# Patient Record
Sex: MALE | Race: WHITE | NOT HISPANIC OR LATINO | Employment: OTHER | URBAN - NONMETROPOLITAN AREA
[De-identification: names, ages, dates, MRNs, and addresses within clinical notes are randomized per-mention and may not be internally consistent; named-entity substitution may affect disease eponyms.]

---

## 2017-05-15 RX ORDER — METOPROLOL SUCCINATE 25 MG/1
25 TABLET, EXTENDED RELEASE ORAL DAILY
Qty: 30 TABLET | Refills: 6 | Status: SHIPPED | OUTPATIENT
Start: 2017-05-15 | End: 2017-05-15 | Stop reason: SDUPTHER

## 2017-05-15 RX ORDER — METOPROLOL SUCCINATE 25 MG/1
12.5 TABLET, EXTENDED RELEASE ORAL DAILY
Qty: 15 TABLET | Refills: 6 | Status: SHIPPED | OUTPATIENT
Start: 2017-05-15 | End: 2017-07-19

## 2017-06-07 ENCOUNTER — OFFICE VISIT (OUTPATIENT)
Dept: CARDIOLOGY | Facility: CLINIC | Age: 82
End: 2017-06-07

## 2017-06-07 VITALS
HEIGHT: 71 IN | BODY MASS INDEX: 26.88 KG/M2 | DIASTOLIC BLOOD PRESSURE: 50 MMHG | WEIGHT: 192 LBS | HEART RATE: 60 BPM | SYSTOLIC BLOOD PRESSURE: 122 MMHG

## 2017-06-07 DIAGNOSIS — I48.3 TYPICAL ATRIAL FLUTTER (HCC): ICD-10-CM

## 2017-06-07 DIAGNOSIS — I25.10 CORONARY ARTERY DISEASE INVOLVING NATIVE CORONARY ARTERY OF NATIVE HEART WITHOUT ANGINA PECTORIS: ICD-10-CM

## 2017-06-07 DIAGNOSIS — E11.9 TYPE 2 DIABETES MELLITUS WITHOUT COMPLICATION, WITHOUT LONG-TERM CURRENT USE OF INSULIN (HCC): ICD-10-CM

## 2017-06-07 DIAGNOSIS — E78.5 HYPERLIPIDEMIA, UNSPECIFIED HYPERLIPIDEMIA TYPE: Primary | ICD-10-CM

## 2017-06-07 DIAGNOSIS — I25.10 ATHEROSCLEROSIS OF NATIVE CORONARY ARTERY OF NATIVE HEART WITHOUT ANGINA PECTORIS: ICD-10-CM

## 2017-06-07 DIAGNOSIS — I10 BENIGN ESSENTIAL HYPERTENSION: ICD-10-CM

## 2017-06-07 DIAGNOSIS — I77.810 DILATED AORTIC ROOT (HCC): ICD-10-CM

## 2017-06-07 PROCEDURE — 99213 OFFICE O/P EST LOW 20 MIN: CPT | Performed by: INTERNAL MEDICINE

## 2017-06-07 NOTE — PROGRESS NOTES
Evgeny Carter  83 y.o. male    06/07/2017  1. Hyperlipidemia, unspecified hyperlipidemia type    2. Atherosclerosis of native coronary artery of native heart without angina pectoris    3. Benign essential hypertension    4. Typical atrial flutter    5. Coronary artery disease involving native coronary artery of native heart without angina pectoris    6. Type 2 diabetes mellitus without complication, without long-term current use of insulin    7. Dilated aortic root        History of Present Illness: routine follow-up    82 years old patient appropriate to his age with history of hypertension, hypertensive disease, mildly dilated aortic root on echocardiographic study done in 2016 with a history of for CAD status post PTCA and stent.  The patient have history of symptomatic counterclockwise atrial flutter underwent EP study and flutter ablation with a significant improvement in quality of life.  Patient denies orthopnea, PND, nauseous, vomiting.  Unable to take the statin due to significant muscle cramping significant improvement after discontinuations.  No fever or chills reported.  No dysuria or hematuria reported.  No palpitations or fluttering syncope or near syncopal episode was reported.            SUBJECTIVE    No Known Allergies      Past Medical History:   Diagnosis Date   • Allergic rhinitis    • Artificial lens present     in position   • Benign neoplasm of skin of eyelid    • Cataract    • Coronary arteriosclerosis    • Cortical senile cataract    • Cough    • Essential hypertension    • Essential hypertension    • Hypercholesterolemia    • Hypercholesterolemia    • Nonexudative age-related macular degeneration     mild   • Nuclear cataract    • Open-angle glaucoma     s/p trabec OS, doing well      • Primary open angle glaucoma     OS, s/p trabeculectomy; IOP down with timolol OD     • Pseudophakia of right eye    • Rhinitis    • Upper respiratory infection    • Vitreous detachment     peripheral          Past Surgical History:   Procedure Laterality Date   • ANKLE SURGERY  07/29/2000    Ankle surgery (ORIF right ankle fracture. Bimalleolar right fracture)   • APPENDECTOMY     • CARDIAC CATHETERIZATION  07/02/1987    Cardiac cath 89596 (Coronary arthosclerotic heart disease. maintained patency of RCA. Mild inferior hypokinesis.)   • CARDIAC CATHETERIZATION  06/30/1987    Cardiac cath 37056 (Coronary atherosclerotic heart disease. Occluded RCA. Acute inferior infarction. S/P successful angioplasty of RCA)   • CATARACT EXTRACTION  04/01/2010    Remove cataract, insert lens (Complicated cataract extraction with intraocular lens implantation, right eye, Marcelo SN-60  serial # [id number]: 20.5 diopter)   • ENDOSCOPY  02/19/1990    EGD 87586 (Olympus video endoscope.Fibrin-like patch in stomach)   • EYE SURGERY  02/02/2005    Eye surgery procedure (Repair of operative wound leak, left eye. S/P trabeculectomy with operative wound leak)   • EYE SURGERY  01/19/2005    Eye surgery procedure (Trabeculectomy, left eye. 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 degeneration)    • OTHER SURGICAL HISTORY  02/26/2015    EXTENDED VISUAL FIELDS STUDY 51992 (Primary open angle glaucoma)    • OTHER SURGICAL HISTORY  02/26/2015    EXTENDED VISUAL FIELDS STUDY 12237 (Primary open angle glaucoma)    • OTHER SURGICAL HISTORY  06/16/2016    OCT DISC NFL 25160 (Open-angle glaucoma)    • OTHER SURGICAL HISTORY      OCT DISC NFL 05530 (Primary open angle glaucoma) (3): 06/17/2015, 03/19/2014, 03/04/2013   • OTHER SURGICAL HISTORY  06/16/2016    OPTIC NERVE HEAD EVAL PERFORMED 2027F (Open-angle glaucoma)    • UVULECTOMY  1969    EXCISION OF UVULA 09418 (partial uvulectomy with excision of papilloma. Redunant uvula with a small  "papilloma)         No family history on file.      Social History     Social History   • Marital status:      Spouse name: N/A   • Number of children: N/A   • Years of education: N/A     Occupational History   • Not on file.     Social History Main Topics   • Smoking status: Former Smoker   • Smokeless tobacco: Current User     Types: Snuff   • Alcohol use No   • Drug use: No   • Sexual activity: Defer     Other Topics Concern   • Not on file     Social History Narrative         Current Outpatient Prescriptions   Medication Sig Dispense Refill   • aspirin 81 MG tablet Take 81 mg by mouth Every Night.     • lisinopril-hydrochlorothiazide (PRINZIDE,ZESTORETIC) 20-12.5 MG per tablet Take 1 tablet by mouth Daily.     • metFORMIN (GLUCOPHAGE) 500 MG tablet Take 500 mg by mouth 2 (Two) Times a Day.     • metoprolol succinate XL (TOPROL-XL) 25 MG 24 hr tablet Take 0.5 tablets by mouth Daily. 15 tablet 6   • Multiple Vitamins-Minerals (MENS MULTIVITAMIN PLUS PO) Take 1 tablet by mouth Daily.     • timolol (TIMOPTIC) 0.5 % ophthalmic solution Administer 1 drop to the right eye 2 (Two) Times a Day. 15 mL 3     No current facility-administered medications for this visit.          OBJECTIVE    /50  Pulse 60  Ht 71\" (180.3 cm)  Wt 192 lb (87.1 kg)  BMI 26.78 kg/m2        Review of Systems     Constitutional:  Denies recent weight loss, weight gain, fever or chills, no change in exercise tolerance     HENT:  Denies any hearing loss, epistaxis, hoarseness, or difficulty speaking.     Eyes: Wears eyeglasses or contact lenses     Respiratory:  Denies dyspnea with exertion,no cough, wheezing, or hemoptysis.     Cardiovascular: Negative for palpations, chest pain, orthopnea, PND, peripheral edema, syncope, or claudication.     Gastrointestinal:  Denies change in bowel habits, dyspepsia, ulcer disease, hematochezia, or melena.     Endocrine: Negative for cold intolerance, heat intolerance, polydipsia, polyphagia and " polyuria. Denies any history of weight change, heat/cold intolerance, polydipsia, polyuria     Genitourinary: Negative.      Musculoskeletal: Denies any history of arthritic symptoms or back problems     Skin:  Denies any change in hair or nails, rashes, or skin lesions.     Allergic/Immunologic: Negative.  Negative for environmental allergies, food allergies and immunocompromised state.     Neurological:  Denies any history of recurrent headaches, strokes, TIA, or seizure disorder.     Hematological: Denies any food allergies, seasonal allergies, bleeding disorders, or lymphadenopathy.     Psychiatric/Behavioral: Denies any history of depression, substance abuse, or change in cognitive function.         Physical Exam     Constitutional: Cooperative, alert and oriented, well-developed, well-nourished, in no acute distress.     HENT:   Head: Normocephalic, normal hair patterns, no masses or tenderness.  Ears, Nose, and Throat: No gross abnormalities. No pallor or cyanosis. Dentition good.   Eyes: EOMS intact, PERRL, conjunctivae and lids unremarkable. Fundoscopic exam and visual fields not performed.   Neck: No palpable masses or adenopathy, no thyromegaly, no JVD, carotid pulses are full and equal bilaterally and without  Bruits.     Cardiovascular: Regular rhythm, S1 and S2 normal, no S3 or S4. Apical impulse not displaced. No murmurs, gallops, or rubs detected.     Pulmonary/Chest: Chest: normal symmetry, no tenderness to palpation, normal respiratory excursion, no intercostal retraction, no use of accessory muscles.            Pulmonary: Normal breath sounds. No rales or ronchi.    Abdominal: Abdomen soft, bowel sounds normoactive, no masses, no hepatosplenomegaly, non-tender, no bruits.     Musculoskeletal: No deformities, clubbing, cyanosis, erythema, or edema observed. There are no spinal abnormalities noted. Normal muscle strength and tone. Pulses full and equal in all extremities, no bruits auscultated.      Neurological: No gross motor or sensory deficits noted, affect appropriate, oriented to time, person, place.     Skin: Warm and dry to the touch, no apparent skin lesions or masses noted.     Psychiatric: He has a normal mood and affect. His behavior is normal. Judgment and thought content normal.         Procedures      Lab Results   Component Value Date    WBC 7.6 11/10/2016    HGB 13.5 (L) 11/10/2016    HCT 38.9 (L) 11/10/2016    MCV 94.6 11/10/2016     11/10/2016     Lab Results   Component Value Date    GLUCOSE 130 (H) 11/10/2016    BUN 20 11/10/2016    CREATININE 1.0 11/10/2016    CO2 29 11/10/2016    CALCIUM 9.0 11/10/2016    ALBUMIN 4.1 11/10/2016    AST 28 11/10/2016    ALT 28 11/10/2016     No results found for: CHOL  Lab Results   Component Value Date    TRIG 188 10/20/2016     Lab Results   Component Value Date    HDL 37 (L) 10/20/2016     Lab Results   Component Value Date    LDLCALC 94 10/20/2016     No results found for: LDL  No results found for: HDLLDLRATIO  No components found for: CHOLHDL  No results found for: HGBA1C  Lab Results   Component Value Date    TSH 1.00 10/19/2016           ASSESSMENT AND PLAN  #1 CAD status post PTCA stent #2 hypertension with hypertensive heart disease #3 mile dilated aortic root with diameter 4 #4 history of hyperlipidemia unable to tolerate the statin    Clinically there is no sign of any cardiac decompensation based on the clinical history physical finding.  No evidence of any active ischemia.  Patient doing remarkably well after the EP study and flutter ablations.  The last echo in 2016 normal left and a systolic function with trace tricuspid regurgitation and mildly dilated aortic root.  We'll arrange an echocardiographic study in 1 year or so.  He will continue metformin for management of diabetes.  Unable to tolerate the statin due to significant muscle cramping.  Continue lisinopril , chlorothiazide with history of hypertension/ hypertensive heart  disease metoprolol for same reason and CAD and aspirin with history of CAD.  We'll see him back in 6 month R depends on patient clinical conditions.    Diagnoses and all orders for this visit:    Hyperlipidemia, unspecified hyperlipidemia type    Atherosclerosis of native coronary artery of native heart without angina pectoris    Benign essential hypertension    Typical atrial flutter    Coronary artery disease involving native coronary artery of native heart without angina pectoris    Type 2 diabetes mellitus without complication, without long-term current use of insulin    Dilated aortic root        Johnny Forbes MD  6/7/2017  2:03 PM

## 2017-06-15 ENCOUNTER — OFFICE VISIT (OUTPATIENT)
Dept: OPHTHALMOLOGY | Facility: CLINIC | Age: 82
End: 2017-06-15

## 2017-06-15 DIAGNOSIS — E11.9 DIABETES MELLITUS WITHOUT COMPLICATION (HCC): Primary | ICD-10-CM

## 2017-06-15 DIAGNOSIS — H25.012 CORTICAL AGE-RELATED CATARACT, LEFT: ICD-10-CM

## 2017-06-15 DIAGNOSIS — H40.2222 CHRONIC ANGLE-CLOSURE GLAUCOMA OF LEFT EYE, MODERATE STAGE: ICD-10-CM

## 2017-06-15 DIAGNOSIS — IMO0002 NUCLEAR CATARACT, LEFT: ICD-10-CM

## 2017-06-15 PROCEDURE — 99213 OFFICE O/P EST LOW 20 MIN: CPT | Performed by: OPHTHALMOLOGY

## 2017-06-15 NOTE — PROGRESS NOTES
Subjective   Evgeny Carter is a 84 y.o. male.   Chief Complaint   Patient presents with   • Cataract   • Glaucoma   • Pseudophakia     HPI     No vision change       Last edited by Derek Tubbs MD on 6/15/2017  9:02 AM.       Review of Systems    Objective   Base Eye Exam     Visual Acuity (Snellen - Linear)      Right Left   Dist cc 20/20 HM       Correction:  Glasses      Tonometry (Applanation, 9:03 AM)      Right Left   Pressure 11 10         Dilation     Both eyes:  2.5% Matheus Synephrine, 1.0% Mydriacyl @ 9:11 AM            Slit Lamp and Fundus Exam     External Exam      Right Left    External Normal Normal      Slit Lamp Exam      Right Left    Lids/Lashes 3mm EIC lateral RUL margin Normal    Conjunctiva/Sclera White and quiet Bleb: Cystic    Cornea Clear Clear    Anterior Chamber Deep and quiet Deep and quiet    Iris Round and reactive Round and reactive    Lens Posterior chamber intraocular lens 3+ Nuclear sclerosis, 3+ Cortical cataract    Vitreous Normal       Fundus Exam      Right Left    Disc Normal rim 0.2 ST, 0.25 IT     Macula Normal not seen    Vessels Normal     Periphery Normal                 Assessment/Plan   Diagnoses and all orders for this visit:    Diabetes mellitus without complication    Nuclear cataract, left    Cortical age-related cataract, left    Chronic angle-closure glaucoma of left eye, moderate stage  Comments:  stable s/p trabeculectomy    Plan:    rec ophth f/u 6 months, suggest [facility]

## 2017-06-29 DIAGNOSIS — H40.1191 PRIMARY OPEN-ANGLE GLAUCOMA, MILD STAGE, UNSPECIFIED LATERALITY: ICD-10-CM

## 2017-06-29 RX ORDER — TIMOLOL MALEATE 5 MG/ML
SOLUTION/ DROPS OPHTHALMIC
Qty: 5 ML | Refills: 6 | Status: SHIPPED | OUTPATIENT
Start: 2017-06-29 | End: 2018-05-04

## 2017-07-07 ENCOUNTER — OFFICE VISIT (OUTPATIENT)
Dept: CARDIOLOGY | Facility: CLINIC | Age: 82
End: 2017-07-07

## 2017-07-07 VITALS
HEIGHT: 71 IN | DIASTOLIC BLOOD PRESSURE: 70 MMHG | WEIGHT: 188 LBS | SYSTOLIC BLOOD PRESSURE: 120 MMHG | HEART RATE: 80 BPM | BODY MASS INDEX: 26.32 KG/M2

## 2017-07-07 DIAGNOSIS — I25.10 ATHEROSCLEROSIS OF NATIVE CORONARY ARTERY OF NATIVE HEART WITHOUT ANGINA PECTORIS: ICD-10-CM

## 2017-07-07 DIAGNOSIS — I77.810 DILATED AORTIC ROOT (HCC): Primary | ICD-10-CM

## 2017-07-07 DIAGNOSIS — I48.0 PAROXYSMAL ATRIAL FIBRILLATION (HCC): ICD-10-CM

## 2017-07-07 DIAGNOSIS — I25.10 CORONARY ARTERY DISEASE INVOLVING NATIVE CORONARY ARTERY OF NATIVE HEART WITHOUT ANGINA PECTORIS: ICD-10-CM

## 2017-07-07 DIAGNOSIS — I48.3 TYPICAL ATRIAL FLUTTER (HCC): ICD-10-CM

## 2017-07-07 DIAGNOSIS — I10 BENIGN ESSENTIAL HYPERTENSION: ICD-10-CM

## 2017-07-07 DIAGNOSIS — E11.9 TYPE 2 DIABETES MELLITUS WITHOUT COMPLICATION, WITHOUT LONG-TERM CURRENT USE OF INSULIN (HCC): ICD-10-CM

## 2017-07-07 DIAGNOSIS — E78.5 HYPERLIPIDEMIA, UNSPECIFIED HYPERLIPIDEMIA TYPE: ICD-10-CM

## 2017-07-07 PROCEDURE — 99213 OFFICE O/P EST LOW 20 MIN: CPT | Performed by: INTERNAL MEDICINE

## 2017-07-07 PROCEDURE — 93000 ELECTROCARDIOGRAM COMPLETE: CPT | Performed by: INTERNAL MEDICINE

## 2017-07-07 RX ORDER — AMIODARONE HYDROCHLORIDE 200 MG/1
200 TABLET ORAL DAILY
Qty: 60 TABLET | Refills: 5 | Status: SHIPPED | OUTPATIENT
Start: 2017-07-07 | End: 2017-07-19 | Stop reason: SDUPTHER

## 2017-07-07 NOTE — PROGRESS NOTES
Evgeny Carter  84 y.o. male    07/07/2017  1. Dilated aortic root    2. Hyperlipidemia, unspecified hyperlipidemia type    3. Atherosclerosis of native coronary artery of native heart without angina pectoris    4. Type 2 diabetes mellitus without complication, without long-term current use of insulin    5. Benign essential hypertension    6. Coronary artery disease involving native coronary artery of native heart without angina pectoris    7. Typical atrial flutter    8. Paroxysmal atrial fibrillation        History of Present Illness: Presented for evaluations of palpitation    84 years old patient with history of CAD status post PTA stent, hypertension, atrial flutter with a counterclockwise mechanism underwent EP study and flutter ablations.  Having off-and-on palpitation and presented to the office.  EKG done and noted to be in atrial fibrillation with underlying right bundle branch block morphology the resting heart rate 110 bpm.  Patient denies any chest pain, nauseous, vomiting syncope or near syncopal episode.  Denies any polydipsia polyuria.  Denies any fever cough which is    Denies any dysuria or hematuria.  Had mildly dilated aortic root and previous echocardiographic study.          SUBJECTIVE    No Known Allergies      Past Medical History:   Diagnosis Date   • Allergic rhinitis    • Artificial lens present     in position   • Benign neoplasm of skin of eyelid    • Cataract    • Coronary arteriosclerosis    • Cortical senile cataract    • Cough    • Essential hypertension    • Essential hypertension    • Hypercholesterolemia    • Hypercholesterolemia    • Nonexudative age-related macular degeneration     mild   • Nuclear cataract    • Open-angle glaucoma     s/p trabec OS, doing well      • Primary open angle glaucoma     OS, s/p trabeculectomy; IOP down with timolol OD     • Pseudophakia of right eye    • Rhinitis    • Upper respiratory infection    • Vitreous detachment     peripheral          Past Surgical History:   Procedure Laterality Date   • ANKLE SURGERY  07/29/2000    Ankle surgery (ORIF right ankle fracture. Bimalleolar right fracture)   • APPENDECTOMY     • CARDIAC CATHETERIZATION  07/02/1987    Cardiac cath 42295 (Coronary arthosclerotic heart disease. maintained patency of RCA. Mild inferior hypokinesis.)   • CARDIAC CATHETERIZATION  06/30/1987    Cardiac cath 94713 (Coronary atherosclerotic heart disease. Occluded RCA. Acute inferior infarction. S/P successful angioplasty of RCA)   • CATARACT EXTRACTION  04/01/2010    Remove cataract, insert lens (Complicated cataract extraction with intraocular lens implantation, right eye, Marcelo SN-60  serial # [id number]: 20.5 diopter)   • ENDOSCOPY  02/19/1990    EGD 28931 (Olympus video endoscope.Fibrin-like patch in stomach)   • EYE SURGERY  02/02/2005    Eye surgery procedure (Repair of operative wound leak, left eye. S/P trabeculectomy with operative wound leak)   • EYE SURGERY  01/19/2005    Eye surgery procedure (Trabeculectomy, left eye. 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 degeneration)    • OTHER SURGICAL HISTORY  02/26/2015    EXTENDED VISUAL FIELDS STUDY 26043 (Primary open angle glaucoma)    • OTHER SURGICAL HISTORY  02/26/2015    EXTENDED VISUAL FIELDS STUDY 78643 (Primary open angle glaucoma)    • OTHER SURGICAL HISTORY  06/16/2016    OCT DISC NFL 50844 (Open-angle glaucoma)    • OTHER SURGICAL HISTORY      OCT DISC NFL 93212 (Primary open angle glaucoma) (3): 06/17/2015, 03/19/2014, 03/04/2013   • OTHER SURGICAL HISTORY  06/16/2016    OPTIC NERVE HEAD EVAL PERFORMED 2027F (Open-angle glaucoma)    • UVULECTOMY  1969    EXCISION OF UVULA 65038 (partial uvulectomy with excision of papilloma. Redunant uvula with a small  "papilloma)         No family history on file.      Social History     Social History   • Marital status:      Spouse name: N/A   • Number of children: N/A   • Years of education: N/A     Occupational History   • Not on file.     Social History Main Topics   • Smoking status: Former Smoker   • Smokeless tobacco: Current User     Types: Snuff   • Alcohol use No   • Drug use: No   • Sexual activity: Defer     Other Topics Concern   • Not on file     Social History Narrative         Current Outpatient Prescriptions   Medication Sig Dispense Refill   • amiodarone (PACERONE) 200 MG tablet Take 1 tablet by mouth Daily. Bid for 3 days then, once daily 60 tablet 5   • apixaban (ELIQUIS) 5 MG tablet tablet Take 1 tablet by mouth 2 (Two) Times a Day. 60 tablet 6   • aspirin 81 MG tablet Take 81 mg by mouth Every Night.     • lisinopril-hydrochlorothiazide (PRINZIDE,ZESTORETIC) 20-12.5 MG per tablet Take 1 tablet by mouth Daily.     • metFORMIN (GLUCOPHAGE) 500 MG tablet Take 500 mg by mouth 2 (Two) Times a Day.     • metoprolol succinate XL (TOPROL-XL) 25 MG 24 hr tablet Take 0.5 tablets by mouth Daily. 15 tablet 6   • Multiple Vitamins-Minerals (MENS MULTIVITAMIN PLUS PO) Take 1 tablet by mouth Daily.     • timolol (TIMOPTIC) 0.5 % ophthalmic solution INSTILL ONE DROP IN EACH EYE TWO TIMES A DAY 5 mL 6     No current facility-administered medications for this visit.          OBJECTIVE    /70  Pulse 80  Ht 71\" (180.3 cm)  Wt 188 lb (85.3 kg)  BMI 26.22 kg/m2        Review of Systems     Constitutional:  Denies recent weight loss, weight gain, fever or chills, no change in exercise tolerance     HENT:  Denies any hearing loss, epistaxis, hoarseness, or difficulty speaking.     Eyes: Wears eyeglasses or contact lenses     Respiratory:  Denies dyspnea with exertion,no cough, wheezing, or hemoptysis.     Cardiovascular:See H&P.     Gastrointestinal:  Denies change in bowel habits, dyspepsia, ulcer disease, " hematochezia, or melena.     Endocrine: Negative for cold intolerance, heat intolerance, polydipsia, polyphagia and polyuria. Denies any history of weight change, heat/cold intolerance, polydipsia, polyuria     Genitourinary: Negative.      Musculoskeletal: Denies any history of arthritic symptoms or back problems     Skin:  Denies any change in hair or nails, rashes, or skin lesions.     Allergic/Immunologic: Negative.  Negative for environmental allergies, food allergies and immunocompromised state.     Neurological:  Denies any history of recurrent headaches, strokes, TIA, or seizure disorder.     Hematological: Denies any food allergies, seasonal allergies, bleeding disorders, or lymphadenopathy.     Psychiatric/Behavioral: Denies any history of depression, substance abuse, or change in cognitive function.         Physical Exam     Constitutional: Cooperative, alert and oriented, well-developed, well-nourished, in no acute distress.     HENT:   Head: Normocephalic, normal hair patterns, no masses or tenderness.  Ears, Nose, and Throat: No gross abnormalities. No pallor or cyanosis. Dentition good.   Eyes: EOMS intact, PERRL, conjunctivae and lids unremarkable. Fundoscopic exam and visual fields not performed.   Neck: No palpable masses or adenopathy, no thyromegaly, no JVD, carotid pulses are full and equal bilaterally and without  Bruits.     Cardiovascular: Regular rhythm, S1 and S2 normal, no S3 or S4. Apical impulse not displaced. No murmurs, gallops, or rubs detected.     Pulmonary/Chest: Chest: normal symmetry, no tenderness to palpation, normal respiratory excursion, no intercostal retraction, no use of accessory muscles.            Pulmonary: Normal breath sounds. No rales or ronchi.    Abdominal: Abdomen soft, bowel sounds normoactive, no masses, no hepatosplenomegaly, non-tender, no bruits.     Musculoskeletal: No deformities, clubbing, cyanosis, erythema, or edema observed. There are no spinal  abnormalities noted. Normal muscle strength and tone. Pulses full and equal in all extremities, no bruits auscultated.     Neurological: No gross motor or sensory deficits noted, affect appropriate, oriented to time, person, place.     Skin: Warm and dry to the touch, no apparent skin lesions or masses noted.     Psychiatric: He has a normal mood and affect. His behavior is normal. Judgment and thought content normal.           ECG 12 Lead  Date/Time: 7/7/2017 10:59 AM  Performed by: VALENTINE LOWERY  Authorized by: VALENTINE LOWERY   Comparison: not compared with previous ECG   Rhythm: atrial fibrillation  Conduction: right bundle branch block  QRS axis: left                Lab Results   Component Value Date    WBC 7.6 11/10/2016    HGB 13.5 (L) 11/10/2016    HCT 38.9 (L) 11/10/2016    MCV 94.6 11/10/2016     11/10/2016     Lab Results   Component Value Date    GLUCOSE 130 (H) 11/10/2016    BUN 20 11/10/2016    CREATININE 1.0 11/10/2016    CO2 29 11/10/2016    CALCIUM 9.0 11/10/2016    ALBUMIN 4.1 11/10/2016    AST 28 11/10/2016    ALT 28 11/10/2016     No results found for: CHOL  Lab Results   Component Value Date    TRIG 188 10/20/2016     Lab Results   Component Value Date    HDL 37 (L) 10/20/2016     Lab Results   Component Value Date    LDLCALC 94 10/20/2016     No results found for: LDL  No results found for: HDLLDLRATIO  No components found for: CHOLHDL  No results found for: HGBA1C  Lab Results   Component Value Date    TSH 1.00 10/19/2016           ASSESSMENT AND PLAN  #1 paroxysmal atrial fibrillation #2 history of atrial flutter status post EP study and flutter ablation #3 CAD status post PTCA stent    Clinical no sign of any cardiac decompensation based on the clinical history physical finding.  Patient to had developed palpitations noted to be in atrial fibrillation with a resting heart rate 110 bpm.  The patient is on beta blocker 25 mg.  After discussion with the patient and family decided to  start the patient amiodarone 200 mg twice a day for 3 days then 200 mg daily along with initiations of oral anticoagulation with eliquis and 5 mg twice a day.  Given the off-and-on palpitations decided to different options such as stress a severely echocardiographic guided electrical cardioversion or continue oral anticoagulation for 3 week and then consider cardioversion if still in atrial fibrillation.  Pros and cons of both option discussed with the patient and the family.  We opted for the second options.  Procedure risk and benefits regarding the electrical cardioversion discussed with the patient and the family.    Diagnoses and all orders for this visit:    Dilated aortic root    Hyperlipidemia, unspecified hyperlipidemia type    Atherosclerosis of native coronary artery of native heart without angina pectoris    Type 2 diabetes mellitus without complication, without long-term current use of insulin    Benign essential hypertension    Coronary artery disease involving native coronary artery of native heart without angina pectoris    Typical atrial flutter  -     ECG 12 Lead    Paroxysmal atrial fibrillation        Johnny Forbes MD  7/7/2017  10:55 AM

## 2017-07-13 ENCOUNTER — DOCUMENTATION (OUTPATIENT)
Dept: CARDIOLOGY | Facility: CLINIC | Age: 82
End: 2017-07-13

## 2017-07-19 ENCOUNTER — OFFICE VISIT (OUTPATIENT)
Dept: CARDIOLOGY | Facility: CLINIC | Age: 82
End: 2017-07-19

## 2017-07-19 VITALS
HEIGHT: 71 IN | WEIGHT: 190 LBS | BODY MASS INDEX: 26.6 KG/M2 | SYSTOLIC BLOOD PRESSURE: 110 MMHG | DIASTOLIC BLOOD PRESSURE: 60 MMHG | HEART RATE: 48 BPM

## 2017-07-19 DIAGNOSIS — I48.3 TYPICAL ATRIAL FLUTTER (HCC): Primary | ICD-10-CM

## 2017-07-19 DIAGNOSIS — I77.810 DILATED AORTIC ROOT (HCC): ICD-10-CM

## 2017-07-19 DIAGNOSIS — E78.5 HYPERLIPIDEMIA, UNSPECIFIED HYPERLIPIDEMIA TYPE: ICD-10-CM

## 2017-07-19 DIAGNOSIS — I10 BENIGN ESSENTIAL HYPERTENSION: ICD-10-CM

## 2017-07-19 DIAGNOSIS — E11.9 TYPE 2 DIABETES MELLITUS WITHOUT COMPLICATION, WITHOUT LONG-TERM CURRENT USE OF INSULIN (HCC): ICD-10-CM

## 2017-07-19 DIAGNOSIS — I25.10 CORONARY ARTERY DISEASE INVOLVING NATIVE CORONARY ARTERY OF NATIVE HEART WITHOUT ANGINA PECTORIS: ICD-10-CM

## 2017-07-19 DIAGNOSIS — I48.0 PAROXYSMAL ATRIAL FIBRILLATION (HCC): ICD-10-CM

## 2017-07-19 PROCEDURE — 99213 OFFICE O/P EST LOW 20 MIN: CPT | Performed by: INTERNAL MEDICINE

## 2017-07-19 PROCEDURE — 93000 ELECTROCARDIOGRAM COMPLETE: CPT | Performed by: INTERNAL MEDICINE

## 2017-07-19 RX ORDER — AMIODARONE HYDROCHLORIDE 100 MG/1
100 TABLET ORAL DAILY
Qty: 30 TABLET | Refills: 3 | Status: SHIPPED | OUTPATIENT
Start: 2017-07-19 | End: 2018-05-04

## 2017-07-19 RX ORDER — AMIODARONE HYDROCHLORIDE 200 MG/1
100 TABLET ORAL DAILY
Qty: 60 TABLET | Refills: 5 | Status: SHIPPED | OUTPATIENT
Start: 2017-07-19 | End: 2017-07-19

## 2017-07-19 NOTE — PROGRESS NOTES
Evgeny Carter  84 y.o. male    07/19/2017  1. Typical atrial flutter    2. Coronary artery disease involving native coronary artery of native heart without angina pectoris    3. Benign essential hypertension    4. Type 2 diabetes mellitus without complication, without long-term current use of insulin    5. Paroxysmal atrial fibrillation    6. Dilated aortic root    7. Hyperlipidemia, unspecified hyperlipidemia type        History of Present Illness:Routine follow-up    84 years old patient with a past medical history significant for hypertension, hypertensive heart disease, CAD status post PTCA stent, atrial flutter with a counterclockwise mechanism state of post-EP study and flutter ablation and noted to be in atrial fibrillation with a resting heart rate 110 bpm.  Patient started on oral anticoagulation and amiodarone.  He presented to the office to discuss the options of electrical cardioversion.  The patient noted to be in sinus bradycardia with a heart rate of 48 bpm.  He denies orthopnea, PND, syncope or near syncopal episode.  Denies any polydipsia polyuria.  Denies any fever cough or chills.  No dysuria or hematuria reported.            SUBJECTIVE    No Known Allergies      Past Medical History:   Diagnosis Date   • Allergic rhinitis    • Artificial lens present     in position   • Benign neoplasm of skin of eyelid    • Cataract    • Coronary arteriosclerosis    • Cortical senile cataract    • Cough    • Essential hypertension    • Essential hypertension    • Hypercholesterolemia    • Hypercholesterolemia    • Nonexudative age-related macular degeneration     mild   • Nuclear cataract    • Open-angle glaucoma     s/p trabec OS, doing well      • Primary open angle glaucoma     OS, s/p trabeculectomy; IOP down with timolol OD     • Pseudophakia of right eye    • Rhinitis    • Upper respiratory infection    • Vitreous detachment     peripheral         Past Surgical History:   Procedure Laterality Date    • ANKLE SURGERY  07/29/2000    Ankle surgery (ORIF right ankle fracture. Bimalleolar right fracture)   • APPENDECTOMY     • CARDIAC CATHETERIZATION  07/02/1987    Cardiac cath 51192 (Coronary arthosclerotic heart disease. maintained patency of RCA. Mild inferior hypokinesis.)   • CARDIAC CATHETERIZATION  06/30/1987    Cardiac cath 09521 (Coronary atherosclerotic heart disease. Occluded RCA. Acute inferior infarction. S/P successful angioplasty of RCA)   • CATARACT EXTRACTION  04/01/2010    Remove cataract, insert lens (Complicated cataract extraction with intraocular lens implantation, right eye, Marcelo SN-60  serial # [id number]: 20.5 diopter)   • ENDOSCOPY  02/19/1990    EGD [id number] (Olympus video endoscope.Fibrin-like patch in stomach)   • EYE SURGERY  02/02/2005    Eye surgery procedure (Repair of operative wound leak, left eye. S/P trabeculectomy with operative wound leak)   • EYE SURGERY  01/19/2005    Eye surgery procedure (Trabeculectomy, left eye. 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 degeneration)    • OTHER SURGICAL HISTORY  02/26/2015    EXTENDED VISUAL FIELDS STUDY 91743 (Primary open angle glaucoma)    • OTHER SURGICAL HISTORY  02/26/2015    EXTENDED VISUAL FIELDS STUDY 94345 (Primary open angle glaucoma)    • OTHER SURGICAL HISTORY  06/16/2016    OCT DISC NFL 62385 (Open-angle glaucoma)    • OTHER SURGICAL HISTORY      OCT DISC NFL 40728 (Primary open angle glaucoma) (3): 06/17/2015, 03/19/2014, 03/04/2013   • OTHER SURGICAL HISTORY  06/16/2016    OPTIC NERVE HEAD EVAL PERFORMED 2027F (Open-angle glaucoma)    • UVULECTOMY  1969    EXCISION OF UVULA 67647 (partial uvulectomy with excision of papilloma. Redunant uvula with a small papilloma)         No family history on file.      Social  "History     Social History   • Marital status:      Spouse name: N/A   • Number of children: N/A   • Years of education: N/A     Occupational History   • Not on file.     Social History Main Topics   • Smoking status: Former Smoker   • Smokeless tobacco: Current User     Types: Snuff   • Alcohol use No   • Drug use: No   • Sexual activity: Defer     Other Topics Concern   • Not on file     Social History Narrative         Current Outpatient Prescriptions   Medication Sig Dispense Refill   • amiodarone (PACERONE) 100 MG tablet Take 1 tablet by mouth Daily. 30 tablet 3   • apixaban (ELIQUIS) 5 MG tablet tablet Take 1 tablet by mouth 2 (Two) Times a Day. 60 tablet 6   • aspirin 81 MG tablet Take 81 mg by mouth Every Night.     • lisinopril-hydrochlorothiazide (PRINZIDE,ZESTORETIC) 20-12.5 MG per tablet Take 1 tablet by mouth Daily.     • metFORMIN (GLUCOPHAGE) 500 MG tablet Take 500 mg by mouth 2 (Two) Times a Day.     • Multiple Vitamins-Minerals (MENS MULTIVITAMIN PLUS PO) Take 1 tablet by mouth Daily.     • timolol (TIMOPTIC) 0.5 % ophthalmic solution INSTILL ONE DROP IN EACH EYE TWO TIMES A DAY 5 mL 6     No current facility-administered medications for this visit.          OBJECTIVE    /60  Pulse (!) 48  Ht 71\" (180.3 cm)  Wt 190 lb (86.2 kg)  BMI 26.5 kg/m2        Review of Systems     Constitutional:  Denies recent weight loss, weight gain, fever or chills, no change in exercise tolerance     HENT:  Denies any hearing loss, epistaxis, hoarseness, or difficulty speaking.     Eyes: Wears eyeglasses or contact lenses     Respiratory:  Denies dyspnea with exertion,no cough, wheezing, or hemoptysis.     Cardiovascular: Negative for palpations, chest pain, orthopnea, PND, peripheral edema, syncope, or claudication.     Gastrointestinal:  Denies change in bowel habits, dyspepsia, ulcer disease, hematochezia, or melena.     Endocrine: Negative for cold intolerance, heat intolerance, polydipsia, " polyphagia and polyuria. Denies any history of weight change, heat/cold intolerance, polydipsia, polyuria     Genitourinary: Negative.      Musculoskeletal: Denies any history of arthritic symptoms or back problems     Skin:  Denies any change in hair or nails, rashes, or skin lesions.     Allergic/Immunologic: Negative.  Negative for environmental allergies, food allergies and immunocompromised state.     Neurological:  Denies any history of recurrent headaches, strokes, TIA, or seizure disorder.     Hematological: Denies any food allergies, seasonal allergies, bleeding disorders, or lymphadenopathy.     Psychiatric/Behavioral: Denies any history of depression, substance abuse, or change in cognitive function.         Physical Exam     Constitutional: Cooperative, alert and oriented, well-developed, well-nourished, in no acute distress.     HENT:   Head: Normocephalic, normal hair patterns, no masses or tenderness.  Ears, Nose, and Throat: No gross abnormalities. No pallor or cyanosis. Dentition good.   Eyes: EOMS intact, PERRL, conjunctivae and lids unremarkable. Fundoscopic exam and visual fields not performed.   Neck: No palpable masses or adenopathy, no thyromegaly, no JVD, carotid pulses are full and equal bilaterally and without  Bruits.     Cardiovascular: Regular rhythm, S1 and S2 normal, no S3 or S4. Apical impulse not displaced. No murmurs, gallops, or rubs detected.     Pulmonary/Chest: Chest: normal symmetry, no tenderness to palpation, normal respiratory excursion, no intercostal retraction, no use of accessory muscles.            Pulmonary: Normal breath sounds. No rales or ronchi.    Abdominal: Abdomen soft, bowel sounds normoactive, no masses, no hepatosplenomegaly, non-tender, no bruits.     Musculoskeletal: No deformities, clubbing, cyanosis, erythema, or edema observed. There are no spinal abnormalities noted. Normal muscle strength and tone. Pulses full and equal in all extremities, no bruits  auscultated.     Neurological: No gross motor or sensory deficits noted, affect appropriate, oriented to time, person, place.     Skin: Warm and dry to the touch, no apparent skin lesions or masses noted.     Psychiatric: He has a normal mood and affect. His behavior is normal. Judgment and thought content normal.           ECG 12 Lead  Date/Time: 7/19/2017 4:03 PM  Performed by: VALENTINE LOWERY  Authorized by: VALENTINE LOWERY   Rhythm: sinus bradycardia  Conduction: right bundle branch block  QRS axis: left                Lab Results   Component Value Date    WBC 7.6 11/10/2016    HGB 13.5 (L) 11/10/2016    HCT 38.9 (L) 11/10/2016    MCV 94.6 11/10/2016     11/10/2016     Lab Results   Component Value Date    GLUCOSE 130 (H) 11/10/2016    BUN 20 11/10/2016    CREATININE 1.0 11/10/2016    CO2 29 11/10/2016    CALCIUM 9.0 11/10/2016    ALBUMIN 4.1 11/10/2016    AST 28 11/10/2016    ALT 28 11/10/2016     No results found for: CHOL  Lab Results   Component Value Date    TRIG 188 10/20/2016     Lab Results   Component Value Date    HDL 37 (L) 10/20/2016     Lab Results   Component Value Date    LDLCALC 94 10/20/2016     No results found for: LDL  No results found for: HDLLDLRATIO  No components found for: CHOLHDL  No results found for: HGBA1C  Lab Results   Component Value Date    TSH 1.00 10/19/2016           ASSESSMENT AND PLAN  #1 paroxysmal atrial fibrillation #2 asymptomatic sinus bradycardia with a heart rate 48 bpm #3 history of atrial flutter status post EP study and flutter ablation #4 hypertension #4 CAD stable    Patient converted to sinus rhythm with the help of oral amiodarone currently sinus bradycardia to 48 bpm.    Amiodarone decreased from 200 to 100 mg and discontinue metoprolol  25 mg.   bradycardia seemed to be asymptomatic at the time of evaluations.  I have asked the patient,  EKG  Done on monday reassess the heart rate.  If patient continued remained bradycardia and or having the symptom  the pacemaker can be contemplated as a part of hybrid approach.  He will continue eliquis to decrease risk of cardiac embolic phenomena, aspirin with history of CAD stable, lisinopril for management of hypertension good blood pressure control.  I will see him back in 6 month R depends on patient clinical conditions.    Diagnoses and all orders for this visit:    Typical atrial flutter    Coronary artery disease involving native coronary artery of native heart without angina pectoris    Benign essential hypertension    Type 2 diabetes mellitus without complication, without long-term current use of insulin    Paroxysmal atrial fibrillation  -     ECG 12 Lead    Dilated aortic root    Hyperlipidemia, unspecified hyperlipidemia type    Other orders  -     Discontinue: amiodarone (PACERONE) 200 MG tablet; Take 0.5 tablets by mouth Daily. Bid for 3 days then, once daily  -     amiodarone (PACERONE) 100 MG tablet; Take 1 tablet by mouth Daily.        Johnny Forbes MD  7/19/2017  3:57 PM

## 2017-07-24 ENCOUNTER — DOCUMENTATION (OUTPATIENT)
Dept: CARDIOLOGY | Facility: CLINIC | Age: 82
End: 2017-07-24

## 2017-07-24 NOTE — PROGRESS NOTES
Per Dr. Forbes patient came to the office for a EKG only.  The EKG was shown to Dr. Forbes, he asked patient to cut his Pacerone in half (50mg) daily, and cut  his Eliquis down to 2.5 mg bid.  Patient understood the new directions and was agreeable.  Mr. Carter was told if he has any symptoms to contact our office so we can address them.  Patient was agreeable.  An appointment for 1 month has been scheduled.

## 2017-08-25 ENCOUNTER — OFFICE VISIT (OUTPATIENT)
Dept: CARDIOLOGY | Facility: CLINIC | Age: 82
End: 2017-08-25

## 2017-08-25 VITALS
DIASTOLIC BLOOD PRESSURE: 72 MMHG | HEART RATE: 47 BPM | HEIGHT: 71 IN | SYSTOLIC BLOOD PRESSURE: 118 MMHG | BODY MASS INDEX: 26.46 KG/M2 | WEIGHT: 189 LBS

## 2017-08-25 DIAGNOSIS — I77.810 DILATED AORTIC ROOT (HCC): ICD-10-CM

## 2017-08-25 DIAGNOSIS — I48.0 PAROXYSMAL ATRIAL FIBRILLATION (HCC): ICD-10-CM

## 2017-08-25 DIAGNOSIS — E78.5 HYPERLIPIDEMIA, UNSPECIFIED HYPERLIPIDEMIA TYPE: ICD-10-CM

## 2017-08-25 DIAGNOSIS — E11.9 TYPE 2 DIABETES MELLITUS WITHOUT COMPLICATION, WITHOUT LONG-TERM CURRENT USE OF INSULIN (HCC): ICD-10-CM

## 2017-08-25 DIAGNOSIS — I25.10 CORONARY ARTERY DISEASE INVOLVING NATIVE CORONARY ARTERY OF NATIVE HEART WITHOUT ANGINA PECTORIS: ICD-10-CM

## 2017-08-25 DIAGNOSIS — I10 BENIGN ESSENTIAL HYPERTENSION: ICD-10-CM

## 2017-08-25 DIAGNOSIS — I48.3 TYPICAL ATRIAL FLUTTER (HCC): Primary | ICD-10-CM

## 2017-08-25 PROCEDURE — 99213 OFFICE O/P EST LOW 20 MIN: CPT | Performed by: INTERNAL MEDICINE

## 2017-08-25 PROCEDURE — 93000 ELECTROCARDIOGRAM COMPLETE: CPT | Performed by: INTERNAL MEDICINE

## 2017-08-25 NOTE — PROGRESS NOTES
Evgeny Carter  84 y.o. male    08/25/2017  1. Typical atrial flutter    2. Coronary artery disease involving native coronary artery of native heart without angina pectoris    3. Benign essential hypertension    4. Type 2 diabetes mellitus without complication, without long-term current use of insulin    5. Hyperlipidemia, unspecified hyperlipidemia type    6. Dilated aortic root    7. Paroxysmal atrial fibrillation        History of Present Illness      84 years old patient with a past medical history significant for hypertension, hypertensive heart disease, CAD status post PTCA stent, atrial flutter with counterclockwise mechanism state  post-EP study and flutter ablation and noted to be in atrial fibrillation with a resting heart rate 110 bpm.  Patient started on oral anticoagulation and amiodarone.  Patient recently evaluated and noted to be in sinus bradycardia.  He was on amiodarone 200 mg and metoprolol.  He was asymptomatic.  Amiodarone decreased to 50 mg a day and metoprolol discontinued.  He was asked to come back for evaluations.  He presented to the office today EKG done and finding discussed with the patient.  Sinus bradycardia heart rate 47.  Patient denies any symptom of orthopnea, PND, nauseous, vomiting.  Denies any syncope or near syncopal episode.  Denies any chest pain..  T.  He denies orthopnea, PND, syncope or near syncopal episode.  Denies any polydipsia polyuria.  Denies any fever cough or chills.  No dysuria or hematuria reported.Last echocardiographic study    1.   Mild concentric left ventricular hypertrophy with estimated       ejection fraction 50% to 60%.  2.   Mildly dilated aortic root with diameter of 4.0.  3.   Mildly dilated left atrium with diameter of 4.4.  4.   Aortic valve appears mildly sclerotic without any hemodynamic       significant aortic stenosis. No aortic regurgitation seen.  5.   Pulmonic valve not well visualized. There is trace pulmonic       regurgitation.  There is a trace mitral regurgitation. No       intracardiac mass, pericardial effusion, cardiac thrombus seen.  6.   Normal right ventricular size and function  SUBJECTIVE    No Known Allergies      Past Medical History:   Diagnosis Date   • Allergic rhinitis    • Artificial lens present     in position   • Benign neoplasm of skin of eyelid    • Cataract    • Coronary arteriosclerosis    • Cortical senile cataract    • Cough    • Essential hypertension    • Essential hypertension    • Hypercholesterolemia    • Hypercholesterolemia    • Nonexudative age-related macular degeneration     mild   • Nuclear cataract    • Open-angle glaucoma     s/p trabec OS, doing well      • Primary open angle glaucoma     OS, s/p trabeculectomy; IOP down with timolol OD     • Pseudophakia of right eye    • Rhinitis    • Upper respiratory infection    • Vitreous detachment     peripheral         Past Surgical History:   Procedure Laterality Date   • ANKLE SURGERY  07/29/2000    Ankle surgery (ORIF right ankle fracture. Bimalleolar right fracture)   • APPENDECTOMY     • CARDIAC CATHETERIZATION  07/02/1987    Cardiac cath 02717 (Coronary arthosclerotic heart disease. maintained patency of RCA. Mild inferior hypokinesis.)   • CARDIAC CATHETERIZATION  06/30/1987    Cardiac cath 75373 (Coronary atherosclerotic heart disease. Occluded RCA. Acute inferior infarction. S/P successful angioplasty of RCA)   • CATARACT EXTRACTION  04/01/2010    Remove cataract, insert lens (Complicated cataract extraction with intraocular lens implantation, right eye, Marcelo SN-60  serial # [id number]: 20.5 diopter)   • ENDOSCOPY  02/19/1990    EGD [id number] (Olympus video endoscope.Fibrin-like patch in stomach)   • EYE SURGERY  02/02/2005    Eye surgery procedure (Repair of operative wound leak, left eye. S/P trabeculectomy with operative wound leak)   • EYE SURGERY  01/19/2005    Eye surgery procedure (Trabeculectomy, left eye. Chronic open angle glaucoma,  uncontrolled left eye)   • INJECTION OF MEDICATION  06/09/2014    Kenalog (Allergic rhinitis)    • OTHER SURGICAL HISTORY  06/16/2016    DIL MAC EXAM PERF INC DOC OF +/- MAC THICK 2019F (Nonexudative age-related macular degeneration)    • OTHER SURGICAL HISTORY  02/26/2015    EXTENDED VISUAL FIELDS STUDY 95447 (Primary open angle glaucoma)    • OTHER SURGICAL HISTORY  02/26/2015    EXTENDED VISUAL FIELDS STUDY 69514 (Primary open angle glaucoma)    • OTHER SURGICAL HISTORY  06/16/2016    OCT DISC NFL 62236 (Open-angle glaucoma)    • OTHER SURGICAL HISTORY      OCT DISC NFL 08888 (Primary open angle glaucoma) (3): 06/17/2015, 03/19/2014, 03/04/2013   • OTHER SURGICAL HISTORY  06/16/2016    OPTIC NERVE HEAD EVAL PERFORMED 2027F (Open-angle glaucoma)    • UVULECTOMY  1969    EXCISION OF UVULA 68715 (partial uvulectomy with excision of papilloma. Redunant uvula with a small papilloma)         History reviewed. No pertinent family history.      Social History     Social History   • Marital status:      Spouse name: N/A   • Number of children: N/A   • Years of education: N/A     Occupational History   • Not on file.     Social History Main Topics   • Smoking status: Former Smoker   • Smokeless tobacco: Current User     Types: Snuff   • Alcohol use No   • Drug use: No   • Sexual activity: Defer     Other Topics Concern   • Not on file     Social History Narrative         Current Outpatient Prescriptions   Medication Sig Dispense Refill   • amiodarone (PACERONE) 100 MG tablet Take 1 tablet by mouth Daily. (Patient taking differently: Take 50 mg by mouth Daily.) 30 tablet 3   • apixaban (ELIQUIS) 2.5 MG tablet tablet Take 2.5 mg by mouth 2 (Two) Times a Day.     • aspirin 81 MG tablet Take 81 mg by mouth Every Night.     • lisinopril-hydrochlorothiazide (PRINZIDE,ZESTORETIC) 20-12.5 MG per tablet Take 1 tablet by mouth Daily.     • metFORMIN (GLUCOPHAGE) 500 MG tablet Take 500 mg by mouth 2 (Two) Times a Day.     •  "Multiple Vitamins-Minerals (MENS MULTIVITAMIN PLUS PO) Take 1 tablet by mouth Daily.     • timolol (TIMOPTIC) 0.5 % ophthalmic solution INSTILL ONE DROP IN EACH EYE TWO TIMES A DAY 5 mL 6     No current facility-administered medications for this visit.          OBJECTIVE    /72  Pulse (!) 47  Ht 71\" (180.3 cm)  Wt 189 lb (85.7 kg)  BMI 26.36 kg/m2        Review of Systems     Constitutional:  Denies recent weight loss, weight gain, fever or chills, no change in exercise tolerance     HENT:  Denies any hearing loss, epistaxis, hoarseness, or difficulty speaking.     Eyes: Wears eyeglasses or contact lenses     Respiratory:  Denies dyspnea with exertion,no cough, wheezing, or hemoptysis.     Cardiovascular: Negative for palpations, chest pain, orthopnea, PND, peripheral edema, syncope, or claudication.     Gastrointestinal:  Denies change in bowel habits, dyspepsia, ulcer disease, hematochezia, or melena.     Endocrine: Negative for cold intolerance, heat intolerance, polydipsia, polyphagia and polyuria. Denies any history of weight change, heat/cold intolerance, polydipsia, polyuria     Genitourinary: Negative.      Musculoskeletal: Denies any history of arthritic symptoms or back problems     Skin:  Denies any change in hair or nails, rashes, or skin lesions.     Allergic/Immunologic: Negative.  Negative for environmental allergies, food allergies and immunocompromised state.     Neurological:  Denies any history of recurrent headaches, strokes, TIA, or seizure disorder.     Hematological: Denies any food allergies, seasonal allergies, bleeding disorders, or lymphadenopathy.     Psychiatric/Behavioral: Denies any history of depression, substance abuse, or change in cognitive function.         Physical Exam     Constitutional: Cooperative, alert and oriented, well-developed, well-nourished, in no acute distress.     HENT:   Head: Normocephalic, normal hair patterns, no masses or tenderness.  Ears, Nose, and " Throat: No gross abnormalities. No pallor or cyanosis. Dentition good.   Eyes: EOMS intact, PERRL, conjunctivae and lids unremarkable. Fundoscopic exam and visual fields not performed.   Neck: No palpable masses or adenopathy, no thyromegaly, no JVD, carotid pulses are full and equal bilaterally and without  Bruits.     Cardiovascular: Regular rhythm, S1 and S2 normal, no S3 or S4. Apical impulse not displaced. No murmurs, gallops, or rubs detected.     Pulmonary/Chest: Chest: normal symmetry, no tenderness to palpation, normal respiratory excursion, no intercostal retraction, no use of accessory muscles.            Pulmonary: Normal breath sounds. No rales or ronchi.    Abdominal: Abdomen soft, bowel sounds normoactive, no masses, no hepatosplenomegaly, non-tender, no bruits.     Musculoskeletal: No deformities, clubbing, cyanosis, erythema, or edema observed. There are no spinal abnormalities noted. Normal muscle strength and tone. Pulses full and equal in all extremities, no bruits auscultated.     Neurological: No gross motor or sensory deficits noted, affect appropriate, oriented to time, person, place.     Skin: Warm and dry to the touch, no apparent skin lesions or masses noted.     Psychiatric: He has a normal mood and affect. His behavior is normal. Judgment and thought content normal.           ECG 12 Lead  Date/Time: 8/25/2017 10:27 AM  Performed by: VALENTINE LOWERY  Authorized by: VALENTINE LOWERY   Rhythm: sinus bradycardia  Conduction: right bundle branch block and LAFB              Lab Results   Component Value Date    WBC 7.6 11/10/2016    HGB 13.5 (L) 11/10/2016    HCT 38.9 (L) 11/10/2016    MCV 94.6 11/10/2016     11/10/2016     Lab Results   Component Value Date    GLUCOSE 130 (H) 11/10/2016    BUN 20 11/10/2016    CREATININE 1.0 11/10/2016    CO2 29 11/10/2016    CALCIUM 9.0 11/10/2016    ALBUMIN 4.1 11/10/2016    AST 28 11/10/2016    ALT 28 11/10/2016     No results found for: CHOL  Lab  Results   Component Value Date    TRIG 188 10/20/2016     Lab Results   Component Value Date    HDL 37 (L) 10/20/2016     Lab Results   Component Value Date    LDLCALC 94 10/20/2016     No results found for: LDL  No results found for: HDLLDLRATIO  No components found for: CHOLHDL  No results found for: HGBA1C  Lab Results   Component Value Date    TSH 1.00 10/19/2016           ASSESSMENT AND PLAN  #1 paroxysmal atrial fibrillation #2 asymptomatic sinus bradycardia with a heart rate 48 bpm #3 history of atrial flutter status post EP study and flutter ablation #4 hypertension #4 CAD stable    84 years old with a background history of hypertension, hypertensive heart disease, atrial flutter status post EP study and flutter ablation.  Patient developed atrial fibrillation started on oral amiodarone and anticoagulation and spontaneously converted to sinus bradycardia.  His amiodarone dose decreased from 200 to 50 mg and beta blocker discontinued.  He presented for routine follow-up.  EKG done and finding discussed the patient.  Sinus bradycardia with a right bundle branch block morphology.  Patient to hemodynamically stable and no sinus symptoms testing of congestive heart failure or any evidence of ischemia.  He is a quite asymptomatic and clinically I didn't see any evidence of pacemaker implantation at this stage.  We'll see him back in 6 month R depends on patient clinical condition.  His CAD is stable and good blood pressure control.  Diagnoses and all orders for this visit:    Typical atrial flutter    Coronary artery disease involving native coronary artery of native heart without angina pectoris    Benign essential hypertension    Type 2 diabetes mellitus without complication, without long-term current use of insulin    Hyperlipidemia, unspecified hyperlipidemia type    Dilated aortic root    Paroxysmal atrial fibrillation  -     ECG 12 Lead        Johnny Forbes MD  8/25/2017  10:22 AM

## 2017-12-11 NOTE — TELEPHONE ENCOUNTER
Refill Request came in by fax from   [facility] - [place], [place] - [address] - [phone]  - [phone]   [address]  [address] [place], [place] [address]  Phone: [phone] Fax: [phone]   Refill sent to pharmacy via e-scribe.

## 2018-01-16 ENCOUNTER — HOSPITAL ENCOUNTER (OUTPATIENT)
Facility: HOSPITAL | Age: 83
Setting detail: HOSPITAL OUTPATIENT SURGERY
End: 2018-01-16
Attending: OPHTHALMOLOGY | Admitting: OPHTHALMOLOGY

## 2018-02-26 ENCOUNTER — OFFICE VISIT (OUTPATIENT)
Dept: CARDIOLOGY | Facility: CLINIC | Age: 83
End: 2018-02-26

## 2018-02-26 VITALS
SYSTOLIC BLOOD PRESSURE: 120 MMHG | HEART RATE: 62 BPM | BODY MASS INDEX: 27.16 KG/M2 | DIASTOLIC BLOOD PRESSURE: 80 MMHG | HEIGHT: 71 IN | WEIGHT: 194 LBS

## 2018-02-26 DIAGNOSIS — I25.10 ATHEROSCLEROSIS OF NATIVE CORONARY ARTERY OF NATIVE HEART WITHOUT ANGINA PECTORIS: ICD-10-CM

## 2018-02-26 DIAGNOSIS — I48.3 TYPICAL ATRIAL FLUTTER (HCC): Primary | ICD-10-CM

## 2018-02-26 DIAGNOSIS — I10 BENIGN ESSENTIAL HYPERTENSION: ICD-10-CM

## 2018-02-26 DIAGNOSIS — I48.0 PAROXYSMAL ATRIAL FIBRILLATION (HCC): ICD-10-CM

## 2018-02-26 DIAGNOSIS — Z92.29 PERSONAL HISTORY OF HIGH RISK MEDICATION TREATMENT: Primary | ICD-10-CM

## 2018-02-26 DIAGNOSIS — I77.810 DILATED AORTIC ROOT (HCC): ICD-10-CM

## 2018-02-26 DIAGNOSIS — E11.9 TYPE 2 DIABETES MELLITUS WITHOUT COMPLICATION, WITHOUT LONG-TERM CURRENT USE OF INSULIN (HCC): ICD-10-CM

## 2018-02-26 DIAGNOSIS — I25.10 CORONARY ARTERY DISEASE INVOLVING NATIVE CORONARY ARTERY OF NATIVE HEART WITHOUT ANGINA PECTORIS: ICD-10-CM

## 2018-02-26 DIAGNOSIS — E78.5 HYPERLIPIDEMIA, UNSPECIFIED HYPERLIPIDEMIA TYPE: ICD-10-CM

## 2018-02-26 PROCEDURE — 93000 ELECTROCARDIOGRAM COMPLETE: CPT | Performed by: INTERNAL MEDICINE

## 2018-02-26 PROCEDURE — 99213 OFFICE O/P EST LOW 20 MIN: CPT | Performed by: INTERNAL MEDICINE

## 2018-02-26 NOTE — PROGRESS NOTES
Evgeny Carter  84 y.o. male    02/26/2018  1. Typical atrial flutter    2. Coronary artery disease involving native coronary artery of native heart without angina pectoris    3. Benign essential hypertension    4. Type 2 diabetes mellitus without complication, without long-term current use of insulin    5. Paroxysmal atrial fibrillation    6. Hyperlipidemia, unspecified hyperlipidemia type    7. Dilated aortic root    8. Atherosclerosis of native coronary artery of native heart without angina pectoris        History of Present IllnessRoutine follow-up     84 years old patient with a past medical history significant for hypertension, hypertensive heart disease, CAD status post PTCA stent, atrial flutter with a counterclockwise mechanism state  post-EP study and flutter ablation and noted to be in atrial fibrillation with a resting heart rate 110 bpm.  Patient started on oral anticoagulation and amiodarone. Last echocardiographic study reported to have dilated aortic root . He presented to the office to discuss the options of electrical cardioversion.  The patient noted to be in sinus bradycardia with a heart rate of 62 bpm.  He denies orthopnea, PND, syncope or near syncopal episode.  Denies any polydipsia polyuria.  Denies any fever cough or chills.  No dysuria or hematuria reported.      10/2016     Ref Range & Units 1yr ago     Total Cholesterol 0 - 199 mg/dl 169   Comments: CHOL DESIRED: < 200 MG/DL   Triglycerides 20 - 199 mg/dl 188   Comments: TRIG DESIRED: < 200 MG/DL   HDL Cholesterol 60 - 200 mg/dl 37 (L)   Comments: HDL AVERAGE RISK: 35 - 60 MG/DL   LDL Cholesterol  0 - 129 mg/dl 94         Stress TEST ; 10/2016   Normal LV systolic function overall with an EF of 70% with inferior       wall hypokinesis.  2.   Moderate size predominantly fixed defect in the inferior wall and       apex with partial reversibility.    ECHO 10/2016  1.   Mild concentric left ventricular hypertrophy with estimated        ejection fraction 50% to 60%.  2.   Mildly dilated aortic root with diameter of 4.0.  3.   Mildly dilated left atrium with diameter of 4.4.  4.   Aortic valve appears mildly sclerotic without any hemodynamic       significant aortic stenosis. No aortic regurgitation seen.  5.   Pulmonic valve not well visualized. There is trace pulmonic       regurgitation. There is a trace mitral regurgitation. No       intracardiac mass, pericardial effusion, cardiac thrombus seen.  6.   Normal right ventricular size and function.    SUBJECTIVE    No Known Allergies      Past Medical History:   Diagnosis Date   • Allergic rhinitis    • Artificial lens present     in position   • Benign neoplasm of skin of eyelid    • Cataract    • Coronary arteriosclerosis    • Cortical senile cataract    • Cough    • Essential hypertension    • Essential hypertension    • Hypercholesterolemia    • Hypercholesterolemia    • Nonexudative age-related macular degeneration     mild   • Nuclear cataract    • Open-angle glaucoma     s/p trabec OS, doing well      • Primary open angle glaucoma     OS, s/p trabeculectomy; IOP down with timolol OD     • Pseudophakia of right eye    • Rhinitis    • Upper respiratory infection    • Vitreous detachment     peripheral         Past Surgical History:   Procedure Laterality Date   • ANKLE SURGERY  07/29/2000    Ankle surgery (ORIF right ankle fracture. Bimalleolar right fracture)   • APPENDECTOMY     • CARDIAC CATHETERIZATION  07/02/1987    Cardiac cath 23564 (Coronary arthosclerotic heart disease. maintained patency of RCA. Mild inferior hypokinesis.)   • CARDIAC CATHETERIZATION  06/30/1987    Cardiac cath 28746 (Coronary atherosclerotic heart disease. Occluded RCA. Acute inferior infarction. S/P successful angioplasty of RCA)   • CATARACT EXTRACTION  04/01/2010    Remove cataract, insert lens (Complicated cataract extraction with intraocular lens implantation, right eye, Marcelo SN-60  serial # [id number]:  20.5 diopter)   • ENDOSCOPY  02/19/1990    EGD 47128 (Olympus video endoscope.Fibrin-like patch in stomach)   • EYE SURGERY  02/02/2005    Eye surgery procedure (Repair of operative wound leak, left eye. S/P trabeculectomy with operative wound leak)   • EYE SURGERY  01/19/2005    Eye surgery procedure (Trabeculectomy, left eye. 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 degeneration)    • OTHER SURGICAL HISTORY  02/26/2015    EXTENDED VISUAL FIELDS STUDY 47860 (Primary open angle glaucoma)    • OTHER SURGICAL HISTORY  02/26/2015    EXTENDED VISUAL FIELDS STUDY 21019 (Primary open angle glaucoma)    • OTHER SURGICAL HISTORY  06/16/2016    OCT DISC NFL 33228 (Open-angle glaucoma)    • OTHER SURGICAL HISTORY      OCT DISC NFL 95596 (Primary open angle glaucoma) (3): 06/17/2015, 03/19/2014, 03/04/2013   • OTHER SURGICAL HISTORY  06/16/2016    OPTIC NERVE HEAD EVAL PERFORMED 2027F (Open-angle glaucoma)    • UVULECTOMY  1969    EXCISION OF UVULA 43891 (partial uvulectomy with excision of papilloma. Redunant uvula with a small papilloma)         Family History   Problem Relation Age of Onset   • No Known Problems Mother    • No Known Problems Father          Social History     Social History   • Marital status:      Spouse name: N/A   • Number of children: N/A   • Years of education: N/A     Occupational History   • Not on file.     Social History Main Topics   • Smoking status: Former Smoker   • Smokeless tobacco: Current User     Types: Snuff   • Alcohol use No   • Drug use: No   • Sexual activity: Defer     Other Topics Concern   • Not on file     Social History Narrative         Current Outpatient Prescriptions   Medication Sig Dispense Refill   • amiodarone (PACERONE) 100 MG tablet Take 1 tablet by mouth  "Daily. (Patient taking differently: Take 25 mg by mouth Daily.) 30 tablet 3   • apixaban (ELIQUIS) 2.5 MG tablet tablet Take 1 tablet by mouth 2 (Two) Times a Day. 60 tablet 6   • aspirin 81 MG tablet Take 81 mg by mouth Every Night.     • lisinopril-hydrochlorothiazide (PRINZIDE,ZESTORETIC) 20-12.5 MG per tablet Take 1 tablet by mouth Daily.     • metFORMIN (GLUCOPHAGE) 500 MG tablet Take 500 mg by mouth 2 (Two) Times a Day.     • Multiple Vitamins-Minerals (MENS MULTIVITAMIN PLUS PO) Take 1 tablet by mouth Daily.     • timolol (TIMOPTIC) 0.5 % ophthalmic solution INSTILL ONE DROP IN EACH EYE TWO TIMES A DAY 5 mL 6     No current facility-administered medications for this visit.          OBJECTIVE    /80  Pulse 62  Ht 180.3 cm (70.98\")  Wt 88 kg (194 lb)  BMI 27.07 kg/m2        Review of Systems     Constitutional:  Denies recent weight loss, weight gain, fever or chills, no change in exercise tolerance     HENT:  Denies any hearing loss, epistaxis, hoarseness, or difficulty speaking.     Eyes: Wears eyeglasses or contact lenses     Respiratory:  Denies dyspnea with exertion,no cough, wheezing, or hemoptysis.     Cardiovascular: Negative for palpations, chest pain, orthopnea, PND, peripheral edema, syncope, or claudication.     Gastrointestinal:  Denies change in bowel habits, dyspepsia, ulcer disease, hematochezia, or melena.     Endocrine: Negative for cold intolerance, heat intolerance, polydipsia, polyphagia and polyuria. Denies any history of weight change, heat/cold intolerance, polydipsia, polyuria     Genitourinary: Negative.      Musculoskeletal: Denies any history of arthritic symptoms or back problems     Skin:  Denies any change in hair or nails, rashes, or skin lesions.     Allergic/Immunologic: Negative.  Negative for environmental allergies, food allergies and immunocompromised state.     Neurological:  Denies any history of recurrent headaches, strokes, TIA, or seizure disorder. "     Hematological: Denies any food allergies, seasonal allergies, bleeding disorders, or lymphadenopathy.     Psychiatric/Behavioral: Denies any history of depression, substance abuse, or change in cognitive function.         Physical Exam     Constitutional: Cooperative, alert and oriented, well-developed, well-nourished, in no acute distress.     HENT:   Head: Normocephalic, normal hair patterns, no masses or tenderness.  Ears, Nose, and Throat: No gross abnormalities. No pallor or cyanosis. Dentition good.   Eyes: EOMS intact, PERRL, conjunctivae and lids unremarkable. Fundoscopic exam and visual fields not performed.   Neck: No palpable masses or adenopathy, no thyromegaly, no JVD, carotid pulses are full and equal bilaterally and without  Bruits.     Cardiovascular: Regular rhythm, S1 and S2 normal, no S3 or S4. Apical impulse not displaced. No murmurs, gallops, or rubs detected.     Pulmonary/Chest: Chest: normal symmetry, no tenderness to palpation, normal respiratory excursion, no intercostal retraction, no use of accessory muscles.            Pulmonary: Normal breath sounds. No rales or ronchi.    Abdominal: Abdomen soft, bowel sounds normoactive, no masses, no hepatosplenomegaly, non-tender, no bruits.     Musculoskeletal: No deformities, clubbing, cyanosis, erythema, or edema observed. There are no spinal abnormalities noted. Normal muscle strength and tone. Pulses full and equal in all extremities, no bruits auscultated.     Neurological: No gross motor or sensory deficits noted, affect appropriate, oriented to time, person, place.     Skin: Warm and dry to the touch, no apparent skin lesions or masses noted.     Psychiatric: He has a normal mood and affect. His behavior is normal. Judgment and thought content normal.           ECG 12 Lead  Date/Time: 2/26/2018 9:13 AM  Performed by: VALENTINE LOWERY  Authorized by: VALENTINE LOWERY   Comparison: not compared with previous ECG   Rhythm: sinus  tachycardia  Conduction: complete RBBB and LAFB  Clinical impression: abnormal ECG  Comments: Sinus bradycardia at a rate of 62 bpm with a right bundle branch block morphology and a leftward axis and nonspecific ST-T wave changes and evidence of left ventricle hypertrophy by voltage criteria.              Lab Results   Component Value Date    WBC 7.6 11/10/2016    HGB 13.5 (L) 11/10/2016    HCT 38.9 (L) 11/10/2016    MCV 94.6 11/10/2016     11/10/2016     Lab Results   Component Value Date    GLUCOSE 130 (H) 11/10/2016    BUN 20 11/10/2016    CREATININE 1.0 11/10/2016    CO2 29 11/10/2016    CALCIUM 9.0 11/10/2016    ALBUMIN 4.1 11/10/2016    AST 28 11/10/2016    ALT 28 11/10/2016     No results found for: CHOL  Lab Results   Component Value Date    TRIG 188 10/20/2016     Lab Results   Component Value Date    HDL 37 (L) 10/20/2016     No components found for: LDLCALC  Lab Results   Component Value Date    LDL 94 10/20/2016     No results found for: HDLLDLRATIO  No components found for: CHOLHDL  No results found for: HGBA1C  Lab Results   Component Value Date    TSH 1.00 10/19/2016           ASSESSMENT AND PLAN  #1 paroxysmal atrial fibrillation #2 asymptomatic sinus bradycardia with a heart rate 48 bpm #3 history of atrial flutter status post EP study and flutter ablation #4 hypertension #4 CAD stable     Patient converted to sinus rhythm with the help of oral amiodarone currently sinus bradycardia 62 BPM .    He is on amiodarone 100 mg a day and previously Toprol Discontinued   bradycardia seemed to be asymptomatic at the time of evaluations.  EKG sinus rhythm at a 62 BPM with a rare premature ventricle and supraventricular ectopic beat.  He will continue eliquis to decrease risk of cardiac embolic phenomena, aspirin with history of CAD stable, lisinopril for management of hypertension good blood pressure control.  I will see him back in 6 month R depends on patient clinical conditions.  We'll arrange  echocardiographic study given the history of dilated aortic root, chest x-ray as a part of amiodarone monitoring, particularly lipid profile prior to the next visit in 6 month.  Patient have history to statin intolerant    Evgeny was seen today for follow-up.    Diagnoses and all orders for this visit:    Typical atrial flutter    Coronary artery disease involving native coronary artery of native heart without angina pectoris    Benign essential hypertension    Type 2 diabetes mellitus without complication, without long-term current use of insulin    Paroxysmal atrial fibrillation    Hyperlipidemia, unspecified hyperlipidemia type    Dilated aortic root    Atherosclerosis of native coronary artery of native heart without angina pectoris        Johnny Forbes MD  2/26/2018  8:59 AM

## 2018-03-23 ENCOUNTER — ANESTHESIA (OUTPATIENT)
Dept: PERIOP | Facility: HOSPITAL | Age: 83
End: 2018-03-23

## 2018-03-23 ENCOUNTER — ANESTHESIA EVENT (OUTPATIENT)
Dept: PERIOP | Facility: HOSPITAL | Age: 83
End: 2018-03-23

## 2018-03-23 ENCOUNTER — HOSPITAL ENCOUNTER (OUTPATIENT)
Facility: HOSPITAL | Age: 83
Setting detail: HOSPITAL OUTPATIENT SURGERY
Discharge: HOME | End: 2018-03-23
Attending: OPHTHALMOLOGY | Admitting: OPHTHALMOLOGY

## 2018-03-23 VITALS
DIASTOLIC BLOOD PRESSURE: 59 MMHG | SYSTOLIC BLOOD PRESSURE: 118 MMHG | HEIGHT: 71 IN | RESPIRATION RATE: 16 BRPM | WEIGHT: 192.9 LBS | BODY MASS INDEX: 27.01 KG/M2 | TEMPERATURE: 97.7 F | HEART RATE: 51 BPM | OXYGEN SATURATION: 95 %

## 2018-03-23 LAB — GLUCOSE BLDC GLUCOMTR-MCNC: 123 MG/DL (ref 70–130)

## 2018-03-23 PROCEDURE — V2632 POST CHMBR INTRAOCULAR LENS: HCPCS | Performed by: OPHTHALMOLOGY

## 2018-03-23 PROCEDURE — 25010000002 FENTANYL CITRATE (PF) 100 MCG/2ML SOLUTION: Performed by: NURSE ANESTHETIST, CERTIFIED REGISTERED

## 2018-03-23 PROCEDURE — 25010000002 VANCOMYCIN PER 500 MG: Performed by: OPHTHALMOLOGY

## 2018-03-23 PROCEDURE — 25010000002 EPINEPHRINE PER 0.1 MG: Performed by: OPHTHALMOLOGY

## 2018-03-23 PROCEDURE — 25010000002 MIDAZOLAM PER 1 MG: Performed by: NURSE ANESTHETIST, CERTIFIED REGISTERED

## 2018-03-23 PROCEDURE — 82962 GLUCOSE BLOOD TEST: CPT

## 2018-03-23 DEVICE — LENS ACRYSOF IQ 6X13MM SN60WF 21.5: Type: IMPLANTABLE DEVICE | Site: EYE | Status: FUNCTIONAL

## 2018-03-23 RX ORDER — PREDNISOLONE ACETATE 10 MG/ML
SUSPENSION/ DROPS OPHTHALMIC AS NEEDED
Status: DISCONTINUED | OUTPATIENT
Start: 2018-03-23 | End: 2018-03-23 | Stop reason: HOSPADM

## 2018-03-23 RX ORDER — PHENYLEPHRINE HCL 2.5 %
1 DROPS OPHTHALMIC (EYE)
Status: COMPLETED | OUTPATIENT
Start: 2018-03-23 | End: 2018-03-23

## 2018-03-23 RX ORDER — CYCLOPENTOLATE HYDROCHLORIDE 10 MG/ML
1 SOLUTION/ DROPS OPHTHALMIC
Status: COMPLETED | OUTPATIENT
Start: 2018-03-23 | End: 2018-03-23

## 2018-03-23 RX ORDER — SODIUM CHLORIDE 0.9 % (FLUSH) 0.9 %
10 SYRINGE (ML) INJECTION AS NEEDED
Status: DISCONTINUED | OUTPATIENT
Start: 2018-03-23 | End: 2018-03-23 | Stop reason: HOSPADM

## 2018-03-23 RX ORDER — TETRACAINE HYDROCHLORIDE 5 MG/ML
SOLUTION OPHTHALMIC AS NEEDED
Status: DISCONTINUED | OUTPATIENT
Start: 2018-03-23 | End: 2018-03-23 | Stop reason: HOSPADM

## 2018-03-23 RX ORDER — FENTANYL CITRATE 50 UG/ML
INJECTION, SOLUTION INTRAMUSCULAR; INTRAVENOUS AS NEEDED
Status: DISCONTINUED | OUTPATIENT
Start: 2018-03-23 | End: 2018-03-23 | Stop reason: SURG

## 2018-03-23 RX ORDER — TETRACAINE HYDROCHLORIDE 5 MG/ML
1 SOLUTION OPHTHALMIC
Status: COMPLETED | OUTPATIENT
Start: 2018-03-23 | End: 2018-03-23

## 2018-03-23 RX ORDER — BALANCED SALT SOLUTION ENRICHED WITH BICARBONATE, DEXTROSE, AND GLUTATHIONE
KIT INTRAOCULAR AS NEEDED
Status: DISCONTINUED | OUTPATIENT
Start: 2018-03-23 | End: 2018-03-23 | Stop reason: HOSPADM

## 2018-03-23 RX ORDER — MOXIFLOXACIN 5 MG/ML
SOLUTION/ DROPS OPHTHALMIC AS NEEDED
Status: DISCONTINUED | OUTPATIENT
Start: 2018-03-23 | End: 2018-03-23 | Stop reason: HOSPADM

## 2018-03-23 RX ORDER — MIDAZOLAM HYDROCHLORIDE 1 MG/ML
INJECTION INTRAMUSCULAR; INTRAVENOUS AS NEEDED
Status: DISCONTINUED | OUTPATIENT
Start: 2018-03-23 | End: 2018-03-23 | Stop reason: SURG

## 2018-03-23 RX ORDER — BRIMONIDINE TARTRATE 0.15 %
DROPS OPHTHALMIC (EYE) AS NEEDED
Status: DISCONTINUED | OUTPATIENT
Start: 2018-03-23 | End: 2018-03-23 | Stop reason: HOSPADM

## 2018-03-23 RX ADMIN — TETRACAINE HYDROCHLORIDE 1 DROP: 5 SOLUTION OPHTHALMIC at 10:32

## 2018-03-23 RX ADMIN — CYCLOPENTOLATE HYDROCHLORIDE 1 DROP: 10 SOLUTION/ DROPS OPHTHALMIC at 10:52

## 2018-03-23 RX ADMIN — FENTANYL CITRATE 50 MCG: 50 INJECTION, SOLUTION INTRAMUSCULAR; INTRAVENOUS at 12:13

## 2018-03-23 RX ADMIN — PHENYLEPHRINE HYDROCHLORIDE 1 DROP: 25 SOLUTION/ DROPS OPHTHALMIC at 10:42

## 2018-03-23 RX ADMIN — CYCLOPENTOLATE HYDROCHLORIDE 1 DROP: 10 SOLUTION/ DROPS OPHTHALMIC at 10:32

## 2018-03-23 RX ADMIN — CYCLOPENTOLATE HYDROCHLORIDE 1 DROP: 10 SOLUTION/ DROPS OPHTHALMIC at 10:42

## 2018-03-23 RX ADMIN — MIDAZOLAM 1 MG: 1 INJECTION INTRAMUSCULAR; INTRAVENOUS at 12:13

## 2018-03-23 RX ADMIN — SODIUM CHLORIDE, PRESERVATIVE FREE 10 ML: 5 INJECTION INTRAVENOUS at 10:33

## 2018-03-23 RX ADMIN — TETRACAINE HYDROCHLORIDE 1 DROP: 5 SOLUTION OPHTHALMIC at 10:52

## 2018-03-23 RX ADMIN — PHENYLEPHRINE HYDROCHLORIDE 1 DROP: 25 SOLUTION/ DROPS OPHTHALMIC at 10:52

## 2018-03-23 RX ADMIN — PHENYLEPHRINE HYDROCHLORIDE 1 DROP: 25 SOLUTION/ DROPS OPHTHALMIC at 10:32

## 2018-03-23 NOTE — ANESTHESIA PREPROCEDURE EVALUATION
Anesthesia Evaluation     NPO Solid Status: > 8 hours  NPO Liquid Status: > 8 hours           Airway   Mallampati: II  TM distance: >3 FB  Neck ROM: full  No difficulty expected  Dental    (+) poor dentition    Pulmonary    (+) a smoker Former, recent URI, decreased breath sounds,   Cardiovascular   Exercise tolerance: poor (<4 METS)    ECG reviewed  PT is on anticoagulation therapy  Rhythm: regular  Rate: normal    (+) hypertension well controlled, CAD, dysrhythmias Atrial Flutter, PVD, hyperlipidemia,       Neuro/Psych  (+) psychiatric history Anxiety,     GI/Hepatic/Renal/Endo    (+)  GERD well controlled,  diabetes mellitus type 2,     Musculoskeletal     (+) arthralgias,   Abdominal     Abdomen: soft.   Substance History      OB/GYN          Other   (+) arthritis                     Anesthesia Plan    ASA 3     MAC     intravenous induction   Anesthetic plan and risks discussed with patient.

## 2018-03-23 NOTE — ANESTHESIA POSTPROCEDURE EVALUATION
Patient: Evgeny Carter    Procedure Summary     Date:  03/23/18 Room / Location:  [facility] OR 06 / [facility] OR    Anesthesia Start:  1210 Anesthesia Stop:  1234    Procedure:  CATARACT PHACO EXTRACTION WITH INTRAOCULAR LENS IMPLANT (Left Eye) Diagnosis:       Age-related nuclear cataract of left eye      (Age-related nuclear cataract of left eye [H25.12])    Surgeon:  Oli Haley MD Provider:  Anthony Medina MD    Anesthesia Type:  MAC ASA Status:  3          Anesthesia Type: MAC  Last vitals  BP   160/66 (03/23/18 1029)   Temp   97 °F (36.1 °C) (03/23/18 1029)   Pulse   (!) 40 (03/23/18 1029)   Resp   18 (03/23/18 1029)     SpO2   97 % (03/23/18 1029)     Post Anesthesia Care and Evaluation    Patient location during evaluation: bedside  Patient participation: complete - patient participated  Level of consciousness: awake and alert  Pain management: adequate  Airway patency: patent  Anesthetic complications: No anesthetic complications  PONV Status: none  Cardiovascular status: acceptable  Respiratory status: acceptable  Hydration status: acceptable

## 2018-05-01 ENCOUNTER — PREP FOR SURGERY (OUTPATIENT)
Dept: OTHER | Facility: HOSPITAL | Age: 83
End: 2018-05-01

## 2018-05-01 DIAGNOSIS — D49.4 BLADDER TUMOR: Primary | ICD-10-CM

## 2018-05-01 RX ORDER — LEVOFLOXACIN 5 MG/ML
500 INJECTION, SOLUTION INTRAVENOUS ONCE
Status: CANCELLED | OUTPATIENT
Start: 2018-05-09 | End: 2018-05-09

## 2018-05-04 ENCOUNTER — APPOINTMENT (OUTPATIENT)
Dept: PREADMISSION TESTING | Facility: HOSPITAL | Age: 83
End: 2018-05-04

## 2018-05-04 VITALS
HEART RATE: 52 BPM | SYSTOLIC BLOOD PRESSURE: 136 MMHG | OXYGEN SATURATION: 97 % | WEIGHT: 194 LBS | BODY MASS INDEX: 27.16 KG/M2 | HEIGHT: 71 IN | RESPIRATION RATE: 14 BRPM | DIASTOLIC BLOOD PRESSURE: 64 MMHG

## 2018-05-04 DIAGNOSIS — D49.4 BLADDER TUMOR: ICD-10-CM

## 2018-05-04 LAB
ANION GAP SERPL CALCULATED.3IONS-SCNC: 9 MMOL/L (ref 5–15)
BILIRUB UR QL STRIP: NEGATIVE
BUN BLD-MCNC: 27 MG/DL (ref 7–21)
BUN/CREAT SERPL: 25.2 (ref 7–25)
CALCIUM SPEC-SCNC: 9.5 MG/DL (ref 8.4–10.2)
CHLORIDE SERPL-SCNC: 100 MMOL/L (ref 95–110)
CLARITY UR: CLEAR
CO2 SERPL-SCNC: 28 MMOL/L (ref 22–31)
COLOR UR: YELLOW
CREAT BLD-MCNC: 1.07 MG/DL (ref 0.7–1.3)
GFR SERPL CREATININE-BSD FRML MDRD: 66 ML/MIN/1.73 (ref 42–98)
GLUCOSE BLD-MCNC: 114 MG/DL (ref 60–100)
GLUCOSE UR STRIP-MCNC: NEGATIVE MG/DL
HGB UR QL STRIP.AUTO: NEGATIVE
KETONES UR QL STRIP: NEGATIVE
LEUKOCYTE ESTERASE UR QL STRIP.AUTO: ABNORMAL
NITRITE UR QL STRIP: NEGATIVE
PH UR STRIP.AUTO: 5.5 [PH] (ref 5–9)
POTASSIUM BLD-SCNC: 4.5 MMOL/L (ref 3.5–5.1)
PROT UR QL STRIP: ABNORMAL
SODIUM BLD-SCNC: 137 MMOL/L (ref 137–145)
SP GR UR STRIP: 1.02 (ref 1–1.03)
UROBILINOGEN UR QL STRIP: ABNORMAL

## 2018-05-04 PROCEDURE — 36415 COLL VENOUS BLD VENIPUNCTURE: CPT

## 2018-05-04 PROCEDURE — 81003 URINALYSIS AUTO W/O SCOPE: CPT | Performed by: UROLOGY

## 2018-05-04 PROCEDURE — 80048 BASIC METABOLIC PNL TOTAL CA: CPT | Performed by: ANESTHESIOLOGY

## 2018-05-04 RX ORDER — TERAZOSIN 2 MG/1
2 CAPSULE ORAL NIGHTLY
COMMUNITY
End: 2018-08-29

## 2018-05-04 RX ORDER — SODIUM CHLORIDE 9 MG/ML
1000 INJECTION, SOLUTION INTRAVENOUS CONTINUOUS
Status: CANCELLED | OUTPATIENT
Start: 2018-05-09

## 2018-05-04 RX ORDER — AMIODARONE HYDROCHLORIDE 200 MG/1
200 TABLET ORAL TAKE AS DIRECTED
COMMUNITY
End: 2018-08-29

## 2018-05-04 RX ORDER — TIMOLOL MALEATE 5 MG/ML
1 SOLUTION/ DROPS OPHTHALMIC 2 TIMES DAILY
COMMUNITY

## 2018-05-04 NOTE — DISCHARGE INSTRUCTIONS
[facility]  Pre-op Information and Guidelines    You will be called after 2 p.m. the day before your surgery (Friday for Monday surgery) and notified of your time for arrival and approximate surgery time.  If you have not received a call by 4P.M., please contact Same Day Surgery at [phone] of if outside [facility] call [phone].    Please Follow these Important Safety Guidelines:    • The morning of your procedure, take only the medications listed below with   A sip of water:_____________________________________________       __AMIODARONE, EYE DROPS__________________    • DO NOT eat or drink anything after 12:00 midnight the night before surgery  Specific instructions concerning drinking clear liquids will be discussed during  the pre-surgery instruction call the day before your surgery.    • If you take a blood thinner (ex. Plavix, Coumadin, aspirin), ask your doctor when to stop it before surgery  STOP DATE: _________________    • Only 2 visitors are allowed in patient rooms at a time  Your visitors will be asked to wait in the lobby until the admission process is complete with the exception of a parent with a child and patients in need of special assistance.    • YOU CANNOT DRIVE YOURSELF HOME  You must be accompanied by someone who will be responsible for driving you home after surgery and for your care at home.    • DO NOT chew gum, use breath mints, hard candy, or smoke the day of surgery  • DO NOT drink alcohol for at least 24 hours before your surgery  • DO NOT wear any jewelry and remove all body piercing before coming to the hospital  • DO NOT wear make-up to the hospital  • If you are having surgery on an extremity (arm/leg/foot) remove nail polish/artificial nails on the surgical side  • Clothing, glasses, contacts, dentures, and hairpieces must be removed before surgery  • Bathe the night before or the morning of your surgery and do not use powders/lotions on  skin.

## 2018-05-09 ENCOUNTER — HOSPITAL ENCOUNTER (OUTPATIENT)
Facility: HOSPITAL | Age: 83
Setting detail: HOSPITAL OUTPATIENT SURGERY
Discharge: HOME | End: 2018-05-09
Attending: UROLOGY | Admitting: UROLOGY

## 2018-05-09 ENCOUNTER — ANESTHESIA (OUTPATIENT)
Dept: PERIOP | Facility: HOSPITAL | Age: 83
End: 2018-05-09

## 2018-05-09 ENCOUNTER — ANESTHESIA EVENT (OUTPATIENT)
Dept: PERIOP | Facility: HOSPITAL | Age: 83
End: 2018-05-09

## 2018-05-09 VITALS
BODY MASS INDEX: 27.93 KG/M2 | HEART RATE: 58 BPM | RESPIRATION RATE: 18 BRPM | OXYGEN SATURATION: 96 % | HEIGHT: 70 IN | TEMPERATURE: 97.1 F | WEIGHT: 195.11 LBS | SYSTOLIC BLOOD PRESSURE: 173 MMHG | DIASTOLIC BLOOD PRESSURE: 70 MMHG

## 2018-05-09 DIAGNOSIS — D49.4 BLADDER TUMOR: ICD-10-CM

## 2018-05-09 LAB
GLUCOSE BLDC GLUCOMTR-MCNC: 117 MG/DL (ref 70–130)
GLUCOSE BLDC GLUCOMTR-MCNC: 118 MG/DL (ref 70–130)

## 2018-05-09 PROCEDURE — 88307 TISSUE EXAM BY PATHOLOGIST: CPT | Performed by: UROLOGY

## 2018-05-09 PROCEDURE — 25010000002 LEVOFLOXACIN PER 250 MG: Performed by: UROLOGY

## 2018-05-09 PROCEDURE — 25010000002 PROPOFOL 10 MG/ML EMULSION: Performed by: NURSE ANESTHETIST, CERTIFIED REGISTERED

## 2018-05-09 PROCEDURE — 88307 TISSUE EXAM BY PATHOLOGIST: CPT | Performed by: PATHOLOGY

## 2018-05-09 PROCEDURE — 82962 GLUCOSE BLOOD TEST: CPT

## 2018-05-09 PROCEDURE — 25010000002 MIDAZOLAM PER 1 MG: Performed by: NURSE ANESTHETIST, CERTIFIED REGISTERED

## 2018-05-09 PROCEDURE — 25010000002 FENTANYL CITRATE (PF) 100 MCG/2ML SOLUTION: Performed by: NURSE ANESTHETIST, CERTIFIED REGISTERED

## 2018-05-09 RX ORDER — SODIUM CHLORIDE 9 MG/ML
1000 INJECTION, SOLUTION INTRAVENOUS CONTINUOUS
Status: DISCONTINUED | OUTPATIENT
Start: 2018-05-09 | End: 2018-05-09 | Stop reason: HOSPADM

## 2018-05-09 RX ORDER — FENTANYL CITRATE 50 UG/ML
INJECTION, SOLUTION INTRAMUSCULAR; INTRAVENOUS AS NEEDED
Status: DISCONTINUED | OUTPATIENT
Start: 2018-05-09 | End: 2018-05-09 | Stop reason: SURG

## 2018-05-09 RX ORDER — EPHEDRINE SULFATE 50 MG/ML
5 INJECTION, SOLUTION INTRAVENOUS ONCE AS NEEDED
Status: DISCONTINUED | OUTPATIENT
Start: 2018-05-09 | End: 2018-05-09 | Stop reason: HOSPADM

## 2018-05-09 RX ORDER — FLUMAZENIL 0.1 MG/ML
0.2 INJECTION INTRAVENOUS AS NEEDED
Status: DISCONTINUED | OUTPATIENT
Start: 2018-05-09 | End: 2018-05-09 | Stop reason: HOSPADM

## 2018-05-09 RX ORDER — LABETALOL HYDROCHLORIDE 5 MG/ML
5 INJECTION, SOLUTION INTRAVENOUS
Status: DISCONTINUED | OUTPATIENT
Start: 2018-05-09 | End: 2018-05-09 | Stop reason: HOSPADM

## 2018-05-09 RX ORDER — NALOXONE HCL 0.4 MG/ML
0.2 VIAL (ML) INJECTION AS NEEDED
Status: DISCONTINUED | OUTPATIENT
Start: 2018-05-09 | End: 2018-05-09 | Stop reason: HOSPADM

## 2018-05-09 RX ORDER — ACETAMINOPHEN 650 MG/1
650 SUPPOSITORY RECTAL ONCE AS NEEDED
Status: DISCONTINUED | OUTPATIENT
Start: 2018-05-09 | End: 2018-05-09 | Stop reason: HOSPADM

## 2018-05-09 RX ORDER — DIPHENHYDRAMINE HYDROCHLORIDE 50 MG/ML
12.5 INJECTION INTRAMUSCULAR; INTRAVENOUS
Status: DISCONTINUED | OUTPATIENT
Start: 2018-05-09 | End: 2018-05-09 | Stop reason: HOSPADM

## 2018-05-09 RX ORDER — MIDAZOLAM HYDROCHLORIDE 1 MG/ML
INJECTION INTRAMUSCULAR; INTRAVENOUS AS NEEDED
Status: DISCONTINUED | OUTPATIENT
Start: 2018-05-09 | End: 2018-05-09 | Stop reason: SURG

## 2018-05-09 RX ORDER — ONDANSETRON 2 MG/ML
4 INJECTION INTRAMUSCULAR; INTRAVENOUS ONCE AS NEEDED
Status: DISCONTINUED | OUTPATIENT
Start: 2018-05-09 | End: 2018-05-09 | Stop reason: HOSPADM

## 2018-05-09 RX ORDER — ACETAMINOPHEN 325 MG/1
650 TABLET ORAL ONCE AS NEEDED
Status: DISCONTINUED | OUTPATIENT
Start: 2018-05-09 | End: 2018-05-09 | Stop reason: HOSPADM

## 2018-05-09 RX ORDER — MEPERIDINE HYDROCHLORIDE 50 MG/ML
12.5 INJECTION INTRAMUSCULAR; INTRAVENOUS; SUBCUTANEOUS
Status: DISCONTINUED | OUTPATIENT
Start: 2018-05-09 | End: 2018-05-09 | Stop reason: HOSPADM

## 2018-05-09 RX ORDER — OXYCODONE AND ACETAMINOPHEN 7.5; 325 MG/1; MG/1
1-2 TABLET ORAL EVERY 4 HOURS PRN
Qty: 10 TABLET | Refills: 0 | Status: SHIPPED | OUTPATIENT
Start: 2018-05-09 | End: 2018-08-29

## 2018-05-09 RX ORDER — LEVOFLOXACIN 5 MG/ML
500 INJECTION, SOLUTION INTRAVENOUS ONCE
Status: COMPLETED | OUTPATIENT
Start: 2018-05-09 | End: 2018-05-09

## 2018-05-09 RX ORDER — DOXYCYCLINE HYCLATE 100 MG/1
100 CAPSULE ORAL DAILY
Qty: 7 CAPSULE | Refills: 0 | Status: SHIPPED | OUTPATIENT
Start: 2018-05-09 | End: 2018-05-16

## 2018-05-09 RX ORDER — PROPOFOL 10 MG/ML
VIAL (ML) INTRAVENOUS AS NEEDED
Status: DISCONTINUED | OUTPATIENT
Start: 2018-05-09 | End: 2018-05-09 | Stop reason: SURG

## 2018-05-09 RX ADMIN — MIDAZOLAM 1 MG: 1 INJECTION INTRAMUSCULAR; INTRAVENOUS at 15:52

## 2018-05-09 RX ADMIN — FENTANYL CITRATE 25 MCG: 50 INJECTION, SOLUTION INTRAMUSCULAR; INTRAVENOUS at 15:58

## 2018-05-09 RX ADMIN — SODIUM CHLORIDE 1000 ML: 900 INJECTION, SOLUTION INTRAVENOUS at 13:47

## 2018-05-09 RX ADMIN — LEVOFLOXACIN 500 MG: 5 INJECTION, SOLUTION INTRAVENOUS at 15:52

## 2018-05-09 RX ADMIN — SODIUM CHLORIDE: 900 INJECTION, SOLUTION INTRAVENOUS at 15:51

## 2018-05-09 RX ADMIN — PROPOFOL 40 MG: 10 INJECTION, EMULSION INTRAVENOUS at 15:55

## 2018-05-09 NOTE — H&P
UROLOGY [facility] History and Physical  GIULIANA BARTLETT  84-year-old; :1933;;male  [address];[place] [address]  Ins: 1) [facility]  MRN:[id number]  LIANE COOLEY MD  UROLOGY [facility]  May. 1, 2018 09:33 AM  Allergies  No Known Allergies Unknown  Current Medications  metFORMIN 500 mg oral tablet  amiodarone 200 mg oral tablet  atorvastatin 10 mg oral tablet  Eliquis 5 mg oral tablet  Aspir 81 oral delayed release tablet  terazosin 2 mg oral capsule 1 capsule oral at  bedtime for prostate. Hold for hypotension.  * Medications Reconciled  Medications Prescribed this Visit  doxycycline monohydrate 100 mg oral tablet one  daily  Problem List  Neoplasm of bladder  Medical History  Hypertensive disorder  Diabetes mellitus  Cataract  Glaucoma  Surgical History  Ablation  2016  CYSTO 2018  Psychiatric History  Patient is generally satisfied with life and has no  depression or thoughts of suicide. Has anxiety.  Family History  Mother Family history of cancer  Mother  Father  Brother   Sister  Sister Alive  Social History  Currently dips 10 pouches per day. Does not  drink alcohol. Retired. . Lives with wife.  Notes  PRE DIAGNOSIS:  BPH with LUTS  POST DIAGNOSIS:  Bladder tumor.  ANESTHESIA: Under sterile conditions, Xylocaine jelly was inserted into the  urethra for local anesthesia.  PROCEDURE DETAILS:  The history, physical findings, current medications, and indications  were reviewed prior to the procedure. I discussed the procedure  with the patient, including possible complications. Patient was  prepped and draped in the usual fashion.  Urethra: No abnormalities of the urethra are noted.  Prostate: Prostate fossa is not obstructed.  Bladder: 1.0 cm tumor at bladder neck. . No stones noted. Normal  bladder expansion.  Ureter: Clear efflux noted both orifices. Orifices normal  configuration and location.  The  cystoscope was removed. The patient tolerated the procedure  well.  COMPLICATIONS:  No complications.  FINDINGS: Bladder tumor - 1.0 cm.  INSTRUCTIONS:Appropriate post procedure instructions were given to patient.  Verbalized understanding.  History of Present Illness  Patient is here today for cystoscopy. He has history of BPH with LUTS. PSA on  4/18/18 was 5.9.  Location: 6 o'clock bladder neck.  Severity: Mild.  Onset / Duration: 6 months.  Modifying factors: Chews tobacco.  Associated signs and symptoms: No fever.  Review of Systems  Eyes: ; Denies: blurry vision, pain in the eyes and double vision  ENMT: ; Denies: ear pain, sore throat and sinus problems  Cardiovascular: ; Denies: chest pain, varicose veins, angina and syncope  Respiratory: ; Denies: shortness of breath, wheezing and frequent cough  Gastrointestinal: Reports: indigestion; Denies: abdominal pain, nausea, vomiting and  heartburn  Genitourinary: Reports: other symptoms (see HPI)  Musculoskeletal: Reports: back pain; Denies: joint pain and neck pain  Integumentary: Reports: skin rash and persistent itch; Denies: boils  Neurological: ; Denies: tremors, dizzy spells and numbness / tingling  Psychiatric: Reports: anxiety; Denies: generally satisfied with life, depression and  suicidal ideation  Endocrine: ; Denies: Excessive thirst, cold intolerance, heat intolerance and  tired/sluggish  Hematologic/Lymphatic: ; Denies: swollen glands and blood clotting problem  Allergic/Immunologic: ; Denies: hayfever  Constitutional: ; Denies: fever, chills and weight loss  Vital Signs  Chews tobacco.  5/1/2018 9:33:00 AM  Weight: 195 (lb) Resp Rate: 18 (/min)  Height: 71 (in) BMI: 27.2  Never smoker  Exam  General appearance: The patient appears well developed and well nourished, in no  apparent acute distress.  Examination of pupils and irises: Normal.  Assessment of hearing: Normal.  Examination of neck: Neck appears supple.  Assessment of respiratory effort:  Respiratory effort appears normal. No apparent  distress.  Obtain stool sample: Stool specimen for occult blood is not indicated.  Examination of gait and station: Normal.  Inspection of skin and subcutaneous tissue: Normal.  Orientation to time, place and person: Normal.  Recent and remote memory: Normal.  Mood and affect: Normal  Data Review  Medications and chart reviewed. History and physical form/ROS reviewed and no  changes noted. Previous encounter reviewed. Last form done 04/11/18. PSA  ORDERED AND REVIEWED BY PROVIDER.  Assessment - Neoplasm of bladder  DX:  Neoplasm of bladder  SNO: [id number], ICD-9: 239.4, ICD-10: D49.4  Plan  Plan Notes:  Cystoscopy, transurethral resection bladder tumor.  Doxycycline 100 daily x 2.  Prescriptions:  doxycycline monohydrate 100 mg oral tablet  one daily  Quantity: 2  Procedures:  87061 CYSTOSCOPY  Education:  Cystoscopy - English  Plan  Discussion:  Discussed my findings and plan of action and reasoning behind decision making. All  questions were answered. FINDINGS WERE DISCUSSED WITH PATIENT. RISKS  AND BENEFITS EXPLAINED. PATIENT WISHES TO PROCEED.  Instructions:  Patient is instructed to call with any problems. Patient is instructed to call if the  condition worsens. Patient is instructed to call with any changes in condition.

## 2018-05-09 NOTE — ANESTHESIA PREPROCEDURE EVALUATION
Anesthesia Evaluation     NPO Solid Status: > 8 hours  NPO Liquid Status: > 8 hours           Airway   Mallampati: II  TM distance: >3 FB  Neck ROM: full  No difficulty expected  Dental    (+) poor dentition    Pulmonary    (+) a smoker Former, recent URI, decreased breath sounds,   Cardiovascular   Exercise tolerance: poor (<4 METS)    ECG reviewed  PT is on anticoagulation therapy  Rhythm: regular  Rate: normal    (+) hypertension well controlled, CAD, dysrhythmias Atrial Flutter, PVD, hyperlipidemia,       Neuro/Psych  (+) syncope, weakness, psychiatric history Anxiety and Depression, poor historian.,     GI/Hepatic/Renal/Endo    (+)  GERD well controlled,  diabetes mellitus type 2,     Musculoskeletal     (+) arthralgias,   Abdominal     Abdomen: soft.   Substance History      OB/GYN          Other   (+) arthritis   history of cancer active                      Anesthesia Plan    ASA 4     general     intravenous induction   Anesthetic plan and risks discussed with patient.

## 2018-05-09 NOTE — OP NOTE
CYSTOSCOPY TRANSURETHRAL RESECTION OF BLADDER TUMOR  Procedure Note    Evgeny Carter  5/9/2018    Pre-op Diagnosis:   Bladder tumor [D49.4]    Post-op Diagnosis:     Post-Op Diagnosis Codes:     * Bladder tumor [D49.4]      Procedure(s):  CYSTOSCOPY TRANSURETHRAL RESECTION OF BLADDER TUMOR    Surgeon(s):  Donovan Conner MD    Anesthesia: General    Staff:   Circulator: Kristine Zaragoza RN  Scrub Person: Yana Weaver V  Assistant: Neva Douglas CSA    Estimated Blood Loss: minimal    Specimens:                ID Type Source Tests Collected by Time   A : BLADDER TUMOR Tissue Urinary Bladder TISSUE PATHOLOGY EXAM Donovan Conner MD 5/9/2018 1603         Drains:      Findings: 1.5 cm bladder tumor at the trigone    Complications: None    Indications: Hematuria    Description of Procedure: 22 Lao cystoscope was placed in the bladder.  Trigone tumor 1.5 cm was noted range to a resectoscope sheath and loop with a prior setting 200 100 respectively.  I resected the lesion between the ureteral opening evacuated the tumor placed a 24 three-way catheter back in the bladder itself 20 cc placed in balloon placed on irrigation the patient taken recovery    Donovan Conner MD     Date: 5/9/2018  Time: 4:13 PM

## 2018-05-09 NOTE — ANESTHESIA PROCEDURE NOTES
Airway  Urgency: elective      General Information and Staff    Patient location during procedure: OR  CRNA: PIPER PEREZ    Indications and Patient Condition  Indications for airway management: airway protection    Preoxygenated: yes      Final Airway Details  Final airway type: supraglottic airway      Successful airway: I-gel  Size 4    Number of attempts at approach: 1

## 2018-05-10 ENCOUNTER — HOSPITAL ENCOUNTER (EMERGENCY)
Facility: HOSPITAL | Age: 83
Discharge: HOME | End: 2018-05-10
Attending: EMERGENCY MEDICINE | Admitting: EMERGENCY MEDICINE

## 2018-05-10 VITALS
HEIGHT: 71 IN | HEART RATE: 60 BPM | BODY MASS INDEX: 27.16 KG/M2 | WEIGHT: 194 LBS | RESPIRATION RATE: 16 BRPM | OXYGEN SATURATION: 96 % | TEMPERATURE: 97.9 F | DIASTOLIC BLOOD PRESSURE: 75 MMHG | SYSTOLIC BLOOD PRESSURE: 180 MMHG

## 2018-05-10 DIAGNOSIS — T83.9XXA FOLEY CATHETER PROBLEM, INITIAL ENCOUNTER (HCC): Primary | ICD-10-CM

## 2018-05-10 PROCEDURE — 99282 EMERGENCY DEPT VISIT SF MDM: CPT

## 2018-05-10 PROCEDURE — 99283 EMERGENCY DEPT VISIT LOW MDM: CPT

## 2018-05-10 NOTE — ED NOTES
Pt had surgery yesterday and had a urinary catheter placed. The patient was told by Dr. Conner nurse to return today because he is having urinary retention and needs his catheter irrigated.      Robyn Andrade RN  05/10/18 1017

## 2018-05-10 NOTE — ED PROVIDER NOTES
Subjective   History of Present Illness  Patient is an 84-year-old male who is status post a 1 transurethral cystoscopy and resection by Dr. Conner.  Hoover was placed intraoperatively.  He did work a  earlier this morning.  He said he is decreased flow.  Not not much increased pain.  No constitutional symptoms.  He contacted Dr. Conner office who suggested he come up in her for Hoover catheter irrigation.  Review of Systems   Constitutional: Negative for activity change, appetite change, chills, diaphoresis, fatigue and fever.   Respiratory: Negative for apnea, cough, choking, chest tightness, shortness of breath and stridor.    Cardiovascular: Negative for chest pain, palpitations and leg swelling.   All other systems reviewed and are negative.      Past Medical History:   Diagnosis Date   • Allergic rhinitis    • Artificial lens present     in position   • Benign neoplasm of skin of eyelid    • Bladder tumor    • BPH (benign prostatic hyperplasia)    • Cancer     SKIN    • Cataract    • Coronary arteriosclerosis    • Cortical senile cataract    • Cough    • Essential hypertension    • Essential hypertension    • Hypercholesterolemia    • Hypercholesterolemia    • MI, old 1988   • Nonexudative age-related macular degeneration     mild   • Nuclear cataract    • Open-angle glaucoma     s/p trabec OS, doing well      • Primary open angle glaucoma     OS, s/p trabeculectomy; IOP down with timolol OD     • Pseudophakia of right eye    • Rhinitis    • Upper respiratory infection    • Vitreous detachment     peripheral       No Known Allergies    Past Surgical History:   Procedure Laterality Date   • ANKLE SURGERY  07/29/2000    Ankle surgery (ORIF right ankle fracture. Bimalleolar right fracture)   • APPENDECTOMY     • CARDIAC ABLATION     • CARDIAC CATHETERIZATION  07/02/1987    Cardiac cath 21849 (Coronary arthosclerotic heart disease. maintained patency of RCA. Mild inferior hypokinesis.)   • CARDIAC  CATHETERIZATION  06/30/1987    Cardiac cath 68420 (Coronary atherosclerotic heart disease. Occluded RCA. Acute inferior infarction. S/P successful angioplasty of RCA)   • CATARACT EXTRACTION  04/01/2010    Remove cataract, insert lens (Complicated cataract extraction with intraocular lens implantation, right eye, Marcelo SN-60 WF serial # [id number]: 20.5 diopter)   • CATARACT EXTRACTION W/ INTRAOCULAR LENS IMPLANT Left 3/23/2018    Procedure: CATARACT PHACO EXTRACTION WITH INTRAOCULAR LENS IMPLANT;  Surgeon: Oli Haley MD;  Location: [facility] OR;  Service: Ophthalmology   • ENDOSCOPY  02/19/1990    EGD 83319 (Olympus video endoscope.Fibrin-like patch in stomach)   • EYE SURGERY  02/02/2005    Eye surgery procedure (Repair of operative wound leak, left eye. S/P trabeculectomy with operative wound leak)   • EYE SURGERY  01/19/2005    Eye surgery procedure (Trabeculectomy, left eye. 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 degeneration)    • OTHER SURGICAL HISTORY  02/26/2015    EXTENDED VISUAL FIELDS STUDY 75617 (Primary open angle glaucoma)    • OTHER SURGICAL HISTORY  02/26/2015    EXTENDED VISUAL FIELDS STUDY 75476 (Primary open angle glaucoma)    • OTHER SURGICAL HISTORY  06/16/2016    OCT DISC NFL 74298 (Open-angle glaucoma)    • OTHER SURGICAL HISTORY      OCT DISC NFL 67669 (Primary open angle glaucoma) (3): 06/17/2015, 03/19/2014, 03/04/2013   • OTHER SURGICAL HISTORY  06/16/2016    OPTIC NERVE HEAD EVAL PERFORMED 2027F (Open-angle glaucoma)    • TRANSURETHRAL RESECTION OF BLADDER TUMOR N/A 5/9/2018    Procedure: CYSTOSCOPY TRANSURETHRAL RESECTION OF BLADDER TUMOR;  Surgeon: Donovan Conner MD;  Location: [facility];  Service: Urology   • UVULECTOMY  1969    EXCISION OF UVULA 93857 (partial  uvulectomy with excision of papilloma. Redunant uvula with a small papilloma)       Family History   Problem Relation Age of Onset   • No Known Problems Mother    • No Known Problems Father        Social History     Social History   • Marital status:      Social History Main Topics   • Smoking status: Former Smoker     Quit date: 1988   • Smokeless tobacco: Current User     Types: Snuff   • Alcohol use No   • Drug use: No   • Sexual activity: Defer     Other Topics Concern   • Not on file           Objective   Physical Exam   Constitutional: He is oriented to person, place, and time. He appears well-developed and well-nourished. No distress.   Genitourinary:   Genitourinary Comments: Hoover catheter in place.  No urinary output from the Hoover catheter in the proximal 20-30 cm of the tube.  We will irrigate.  No obvious signs of infection.   Neurological: He is alert and oriented to person, place, and time. No cranial nerve deficit.   Skin: He is not diaphoretic.   Psychiatric: He has a normal mood and affect. His behavior is normal. Judgment and thought content normal.   Nursing note and vitals reviewed.      Procedures           ED Course  ED Course      Irrigated easily by nursing staff.  Clear urine was draining into the back.        MDM  Number of Diagnoses or Management Options     Amount and/or Complexity of Data Reviewed  Clinical lab tests: ordered and reviewed  Tests in the medicine section of CPT®: reviewed and ordered    Risk of Complications, Morbidity, and/or Mortality  Presenting problems: moderate  Diagnostic procedures: moderate  Management options: moderate          Final diagnoses:   Hoover catheter problem, initial encounter            Rainer Medley MD  05/10/18 2001

## 2018-05-11 LAB
LAB AP CASE REPORT: NORMAL
Lab: NORMAL
PATH REPORT.FINAL DX SPEC: NORMAL
PATH REPORT.GROSS SPEC: NORMAL

## 2018-06-01 ENCOUNTER — HOSPITAL ENCOUNTER (OUTPATIENT)
Dept: NUCLEAR MEDICINE | Facility: HOSPITAL | Age: 83
Discharge: HOME | End: 2018-06-01

## 2018-06-01 ENCOUNTER — HOSPITAL ENCOUNTER (OUTPATIENT)
Dept: CT IMAGING | Facility: HOSPITAL | Age: 83
Discharge: HOME | End: 2018-06-01

## 2018-06-01 DIAGNOSIS — C67.9 MALIGNANT NEOPLASM OF URINARY BLADDER, UNSPECIFIED SITE (HCC): ICD-10-CM

## 2018-06-01 PROCEDURE — 74177 CT ABD & PELVIS W/CONTRAST: CPT

## 2018-06-01 PROCEDURE — 78306 BONE IMAGING WHOLE BODY: CPT

## 2018-06-01 PROCEDURE — 25010000002 IOPAMIDOL 61 % SOLUTION: Performed by: UROLOGY

## 2018-06-01 PROCEDURE — 0 TECHNETIUM MEDRONATE KIT: Performed by: UROLOGY

## 2018-06-01 PROCEDURE — A9503 TC99M MEDRONATE: HCPCS | Performed by: UROLOGY

## 2018-06-01 RX ORDER — TC 99M MEDRONATE 20 MG/10ML
25.8 INJECTION, POWDER, LYOPHILIZED, FOR SOLUTION INTRAVENOUS
Status: COMPLETED | OUTPATIENT
Start: 2018-06-01 | End: 2018-06-01

## 2018-06-01 RX ADMIN — Medication 25.8 MILLICURIE: at 09:20

## 2018-06-01 RX ADMIN — IOPAMIDOL 94 ML: 612 INJECTION, SOLUTION INTRAVENOUS at 09:32

## 2018-08-29 ENCOUNTER — OFFICE VISIT (OUTPATIENT)
Dept: CARDIOLOGY | Facility: CLINIC | Age: 83
End: 2018-08-29

## 2018-08-29 VITALS
HEIGHT: 71 IN | HEART RATE: 47 BPM | WEIGHT: 199 LBS | BODY MASS INDEX: 27.86 KG/M2 | DIASTOLIC BLOOD PRESSURE: 68 MMHG | SYSTOLIC BLOOD PRESSURE: 120 MMHG

## 2018-08-29 DIAGNOSIS — I48.0 PAROXYSMAL ATRIAL FIBRILLATION (HCC): ICD-10-CM

## 2018-08-29 DIAGNOSIS — I25.10 CORONARY ARTERY DISEASE INVOLVING NATIVE CORONARY ARTERY OF NATIVE HEART WITHOUT ANGINA PECTORIS: ICD-10-CM

## 2018-08-29 DIAGNOSIS — I48.3 TYPICAL ATRIAL FLUTTER (HCC): Primary | ICD-10-CM

## 2018-08-29 DIAGNOSIS — E11.9 TYPE 2 DIABETES MELLITUS WITHOUT COMPLICATION, WITHOUT LONG-TERM CURRENT USE OF INSULIN (HCC): ICD-10-CM

## 2018-08-29 DIAGNOSIS — I77.810 DILATED AORTIC ROOT (HCC): ICD-10-CM

## 2018-08-29 DIAGNOSIS — E78.5 HYPERLIPIDEMIA, UNSPECIFIED HYPERLIPIDEMIA TYPE: ICD-10-CM

## 2018-08-29 DIAGNOSIS — I10 BENIGN ESSENTIAL HYPERTENSION: ICD-10-CM

## 2018-08-29 PROCEDURE — 93000 ELECTROCARDIOGRAM COMPLETE: CPT | Performed by: INTERNAL MEDICINE

## 2018-08-29 PROCEDURE — 99214 OFFICE O/P EST MOD 30 MIN: CPT | Performed by: INTERNAL MEDICINE

## 2018-10-10 ENCOUNTER — HOSPITAL ENCOUNTER (EMERGENCY)
Facility: HOSPITAL | Age: 83
Discharge: HOME | End: 2018-10-10
Attending: EMERGENCY MEDICINE | Admitting: EMERGENCY MEDICINE

## 2018-10-10 ENCOUNTER — APPOINTMENT (OUTPATIENT)
Dept: CT IMAGING | Facility: HOSPITAL | Age: 83
End: 2018-10-10

## 2018-10-10 VITALS
RESPIRATION RATE: 18 BRPM | WEIGHT: 197 LBS | TEMPERATURE: 97.3 F | BODY MASS INDEX: 27.58 KG/M2 | SYSTOLIC BLOOD PRESSURE: 162 MMHG | DIASTOLIC BLOOD PRESSURE: 71 MMHG | HEIGHT: 71 IN | HEART RATE: 55 BPM | OXYGEN SATURATION: 95 %

## 2018-10-10 DIAGNOSIS — R31.9 HEMATURIA, UNSPECIFIED TYPE: ICD-10-CM

## 2018-10-10 DIAGNOSIS — R10.30 LOWER ABDOMINAL PAIN: Primary | ICD-10-CM

## 2018-10-10 DIAGNOSIS — N20.1 URETEROLITHIASIS: ICD-10-CM

## 2018-10-10 LAB
ALBUMIN SERPL-MCNC: 3.9 G/DL (ref 3.4–4.8)
ALBUMIN/GLOB SERPL: 1.3 G/DL (ref 1.1–1.8)
ALP SERPL-CCNC: 74 U/L (ref 38–126)
ALT SERPL W P-5'-P-CCNC: 29 U/L (ref 21–72)
ANION GAP SERPL CALCULATED.3IONS-SCNC: 8 MMOL/L (ref 5–15)
AST SERPL-CCNC: 24 U/L (ref 17–59)
BACTERIA UR QL AUTO: ABNORMAL /HPF
BASOPHILS # BLD AUTO: 0.01 10*3/MM3 (ref 0–0.2)
BASOPHILS NFR BLD AUTO: 0.1 % (ref 0–2)
BILIRUB SERPL-MCNC: 0.5 MG/DL (ref 0.2–1.3)
BILIRUB UR QL STRIP: NEGATIVE
BUN BLD-MCNC: 17 MG/DL (ref 7–21)
BUN/CREAT SERPL: 17.2 (ref 7–25)
CALCIUM SPEC-SCNC: 9 MG/DL (ref 8.4–10.2)
CHLORIDE SERPL-SCNC: 100 MMOL/L (ref 95–110)
CLARITY UR: CLEAR
CO2 SERPL-SCNC: 28 MMOL/L (ref 22–31)
COLOR UR: YELLOW
CREAT BLD-MCNC: 0.99 MG/DL (ref 0.7–1.3)
DEPRECATED RDW RBC AUTO: 43.1 FL (ref 35.1–43.9)
EOSINOPHIL # BLD AUTO: 0.07 10*3/MM3 (ref 0–0.7)
EOSINOPHIL NFR BLD AUTO: 0.9 % (ref 0–7)
ERYTHROCYTE [DISTWIDTH] IN BLOOD BY AUTOMATED COUNT: 12.4 % (ref 11.5–14.5)
GFR SERPL CREATININE-BSD FRML MDRD: 72 ML/MIN/1.73 (ref 42–98)
GLOBULIN UR ELPH-MCNC: 3 GM/DL (ref 2.3–3.5)
GLUCOSE BLD-MCNC: 114 MG/DL (ref 60–100)
GLUCOSE UR STRIP-MCNC: NEGATIVE MG/DL
HCT VFR BLD AUTO: 35.9 % (ref 39–49)
HGB BLD-MCNC: 12.6 G/DL (ref 13.7–17.3)
HGB UR QL STRIP.AUTO: ABNORMAL
HOLD SPECIMEN: NORMAL
HOLD SPECIMEN: NORMAL
HYALINE CASTS UR QL AUTO: ABNORMAL /LPF
IMM GRANULOCYTES # BLD: 0.02 10*3/MM3 (ref 0–0.02)
IMM GRANULOCYTES NFR BLD: 0.3 % (ref 0–0.5)
KETONES UR QL STRIP: NEGATIVE
LEUKOCYTE ESTERASE UR QL STRIP.AUTO: ABNORMAL
LIPASE SERPL-CCNC: 50 U/L (ref 23–300)
LYMPHOCYTES # BLD AUTO: 1.29 10*3/MM3 (ref 0.6–4.2)
LYMPHOCYTES NFR BLD AUTO: 16.8 % (ref 10–50)
MCH RBC QN AUTO: 33.6 PG (ref 26.5–34)
MCHC RBC AUTO-ENTMCNC: 35.1 G/DL (ref 31.5–36.3)
MCV RBC AUTO: 95.7 FL (ref 80–98)
MONOCYTES # BLD AUTO: 0.63 10*3/MM3 (ref 0–0.9)
MONOCYTES NFR BLD AUTO: 8.2 % (ref 0–12)
NEUTROPHILS # BLD AUTO: 5.65 10*3/MM3 (ref 2–8.6)
NEUTROPHILS NFR BLD AUTO: 73.7 % (ref 37–80)
NITRITE UR QL STRIP: NEGATIVE
PH UR STRIP.AUTO: 5.5 [PH] (ref 5–9)
PLATELET # BLD AUTO: 161 10*3/MM3 (ref 150–450)
PMV BLD AUTO: 10 FL (ref 8–12)
POTASSIUM BLD-SCNC: 3.9 MMOL/L (ref 3.5–5.1)
PROT SERPL-MCNC: 6.9 G/DL (ref 6.3–8.6)
PROT UR QL STRIP: ABNORMAL
RBC # BLD AUTO: 3.75 10*6/MM3 (ref 4.37–5.74)
RBC # UR: ABNORMAL /HPF
REF LAB TEST METHOD: ABNORMAL
SODIUM BLD-SCNC: 136 MMOL/L (ref 137–145)
SP GR UR STRIP: 1.02 (ref 1–1.03)
SQUAMOUS #/AREA URNS HPF: ABNORMAL /HPF
UROBILINOGEN UR QL STRIP: ABNORMAL
WBC NRBC COR # BLD: 7.67 10*3/MM3 (ref 3.2–9.8)
WBC UR QL AUTO: ABNORMAL /HPF
WHOLE BLOOD HOLD SPECIMEN: NORMAL
WHOLE BLOOD HOLD SPECIMEN: NORMAL

## 2018-10-10 PROCEDURE — 80053 COMPREHEN METABOLIC PANEL: CPT | Performed by: EMERGENCY MEDICINE

## 2018-10-10 PROCEDURE — 96360 HYDRATION IV INFUSION INIT: CPT

## 2018-10-10 PROCEDURE — 74177 CT ABD & PELVIS W/CONTRAST: CPT

## 2018-10-10 PROCEDURE — 85025 COMPLETE CBC W/AUTO DIFF WBC: CPT | Performed by: EMERGENCY MEDICINE

## 2018-10-10 PROCEDURE — 25010000002 IOPAMIDOL 61 % SOLUTION: Performed by: EMERGENCY MEDICINE

## 2018-10-10 PROCEDURE — 83690 ASSAY OF LIPASE: CPT | Performed by: EMERGENCY MEDICINE

## 2018-10-10 PROCEDURE — 99284 EMERGENCY DEPT VISIT MOD MDM: CPT

## 2018-10-10 PROCEDURE — 81001 URINALYSIS AUTO W/SCOPE: CPT | Performed by: EMERGENCY MEDICINE

## 2018-10-10 RX ORDER — TAMSULOSIN HYDROCHLORIDE 0.4 MG/1
0.4 CAPSULE ORAL ONCE
Status: COMPLETED | OUTPATIENT
Start: 2018-10-10 | End: 2018-10-10

## 2018-10-10 RX ORDER — HYDROCODONE BITARTRATE AND ACETAMINOPHEN 5; 325 MG/1; MG/1
1 TABLET ORAL EVERY 6 HOURS PRN
Status: DISCONTINUED | OUTPATIENT
Start: 2018-10-10 | End: 2018-10-10 | Stop reason: HOSPADM

## 2018-10-10 RX ORDER — ONDANSETRON 2 MG/ML
4 INJECTION INTRAMUSCULAR; INTRAVENOUS ONCE
Status: DISCONTINUED | OUTPATIENT
Start: 2018-10-10 | End: 2018-10-10 | Stop reason: HOSPADM

## 2018-10-10 RX ORDER — OXYCODONE AND ACETAMINOPHEN 7.5; 325 MG/1; MG/1
1 TABLET ORAL ONCE
Status: COMPLETED | OUTPATIENT
Start: 2018-10-10 | End: 2018-10-10

## 2018-10-10 RX ORDER — TERAZOSIN 2 MG/1
2 CAPSULE ORAL NIGHTLY
COMMUNITY
End: 2022-12-05

## 2018-10-10 RX ORDER — SODIUM CHLORIDE 9 MG/ML
INJECTION, SOLUTION INTRAVENOUS
Status: DISCONTINUED
Start: 2018-10-10 | End: 2018-10-10 | Stop reason: HOSPADM

## 2018-10-10 RX ORDER — HYDROCODONE BITARTRATE AND ACETAMINOPHEN 5; 325 MG/1; MG/1
1 TABLET ORAL EVERY 6 HOURS PRN
Qty: 3 TABLET | Refills: 0 | Status: SHIPPED | OUTPATIENT
Start: 2018-10-10 | End: 2020-03-11

## 2018-10-10 RX ORDER — HYDROCODONE BITARTRATE AND ACETAMINOPHEN 5; 325 MG/1; MG/1
1 TABLET ORAL EVERY 6 HOURS PRN
Qty: 8 TABLET | Refills: 0 | Status: SHIPPED | OUTPATIENT
Start: 2018-10-10 | End: 2018-10-12

## 2018-10-10 RX ADMIN — TAMSULOSIN HYDROCHLORIDE 0.4 MG: 0.4 CAPSULE ORAL at 20:52

## 2018-10-10 RX ADMIN — OXYCODONE HYDROCHLORIDE AND ACETAMINOPHEN 1 TABLET: 7.5; 325 TABLET ORAL at 18:19

## 2018-10-10 RX ADMIN — SODIUM CHLORIDE 500 ML: 9 INJECTION, SOLUTION INTRAVENOUS at 17:17

## 2018-10-10 RX ADMIN — IOPAMIDOL 91 ML: 612 INJECTION, SOLUTION INTRAVENOUS at 19:32

## 2018-10-10 NOTE — ED TRIAGE NOTES
Pt c/o pain to suprapubic area since this morning. He states he feels like he has the urge to have a bm constantly, but he passed a large amount of stool this morning. Pt denies any dysuria.

## 2018-10-10 NOTE — ED PROVIDER NOTES
Subjective   85 years old male presented in the ER with cramping nature lower abdominal pain of moderate intensity with no significant aggravating or relieving factors going on for the last 7-8 hours.  Associated with nausea and couple of episodes of nonprojectile, nonbilious vomiting.  He is also complaining of dysuria but no hematuria.  No fever or chills.        History provided by:  Patient  Abdominal Pain   Pain location:  Suprapubic, LLQ and RLQ  Pain quality: cramping and dull    Pain radiates to:  Does not radiate  Pain severity:  Moderate  Onset quality:  Sudden  Duration:  7 hours  Timing:  Constant  Progression:  Improving  Chronicity:  New  Relieved by:  Nothing  Worsened by:  Nothing  Ineffective treatments:  None tried  Associated symptoms: dysuria, fatigue, nausea and vomiting    Associated symptoms: no anorexia, no chest pain, no chills, no constipation, no cough, no flatus, no hematuria and no sore throat        Review of Systems   Constitutional: Positive for fatigue. Negative for chills.   HENT: Negative for congestion, sinus pressure and sore throat.    Eyes: Negative for pain.   Respiratory: Negative for cough and chest tightness.    Cardiovascular: Negative for chest pain.   Gastrointestinal: Positive for abdominal pain, nausea and vomiting. Negative for anorexia, constipation and flatus.   Genitourinary: Positive for dysuria. Negative for hematuria.   Musculoskeletal: Positive for back pain.   Neurological: Negative for syncope and headaches.   Psychiatric/Behavioral: Negative for agitation.       Past Medical History:   Diagnosis Date   • Allergic rhinitis    • Artificial lens present     in position   • Benign neoplasm of skin of eyelid    • Bladder tumor    • BPH (benign prostatic hyperplasia)    • Cancer (CMS/HCC)     SKIN    • Cataract    • Coronary arteriosclerosis    • Cortical senile cataract    • Cough    • Essential hypertension    • Essential hypertension    • Hypercholesterolemia     • Hypercholesterolemia    • MI, old 1988   • Nonexudative age-related macular degeneration     mild   • Nuclear cataract    • Open-angle glaucoma     s/p trabec OS, doing well      • Primary open angle glaucoma     OS, s/p trabeculectomy; IOP down with timolol OD     • Pseudophakia of right eye    • Rhinitis    • Upper respiratory infection    • Vitreous detachment     peripheral       No Known Allergies    Past Surgical History:   Procedure Laterality Date   • ANKLE SURGERY  07/29/2000    Ankle surgery (ORIF right ankle fracture. Bimalleolar right fracture)   • APPENDECTOMY     • CARDIAC ABLATION     • CARDIAC CATHETERIZATION  07/02/1987    Cardiac cath 06370 (Coronary arthosclerotic heart disease. maintained patency of RCA. Mild inferior hypokinesis.)   • CARDIAC CATHETERIZATION  06/30/1987    Cardiac cath 94855 (Coronary atherosclerotic heart disease. Occluded RCA. Acute inferior infarction. S/P successful angioplasty of RCA)   • CATARACT EXTRACTION  04/01/2010    Remove cataract, insert lens (Complicated cataract extraction with intraocular lens implantation, right eye, Marcelo SN-60 WF serial # [id number]: 20.5 diopter)   • CATARACT EXTRACTION W/ INTRAOCULAR LENS IMPLANT Left 3/23/2018    Procedure: CATARACT PHACO EXTRACTION WITH INTRAOCULAR LENS IMPLANT;  Surgeon: Oli Haley MD;  Location: [facility];  Service: Ophthalmology   • ENDOSCOPY  02/19/1990    EGD 04710 (Olympus video endoscope.Fibrin-like patch in stomach)   • EYE SURGERY  02/02/2005    Eye surgery procedure (Repair of operative wound leak, left eye. S/P trabeculectomy with operative wound leak)   • EYE SURGERY  01/19/2005    Eye surgery procedure (Trabeculectomy, left eye. 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 degeneration)    •  OTHER SURGICAL HISTORY  02/26/2015    EXTENDED VISUAL FIELDS STUDY 88179 (Primary open angle glaucoma)    • OTHER SURGICAL HISTORY  02/26/2015    EXTENDED VISUAL FIELDS STUDY 88012 (Primary open angle glaucoma)    • OTHER SURGICAL HISTORY  06/16/2016    OCT DISC NFL 75335 (Open-angle glaucoma)    • OTHER SURGICAL HISTORY      OCT DISC NFL 30129 (Primary open angle glaucoma) (3): 06/17/2015, 03/19/2014, 03/04/2013   • OTHER SURGICAL HISTORY  06/16/2016    OPTIC NERVE HEAD EVAL PERFORMED 2027F (Open-angle glaucoma)    • TRANSURETHRAL RESECTION OF BLADDER TUMOR N/A 5/9/2018    Procedure: CYSTOSCOPY TRANSURETHRAL RESECTION OF BLADDER TUMOR;  Surgeon: Donovan Conner MD;  Location: [facility];  Service: Urology   • UVULECTOMY  1969    EXCISION OF UVULA 76641 (partial uvulectomy with excision of papilloma. Redunant uvula with a small papilloma)       Family History   Problem Relation Age of Onset   • No Known Problems Mother    • No Known Problems Father        Social History     Social History   • Marital status:      Social History Main Topics   • Smoking status: Former Smoker     Quit date: 1988   • Smokeless tobacco: Current User     Types: Snuff   • Alcohol use No   • Drug use: No   • Sexual activity: Defer     Other Topics Concern   • Not on file           Objective   Physical Exam   Constitutional: He is oriented to person, place, and time. He appears well-developed and well-nourished.   HENT:   Head: Normocephalic and atraumatic.   Nose: Nose normal.   Mouth/Throat: Oropharynx is clear and moist.   Eyes: Conjunctivae are normal.   Neck: Normal range of motion.   Cardiovascular: Normal rate, regular rhythm and normal heart sounds.    Pulmonary/Chest: Effort normal and breath sounds normal. No respiratory distress.   Abdominal: Soft. There is no tenderness.   Musculoskeletal: Normal range of motion.   Neurological: He is alert and oriented to person, place, and time.   Skin: Skin is dry. Capillary refill  takes less than 2 seconds.   Psychiatric: He has a normal mood and affect.   Nursing note and vitals reviewed.      Procedures  none         ED Course      Labs Reviewed   COMPREHENSIVE METABOLIC PANEL - Abnormal; Notable for the following:        Result Value    Glucose 114 (*)     Sodium 136 (*)     All other components within normal limits    Narrative:     The MDRD GFR formula is only valid for adults with stable renal function between ages 18 and 70.   URINALYSIS W/ MICROSCOPIC IF INDICATED (NO CULTURE) - Abnormal; Notable for the following:     Blood, UA Large (3+) (*)     Protein, UA 30 mg/dL (1+) (*)     Leuk Esterase, UA Small (1+) (*)     All other components within normal limits   CBC WITH AUTO DIFFERENTIAL - Abnormal; Notable for the following:     RBC 3.75 (*)     Hemoglobin 12.6 (*)     Hematocrit 35.9 (*)     All other components within normal limits   URINALYSIS, MICROSCOPIC ONLY - Abnormal; Notable for the following:     RBC, UA Too Numerous to Count (*)     All other components within normal limits   LIPASE - Normal   RAINBOW DRAW    Narrative:     The following orders were created for panel order [place] Draw.  Procedure                               Abnormality         Status                     ---------                               -----------         ------                     Light Blue Top[[id number]]                                   Final result               Green Top (Gel)[[id number]]                                  Final result               Lavender Top[[id number]]                                     Final result               Gold Top - SST[[id number]]                                   Final result                 Please view results for these tests on the individual orders.   CBC AND DIFFERENTIAL    Narrative:     The following orders were created for panel order CBC & Differential.  Procedure                               Abnormality         Status                     ---------                                -----------         ------                     CBC Auto Differential[[id number]]        Abnormal            Final result                 Please view results for these tests on the individual orders.   LIGHT BLUE TOP   GREEN TOP   LAVENDER TOP   GOLD TOP - SST     Ct Abdomen Pelvis With Contrast    Result Date: 10/10/2018  Narrative: PROCEDURE: CT ABDOMEN PELVIS W CONTRAST INDICATION: Lower abdominal pain COMPARISON: 6/1/2018  TECHNIQUE:  - reconstructions:  Axial, coronal, sagittal  - contrast:  oral: No  - intravenous:  91 mL of Isovue-300  This exam was performed according to our departmental dose-optimization program, which includes automated exposure control, adjustment of the mA and/or kV according to patient size and/or use of iterative reconstruction technique. FINDINGS:   - - - CT ABDOMEN - - -   THORAX (INFERIOR):  - LUNG BASES:  Clear  - PLEURA:  No fluid or mass  - HEART: Normal size, no pericardial fluid  - MISC:  Small hiatal hernia present   ABDOMEN:  - LIVER:  Normal size/contour, no ductal dilatation. Tiny attenuating lesion in the dome of the liver is stable, and of dubious clinical importance  - GB:  Grossly negative  - CBD:  Grossly negative  - SPLEEN:  Normal size and contour  - PANCREAS:  Normal in size, contour, no focal mass  - VISCERA:  Normal caliber, no wall thickening  - MESENTERY: No mesenteric mass  - CAVITY:  No free abdominal fluid, no free intraperitoneal air  - BODY WALL:  With normal limits  - OSSEOUS:  Grossly negative for age  - MISC:  RETROPERITONEUM:  - KIDNEYS:Normal size/contour, minimal left renal collecting system dilation                   No evidence of an enhancing mass. Nonobstructing calculi lower pole left kidney, the larger of which measures approximately 1 cm in greatest dimension. Lower pole right renal cyst measures approximately 4.9 cm and is stable  - URETERS:  Minimal dilation of the left ureter. 2 mm obstructing calculus seen at the left  ureterovesical junction  - ADRENALS:  Normal size, contour  - MISC:  No sig. retroperitoneal adenopathy or mass  - VASCULAR:  Aorta / iliacs: Atherosclerotic vascular calcification  - - - CT PELVIS - - -  - VISCERA:  Normal caliber small/large bowel, no focal thickening/mass . Colonic diverticulosis without radiographic evidence of diverticulitis      - APPENDIX: Are visualized, but no particular right lower quadrant inflammatory stranding is present  - MESENTERY:  No mass  - VASCULAR:  Within normal limits for age  - CAVITY:  No free fluid / air  - BLADDER:  Unremarkable  - OSSEOUS:  Within normal limits  - PROSTATE:  Grossly wnl      Impression: 1. Obstructing calculus seen at the left ureterovesical junction 2. Colonic diverticulosis without radiographic evidence of diverticulitis 3. Left renal nephrolithiasis 4. Small hiatal hernia 5. Atherosclerosis 6. Stable inferior pole right renal cyst Electronically signed by:  Linh Jensen MD  10/10/2018 7:59 PM CDT Workstation: [id number] patient signed out to be by Dr. Moseley at the end of his shift.  Patient presented with lower abdominal pain and CT scan was pending.  Laboratory results reveal no acute abnormality.  There is significant hematuria without urinary tract infection.    2040 results the CT scan reveal 2 mm UVJ stone with some hydroureter and a 1 cm stone within the left kidney.  Urinalysis reveals no evidence of urinary tract infection.  This does appear to be slightly obstructive stone the patient had significant increase of pain just after CT scan and the pain completely resolved.  Felt likely this was the stone moving into the bladder.  Patient continues to have no further pain.  Creatinine is not elevated.  Patient given prescription Norco and dose of tamsulosin and emergency department.  Also given three 5 mg Norco for home.  [facility] report is reviewed and reveals only 2 prescriptions over the last year for small amounts for procedures.  Patient  advised of not operating heavy machinery or driving while taking this medication and this medication may be addictive and cause sleepiness.  He is advised to contact his urologist Dr. Conner in the morning for follow-up appointment.  He is instructed to return with any new or worsening symptoms including fever, uncontrolled vomiting or uncontrolled pain.        MDM  Number of Diagnoses or Management Options  Lower abdominal pain:   Ureterolithiasis:   Diagnosis management comments: Patient presented with lower abdominal pain along with nausea and vomiting.  He is offered IV pain medication but patient refused and wants oral Percocet.  Has normal white count and unremarkable chemistries.  CT abdomen and pelvis is pending and care is transferred to Dr. Beltran due to end of my shift.       Amount and/or Complexity of Data Reviewed  Clinical lab tests: reviewed  Tests in the radiology section of CPT®: reviewed    Patient Progress  Patient progress: stable        Final diagnoses:   Lower abdominal pain   Ureterolithiasis   Hematuria, unspecified type            Doug Beltran, DO  10/10/18 8985

## 2018-10-10 NOTE — ED NOTES
Patient notified of need for urine sample, patient unable to void at this time. Patient states he wants to wait on pain medications for a while as he is not hurting as bad right this minute.     Khadijah Conway, RN  10/10/18 8505

## 2018-10-11 NOTE — ED NOTES
RX for [place] faxed to pharmacy at this time for dispensing for home use.     Chuyita Ledesma, RN  10/10/18 9917

## 2018-10-11 NOTE — DISCHARGE INSTRUCTIONS
Please return with new or worsening symptoms.  Use medications provided for discomfort.  Drink plenty of fluids.  Contact Dr. Conner tomorrow for follow-up appointment.

## 2019-03-01 ENCOUNTER — OFFICE VISIT (OUTPATIENT)
Dept: CARDIOLOGY | Facility: CLINIC | Age: 84
End: 2019-03-01

## 2019-03-01 VITALS
DIASTOLIC BLOOD PRESSURE: 64 MMHG | WEIGHT: 198 LBS | HEART RATE: 77 BPM | SYSTOLIC BLOOD PRESSURE: 130 MMHG | OXYGEN SATURATION: 98 % | HEIGHT: 71 IN | BODY MASS INDEX: 27.72 KG/M2

## 2019-03-01 DIAGNOSIS — I48.3 TYPICAL ATRIAL FLUTTER (HCC): Primary | ICD-10-CM

## 2019-03-01 DIAGNOSIS — I48.0 PAROXYSMAL ATRIAL FIBRILLATION (HCC): ICD-10-CM

## 2019-03-01 DIAGNOSIS — E78.5 HYPERLIPIDEMIA, UNSPECIFIED HYPERLIPIDEMIA TYPE: ICD-10-CM

## 2019-03-01 DIAGNOSIS — I77.810 DILATED AORTIC ROOT (HCC): ICD-10-CM

## 2019-03-01 DIAGNOSIS — I25.10 CORONARY ARTERY DISEASE INVOLVING NATIVE CORONARY ARTERY OF NATIVE HEART WITHOUT ANGINA PECTORIS: ICD-10-CM

## 2019-03-01 DIAGNOSIS — E11.9 TYPE 2 DIABETES MELLITUS WITHOUT COMPLICATION, WITHOUT LONG-TERM CURRENT USE OF INSULIN (HCC): ICD-10-CM

## 2019-03-01 DIAGNOSIS — I10 BENIGN ESSENTIAL HYPERTENSION: ICD-10-CM

## 2019-03-01 PROCEDURE — 99214 OFFICE O/P EST MOD 30 MIN: CPT | Performed by: INTERNAL MEDICINE

## 2019-03-01 NOTE — PROGRESS NOTES
Evgeny Carter Jr.  85 y.o. male    03/01/2019  1. Typical atrial flutter (CMS/HCC)    2. Coronary artery disease involving native coronary artery of native heart without angina pectoris    3. Benign essential hypertension    4. Type 2 diabetes mellitus without complication, without long-term current use of insulin (CMS/HCC)    5. Hyperlipidemia, unspecified hyperlipidemia type    6. Paroxysmal atrial fibrillation (CMS/HCC)    7. Dilated aortic root (CMS/HCC)        History of Present Illness:  Patient's Body mass index is 27.62 kg/m². BMI is above normal parameters. Recommendations include: exercise counseling, nutrition counseling and referral to primary care.  84 years old patient scheduled to have: Scope and presented for routine follow-up on oral anticoagulation currently in sinus rhythm with a past medical history significant for hypertension, hypertensive heart disease, CAD status post PTCA stent, atrial flutter with a counterclockwise mechanism state  post-EP study and flutter ablation and noted to be in atrial fibrillation with a resting heart rate 110 bpm.  Patient started on oral anticoagulation and amiodarone. Last echocardiographic study reported  dilated aortic root . Patient spontaneously converted to sinus rhythm on amiodarone 200 mg., decreased 100 mg and subsequent to 50 mg given the sinus bradycardia and todays a heart rate is a 46.  Advised the patient to discontinue amiodarone on further recurrence of arrhythmia hybrid approach can be interpreted.  At this time the patient doesn't want to consider pacemaker implantations.  He denies orthopnea, PND, syncope or near syncopal episode.  Denies any polydipsia polyuria.  Denies any fever cough or chills.  No dysuria or hematuria reported.  Repeat ECHO in August 2018 normal left and systolic function with mildly dilated aortic root 4.2 no significant change from the old echo    ECHO 8/15/18  ·  Mild tricuspid valve regurgitation is  present.  · Mild pulmonic valve regurgitation is present.  · Left atrial cavity size is mild-to-moderately dilated.  · Left ventricular systolic function is normal. Estimated EF = 55%.  · Mild mitral valve regurgitation is present  Left ventricular diastolic dysfunction (grade I a) consistent with impaired relaxation     10/2016      Ref Range & Units 1yr ago      Total Cholesterol 0 - 199 mg/dl 169   Comments: CHOL DESIRED: < 200 MG/DL   Triglycerides 20 - 199 mg/dl 188   Comments: TRIG DESIRED: < 200 MG/DL   HDL Cholesterol 60 - 200 mg/dl 37 (L)   Comments: HDL AVERAGE RISK: 35 - 60 MG/DL   LDL Cholesterol  0 - 129 mg/dl 94            Stress TEST ; 10/2016   Normal LV systolic function overall with an EF of 70% with inferior       wall hypokinesis.  2.   Moderate size predominantly fixed defect in the inferior wall and       apex with partial reversibility.     ECHO 10/2016  1.   Mild concentric left ventricular hypertrophy with estimated       ejection fraction 50% to 60%.  2.   Mildly dilated aortic root with diameter of 4.0.  3.   Mildly dilated left atrium with diameter of 4.4.  4.   Aortic valve appears mildly sclerotic without any hemodynamic       significant aortic stenosis. No aortic regurgitation seen.  5.   Pulmonic valve not well visualized. There is trace pulmonic       regurgitation. There is a trace mitral regurgitation. No       intracardiac mass, pericardial effusion, cardiac thrombus seen.  6.   Normal right ventricular size and function              SUBJECTIVE:    No Known Allergies      Past Medical History:   Diagnosis Date   • Allergic rhinitis    • Artificial lens present     in position   • Benign neoplasm of skin of eyelid    • Bladder tumor    • BPH (benign prostatic hyperplasia)    • Cancer (CMS/HCC)     SKIN    • Cataract    • Coronary arteriosclerosis    • Cortical senile cataract    • Cough    • Essential hypertension    • Essential hypertension    • Hypercholesterolemia    •  Hypercholesterolemia    • MI, old 1988   • Nonexudative age-related macular degeneration     mild   • Nuclear cataract    • Open-angle glaucoma     s/p trabec OS, doing well      • Primary open angle glaucoma     OS, s/p trabeculectomy; IOP down with timolol OD     • Pseudophakia of right eye    • Rhinitis    • Upper respiratory infection    • Vitreous detachment     peripheral         Past Surgical History:   Procedure Laterality Date   • ANKLE SURGERY  07/29/2000    Ankle surgery (ORIF right ankle fracture. Bimalleolar right fracture)   • APPENDECTOMY     • CARDIAC ABLATION     • CARDIAC CATHETERIZATION  07/02/1987    Cardiac cath 52269 (Coronary arthosclerotic heart disease. maintained patency of RCA. Mild inferior hypokinesis.)   • CARDIAC CATHETERIZATION  06/30/1987    Cardiac cath 32756 (Coronary atherosclerotic heart disease. Occluded RCA. Acute inferior infarction. S/P successful angioplasty of RCA)   • CATARACT EXTRACTION  04/01/2010    Remove cataract, insert lens (Complicated cataract extraction with intraocular lens implantation, right eye, Marcelo SN-60 WF serial # [id number]: 20.5 diopter)   • CATARACT EXTRACTION W/ INTRAOCULAR LENS IMPLANT Left 3/23/2018    Procedure: CATARACT PHACO EXTRACTION WITH INTRAOCULAR LENS IMPLANT;  Surgeon: Oli Haley MD;  Location: [facility];  Service: Ophthalmology   • ENDOSCOPY  02/19/1990    EGD 04139 (Olympus video endoscope.Fibrin-like patch in stomach)   • EYE SURGERY  02/02/2005    Eye surgery procedure (Repair of operative wound leak, left eye. S/P trabeculectomy with operative wound leak)   • EYE SURGERY  01/19/2005    Eye surgery procedure (Trabeculectomy, left eye. 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 degeneration)    • OTHER SURGICAL HISTORY   2015    EXTENDED VISUAL FIELDS STUDY 84968 (Primary open angle glaucoma)    • OTHER SURGICAL HISTORY  2015    EXTENDED VISUAL FIELDS STUDY 70932 (Primary open angle glaucoma)    • OTHER SURGICAL HISTORY  2016    OCT DISC NFL 74108 (Open-angle glaucoma)    • OTHER SURGICAL HISTORY      OCT DISC NFL 94248 (Primary open angle glaucoma) (3): 2015, 2014, 2013   • OTHER SURGICAL HISTORY  2016    OPTIC NERVE HEAD EVAL PERFORMED  (Open-angle glaucoma)    • TRANSURETHRAL RESECTION OF BLADDER TUMOR N/A 2018    Procedure: CYSTOSCOPY TRANSURETHRAL RESECTION OF BLADDER TUMOR;  Surgeon: Donovan Conner MD;  Location: [facility];  Service: Urology   • UVULECTOMY  1969    EXCISION OF UVULA 62441 (partial uvulectomy with excision of papilloma. Redunant uvula with a small papilloma)         Family History   Problem Relation Age of Onset   • No Known Problems Mother    • No Known Problems Father          Social History     Socioeconomic History   • Marital status:      Spouse name: Not on file   • Number of children: Not on file   • Years of education: Not on file   • Highest education level: Not on file   Social Needs   • Financial resource strain: Not on file   • Food insecurity - worry: Not on file   • Food insecurity - inability: Not on file   • Transportation needs - medical: Not on file   • Transportation needs - non-medical: Not on file   Occupational History   • Not on file   Tobacco Use   • Smoking status: Former Smoker     Last attempt to quit: 1988     Years since quittin.1   • Smokeless tobacco: Current User     Types: Snuff   Substance and Sexual Activity   • Alcohol use: No   • Drug use: No   • Sexual activity: Defer   Other Topics Concern   • Not on file   Social History Narrative   • Not on file         Current Outpatient Medications   Medication Sig Dispense Refill   • apixaban (ELIQUIS) 5 MG tablet tablet Take 2.5 mg by mouth 2 (Two) Times a Day. Last  dose to be taken 5/5/18 prior to surgery     • aspirin 81 MG tablet Take 81 mg by mouth Every Night. Last dose 5/1/18     • lisinopril-hydrochlorothiazide (PRINZIDE,ZESTORETIC) 20-12.5 MG per tablet Take 1 tablet by mouth Daily.     • metFORMIN (GLUCOPHAGE) 500 MG tablet Take 500 mg by mouth 2 (Two) Times a Day.     • Multiple Vitamins-Minerals (MENS MULTIVITAMIN PLUS PO) Take 1 tablet by mouth Daily.     • terazosin (HYTRIN) 2 MG capsule Take 2 mg by mouth Every Night.     • timolol (TIMOPTIC) 0.5 % ophthalmic solution Administer 1 drop to both eyes 2 (Two) Times a Day.     • HYDROcodone-acetaminophen (NORCO) 5-325 MG per tablet Take 1 tablet by mouth Every 6 (Six) Hours As Needed for Moderate Pain . 3 tablet 0     No current facility-administered medications for this visit.            Review of Systems:     Constitutional:  Denies recent weight loss, weight gain, fever or chills, no change in exercise tolerance.     HENT:  Denies any hearing loss, epistaxis, hoarseness, or difficulty speaking.     Eyes: Wears eyeglasses or contact lenses.    Respiratory:  Denies dyspnea with exertion,no cough, wheezing, or hemoptysis.     Cardiovascular: See H&P .     Gastrointestinal:  Denies change in bowel habits, dyspepsia, ulcer disease, hematochezia, or melena.     Endocrine: Negative for cold intolerance, heat intolerance, polydipsia, polyphagia and polyuria. Denies any history of weight change, polydipsia, polyuria.     Genitourinary: Negative.      Musculoskeletal: Denies any history of arthritic symptoms or back problems.     Skin:  Denies any change in hair or nails, rashes, or skin lesions.     Allergic/Immunologic: Negative.  Negative for environmental allergies, food allergies and immunocompromised state.     Neurological:  Denies any history of recurrent headaches, strokes, TIA, or seizure disorder.     Hematological: Denies any food allergies, seasonal allergies, bleeding disorders, or lymphadenopathy.  "    Psychiatric/Behavioral: Denies any history of depression, substance abuse, or change in cognitive function.       OBJECTIVE:    /64   Pulse 77   Ht 180.3 cm (71\")   Wt 89.8 kg (198 lb)   SpO2 98%   BMI 27.62 kg/m²       Physical Exam:     Constitutional: Cooperative, alert and oriented, well-developed, well-nourished, in no acute distress.     HENT:   Head: Normocephalic, normal hair patterns, no masses or tenderness.  Ears, Nose, and Throat: No gross abnormalities. No pallor or cyanosis. Dentition good.   Eyes: EOMS intact, PERRL, conjunctivae and lids unremarkable. Fundoscopic exam and visual fields not performed.   Neck: No palpable masses or adenopathy, no thyromegaly, no JVD, carotid pulses are full and equal bilaterally and without  Bruits.     Cardiovascular: Regular rhythm, S1 and S2 normal, no S3 or S4. Apical impulse not displaced. No murmurs, gallops, or rubs detected.     Pulmonary/Chest: Chest: normal symmetry, no tenderness to palpation, normal respiratory excursion, no intercostal retraction, no use of accessory muscles. Pulmonary: Normal breath sounds. No rales or rhonchi.    Abdominal: Abdomen soft, bowel sounds normoactive, no masses, no hepatosplenomegaly, non-tender, no bruits.     Musculoskeletal: No deformities, clubbing, cyanosis, erythema, or edema observed. There are no spinal abnormalities noted. Normal muscle strength and tone. Pulses full and equal in all extremities, no bruits auscultated.     Neurological: No gross motor or sensory deficits noted, affect appropriate, oriented to time, person, place.     Skin: Warm and dry to the touch, no apparent skin lesions or masses noted.     Psychiatric: He has a normal mood and affect. His behavior is normal. Judgment and thought content normal.         Procedures      Lab Results   Component Value Date    WBC 7.67 10/10/2018    HGB 12.6 (L) 10/10/2018    HCT 35.9 (L) 10/10/2018    MCV 95.7 10/10/2018     10/10/2018     Lab " Results   Component Value Date    GLUCOSE 114 (H) 10/10/2018    BUN 17 10/10/2018    CREATININE 0.99 10/10/2018    EGFRIFNONA 72 10/10/2018    BCR 17.2 10/10/2018    CO2 28.0 10/10/2018    CALCIUM 9.0 10/10/2018    ALBUMIN 3.90 10/10/2018    AST 24 10/10/2018    ALT 29 10/10/2018     Lab Results   Component Value Date    CHOL 190 03/30/2018     Lab Results   Component Value Date    TRIG 198 03/30/2018    TRIG 188 10/20/2016     Lab Results   Component Value Date    HDL 42 03/30/2018    HDL 37 (L) 10/20/2016     No components found for: LDLCALC  Lab Results   Component Value Date     (H) 03/30/2018    LDL 94 10/20/2016     No results found for: HDLLDLRATIO  No components found for: CHOLHDL  Lab Results   Component Value Date    HGBA1C 5.5 03/30/2018     Lab Results   Component Value Date    TSH 1.00 10/19/2016           ASSESSMENT AND PLAN:  #1 paroxysmal atrial fibrillation #2 asymptomatic sinus bradycardia with a heart rate 48 bpm #3 history of atrial flutter status post EP study and flutter ablation #4 hypertension #4 CAD stable     85 years old patient with history of atrial flutter status post EP study and flutter ablation.  Post atrial flutter develop develop atrial fibrillation started on amiodarone discontinued due to significant asymptomatic bradycardia arrhythmia.  The heart rate at the time of evaluation is a 77 bpm.  Patient is scheduled to undergo colonoscope and currently in sinus rhythm.    Patient denied symptom of palpitation, dizziness, fatigability or shortness of breath..  He will continue eliquis to decrease risk of cardiac embolic phenomena, aspirin with history of CAD stable, lisinopril for management of hypertension good blood pressure control.  Patient with a history of intolerant to statinI will see him back in 6 month R depends on patient clinical conditions. patient doesn't want to consider pacemaker implantation.        Egveny was seen today for follow-up.    Diagnoses and all  orders for this visit:    Typical atrial flutter (CMS/HCC)    Coronary artery disease involving native coronary artery of native heart without angina pectoris    Benign essential hypertension    Type 2 diabetes mellitus without complication, without long-term current use of insulin (CMS/HCC)    Hyperlipidemia, unspecified hyperlipidemia type    Paroxysmal atrial fibrillation (CMS/HCC)    Dilated aortic root (CMS/HCC)        Johnny Forbes MD  3/1/2019  9:13 AM   none

## 2019-05-09 ENCOUNTER — ANESTHESIA EVENT (OUTPATIENT)
Dept: GASTROENTEROLOGY | Facility: HOSPITAL | Age: 84
End: 2019-05-09

## 2019-05-09 ENCOUNTER — ANESTHESIA (OUTPATIENT)
Dept: GASTROENTEROLOGY | Facility: HOSPITAL | Age: 84
End: 2019-05-09

## 2019-05-09 ENCOUNTER — HOSPITAL ENCOUNTER (OUTPATIENT)
Facility: HOSPITAL | Age: 84
Setting detail: HOSPITAL OUTPATIENT SURGERY
Discharge: HOME | End: 2019-05-09
Attending: INTERNAL MEDICINE | Admitting: INTERNAL MEDICINE

## 2019-05-09 VITALS
WEIGHT: 191.14 LBS | OXYGEN SATURATION: 96 % | HEART RATE: 56 BPM | DIASTOLIC BLOOD PRESSURE: 58 MMHG | TEMPERATURE: 97 F | SYSTOLIC BLOOD PRESSURE: 118 MMHG | RESPIRATION RATE: 18 BRPM | BODY MASS INDEX: 27.36 KG/M2 | HEIGHT: 70 IN

## 2019-05-09 LAB — GLUCOSE BLDC GLUCOMTR-MCNC: 136 MG/DL (ref 70–130)

## 2019-05-09 PROCEDURE — 82962 GLUCOSE BLOOD TEST: CPT

## 2019-05-09 PROCEDURE — 25010000002 PROPOFOL 10 MG/ML EMULSION: Performed by: NURSE ANESTHETIST, CERTIFIED REGISTERED

## 2019-05-09 RX ORDER — PROMETHAZINE HYDROCHLORIDE 25 MG/1
25 SUPPOSITORY RECTAL ONCE AS NEEDED
Status: DISCONTINUED | OUTPATIENT
Start: 2019-05-09 | End: 2019-05-09 | Stop reason: HOSPADM

## 2019-05-09 RX ORDER — LIDOCAINE HYDROCHLORIDE 10 MG/ML
INJECTION, SOLUTION INFILTRATION; PERINEURAL AS NEEDED
Status: DISCONTINUED | OUTPATIENT
Start: 2019-05-09 | End: 2019-05-09 | Stop reason: SURG

## 2019-05-09 RX ORDER — PROMETHAZINE HYDROCHLORIDE 25 MG/ML
12.5 INJECTION, SOLUTION INTRAMUSCULAR; INTRAVENOUS ONCE AS NEEDED
Status: DISCONTINUED | OUTPATIENT
Start: 2019-05-09 | End: 2019-05-09 | Stop reason: HOSPADM

## 2019-05-09 RX ORDER — DEXAMETHASONE SODIUM PHOSPHATE 4 MG/ML
8 INJECTION, SOLUTION INTRA-ARTICULAR; INTRALESIONAL; INTRAMUSCULAR; INTRAVENOUS; SOFT TISSUE ONCE AS NEEDED
Status: DISCONTINUED | OUTPATIENT
Start: 2019-05-09 | End: 2019-05-09 | Stop reason: HOSPADM

## 2019-05-09 RX ORDER — PROPOFOL 10 MG/ML
VIAL (ML) INTRAVENOUS AS NEEDED
Status: DISCONTINUED | OUTPATIENT
Start: 2019-05-09 | End: 2019-05-09 | Stop reason: SURG

## 2019-05-09 RX ORDER — PROMETHAZINE HYDROCHLORIDE 25 MG/1
25 TABLET ORAL ONCE AS NEEDED
Status: DISCONTINUED | OUTPATIENT
Start: 2019-05-09 | End: 2019-05-09 | Stop reason: HOSPADM

## 2019-05-09 RX ORDER — DEXTROSE AND SODIUM CHLORIDE 5; .45 G/100ML; G/100ML
20 INJECTION, SOLUTION INTRAVENOUS CONTINUOUS
Status: DISCONTINUED | OUTPATIENT
Start: 2019-05-09 | End: 2019-05-09 | Stop reason: HOSPADM

## 2019-05-09 RX ORDER — ONDANSETRON 2 MG/ML
4 INJECTION INTRAMUSCULAR; INTRAVENOUS ONCE AS NEEDED
Status: DISCONTINUED | OUTPATIENT
Start: 2019-05-09 | End: 2019-05-09 | Stop reason: HOSPADM

## 2019-05-09 RX ADMIN — LIDOCAINE HYDROCHLORIDE 50 MG: 10 INJECTION, SOLUTION INFILTRATION; PERINEURAL at 08:46

## 2019-05-09 RX ADMIN — DEXTROSE AND SODIUM CHLORIDE 20 ML/HR: 5; 450 INJECTION, SOLUTION INTRAVENOUS at 08:33

## 2019-05-09 RX ADMIN — PROPOFOL 50 MG: 10 INJECTION, EMULSION INTRAVENOUS at 08:51

## 2019-05-09 RX ADMIN — PROPOFOL 50 MG: 10 INJECTION, EMULSION INTRAVENOUS at 08:57

## 2019-05-09 RX ADMIN — PROPOFOL 50 MG: 10 INJECTION, EMULSION INTRAVENOUS at 08:46

## 2019-05-09 NOTE — H&P
Adilson Meneses DO,[facility]  Gastroenterology  Hepatology  Endoscopy  Board Certified in Internal Medicine and gastroenterology  [address], [address]  [place]. [address]  - [phone]   F - [phone]     GASTROENTEROLOGY HISTORY AND PHYSICAL  NOTE   ADILSON MENESES DO.         SUBJECTIVE:   5/9/2019    Name: Evgeny Carter Jr.  DOD: 6/11/1933        Chief Complaint:       Subjective : GI bleeding, anemia, on Eliquis    Patient is 85 y.o. male presents with desire for elective colonoscopy.      ROS/HISTORY/ CURRENT MEDICATIONS/OBJECTIVE/VS/PE:   Review of Systems:  All systems unremarkable unless specified below.  Constitutional   HENT  Eyes   Respiratory    Cardiovascular  Gastrointestinal   Endocrine  Genitourinary    Musculoskeletal   Skin  Allergic/Immunologic    Neurological    Hematological  Psychiatric/Behavioral    History:     Past Medical History:   Diagnosis Date   • Allergic rhinitis    • Artificial lens present     in position   • Benign neoplasm of skin of eyelid    • Bladder tumor    • BPH (benign prostatic hyperplasia)    • Cancer (CMS/HCC)     SKIN    • Cataract    • Coronary arteriosclerosis    • Cortical senile cataract    • Cough    • Essential hypertension    • Essential hypertension    • Hypercholesterolemia    • Hypercholesterolemia    • MI, old 1988   • Nonexudative age-related macular degeneration     mild   • Nuclear cataract    • Open-angle glaucoma     s/p trabec OS, doing well      • Primary open angle glaucoma     OS, s/p trabeculectomy; IOP down with timolol OD     • Pseudophakia of right eye    • Rhinitis    • Upper respiratory infection    • Vitreous detachment     peripheral     Past Surgical History:   Procedure Laterality Date   • ANKLE SURGERY  07/29/2000    Ankle surgery (ORIF right ankle fracture. Bimalleolar right fracture)   • APPENDECTOMY     • CARDIAC ABLATION     • CARDIAC CATHETERIZATION  07/02/1987    Cardiac cath 75455 (Coronary  arthosclerotic heart disease. maintained patency of RCA. Mild inferior hypokinesis.)   • CARDIAC CATHETERIZATION  06/30/1987    Cardiac cath 30836 (Coronary atherosclerotic heart disease. Occluded RCA. Acute inferior infarction. S/P successful angioplasty of RCA)   • CATARACT EXTRACTION  04/01/2010    Remove cataract, insert lens (Complicated cataract extraction with intraocular lens implantation, right eye, Marcelo SN-60 WF serial # [id number]: 20.5 diopter)   • CATARACT EXTRACTION W/ INTRAOCULAR LENS IMPLANT Left 3/23/2018    Procedure: CATARACT PHACO EXTRACTION WITH INTRAOCULAR LENS IMPLANT;  Surgeon: Oli Haley MD;  Location: [facility];  Service: Ophthalmology   • ENDOSCOPY  02/19/1990    EGD 31339 (Olympus video endoscope.Fibrin-like patch in stomach)   • EYE SURGERY  02/02/2005    Eye surgery procedure (Repair of operative wound leak, left eye. S/P trabeculectomy with operative wound leak)   • EYE SURGERY  01/19/2005    Eye surgery procedure (Trabeculectomy, left eye. 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 degeneration)    • OTHER SURGICAL HISTORY  02/26/2015    EXTENDED VISUAL FIELDS STUDY 71695 (Primary open angle glaucoma)    • OTHER SURGICAL HISTORY  02/26/2015    EXTENDED VISUAL FIELDS STUDY 54371 (Primary open angle glaucoma)    • OTHER SURGICAL HISTORY  06/16/2016    OCT DISC NFL 45929 (Open-angle glaucoma)    • OTHER SURGICAL HISTORY      OCT DISC NFL 95190 (Primary open angle glaucoma) (3): 06/17/2015, 03/19/2014, 03/04/2013   • OTHER SURGICAL HISTORY  06/16/2016    OPTIC NERVE HEAD EVAL PERFORMED 2027F (Open-angle glaucoma)    • TRANSURETHRAL RESECTION OF BLADDER TUMOR N/A 5/9/2018    Procedure: CYSTOSCOPY TRANSURETHRAL RESECTION OF BLADDER TUMOR;  Surgeon: Donovan Conner MD;   Location: [facility];  Service: Urology   • UVULECTOMY  1969    EXCISION OF UVULA 89730 (partial uvulectomy with excision of papilloma. Redunant uvula with a small papilloma)     Family History   Problem Relation Age of Onset   • No Known Problems Mother    • No Known Problems Father      Social History     Tobacco Use   • Smoking status: Former Smoker     Last attempt to quit:      Years since quittin.3   • Smokeless tobacco: Current User     Types: Snuff   Substance Use Topics   • Alcohol use: No   • Drug use: No     Medications Prior to Admission   Medication Sig Dispense Refill Last Dose   • lisinopril-hydrochlorothiazide (PRINZIDE,ZESTORETIC) 20-12.5 MG per tablet Take 1 tablet by mouth Daily.   2019 at Unknown time   • metFORMIN (GLUCOPHAGE) 500 MG tablet Take 500 mg by mouth 2 (Two) Times a Day.   2019 at Unknown time   • Multiple Vitamins-Minerals (MENS MULTIVITAMIN PLUS PO) Take 1 tablet by mouth Daily.   2019 at Unknown time   • terazosin (HYTRIN) 2 MG capsule Take 2 mg by mouth Every Night.   2019 at Unknown time   • timolol (TIMOPTIC) 0.5 % ophthalmic solution Administer 1 drop to both eyes 2 (Two) Times a Day.   2019 at Unknown time   • apixaban (ELIQUIS) 5 MG tablet tablet Take 2.5 mg by mouth 2 (Two) Times a Day. Last dose to be taken 18 prior to surgery   2019   • aspirin 81 MG tablet Take 81 mg by mouth Every Night. Last dose 18   • HYDROcodone-acetaminophen (NORCO) 5-325 MG per tablet Take 1 tablet by mouth Every 6 (Six) Hours As Needed for Moderate Pain . 3 tablet 0 Unknown at Unknown time     Allergies:  Patient has no known allergies.    I have reviewed the patients medical history, surgical history and family history in the available medical record system.     Current Medications:     Current Facility-Administered Medications   Medication Dose Route Frequency Provider Last Rate Last Dose   • dextrose 5 % and sodium chloride 0.45 % infusion  20  mL/hr Intravenous Continuous Rainer Gates DO 20 mL/hr at 05/09/19 0833 20 mL/hr at 05/09/19 0833       Objective     Physical Exam:   Temp:  [97.7 °F (36.5 °C)] 97.7 °F (36.5 °C)  Heart Rate:  [53] 53  Resp:  [18] 18  BP: (169)/(71) 169/71    Physical Exam:  General Appearance:    Alert, cooperative, in no acute distress   Head:    Normocephalic, without obvious abnormality, atraumatic   Eyes:            Lids and lashes normal, conjunctivae and sclerae normal, no   icterus, no pallor, corneas clear, PERRLA   Ears:    Ears appear intact with no abnormalities noted   Throat:   No oral lesions, no thrush, oral mucosa moist   Neck:   No adenopathy, supple, trachea midline, no thyromegaly, no     carotid bruit, no JVD   Back:     No kyphosis present, no scoliosis present, no skin lesions,       erythema or scars, no tenderness to percussion or                   palpation,   range of motion normal   Lungs:     Clear to auscultation,respirations regular, even and                   unlabored    Heart:    Regular rhythm and normal rate, normal S1 and S2, no            murmur, no gallop, no rub, no click   Breast Exam:    Deferred   Abdomen:     Normal bowel sounds, no masses, no organomegaly, soft        non-tender, non-distended, no guarding, no rebound                 tenderness   Genitalia:    Deferred   Extremities:   Moves all extremities well, no edema, no cyanosis, no              redness   Pulses:   Pulses palpable and equal bilaterally   Skin:   No bleeding, bruising or rash   Lymph nodes:   No palpable adenopathy   Neurologic:   Cranial nerves 2 - 12 grossly intact, sensation intact, DTR        present and equal bilaterally      Results Review:     Lab Results   Component Value Date    WBC 7.67 10/10/2018    WBC 7.6 11/10/2016    WBC 6.8 10/20/2016    HGB 12.6 (L) 10/10/2018    HGB 13.5 (L) 11/10/2016    HGB 12.8 (L) 10/20/2016    HCT 35.9 (L) 10/10/2018    HCT 38.9 (L) 11/10/2016    HCT 37.0 (L) 10/20/2016      10/10/2018     11/10/2016     10/20/2016             No results found for: LIPASE  Lab Results   Component Value Date    INR 1.1 11/10/2016          Radiology Review:  Imaging Results (last 72 hours)     ** No results found for the last 72 hours. **           I reviewed the patient's new clinical results.  I reviewed the patient's new imaging results and agree with the interpretation.     ASSESSMENT/PLAN:   ASSESSMENT:  1.  Gastrointestinal bleeding  2.  History of colon polyps    PLAN:  1.  Colonoscopy    Risk and benefits associated with the procedure are reviewed with the patient.  The patient wished to proceed     Rainer Gates DO  05/09/19  8:38 AM

## 2019-05-09 NOTE — ANESTHESIA POSTPROCEDURE EVALUATION
Patient: Evgeny Carter Jr.    Procedure Summary     Date:  05/09/19 Room / Location:  [facility] ENDOSCOPY 2 / [facility] ENDOSCOPY    Anesthesia Start:  0842 Anesthesia Stop:  0903    Procedure:  COLONOSCOPY (N/A ) Diagnosis:       Gastrointestinal bleeding      Diverticulosis large intestine w/o perforation or abscess w/o bleeding      (Gastrointestinal bleeding [K92.2])    Surgeon:  Rainer Gates DO Provider:  Gerson Keating CRNA    Anesthesia Type:  MAC ASA Status:  3          Anesthesia Type: MAC  Last vitals  BP   169/71 (05/09/19 0825)   Temp   97.7 °F (36.5 °C) (05/09/19 0825)   Pulse   53 (05/09/19 0825)   Resp   18 (05/09/19 0825)     SpO2   97 % (05/09/19 0825)     Post Anesthesia Care and Evaluation    Patient location during evaluation: bedside  Patient participation: complete - patient cannot participate  Level of consciousness: awake  Pain score: 0  Pain management: adequate  Airway patency: patent  Anesthetic complications: No anesthetic complications  PONV Status: none  Cardiovascular status: acceptable  Respiratory status: acceptable  Hydration status: acceptable

## 2019-05-09 NOTE — ANESTHESIA PREPROCEDURE EVALUATION
Anesthesia Evaluation     NPO Solid Status: > 8 hours  NPO Liquid Status: > 8 hours           Airway   Mallampati: III  TM distance: >3 FB  Neck ROM: full  No difficulty expected  Dental - normal exam     Pulmonary - normal exam   Cardiovascular - normal exam        Neuro/Psych  GI/Hepatic/Renal/Endo      Musculoskeletal     Abdominal    Substance History      OB/GYN          Other                      Anesthesia Plan    ASA 3     MAC     intravenous induction   Anesthetic plan, all risks, benefits, and alternatives have been provided, discussed and informed consent has been obtained with: patient.

## 2019-06-26 ENCOUNTER — PREP FOR SURGERY (OUTPATIENT)
Dept: OTHER | Facility: HOSPITAL | Age: 84
End: 2019-06-26

## 2019-06-26 DIAGNOSIS — D49.4 BLADDER NEOPLASM: Primary | ICD-10-CM

## 2019-06-26 RX ORDER — LEVOFLOXACIN 5 MG/ML
500 INJECTION, SOLUTION INTRAVENOUS ONCE
Status: CANCELLED | OUTPATIENT
Start: 2019-07-26

## 2019-07-22 ENCOUNTER — APPOINTMENT (OUTPATIENT)
Dept: PREADMISSION TESTING | Facility: HOSPITAL | Age: 84
End: 2019-07-22

## 2019-07-22 VITALS
SYSTOLIC BLOOD PRESSURE: 136 MMHG | HEIGHT: 71 IN | DIASTOLIC BLOOD PRESSURE: 65 MMHG | BODY MASS INDEX: 28.28 KG/M2 | HEART RATE: 60 BPM | WEIGHT: 202 LBS | RESPIRATION RATE: 20 BRPM | OXYGEN SATURATION: 98 %

## 2019-07-22 DIAGNOSIS — D49.4 BLADDER NEOPLASM: ICD-10-CM

## 2019-07-22 LAB
BILIRUB UR QL STRIP: NEGATIVE
CLARITY UR: CLEAR
COLOR UR: YELLOW
GLUCOSE UR STRIP-MCNC: NEGATIVE MG/DL
HGB UR QL STRIP.AUTO: ABNORMAL
KETONES UR QL STRIP: NEGATIVE
LEUKOCYTE ESTERASE UR QL STRIP.AUTO: NEGATIVE
NITRITE UR QL STRIP: NEGATIVE
PH UR STRIP.AUTO: 6 [PH] (ref 5–9)
PROT UR QL STRIP: NEGATIVE
SP GR UR STRIP: 1.02 (ref 1–1.03)
UROBILINOGEN UR QL STRIP: ABNORMAL

## 2019-07-22 PROCEDURE — 93010 ELECTROCARDIOGRAM REPORT: CPT | Performed by: INTERNAL MEDICINE

## 2019-07-22 PROCEDURE — 93005 ELECTROCARDIOGRAM TRACING: CPT

## 2019-07-22 PROCEDURE — 81003 URINALYSIS AUTO W/O SCOPE: CPT | Performed by: UROLOGY

## 2019-07-22 RX ORDER — LORATADINE 10 MG/1
10 TABLET ORAL DAILY
COMMUNITY

## 2019-07-22 RX ORDER — SODIUM CHLORIDE 9 MG/ML
1000 INJECTION, SOLUTION INTRAVENOUS CONTINUOUS
Status: CANCELLED | OUTPATIENT
Start: 2019-07-26

## 2019-07-22 NOTE — PAT
Discussed EKG with Dr. Farris, patient denies syncope or near syncope, also denies having any chest pain. No new orders received.

## 2019-07-22 NOTE — DISCHARGE INSTRUCTIONS
[facility]  Pre-op Information and Guidelines    You will be called after 2 p.m. the day before your surgery (Friday for Monday surgery) and notified of your time for arrival and approximate surgery time.  If you have not received a call by 4P.M., please contact Same Day Surgery at [phone] of if outside [facility] call [phone].    Please Follow these Important Safety Guidelines:    • The morning of your procedure, take only the medications listed below with   A sip of water:_____________________________________________       __EYE DROPS, NORCO, CLARITIN_______________    • DO NOT eat or drink anything after 12:00 midnight the night before surgery  Specific instructions concerning drinking clear liquids will be discussed during  the pre-surgery instruction call the day before your surgery.    • If you take a blood thinner (ex. Plavix, Coumadin, aspirin), ask your doctor when to stop it before surgery  STOP DATE: _________________    • Only 2 visitors are allowed in patient rooms at a time  Your visitors will be asked to wait in the lobby until the admission process is complete with the exception of a parent with a child and patients in need of special assistance.    • YOU CANNOT DRIVE YOURSELF HOME  You must be accompanied by someone who will be responsible for driving you home after surgery and for your care at home.    • DO NOT chew gum, use breath mints, hard candy, or smoke the day of surgery  • DO NOT drink alcohol for at least 24 hours before your surgery  • DO NOT wear any jewelry and remove all body piercing before coming to the hospital  • DO NOT wear make-up to the hospital  • If you are having surgery on an extremity (arm/leg/foot) remove nail polish/artificial nails on the surgical side  • Clothing, glasses, contacts, dentures, and hairpieces must be removed before surgery  • Bathe the night before or the morning of your surgery and do not use powders/lotions on  skin.

## 2019-07-26 ENCOUNTER — ANESTHESIA (OUTPATIENT)
Dept: PERIOP | Facility: HOSPITAL | Age: 84
End: 2019-07-26

## 2019-07-26 ENCOUNTER — HOSPITAL ENCOUNTER (OUTPATIENT)
Facility: HOSPITAL | Age: 84
Setting detail: HOSPITAL OUTPATIENT SURGERY
Discharge: HOME | End: 2019-07-26
Attending: UROLOGY | Admitting: UROLOGY

## 2019-07-26 ENCOUNTER — ANESTHESIA EVENT (OUTPATIENT)
Dept: PERIOP | Facility: HOSPITAL | Age: 84
End: 2019-07-26

## 2019-07-26 VITALS
WEIGHT: 199.08 LBS | RESPIRATION RATE: 18 BRPM | BODY MASS INDEX: 28.5 KG/M2 | DIASTOLIC BLOOD PRESSURE: 75 MMHG | HEIGHT: 70 IN | SYSTOLIC BLOOD PRESSURE: 176 MMHG | OXYGEN SATURATION: 96 % | TEMPERATURE: 97.9 F | HEART RATE: 55 BPM

## 2019-07-26 DIAGNOSIS — D49.4 BLADDER NEOPLASM: ICD-10-CM

## 2019-07-26 LAB — GLUCOSE BLDC GLUCOMTR-MCNC: 116 MG/DL (ref 70–130)

## 2019-07-26 PROCEDURE — 25010000002 ONDANSETRON PER 1 MG: Performed by: NURSE ANESTHETIST, CERTIFIED REGISTERED

## 2019-07-26 PROCEDURE — 25010000002 LEVOFLOXACIN PER 250 MG: Performed by: UROLOGY

## 2019-07-26 PROCEDURE — 82962 GLUCOSE BLOOD TEST: CPT

## 2019-07-26 PROCEDURE — 25010000002 FENTANYL CITRATE (PF) 100 MCG/2ML SOLUTION: Performed by: NURSE ANESTHETIST, CERTIFIED REGISTERED

## 2019-07-26 PROCEDURE — 25010000002 PROPOFOL 10 MG/ML EMULSION: Performed by: NURSE ANESTHETIST, CERTIFIED REGISTERED

## 2019-07-26 PROCEDURE — 88307 TISSUE EXAM BY PATHOLOGIST: CPT | Performed by: UROLOGY

## 2019-07-26 PROCEDURE — 88307 TISSUE EXAM BY PATHOLOGIST: CPT | Performed by: PATHOLOGY

## 2019-07-26 RX ORDER — PROMETHAZINE HYDROCHLORIDE 25 MG/1
25 TABLET ORAL ONCE AS NEEDED
Status: DISCONTINUED | OUTPATIENT
Start: 2019-07-26 | End: 2019-07-26 | Stop reason: HOSPADM

## 2019-07-26 RX ORDER — LEVOFLOXACIN 5 MG/ML
500 INJECTION, SOLUTION INTRAVENOUS ONCE
Status: COMPLETED | OUTPATIENT
Start: 2019-07-26 | End: 2019-07-26

## 2019-07-26 RX ORDER — FENTANYL CITRATE 50 UG/ML
INJECTION, SOLUTION INTRAMUSCULAR; INTRAVENOUS AS NEEDED
Status: DISCONTINUED | OUTPATIENT
Start: 2019-07-26 | End: 2019-07-26 | Stop reason: SURG

## 2019-07-26 RX ORDER — PROMETHAZINE HYDROCHLORIDE 25 MG/1
25 SUPPOSITORY RECTAL ONCE AS NEEDED
Status: DISCONTINUED | OUTPATIENT
Start: 2019-07-26 | End: 2019-07-26 | Stop reason: HOSPADM

## 2019-07-26 RX ORDER — LIDOCAINE HYDROCHLORIDE 20 MG/ML
INJECTION, SOLUTION INFILTRATION; PERINEURAL AS NEEDED
Status: DISCONTINUED | OUTPATIENT
Start: 2019-07-26 | End: 2019-07-26 | Stop reason: SURG

## 2019-07-26 RX ORDER — DOXYCYCLINE HYCLATE 100 MG/1
100 CAPSULE ORAL DAILY
Qty: 7 CAPSULE | Refills: 0 | Status: SHIPPED | OUTPATIENT
Start: 2019-07-26 | End: 2019-08-02

## 2019-07-26 RX ORDER — PROMETHAZINE HYDROCHLORIDE 25 MG/ML
12.5 INJECTION, SOLUTION INTRAMUSCULAR; INTRAVENOUS ONCE AS NEEDED
Status: DISCONTINUED | OUTPATIENT
Start: 2019-07-26 | End: 2019-07-26 | Stop reason: HOSPADM

## 2019-07-26 RX ORDER — SODIUM CHLORIDE 9 MG/ML
1000 INJECTION, SOLUTION INTRAVENOUS CONTINUOUS
Status: DISCONTINUED | OUTPATIENT
Start: 2019-07-26 | End: 2019-07-26 | Stop reason: HOSPADM

## 2019-07-26 RX ORDER — ONDANSETRON 2 MG/ML
4 INJECTION INTRAMUSCULAR; INTRAVENOUS ONCE AS NEEDED
Status: DISCONTINUED | OUTPATIENT
Start: 2019-07-26 | End: 2019-07-26 | Stop reason: HOSPADM

## 2019-07-26 RX ORDER — ONDANSETRON 2 MG/ML
INJECTION INTRAMUSCULAR; INTRAVENOUS AS NEEDED
Status: DISCONTINUED | OUTPATIENT
Start: 2019-07-26 | End: 2019-07-26 | Stop reason: SURG

## 2019-07-26 RX ORDER — PROPOFOL 10 MG/ML
VIAL (ML) INTRAVENOUS AS NEEDED
Status: DISCONTINUED | OUTPATIENT
Start: 2019-07-26 | End: 2019-07-26 | Stop reason: SURG

## 2019-07-26 RX ORDER — DEXAMETHASONE SODIUM PHOSPHATE 4 MG/ML
8 INJECTION, SOLUTION INTRA-ARTICULAR; INTRALESIONAL; INTRAMUSCULAR; INTRAVENOUS; SOFT TISSUE ONCE AS NEEDED
Status: DISCONTINUED | OUTPATIENT
Start: 2019-07-26 | End: 2019-07-26 | Stop reason: HOSPADM

## 2019-07-26 RX ADMIN — FENTANYL CITRATE 25 MCG: 50 INJECTION, SOLUTION INTRAMUSCULAR; INTRAVENOUS at 17:45

## 2019-07-26 RX ADMIN — SODIUM CHLORIDE 1000 ML: 9 INJECTION, SOLUTION INTRAVENOUS at 15:08

## 2019-07-26 RX ADMIN — PROPOFOL 80 MG: 10 INJECTION, EMULSION INTRAVENOUS at 17:39

## 2019-07-26 RX ADMIN — LIDOCAINE HYDROCHLORIDE 80 MG: 20 INJECTION, SOLUTION INFILTRATION; PERINEURAL at 17:39

## 2019-07-26 RX ADMIN — LEVOFLOXACIN 500 MG: 5 INJECTION, SOLUTION INTRAVENOUS at 17:43

## 2019-07-26 RX ADMIN — FENTANYL CITRATE 25 MCG: 50 INJECTION, SOLUTION INTRAMUSCULAR; INTRAVENOUS at 17:50

## 2019-07-26 RX ADMIN — ONDANSETRON 4 MG: 2 INJECTION INTRAMUSCULAR; INTRAVENOUS at 17:45

## 2019-07-26 RX ADMIN — PROPOFOL 20 MG: 10 INJECTION, EMULSION INTRAVENOUS at 17:42

## 2019-07-26 NOTE — ANESTHESIA PROCEDURE NOTES
Airway  Urgency: elective      General Information and Staff    Patient location during procedure: OR    Indications and Patient Condition  Indications for airway management: airway protection    Preoxygenated: yes  Mask difficulty assessment: 0 - not attempted    Final Airway Details  Final airway type: supraglottic airway      Successful airway: I-gel  Size 4    Number of attempts at approach: 1

## 2019-07-26 NOTE — ANESTHESIA POSTPROCEDURE EVALUATION
Patient: Evgeny Carter Jr.    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])    Surgeon:  Donovan Conner MD Provider:  Hebert Keyes MD    Anesthesia Type:  general ASA Status:  3          Anesthesia Type: general  Last vitals  BP   178/73 (07/26/19 1456)   Temp   96.9 °F (36.1 °C) (07/26/19 1456)   Pulse   50 (07/26/19 1456)   Resp   18 (07/26/19 1456)     SpO2   98 % (07/26/19 1456)     Post Anesthesia Care and Evaluation    Patient location during evaluation: PACU  Patient participation: complete - patient participated  Level of consciousness: awake and alert  Pain score: 1  Pain management: adequate  Airway patency: patent  Anesthetic complications: No anesthetic complications  PONV Status: none  Cardiovascular status: acceptable  Respiratory status: acceptable  Hydration status: acceptable

## 2019-07-26 NOTE — ANESTHESIA PREPROCEDURE EVALUATION
Anesthesia Evaluation     no history of anesthetic complications:  NPO Solid Status: > 8 hours  NPO Liquid Status: > 4 hours           Airway   Mallampati: II  TM distance: >3 FB  Neck ROM: full  Possible difficult intubation  Dental    (+) poor dentition and partials    Pulmonary - normal exam    breath sounds clear to auscultation  (-) COPD, sleep apnea, not a smoker, lung cancer    ROS comment: snore  Cardiovascular   Exercise tolerance: good (4-7 METS)    ECG reviewed  PT is on anticoagulation therapy  Rhythm: regular  Rate: normal    (+) hypertension, valvular problems/murmurs, past MI  >12 months, CAD, dysrhythmias,   (-) angina, DVT    ROS comment: Sinus bradycardia with marked sinus arrhythmia  Right bundle branch block  Left anterior fascicular block  ** Bifascicular block **  Minimal voltage criteria for LVH, may be normal variant  Possible Lateral infarct , age undetermined  Possible Inferior infarct , age undetermined  Abnormal ECG  When compared with ECG of 29-AUG-2018 08:45,  No significant change was found    Interpretation Summary   · Mild tricuspid valve regurgitation is present.  · Mild pulmonic valve regurgitation is present.  · Left atrial cavity size is mild-to-moderately dilated.  · Left ventricular systolic function is normal. Estimated EF = 55%.  · Mild mitral valve regurgitation is present  · Left ventricular diastolic dysfunction (grade I a) consistent with impaired relaxation.        Neuro/Psych  (-) seizures, TIA, CVA, headaches, psychiatric history  GI/Hepatic/Renal/Endo    (+)  GERD,  diabetes mellitus type 2,   (-) hepatitis, liver disease, no renal disease    ROS Comment: Bladder tumor    Musculoskeletal     (+) arthralgias,   Abdominal    Substance History - negative use  (-) alcohol use, drug use     OB/GYN          Other   (+) arthritis (hips and knees)   history of cancer (bladder)                    Anesthesia Plan    ASA 3     general     intravenous induction   Anesthetic  plan, all risks, benefits, and alternatives have been provided, discussed and informed consent has been obtained with: patient and child.

## 2019-07-27 LAB — GLUCOSE BLDC GLUCOMTR-MCNC: 119 MG/DL (ref 70–130)

## 2019-07-30 LAB
LAB AP CASE REPORT: NORMAL
PATH REPORT.FINAL DX SPEC: NORMAL
PATH REPORT.GROSS SPEC: NORMAL

## 2019-09-06 ENCOUNTER — OFFICE VISIT (OUTPATIENT)
Dept: CARDIOLOGY | Facility: CLINIC | Age: 84
End: 2019-09-06

## 2019-09-06 VITALS
WEIGHT: 198.4 LBS | OXYGEN SATURATION: 98 % | DIASTOLIC BLOOD PRESSURE: 64 MMHG | BODY MASS INDEX: 28.4 KG/M2 | HEART RATE: 83 BPM | HEIGHT: 70 IN | SYSTOLIC BLOOD PRESSURE: 128 MMHG

## 2019-09-06 DIAGNOSIS — I48.3 TYPICAL ATRIAL FLUTTER (HCC): ICD-10-CM

## 2019-09-06 DIAGNOSIS — E11.9 TYPE 2 DIABETES MELLITUS WITHOUT COMPLICATION, WITHOUT LONG-TERM CURRENT USE OF INSULIN (HCC): ICD-10-CM

## 2019-09-06 DIAGNOSIS — I77.810 DILATED AORTIC ROOT (HCC): ICD-10-CM

## 2019-09-06 DIAGNOSIS — I48.0 PAROXYSMAL ATRIAL FIBRILLATION (HCC): Primary | ICD-10-CM

## 2019-09-06 DIAGNOSIS — I25.10 CORONARY ARTERY DISEASE INVOLVING NATIVE CORONARY ARTERY OF NATIVE HEART WITHOUT ANGINA PECTORIS: ICD-10-CM

## 2019-09-06 PROCEDURE — 99213 OFFICE O/P EST LOW 20 MIN: CPT | Performed by: INTERNAL MEDICINE

## 2019-09-06 NOTE — PROGRESS NOTES
Evgeny Carter Jr.  86 y.o. male    09/06/2019  1. Paroxysmal atrial fibrillation (CMS/HCC)    2. Dilated aortic root (CMS/HCC)    3. Type 2 diabetes mellitus without complication, without long-term current use of insulin (CMS/HCC)    4. Coronary artery disease involving native coronary artery of native heart without angina pectoris    5. Typical atrial flutter (CMS/HCC)        History of Present Illness:  Body mass index is 28.47 kg/m². BMI is above normal parameters. Recommendations include: exercise counseling, nutrition counseling and referral to primary care.        86 years old patient scheduled to have: Scope and presented for routine follow-up on oral anticoagulation currently in sinus rhythm with a past medical history significant for hypertension, hypertensive heart disease, CAD status post PTCA stent, atrial flutter with a counterclockwise mechanism state  post-EP study and flutter ablation and noted to be in atrial fibrillation with a resting heart rate 110 bpm.  Patient started on oral anticoagulation and amiodarone. Last echocardiographic study reported  dilated aortic root . Patient spontaneously converted to sinus rhythm on amiodarone 200 mg., decreased 100 mg and subsequent to 50 mg given the sinus bradycardia and todays a heart rate is a 46.  Advised the patient to discontinue amiodarone on further recurrence of arrhythmia hybrid approach can be interpreted.  At this time the patient doesn't want to consider pacemaker implantations.  He denies orthopnea, PND, syncope or near syncopal episode.  Denies any polydipsia polyuria.  Denies any fever cough or chills.  No dysuria or hematuria reported.  Repeat ECHO in August 2018 normal left and systolic function with mildly dilated aortic root 4.2 no significant change from the old echo     ECHO 8/15/18  ·  Mild tricuspid valve regurgitation is present.  · Mild pulmonic valve regurgitation is present.  · Left atrial cavity size is  mild-to-moderately dilated.  · Left ventricular systolic function is normal. Estimated EF = 55%.  · Mild mitral valve regurgitation is present  Left ventricular diastolic dysfunction (grade I a) consistent with impaired relaxation     10/2016      Ref Range & Units 1yr ago      Total Cholesterol 0 - 199 mg/dl 169   Comments: CHOL DESIRED: < 200 MG/DL   Triglycerides 20 - 199 mg/dl 188   Comments: TRIG DESIRED: < 200 MG/DL   HDL Cholesterol 60 - 200 mg/dl 37 (L)   Comments: HDL AVERAGE RISK: 35 - 60 MG/DL   LDL Cholesterol  0 - 129 mg/dl 94       7/2019  Total Cholesterol  100 - 200 mg/dL 196    Triglycerides  30 - 150 mg/dL 199 Abnormally high     HDL Cholesterol  32 - 72 mg/dL 41    LDL Cholesterol   5 - 99 mg/dL 112 Abnormally high     Chol/HDL Ratio  4.2 - 5.6 4.8           Stress TEST ; 10/2016   Normal LV systolic function overall with an EF of 70% with inferior       wall hypokinesis.  2.   Moderate size predominantly fixed defect in the inferior wall and       apex with partial reversibility.     ECHO 10/2016  1.   Mild concentric left ventricular hypertrophy with estimated       ejection fraction 50% to 60%.  2.   Mildly dilated aortic root with diameter of 4.0.  3.   Mildly dilated left atrium with diameter of 4.4.  4.   Aortic valve appears mildly sclerotic without any hemodynamic       significant aortic stenosis. No aortic regurgitation seen.  5.   Pulmonic valve not well visualized. There is trace pulmonic       regurgitation. There is a trace mitral regurgitation. No       intracardiac mass, pericardial effusion, cardiac thrombus seen.  6.   Normal right ventricular size and function          SUBJECTIVE:    No Known Allergies      Past Medical History:   Diagnosis Date   • Allergic rhinitis    • Arthritis     hips, knees, feet   • Artificial lens present     in position   • Benign neoplasm of skin of eyelid    • Bladder tumor    • Bladder tumor    • BPH (benign prostatic hyperplasia)    • Cancer  (CMS/[facility])     SKIN    • Cataract    • Coronary arteriosclerosis    • Cortical senile cataract    • Cough    • Essential hypertension    • Essential hypertension    • Hypercholesterolemia    • Hypercholesterolemia    • Kidney stones    • MI, old 1988   • Nonexudative age-related macular degeneration     mild   • Nuclear cataract    • Open-angle glaucoma     s/p trabec OS, doing well      • Primary open angle glaucoma     OS, s/p trabeculectomy; IOP down with timolol OD     • Pseudophakia of right eye    • Rhinitis    • Upper respiratory infection    • Vitreous detachment     peripheral         Past Surgical History:   Procedure Laterality Date   • ANKLE SURGERY  07/29/2000    Ankle surgery (ORIF right ankle fracture. Bimalleolar right fracture)   • APPENDECTOMY     • CARDIAC ABLATION     • CARDIAC CATHETERIZATION  07/02/1987    Cardiac cath 54139 (Coronary arthosclerotic heart disease. maintained patency of RCA. Mild inferior hypokinesis.)   • CARDIAC CATHETERIZATION  06/30/1987    Cardiac cath 94225 (Coronary atherosclerotic heart disease. Occluded RCA. Acute inferior infarction. S/P successful angioplasty of RCA)   • CATARACT EXTRACTION  04/01/2010    Remove cataract, insert lens (Complicated cataract extraction with intraocular lens implantation, right eye, Marcelo SN-60  serial # [id number]: 20.5 diopter)   • CATARACT EXTRACTION W/ INTRAOCULAR LENS IMPLANT Left 3/23/2018    Procedure: CATARACT PHACO EXTRACTION WITH INTRAOCULAR LENS IMPLANT;  Surgeon: Oli Haley MD;  Location: [facility] OR;  Service: Ophthalmology   • COLONOSCOPY N/A 5/9/2019    Procedure: COLONOSCOPY;  Surgeon: Rainer Gates DO;  Location: [facility] ENDOSCOPY;  Service: Gastroenterology   • ENDOSCOPY  02/19/1990    EGD 78446 (Olympus video endoscope.Fibrin-like patch in stomach)   • EYE SURGERY  02/02/2005    Eye surgery procedure (Repair of operative wound leak, left eye. S/P trabeculectomy with operative wound leak)   • EYE SURGERY   2005    Eye surgery procedure (Trabeculectomy, left eye. Chronic open angle glaucoma, uncontrolled left eye)   • INJECTION OF MEDICATION  2014    Kenalog (Allergic rhinitis)    • OTHER SURGICAL HISTORY  2016    DIL MAC EXAM PERF INC DOC OF +/- MAC THICK 2019F (Nonexudative age-related macular degeneration)    • OTHER SURGICAL HISTORY  2015    EXTENDED VISUAL FIELDS STUDY 98854 (Primary open angle glaucoma)    • OTHER SURGICAL HISTORY  2015    EXTENDED VISUAL FIELDS STUDY 52413 (Primary open angle glaucoma)    • OTHER SURGICAL HISTORY  2016    OCT DISC NFL 81788 (Open-angle glaucoma)    • OTHER SURGICAL HISTORY      OCT DISC NFL 00562 (Primary open angle glaucoma) (3): 2015, 2014, 2013   • OTHER SURGICAL HISTORY  2016    OPTIC NERVE HEAD EVAL PERFORMED  (Open-angle glaucoma)    • TRANSURETHRAL RESECTION OF BLADDER TUMOR N/A 2018    Procedure: CYSTOSCOPY TRANSURETHRAL RESECTION OF BLADDER TUMOR;  Surgeon: Donovan Conner MD;  Location: [facility] OR;  Service: Urology   • TRANSURETHRAL RESECTION OF BLADDER TUMOR N/A 2019    Procedure: CYSTOSCOPY TRANSURETHRAL RESECTION OF BLADDER TUMOR;  Surgeon: Donovan Conner MD;  Location: [facility] OR;  Service: Urology   • UVULECTOMY  1969    EXCISION OF UVULA 44793 (partial uvulectomy with excision of papilloma. Redunant uvula with a small papilloma)         Family History   Problem Relation Age of Onset   • No Known Problems Mother    • No Known Problems Father          Social History     Socioeconomic History   • Marital status:      Spouse name: Not on file   • Number of children: Not on file   • Years of education: Not on file   • Highest education level: Not on file   Tobacco Use   • Smoking status: Former Smoker     Last attempt to quit: 1988     Years since quittin.7   • Smokeless tobacco: Current User     Types: Snuff   Substance and Sexual Activity   • Alcohol use: No   • Drug use: No    • Sexual activity: Defer         Current Outpatient Medications   Medication Sig Dispense Refill   • apixaban (ELIQUIS) 5 MG tablet tablet Take 2.5 mg by mouth 2 (Two) Times a Day. Last dose to be taken 5/5/18 prior to surgery     • aspirin 81 MG tablet Take 81 mg by mouth Every Night. Last dose 5/1/18     • HYDROcodone-acetaminophen (NORCO) 5-325 MG per tablet Take 1 tablet by mouth Every 6 (Six) Hours As Needed for Moderate Pain . 3 tablet 0   • lisinopril-hydrochlorothiazide (PRINZIDE,ZESTORETIC) 20-12.5 MG per tablet Take 1 tablet by mouth Daily.     • loratadine (CLARITIN) 10 MG tablet Take 10 mg by mouth Daily.     • metFORMIN (GLUCOPHAGE) 500 MG tablet Take 500 mg by mouth 2 (Two) Times a Day.     • Multiple Vitamins-Minerals (MENS MULTIVITAMIN PLUS PO) Take 1 tablet by mouth Daily.     • terazosin (HYTRIN) 2 MG capsule Take 2 mg by mouth Every Night.     • timolol (TIMOPTIC) 0.5 % ophthalmic solution Administer 1 drop to both eyes 2 (Two) Times a Day.       No current facility-administered medications for this visit.            Review of Systems:     Constitutional:  Denies recent weight loss, weight gain, fever or chills, no change in exercise tolerance.     HENT:  Denies any hearing loss, epistaxis, hoarseness, or difficulty speaking.     Eyes: Wears eyeglasses or contact lenses.    Respiratory:  Denies dyspnea with exertion,no cough, wheezing, or hemoptysis.     Cardiovascular: Negative for palpations, chest pain, orthopnea, PND, peripheral edema, syncope, or claudication.     Gastrointestinal:  Denies change in bowel habits, dyspepsia, ulcer disease, hematochezia, or melena.     Endocrine: Negative for cold intolerance, heat intolerance, polydipsia, polyphagia and polyuria. Denies any history of weight change, polydipsia, polyuria.     Genitourinary: Negative.      Musculoskeletal: Denies any history of arthritic symptoms or back problems.     Skin:  Denies any change in hair or nails, rashes, or skin  "lesions.     Allergic/Immunologic: Negative.  Negative for environmental allergies, food allergies and immunocompromised state.     Neurological:  Denies any history of recurrent headaches, strokes, TIA, or seizure disorder.     Hematological: Denies any food allergies, seasonal allergies, bleeding disorders, or lymphadenopathy.     Psychiatric/Behavioral: Denies any history of depression, substance abuse, or change in cognitive function.       OBJECTIVE:    /64   Pulse 83   Ht 177.8 cm (70\")   Wt 90 kg (198 lb 6.4 oz)   SpO2 98%   BMI 28.47 kg/m²       Physical Exam:     Constitutional: Cooperative, alert and oriented, well-developed, well-nourished, in no acute distress.     HENT:   Head: Normocephalic, normal hair patterns, no masses or tenderness.  Ears, Nose, and Throat: No gross abnormalities. No pallor or cyanosis. Dentition good.   Eyes: EOMS intact, PERRL, conjunctivae and lids unremarkable. Fundoscopic exam and visual fields not performed.   Neck: No palpable masses or adenopathy, no thyromegaly, no JVD, carotid pulses are full and equal bilaterally and without  Bruits.     Cardiovascular: Regular rhythm, S1 and S2 normal, no S3 or S4. Apical impulse not displaced. No murmurs, gallops, or rubs detected.     Pulmonary/Chest: Chest: normal symmetry, no tenderness to palpation, normal respiratory excursion, no intercostal retraction, no use of accessory muscles. Pulmonary: Normal breath sounds. No rales or rhonchi.    Abdominal: Abdomen soft, bowel sounds normoactive, no masses, no hepatosplenomegaly, non-tender, no bruits.     Musculoskeletal: No deformities, clubbing, cyanosis, erythema, or edema observed. There are no spinal abnormalities noted. Normal muscle strength and tone. Pulses full and equal in all extremities, no bruits auscultated.     Neurological: No gross motor or sensory deficits noted, affect appropriate, oriented to time, person, place.     Skin: Warm and dry to the touch, no " apparent skin lesions or masses noted.     Psychiatric: He has a normal mood and affect. His behavior is normal. Judgment and thought content normal.         Procedures      Lab Results   Component Value Date    WBC 6.3 07/09/2019    HGB 12.4 (L) 07/09/2019    HCT 37.4 (L) 07/09/2019     (H) 07/09/2019     07/09/2019     Lab Results   Component Value Date    GLUCOSE 114 (H) 10/10/2018    BUN 26 (H) 07/09/2019    CREATININE 1.3 07/09/2019    EGFRIFNONA 72 10/10/2018    BCR 17.2 10/10/2018    CO2 28.0 10/10/2018    CALCIUM 9.1 07/09/2019    ALBUMIN 4.2 07/09/2019    AST 20 07/09/2019    ALT 25 07/09/2019     Lab Results   Component Value Date    CHOL 196 07/09/2019    CHOL 190 03/30/2018     Lab Results   Component Value Date    TRIG 199 (H) 07/09/2019    TRIG 198 03/30/2018    TRIG 188 10/20/2016     Lab Results   Component Value Date    HDL 41 07/09/2019    HDL 42 03/30/2018    HDL 37 (L) 10/20/2016     No components found for: LDLCALC  Lab Results   Component Value Date     (H) 07/09/2019     (H) 03/30/2018    LDL 94 10/20/2016     No results found for: HDLLDLRATIO  No components found for: CHOLHDL  Lab Results   Component Value Date    HGBA1C 6.1 07/09/2019     Lab Results   Component Value Date    TSH 1.00 10/19/2016           ASSESSMENT AND PLAN:    #1 paroxysmal atrial fibrillation #2 asymptomatic sinus bradycardia with a heart rate 48 bpm #3 history of atrial flutter status post EP study and flutter ablation #4 hypertension #4 CAD stable     86 years old patient with history of atrial flutter status post EP study and flutter ablation.  Post atrial flutter develop develop atrial fibrillation started on amiodarone discontinued due to significant asymptomatic bradycardia arrhythmia.  The heart rate at the time of evaluation is a 80 bpm.  Patient is scheduled to undergo colonoscope and currently in sinus rhythm.    Patient denied symptom of palpitation, dizziness, fatigability or  shortness of breath..  He will continue eliquis to decrease risk of cardiac embolic phenomena, aspirin with history of CAD stable, lisinopril for management of hypertension good blood pressure control.  Patient with a history of intolerant to statinI will see him back in 6 month R depends on patient clinical conditions.  Previously. discussed with the patient given the bradycardia regarding pacemaker implantation but has significant improvement in heart rate and there is no definitive indication at this stage  Evgeny was seen today for follow-up.    Diagnoses and all orders for this visit:    Paroxysmal atrial fibrillation (CMS/HCC)    Dilated aortic root (CMS/HCC)    Type 2 diabetes mellitus without complication, without long-term current use of insulin (CMS/HCC)    Coronary artery disease involving native coronary artery of native heart without angina pectoris    Typical atrial flutter (CMS/HCC)        Johnny Forbes MD  9/6/2019  11:17 AM

## 2020-03-10 DIAGNOSIS — I48.92 ATRIAL FLUTTER, UNSPECIFIED TYPE (HCC): Primary | ICD-10-CM

## 2020-03-11 ENCOUNTER — OFFICE VISIT (OUTPATIENT)
Dept: CARDIOLOGY | Facility: CLINIC | Age: 85
End: 2020-03-11

## 2020-03-11 VITALS
DIASTOLIC BLOOD PRESSURE: 68 MMHG | OXYGEN SATURATION: 98 % | WEIGHT: 203 LBS | HEIGHT: 70 IN | SYSTOLIC BLOOD PRESSURE: 118 MMHG | HEART RATE: 51 BPM | BODY MASS INDEX: 29.06 KG/M2

## 2020-03-11 DIAGNOSIS — I25.10 CORONARY ARTERY DISEASE INVOLVING NATIVE CORONARY ARTERY OF NATIVE HEART WITHOUT ANGINA PECTORIS: ICD-10-CM

## 2020-03-11 DIAGNOSIS — E78.5 HYPERLIPIDEMIA, UNSPECIFIED HYPERLIPIDEMIA TYPE: ICD-10-CM

## 2020-03-11 DIAGNOSIS — I77.810 DILATED AORTIC ROOT (HCC): ICD-10-CM

## 2020-03-11 DIAGNOSIS — I10 BENIGN ESSENTIAL HYPERTENSION: ICD-10-CM

## 2020-03-11 DIAGNOSIS — I48.0 PAROXYSMAL ATRIAL FIBRILLATION (HCC): ICD-10-CM

## 2020-03-11 DIAGNOSIS — I48.92 ATRIAL FLUTTER, UNSPECIFIED TYPE (HCC): Primary | ICD-10-CM

## 2020-03-11 PROCEDURE — 99214 OFFICE O/P EST MOD 30 MIN: CPT | Performed by: INTERNAL MEDICINE

## 2020-03-11 NOTE — PROGRESS NOTES
Evgeny Carter Jr.  86 y.o. male    3/11/2020  1. Atrial flutter, unspecified type (CMS/HCC)    2. Coronary artery disease involving native coronary artery of native heart without angina pectoris    3. Benign essential hypertension    4. Hyperlipidemia, unspecified hyperlipidemia type    5. Paroxysmal atrial fibrillation (CMS/HCC)    6. Dilated aortic root (CMS/HCC)        History of Present Illness:    Body mass index is 29.13 kg/m². BMI is above normal parameters. Recommendations include: exercise counseling, nutrition counseling and referral to primary care.        86 years old patient scheduled to have: Scope and presented for routine follow-up on oral anticoagulation currently in sinus rhythm with a past medical history significant for hypertension, hypertensive heart disease, CAD status post PTCA stent, atrial flutter with a counterclockwise mechanism state  post-EP study and flutter ablation and noted to be in atrial fibrillation with a resting heart rate 110 bpm.  Patient started on oral anticoagulation and amiodarone. Last echoc reported mildly dilated aortic root and repeat echo similar aortic root diameter 4.0 in August 2018.  Post atrial flutter ablation patient developed A. fib initially managed with amiodarone 200 mg then subsequent decrease to 200 and then 50 mg and given the significant bradyarrhythmia amiodarone was discontinued  He denies orthopnea, PND, syncope or near syncopal episode.  Denies any polydipsia polyuria.  Denies any fever cough or chills.  No dysuria or hematuria reported.     ECHO 8/15/18  ·  Mild tricuspid valve regurgitation is present.  · Mild pulmonic valve regurgitation is present.  · Left atrial cavity size is mild-to-moderately dilated.  · Left ventricular systolic function is normal. Estimated EF = 55%.  · Mild mitral valve regurgitation is present  Left ventricular diastolic dysfunction (grade I a) consistent with impaired relaxation     10/2016      Ref Range &  Units 1yr ago      Total Cholesterol 0 - 199 mg/dl 169   Comments: CHOL DESIRED: < 200 MG/DL   Triglycerides 20 - 199 mg/dl 188   Comments: TRIG DESIRED: < 200 MG/DL   HDL Cholesterol 60 - 200 mg/dl 37 (L)   Comments: HDL AVERAGE RISK: 35 - 60 MG/DL   LDL Cholesterol  0 - 129 mg/dl 94       7/2019  Total Cholesterol  100 - 200 mg/dL 196    Triglycerides  30 - 150 mg/dL 199 Abnormally high     HDL Cholesterol  32 - 72 mg/dL 41    LDL Cholesterol   5 - 99 mg/dL 112 Abnormally high     Chol/HDL Ratio  4.2 - 5.6 4.8           Stress TEST ; 10/2016   Normal LV systolic function overall with an EF of 70% with inferior       wall hypokinesis.  2.   Moderate size predominantly fixed defect in the inferior wall and       apex with partial reversibility.     ECHO 10/2016  1.   Mild concentric left ventricular hypertrophy with estimated       ejection fraction 50% to 60%.  2.   Mildly dilated aortic root with diameter of 4.0.  3.   Mildly dilated left atrium with diameter of 4.4.  4.   Aortic valve appears mildly sclerotic without any hemodynamic       significant aortic stenosis. No aortic regurgitation seen.  5.   Pulmonic valve not well visualized. There is trace pulmonic       regurgitation. There is a trace mitral regurgitation. No       intracardiac mass, pericardial effusion, cardiac thrombus seen.  6.   Normal right ventricular size and function          SUBJECTIVE:    No Known Allergies      Past Medical History:   Diagnosis Date   • Allergic rhinitis    • Arthritis     hips, knees, feet   • Artificial lens present     in position   • Benign neoplasm of skin of eyelid    • Bladder tumor    • Bladder tumor    • BPH (benign prostatic hyperplasia)    • Cancer (CMS/HCC)     SKIN    • Cataract    • Coronary arteriosclerosis    • Cortical senile cataract    • Cough    • Essential hypertension    • Essential hypertension    • Hypercholesterolemia    • Hypercholesterolemia    • Kidney stones    • MI, old 1988   • Nonexudative  age-related macular degeneration     mild   • Nuclear cataract    • Open-angle glaucoma     s/p trabec OS, doing well      • Primary open angle glaucoma     OS, s/p trabeculectomy; IOP down with timolol OD     • Pseudophakia of right eye    • Rhinitis    • Upper respiratory infection    • Vitreous detachment     peripheral         Past Surgical History:   Procedure Laterality Date   • ANKLE SURGERY  07/29/2000    Ankle surgery (ORIF right ankle fracture. Bimalleolar right fracture)   • APPENDECTOMY     • CARDIAC ABLATION     • CARDIAC CATHETERIZATION  07/02/1987    Cardiac cath 66150 (Coronary arthosclerotic heart disease. maintained patency of RCA. Mild inferior hypokinesis.)   • CARDIAC CATHETERIZATION  06/30/1987    Cardiac cath 02140 (Coronary atherosclerotic heart disease. Occluded RCA. Acute inferior infarction. S/P successful angioplasty of RCA)   • CATARACT EXTRACTION  04/01/2010    Remove cataract, insert lens (Complicated cataract extraction with intraocular lens implantation, right eye, Marcelo SN-60  serial # [id number]: 20.5 diopter)   • CATARACT EXTRACTION W/ INTRAOCULAR LENS IMPLANT Left 3/23/2018    Procedure: CATARACT PHACO EXTRACTION WITH INTRAOCULAR LENS IMPLANT;  Surgeon: Oli Haley MD;  Location: [facility] OR;  Service: Ophthalmology   • COLONOSCOPY N/A 5/9/2019    Procedure: COLONOSCOPY;  Surgeon: Rainer Gates DO;  Location: [facility] ENDOSCOPY;  Service: Gastroenterology   • ENDOSCOPY  02/19/1990    EGD 93914 (Olympus video endoscope.Fibrin-like patch in stomach)   • EYE SURGERY  02/02/2005    Eye surgery procedure (Repair of operative wound leak, left eye. S/P trabeculectomy with operative wound leak)   • EYE SURGERY  01/19/2005    Eye surgery procedure (Trabeculectomy, left eye. Chronic open angle glaucoma, uncontrolled left eye)   • INJECTION OF MEDICATION  06/09/2014    Kenalog (Allergic rhinitis)    • OTHER SURGICAL HISTORY  06/16/2016    DIL MAC EXAM PERF INC DOC OF +/- MAC  THICK 2019F (Nonexudative age-related macular degeneration)    • OTHER SURGICAL HISTORY  2015    EXTENDED VISUAL FIELDS STUDY 19160 (Primary open angle glaucoma)    • OTHER SURGICAL HISTORY  2015    EXTENDED VISUAL FIELDS STUDY 04605 (Primary open angle glaucoma)    • OTHER SURGICAL HISTORY  2016    OCT DISC NFL 04202 (Open-angle glaucoma)    • OTHER SURGICAL HISTORY      OCT DISC NFL 35053 (Primary open angle glaucoma) (3): 2015, 2014, 2013   • OTHER SURGICAL HISTORY  2016    OPTIC NERVE HEAD EVAL PERFORMED  (Open-angle glaucoma)    • TRANSURETHRAL RESECTION OF BLADDER TUMOR N/A 2018    Procedure: CYSTOSCOPY TRANSURETHRAL RESECTION OF BLADDER TUMOR;  Surgeon: Donovan Conner MD;  Location: [facility];  Service: Urology   • TRANSURETHRAL RESECTION OF BLADDER TUMOR N/A 2019    Procedure: CYSTOSCOPY TRANSURETHRAL RESECTION OF BLADDER TUMOR;  Surgeon: Donovan Conner MD;  Location: [facility];  Service: Urology   • UVULECTOMY  1969    EXCISION OF UVULA 52200 (partial uvulectomy with excision of papilloma. Redunant uvula with a small papilloma)         Family History   Problem Relation Age of Onset   • No Known Problems Mother    • No Known Problems Father          Social History     Socioeconomic History   • Marital status:      Spouse name: Not on file   • Number of children: Not on file   • Years of education: Not on file   • Highest education level: Not on file   Tobacco Use   • Smoking status: Former Smoker     Last attempt to quit: 1988     Years since quittin.2   • Smokeless tobacco: Current User     Types: Snuff   Substance and Sexual Activity   • Alcohol use: No   • Drug use: No   • Sexual activity: Defer         Current Outpatient Medications   Medication Sig Dispense Refill   • apixaban (ELIQUIS) 5 MG tablet tablet Take 2.5 mg by mouth 2 (Two) Times a Day. Last dose to be taken 18 prior to surgery     • aspirin 81 MG tablet Take 81 mg by  mouth Every Night. Last dose 5/1/18     • lisinopril-hydrochlorothiazide (PRINZIDE,ZESTORETIC) 20-12.5 MG per tablet Take 1 tablet by mouth Daily.     • loratadine (CLARITIN) 10 MG tablet Take 10 mg by mouth Daily.     • metFORMIN (GLUCOPHAGE) 500 MG tablet Take 500 mg by mouth 2 (Two) Times a Day.     • Multiple Vitamins-Minerals (MENS MULTIVITAMIN PLUS PO) Take 1 tablet by mouth Daily.     • terazosin (HYTRIN) 2 MG capsule Take 2 mg by mouth Every Night.     • timolol (TIMOPTIC) 0.5 % ophthalmic solution Administer 1 drop to both eyes 2 (Two) Times a Day.       No current facility-administered medications for this visit.            Review of Systems:     Constitutional:  Denies recent weight loss, weight gain, fever or chills, no change in exercise tolerance.     HENT:  Denies any hearing loss, epistaxis, hoarseness, or difficulty speaking.     Eyes: Wears eyeglasses or contact lenses.    Respiratory:  Denies dyspnea with exertion,no cough, wheezing, or hemoptysis.     Cardiovascular: Negative for palpations, chest pain, orthopnea, PND, peripheral edema, syncope, or claudication.     Gastrointestinal:  Denies change in bowel habits, dyspepsia, ulcer disease, hematochezia, or melena.     Endocrine: Negative for cold intolerance, heat intolerance, polydipsia, polyphagia and polyuria. Denies any history of weight change, polydipsia, polyuria.     Genitourinary: Negative.      Musculoskeletal: Denies any history of arthritic symptoms or back problems.     Skin:  Denies any change in hair or nails, rashes, or skin lesions.     Allergic/Immunologic: Negative.  Negative for environmental allergies, food allergies and immunocompromised state.     Neurological:  Denies any history of recurrent headaches, strokes, TIA, or seizure disorder.     Hematological: Denies any food allergies, seasonal allergies, bleeding disorders, or lymphadenopathy.     Psychiatric/Behavioral: Denies any history of depression, substance abuse, or  "change in cognitive function.       OBJECTIVE:    /68   Pulse 51   Ht 177.8 cm (70\")   Wt 92.1 kg (203 lb)   SpO2 98%   BMI 29.13 kg/m²       Physical Exam:     Constitutional: Cooperative, alert and oriented, well-developed, well-nourished, in no acute distress.     HENT:   Head: Normocephalic, normal hair patterns, no masses or tenderness.  Ears, Nose, and Throat: No gross abnormalities. No pallor or cyanosis. Dentition good.   Eyes: EOMS intact, PERRL, conjunctivae and lids unremarkable. Fundoscopic exam and visual fields not performed.   Neck: No palpable masses or adenopathy, no thyromegaly, no JVD, carotid pulses are full and equal bilaterally and without  Bruits.     Cardiovascular: Regular rhythm, S1 and S2 normal, no S3 or S4. Apical impulse not displaced. No murmurs, gallops, or rubs detected.     Pulmonary/Chest: Chest: normal symmetry, no tenderness to palpation, normal respiratory excursion, no intercostal retraction, no use of accessory muscles. Pulmonary: Normal breath sounds. No rales or rhonchi.    Abdominal: Abdomen soft, bowel sounds normoactive, no masses, no hepatosplenomegaly, non-tender, no bruits.     Musculoskeletal: No deformities, clubbing, cyanosis, erythema, or edema observed. There are no spinal abnormalities noted. Normal muscle strength and tone. Pulses full and equal in all extremities, no bruits auscultated.     Neurological: No gross motor or sensory deficits noted, affect appropriate, oriented to time, person, place.     Skin: Warm and dry to the touch, no apparent skin lesions or masses noted.     Psychiatric: He has a normal mood and affect. His behavior is normal. Judgment and thought content normal.         Procedures      Lab Results   Component Value Date    WBC 6.3 07/09/2019    HGB 12.4 (L) 07/09/2019    HCT 37.4 (L) 07/09/2019     (H) 07/09/2019     07/09/2019     Lab Results   Component Value Date    GLUCOSE 114 (H) 10/10/2018    BUN 26 (H) " 07/09/2019    CREATININE 1.3 07/09/2019    EGFRIFNONA 72 10/10/2018    BCR 17.2 10/10/2018    CO2 28.0 10/10/2018    CALCIUM 9.1 07/09/2019    ALBUMIN 4.2 07/09/2019    AST 20 07/09/2019    ALT 25 07/09/2019     Lab Results   Component Value Date    CHOL 196 07/09/2019    CHOL 190 03/30/2018     Lab Results   Component Value Date    TRIG 199 (H) 07/09/2019    TRIG 198 03/30/2018    TRIG 188 10/20/2016     Lab Results   Component Value Date    HDL 41 07/09/2019    HDL 42 03/30/2018    HDL 37 (L) 10/20/2016     No components found for: LDLCALC  Lab Results   Component Value Date     (H) 07/09/2019     (H) 03/30/2018    LDL 94 10/20/2016     No results found for: HDLLDLRATIO  No components found for: CHOLHDL  Lab Results   Component Value Date    HGBA1C 6.1 07/09/2019     Lab Results   Component Value Date    TSH 1.00 10/19/2016           ASSESSMENT AND PLAN:    #1 paroxysmal atrial fibrillation currently in sinus rhythm on oral anticoagulation to decrease the cardiac embolization   #2 asymptomatic sinus bradycardia with a heart rate 48 bpm requiring discontinuation's of amiodarone   #3 history of atrial flutter status post EP study and flutter ablation   #4 hypertension good blood pressure control on lisinopril hydrochlorothiazide  #4 CAD stable on baby aspirin will continue     86 years old patient with background history of atrial flutter status post EP study and flutter ablation post ablation 12 atrial fibrillation started on amiodarone spontaneously converted to sinus rhythm with a significant bradyarrhythmia requiring discontinuation.  Patient presented for routine follow-up and will continue Eliquis to decrease the cardiac embolization, lisinopril hydrochlorothiazide for management of hypertension with hypertensive heart disease with good blood pressure control, diabetes managed with metformin.  Patient had problem with a statin previously requiring discontinuation he preferred to take fish  vicky Pepper was seen today for follow-up.    Diagnoses and all orders for this visit:    Atrial flutter, unspecified type (CMS/HCC)    Coronary artery disease involving native coronary artery of native heart without angina pectoris    Benign essential hypertension    Hyperlipidemia, unspecified hyperlipidemia type    Paroxysmal atrial fibrillation (CMS/HCC)    Dilated aortic root (CMS/HCC)        Johnny Forbes MD  3/11/2020  10:43

## 2020-03-27 NOTE — PROGRESS NOTES
Yesica Grimaldo [facility]   Phone Number: [phone]             Sultana Regalado came down here to ask about this patient (friend/relative?) didn't hear all the story, just asked to put in a note.     Patient seen 2wks ago. Pt is dizzy/pulse, heart rate acclerated. Said he has left messages and has not heard anything. Would like a call back    Called patient: heart rate running greater than 120    Discussed the above with Dr Harrell. Metoprolol tartrate 25mg bid RX sent, patients spouse aware. They would like to get an appt with Dr Forbes for Tuesday if possible. (Dr Forbes is out until then)

## 2020-03-31 ENCOUNTER — TELEPHONE (OUTPATIENT)
Dept: CARDIOLOGY | Facility: CLINIC | Age: 85
End: 2020-03-31

## 2020-03-31 NOTE — TELEPHONE ENCOUNTER
Called back to follow up on message Friday and medications that were ordered for patient. Spoke to wife she explained that since he started the new medication his heart rate has been in the 60's and 70's. And patient has not had anymore dizziness. I explained to her that at this time we will not make an appt sooner with Dr Forbes since he is no having any symptoms and heart rate is controlled. But to call the office if any of the above should arise. Verbalized understanding.

## 2020-05-09 ENCOUNTER — HOSPITAL ENCOUNTER (EMERGENCY)
Facility: HOSPITAL | Age: 85
End: 2020-05-09

## 2020-05-09 ENCOUNTER — APPOINTMENT (OUTPATIENT)
Dept: GENERAL RADIOLOGY | Facility: HOSPITAL | Age: 85
End: 2020-05-09

## 2020-05-09 ENCOUNTER — HOSPITAL ENCOUNTER (EMERGENCY)
Facility: HOSPITAL | Age: 85
Discharge: HOME | End: 2020-05-09
Attending: FAMILY MEDICINE | Admitting: FAMILY MEDICINE

## 2020-05-09 VITALS
BODY MASS INDEX: 28 KG/M2 | OXYGEN SATURATION: 97 % | RESPIRATION RATE: 18 BRPM | TEMPERATURE: 97.7 F | HEIGHT: 71 IN | HEART RATE: 88 BPM | WEIGHT: 200 LBS | DIASTOLIC BLOOD PRESSURE: 71 MMHG | SYSTOLIC BLOOD PRESSURE: 131 MMHG

## 2020-05-09 DIAGNOSIS — I48.91 ATRIAL FIBRILLATION WITH RVR (HCC): Primary | ICD-10-CM

## 2020-05-09 LAB
ALBUMIN SERPL-MCNC: 4.2 G/DL (ref 3.5–5.2)
ALBUMIN/GLOB SERPL: 1.6 G/DL
ALP SERPL-CCNC: 67 U/L (ref 39–117)
ALT SERPL W P-5'-P-CCNC: 19 U/L (ref 1–41)
ANION GAP SERPL CALCULATED.3IONS-SCNC: 13 MMOL/L (ref 5–15)
AST SERPL-CCNC: 20 U/L (ref 1–40)
BASOPHILS # BLD AUTO: 0.03 10*3/MM3 (ref 0–0.2)
BASOPHILS NFR BLD AUTO: 0.4 % (ref 0–1.5)
BILIRUB SERPL-MCNC: 0.3 MG/DL (ref 0.2–1.2)
BUN BLD-MCNC: 23 MG/DL (ref 8–23)
BUN/CREAT SERPL: 20.2 (ref 7–25)
CALCIUM SPEC-SCNC: 9.3 MG/DL (ref 8.6–10.5)
CHLORIDE SERPL-SCNC: 100 MMOL/L (ref 98–107)
CO2 SERPL-SCNC: 26 MMOL/L (ref 22–29)
CREAT BLD-MCNC: 1.14 MG/DL (ref 0.76–1.27)
DEPRECATED RDW RBC AUTO: 44 FL (ref 37–54)
EOSINOPHIL # BLD AUTO: 0.17 10*3/MM3 (ref 0–0.4)
EOSINOPHIL NFR BLD AUTO: 2.2 % (ref 0.3–6.2)
ERYTHROCYTE [DISTWIDTH] IN BLOOD BY AUTOMATED COUNT: 12.3 % (ref 12.3–15.4)
GFR SERPL CREATININE-BSD FRML MDRD: 61 ML/MIN/1.73
GLOBULIN UR ELPH-MCNC: 2.7 GM/DL
GLUCOSE BLD-MCNC: 130 MG/DL (ref 65–99)
HCT VFR BLD AUTO: 38 % (ref 37.5–51)
HGB BLD-MCNC: 12.9 G/DL (ref 13–17.7)
HOLD SPECIMEN: NORMAL
HOLD SPECIMEN: NORMAL
IMM GRANULOCYTES # BLD AUTO: 0.03 10*3/MM3 (ref 0–0.05)
IMM GRANULOCYTES NFR BLD AUTO: 0.4 % (ref 0–0.5)
INR PPP: 1.21 (ref 0.8–1.2)
LYMPHOCYTES # BLD AUTO: 1.79 10*3/MM3 (ref 0.7–3.1)
LYMPHOCYTES NFR BLD AUTO: 23.6 % (ref 19.6–45.3)
MCH RBC QN AUTO: 33.2 PG (ref 26.6–33)
MCHC RBC AUTO-ENTMCNC: 33.9 G/DL (ref 31.5–35.7)
MCV RBC AUTO: 97.7 FL (ref 79–97)
MONOCYTES # BLD AUTO: 0.78 10*3/MM3 (ref 0.1–0.9)
MONOCYTES NFR BLD AUTO: 10.3 % (ref 5–12)
NEUTROPHILS # BLD AUTO: 4.77 10*3/MM3 (ref 1.7–7)
NEUTROPHILS NFR BLD AUTO: 63.1 % (ref 42.7–76)
NRBC BLD AUTO-RTO: 0 /100 WBC (ref 0–0.2)
NT-PROBNP SERPL-MCNC: 489.9 PG/ML (ref 5–1800)
PLATELET # BLD AUTO: 220 10*3/MM3 (ref 140–450)
PMV BLD AUTO: 10 FL (ref 6–12)
POTASSIUM BLD-SCNC: 4.3 MMOL/L (ref 3.5–5.2)
PROT SERPL-MCNC: 6.9 G/DL (ref 6–8.5)
PROTHROMBIN TIME: 15.1 SECONDS (ref 11.1–15.3)
RBC # BLD AUTO: 3.89 10*6/MM3 (ref 4.14–5.8)
SODIUM BLD-SCNC: 139 MMOL/L (ref 136–145)
TROPONIN T SERPL-MCNC: <0.01 NG/ML (ref 0–0.03)
WBC NRBC COR # BLD: 7.57 10*3/MM3 (ref 3.4–10.8)
WHOLE BLOOD HOLD SPECIMEN: NORMAL
WHOLE BLOOD HOLD SPECIMEN: NORMAL

## 2020-05-09 PROCEDURE — 96374 THER/PROPH/DIAG INJ IV PUSH: CPT

## 2020-05-09 PROCEDURE — 85610 PROTHROMBIN TIME: CPT | Performed by: FAMILY MEDICINE

## 2020-05-09 PROCEDURE — 99284 EMERGENCY DEPT VISIT MOD MDM: CPT

## 2020-05-09 PROCEDURE — 93010 ELECTROCARDIOGRAM REPORT: CPT | Performed by: INTERNAL MEDICINE

## 2020-05-09 PROCEDURE — 84484 ASSAY OF TROPONIN QUANT: CPT | Performed by: FAMILY MEDICINE

## 2020-05-09 PROCEDURE — 83880 ASSAY OF NATRIURETIC PEPTIDE: CPT | Performed by: FAMILY MEDICINE

## 2020-05-09 PROCEDURE — 80053 COMPREHEN METABOLIC PANEL: CPT | Performed by: FAMILY MEDICINE

## 2020-05-09 PROCEDURE — 71045 X-RAY EXAM CHEST 1 VIEW: CPT

## 2020-05-09 PROCEDURE — 93005 ELECTROCARDIOGRAM TRACING: CPT | Performed by: FAMILY MEDICINE

## 2020-05-09 PROCEDURE — 85025 COMPLETE CBC W/AUTO DIFF WBC: CPT | Performed by: FAMILY MEDICINE

## 2020-05-09 RX ORDER — SODIUM CHLORIDE 0.9 % (FLUSH) 0.9 %
10 SYRINGE (ML) INJECTION AS NEEDED
Status: DISCONTINUED | OUTPATIENT
Start: 2020-05-09 | End: 2020-05-09 | Stop reason: HOSPADM

## 2020-05-09 RX ORDER — METOPROLOL TARTRATE 5 MG/5ML
5 INJECTION INTRAVENOUS ONCE
Status: COMPLETED | OUTPATIENT
Start: 2020-05-09 | End: 2020-05-09

## 2020-05-09 RX ADMIN — SODIUM CHLORIDE 500 ML: 9 INJECTION, SOLUTION INTRAVENOUS at 13:34

## 2020-05-09 RX ADMIN — METOPROLOL TARTRATE 5 MG: 5 INJECTION INTRAVENOUS at 13:20

## 2020-05-09 NOTE — DISCHARGE INSTRUCTIONS
We recommend between now and when patient sees his cardiologist, Dr. Forbes, he is to take his metoprolol 25 twice a day to keep his heart rate controlled.

## 2020-05-09 NOTE — ED PROVIDER NOTES
Subjective   86-year-old male with past medical history of an ablation presented for A. fib with RVR.  Patient is on Eliquis.  Patient normally checks his pulse and blood pressure in the morning and today I noticed his blood pressure was a little lower and elevated heart rate.  Family took him to the urgent care where they did an EKG and found A. fib with RVR and recommended presenting to the ED.  Patient says he is asymptomatic.      History provided by:  Patient   used: No        Review of Systems   Constitutional: Negative for chills and fatigue.   HENT: Negative for sore throat and voice change.    Eyes: Negative for pain and redness.   Respiratory: Negative for chest tightness and shortness of breath.    Cardiovascular: Negative for chest pain and leg swelling.   Gastrointestinal: Negative for abdominal distention and abdominal pain.   Endocrine: Negative for cold intolerance and heat intolerance.   Genitourinary: Negative for difficulty urinating and urgency.   Musculoskeletal: Negative for arthralgias and myalgias.   Skin: Negative for color change and rash.   Neurological: Negative for dizziness and weakness.   Psychiatric/Behavioral: Negative for agitation and confusion.       Past Medical History:   Diagnosis Date   • Allergic rhinitis    • Arthritis     hips, knees, feet   • Artificial lens present     in position   • Benign neoplasm of skin of eyelid    • Bladder tumor    • Bladder tumor    • BPH (benign prostatic hyperplasia)    • Cancer (CMS/HCC)     SKIN    • Cataract    • Coronary arteriosclerosis    • Cortical senile cataract    • Cough    • Essential hypertension    • Essential hypertension    • Hypercholesterolemia    • Hypercholesterolemia    • Kidney stones    • MI, old 1988   • Nonexudative age-related macular degeneration     mild   • Nuclear cataract    • Open-angle glaucoma     s/p trabec OS, doing well      • Primary open angle glaucoma     OS, s/p trabeculectomy; IOP  down with timolol OD     • Pseudophakia of right eye    • Rhinitis    • Upper respiratory infection    • Vitreous detachment     peripheral       No Known Allergies    Past Surgical History:   Procedure Laterality Date   • ANKLE SURGERY  07/29/2000    Ankle surgery (ORIF right ankle fracture. Bimalleolar right fracture)   • APPENDECTOMY     • CARDIAC ABLATION     • CARDIAC CATHETERIZATION  07/02/1987    Cardiac cath 74990 (Coronary arthosclerotic heart disease. maintained patency of RCA. Mild inferior hypokinesis.)   • CARDIAC CATHETERIZATION  06/30/1987    Cardiac cath 87009 (Coronary atherosclerotic heart disease. Occluded RCA. Acute inferior infarction. S/P successful angioplasty of RCA)   • CATARACT EXTRACTION  04/01/2010    Remove cataract, insert lens (Complicated cataract extraction with intraocular lens implantation, right eye, Marcelo SN-60 WF serial # [id number]: 20.5 diopter)   • CATARACT EXTRACTION W/ INTRAOCULAR LENS IMPLANT Left 3/23/2018    Procedure: CATARACT PHACO EXTRACTION WITH INTRAOCULAR LENS IMPLANT;  Surgeon: Oli Haley MD;  Location: [facility] OR;  Service: Ophthalmology   • COLONOSCOPY N/A 5/9/2019    Procedure: COLONOSCOPY;  Surgeon: Rainer Gates DO;  Location: [facility] ENDOSCOPY;  Service: Gastroenterology   • ENDOSCOPY  02/19/1990    EGD 01459 (Olympus video endoscope.Fibrin-like patch in stomach)   • EYE SURGERY  02/02/2005    Eye surgery procedure (Repair of operative wound leak, left eye. S/P trabeculectomy with operative wound leak)   • EYE SURGERY  01/19/2005    Eye surgery procedure (Trabeculectomy, left eye. 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 degeneration)    • OTHER SURGICAL HISTORY  02/26/2015    EXTENDED VISUAL FIELDS STUDY 71683 (Primary open angle  glaucoma)    • OTHER SURGICAL HISTORY  2015    EXTENDED VISUAL FIELDS STUDY 67619 (Primary open angle glaucoma)    • OTHER SURGICAL HISTORY  2016    OCT DISC NFL 56167 (Open-angle glaucoma)    • OTHER SURGICAL HISTORY      OCT DISC NFL 03958 (Primary open angle glaucoma) (3): 2015, 2014, 2013   • OTHER SURGICAL HISTORY  2016    OPTIC NERVE HEAD EVAL PERFORMED  (Open-angle glaucoma)    • TRANSURETHRAL RESECTION OF BLADDER TUMOR N/A 2018    Procedure: CYSTOSCOPY TRANSURETHRAL RESECTION OF BLADDER TUMOR;  Surgeon: Donovan Conner MD;  Location: [facility] OR;  Service: Urology   • TRANSURETHRAL RESECTION OF BLADDER TUMOR N/A 2019    Procedure: CYSTOSCOPY TRANSURETHRAL RESECTION OF BLADDER TUMOR;  Surgeon: Donovan Conner MD;  Location: [facility] OR;  Service: Urology   • UVULECTOMY  1969    EXCISION OF UVULA 51992 (partial uvulectomy with excision of papilloma. Redunant uvula with a small papilloma)       Family History   Problem Relation Age of Onset   • No Known Problems Mother    • No Known Problems Father        Social History     Socioeconomic History   • Marital status:      Spouse name: Not on file   • Number of children: Not on file   • Years of education: Not on file   • Highest education level: Not on file   Tobacco Use   • Smoking status: Former Smoker     Last attempt to quit: 1988     Years since quittin.3   • Smokeless tobacco: Current User     Types: Snuff   Substance and Sexual Activity   • Alcohol use: No   • Drug use: No   • Sexual activity: Defer           Objective   Physical Exam   Constitutional: He is oriented to person, place, and time. He appears well-developed and well-nourished.   HENT:   Head: Normocephalic and atraumatic.   Right Ear: External ear normal.   Left Ear: External ear normal.   Eyes: Pupils are equal, round, and reactive to light. Conjunctivae and EOM are normal.   Neck: Normal range of motion. Neck supple.    Cardiovascular: Normal heart sounds.   Tachycardia  Irregularly irregular   Pulmonary/Chest: Effort normal and breath sounds normal.   Abdominal: Soft. There is no tenderness.   Musculoskeletal: Normal range of motion. He exhibits no edema.   Neurological: He is alert and oriented to person, place, and time.   Skin: Skin is warm and dry.   Psychiatric: He has a normal mood and affect. His behavior is normal.       ECG 12 Lead    Date/Time: 5/9/2020 1:28 PM  Performed by: Vaughn Carney MD  Authorized by: Levi Martínez MD   Interpreted by physician  Comparison: compared with previous ECG   Rhythm: atrial fibrillation  Rhythm comments: RVR  Rate: tachycardic  BPM: 117  Clinical impression: abnormal ECG                 ED Course                                         [id number] Score (for atrial fibrillation stroke risk) reviewed and/or performed as part of the patient evaluation and treatment planning process.  The result associated with this review/performance is: 4       MDM  Number of Diagnoses or Management Options  Atrial fibrillation with RVR (CMS/HCC):   Diagnosis management comments: Presented for A. fib with RVR.  EKG and metoprolol.  Heart rate came down to 90s.  Patient discharged with instruction to follow-up with his cardiologist and take his metoprolol as scheduled until then.       Amount and/or Complexity of Data Reviewed  Clinical lab tests: ordered and reviewed  Tests in the radiology section of CPT®: ordered and reviewed  Discuss the patient with other providers: yes        Final diagnoses:   Atrial fibrillation with RVR (CMS/HCC)       This document has been electronically signed by Vaughn Carney MD on May 9, 2020 14:27      EMR Dragon/Transcription disclaimer:   Much of this encounter note is an electronic transcription/translation of spoken language to printed text. The electronic translation of spoken language may permit erroneous, or at times, nonsensical words or phrases to  be inadvertently transcribed; Although I have reviewed the note for such errors, some may still exist.         Vaughn Carney MD  Resident  05/09/20 [id number]

## 2020-05-10 ENCOUNTER — APPOINTMENT (OUTPATIENT)
Dept: GENERAL RADIOLOGY | Facility: HOSPITAL | Age: 85
End: 2020-05-10

## 2020-05-10 ENCOUNTER — HOSPITAL ENCOUNTER (OUTPATIENT)
Facility: HOSPITAL | Age: 85
Setting detail: OBSERVATION
Discharge: HOME | End: 2020-05-12
Attending: FAMILY MEDICINE | Admitting: HOSPITALIST

## 2020-05-10 DIAGNOSIS — I48.91 ATRIAL FIBRILLATION WITH RVR (HCC): Primary | ICD-10-CM

## 2020-05-10 LAB
ALBUMIN SERPL-MCNC: 4 G/DL (ref 3.5–5.2)
ALBUMIN/GLOB SERPL: 1.5 G/DL
ALP SERPL-CCNC: 66 U/L (ref 39–117)
ALT SERPL W P-5'-P-CCNC: 17 U/L (ref 1–41)
ANION GAP SERPL CALCULATED.3IONS-SCNC: 13 MMOL/L (ref 5–15)
AST SERPL-CCNC: 18 U/L (ref 1–40)
BASOPHILS # BLD AUTO: 0.02 10*3/MM3 (ref 0–0.2)
BASOPHILS NFR BLD AUTO: 0.3 % (ref 0–1.5)
BILIRUB SERPL-MCNC: 0.3 MG/DL (ref 0.2–1.2)
BUN BLD-MCNC: 25 MG/DL (ref 8–23)
BUN/CREAT SERPL: 20.8 (ref 7–25)
CALCIUM SPEC-SCNC: 9.5 MG/DL (ref 8.6–10.5)
CHLORIDE SERPL-SCNC: 100 MMOL/L (ref 98–107)
CO2 SERPL-SCNC: 26 MMOL/L (ref 22–29)
CREAT BLD-MCNC: 1.2 MG/DL (ref 0.76–1.27)
DEPRECATED RDW RBC AUTO: 44.4 FL (ref 37–54)
EOSINOPHIL # BLD AUTO: 0.19 10*3/MM3 (ref 0–0.4)
EOSINOPHIL NFR BLD AUTO: 2.5 % (ref 0.3–6.2)
ERYTHROCYTE [DISTWIDTH] IN BLOOD BY AUTOMATED COUNT: 12.3 % (ref 12.3–15.4)
GFR SERPL CREATININE-BSD FRML MDRD: 57 ML/MIN/1.73
GLOBULIN UR ELPH-MCNC: 2.7 GM/DL
GLUCOSE BLD-MCNC: 209 MG/DL (ref 65–99)
GLUCOSE BLDC GLUCOMTR-MCNC: 125 MG/DL (ref 70–130)
GLUCOSE BLDC GLUCOMTR-MCNC: 133 MG/DL (ref 70–130)
HCT VFR BLD AUTO: 36.1 % (ref 37.5–51)
HGB BLD-MCNC: 12.2 G/DL (ref 13–17.7)
HOLD SPECIMEN: NORMAL
HOLD SPECIMEN: NORMAL
IMM GRANULOCYTES # BLD AUTO: 0.01 10*3/MM3 (ref 0–0.05)
IMM GRANULOCYTES NFR BLD AUTO: 0.1 % (ref 0–0.5)
INR PPP: 1.13 (ref 0.8–1.2)
LYMPHOCYTES # BLD AUTO: 2.24 10*3/MM3 (ref 0.7–3.1)
LYMPHOCYTES NFR BLD AUTO: 30 % (ref 19.6–45.3)
MAGNESIUM SERPL-MCNC: 1.9 MG/DL (ref 1.6–2.4)
MCH RBC QN AUTO: 33.1 PG (ref 26.6–33)
MCHC RBC AUTO-ENTMCNC: 33.8 G/DL (ref 31.5–35.7)
MCV RBC AUTO: 97.8 FL (ref 79–97)
MONOCYTES # BLD AUTO: 0.91 10*3/MM3 (ref 0.1–0.9)
MONOCYTES NFR BLD AUTO: 12.2 % (ref 5–12)
NEUTROPHILS # BLD AUTO: 4.1 10*3/MM3 (ref 1.7–7)
NEUTROPHILS NFR BLD AUTO: 54.9 % (ref 42.7–76)
NRBC BLD AUTO-RTO: 0 /100 WBC (ref 0–0.2)
NT-PROBNP SERPL-MCNC: 1990 PG/ML (ref 5–1800)
PLATELET # BLD AUTO: 210 10*3/MM3 (ref 140–450)
PMV BLD AUTO: 10.3 FL (ref 6–12)
POTASSIUM BLD-SCNC: 4 MMOL/L (ref 3.5–5.2)
PROT SERPL-MCNC: 6.7 G/DL (ref 6–8.5)
PROTHROMBIN TIME: 14.3 SECONDS (ref 11.1–15.3)
RBC # BLD AUTO: 3.69 10*6/MM3 (ref 4.14–5.8)
SODIUM BLD-SCNC: 139 MMOL/L (ref 136–145)
T4 FREE SERPL-MCNC: 1.17 NG/DL (ref 0.93–1.7)
TROPONIN T SERPL-MCNC: <0.01 NG/ML (ref 0–0.03)
TSH SERPL DL<=0.05 MIU/L-ACNC: 1.85 UIU/ML (ref 0.27–4.2)
WBC NRBC COR # BLD: 7.47 10*3/MM3 (ref 3.4–10.8)
WHOLE BLOOD HOLD SPECIMEN: NORMAL
WHOLE BLOOD HOLD SPECIMEN: NORMAL

## 2020-05-10 PROCEDURE — 82962 GLUCOSE BLOOD TEST: CPT

## 2020-05-10 PROCEDURE — G0378 HOSPITAL OBSERVATION PER HR: HCPCS

## 2020-05-10 PROCEDURE — 93005 ELECTROCARDIOGRAM TRACING: CPT | Performed by: FAMILY MEDICINE

## 2020-05-10 PROCEDURE — 80053 COMPREHEN METABOLIC PANEL: CPT | Performed by: STUDENT IN AN ORGANIZED HEALTH CARE EDUCATION/TRAINING PROGRAM

## 2020-05-10 PROCEDURE — 84439 ASSAY OF FREE THYROXINE: CPT | Performed by: NURSE PRACTITIONER

## 2020-05-10 PROCEDURE — 96376 TX/PRO/DX INJ SAME DRUG ADON: CPT

## 2020-05-10 PROCEDURE — 99284 EMERGENCY DEPT VISIT MOD MDM: CPT

## 2020-05-10 PROCEDURE — 84443 ASSAY THYROID STIM HORMONE: CPT | Performed by: NURSE PRACTITIONER

## 2020-05-10 PROCEDURE — 25010000002 AMIODARONE IN DEXTROSE 5% 150-4.21 MG/100ML-% SOLUTION: Performed by: STUDENT IN AN ORGANIZED HEALTH CARE EDUCATION/TRAINING PROGRAM

## 2020-05-10 PROCEDURE — 84484 ASSAY OF TROPONIN QUANT: CPT | Performed by: STUDENT IN AN ORGANIZED HEALTH CARE EDUCATION/TRAINING PROGRAM

## 2020-05-10 PROCEDURE — 93010 ELECTROCARDIOGRAM REPORT: CPT | Performed by: INTERNAL MEDICINE

## 2020-05-10 PROCEDURE — 71045 X-RAY EXAM CHEST 1 VIEW: CPT

## 2020-05-10 PROCEDURE — 93005 ELECTROCARDIOGRAM TRACING: CPT

## 2020-05-10 PROCEDURE — 83735 ASSAY OF MAGNESIUM: CPT | Performed by: NURSE PRACTITIONER

## 2020-05-10 PROCEDURE — 85610 PROTHROMBIN TIME: CPT | Performed by: STUDENT IN AN ORGANIZED HEALTH CARE EDUCATION/TRAINING PROGRAM

## 2020-05-10 PROCEDURE — 85025 COMPLETE CBC W/AUTO DIFF WBC: CPT | Performed by: STUDENT IN AN ORGANIZED HEALTH CARE EDUCATION/TRAINING PROGRAM

## 2020-05-10 PROCEDURE — 83880 ASSAY OF NATRIURETIC PEPTIDE: CPT | Performed by: STUDENT IN AN ORGANIZED HEALTH CARE EDUCATION/TRAINING PROGRAM

## 2020-05-10 RX ORDER — METOPROLOL TARTRATE 5 MG/5ML
5 INJECTION INTRAVENOUS ONCE
Status: DISCONTINUED | OUTPATIENT
Start: 2020-05-10 | End: 2020-05-10

## 2020-05-10 RX ORDER — DEXTROSE MONOHYDRATE 25 G/50ML
25 INJECTION, SOLUTION INTRAVENOUS
Status: DISCONTINUED | OUTPATIENT
Start: 2020-05-10 | End: 2020-05-12 | Stop reason: HOSPADM

## 2020-05-10 RX ORDER — CETIRIZINE HYDROCHLORIDE 5 MG/1
5 TABLET ORAL DAILY
Status: DISCONTINUED | OUTPATIENT
Start: 2020-05-10 | End: 2020-05-12 | Stop reason: HOSPADM

## 2020-05-10 RX ORDER — SODIUM CHLORIDE 0.9 % (FLUSH) 0.9 %
10 SYRINGE (ML) INJECTION EVERY 12 HOURS SCHEDULED
Status: DISCONTINUED | OUTPATIENT
Start: 2020-05-10 | End: 2020-05-12 | Stop reason: HOSPADM

## 2020-05-10 RX ORDER — NICOTINE POLACRILEX 4 MG
15 LOZENGE BUCCAL
Status: DISCONTINUED | OUTPATIENT
Start: 2020-05-10 | End: 2020-05-12 | Stop reason: HOSPADM

## 2020-05-10 RX ORDER — SODIUM CHLORIDE 0.9 % (FLUSH) 0.9 %
10 SYRINGE (ML) INJECTION AS NEEDED
Status: DISCONTINUED | OUTPATIENT
Start: 2020-05-10 | End: 2020-05-12 | Stop reason: HOSPADM

## 2020-05-10 RX ORDER — ASPIRIN 81 MG/1
81 TABLET ORAL DAILY
Status: DISCONTINUED | OUTPATIENT
Start: 2020-05-10 | End: 2020-05-12 | Stop reason: HOSPADM

## 2020-05-10 RX ORDER — ONDANSETRON 4 MG/1
4 TABLET, FILM COATED ORAL EVERY 6 HOURS PRN
Status: DISCONTINUED | OUTPATIENT
Start: 2020-05-10 | End: 2020-05-12 | Stop reason: HOSPADM

## 2020-05-10 RX ORDER — ONDANSETRON 2 MG/ML
4 INJECTION INTRAMUSCULAR; INTRAVENOUS EVERY 6 HOURS PRN
Status: DISCONTINUED | OUTPATIENT
Start: 2020-05-10 | End: 2020-05-12 | Stop reason: HOSPADM

## 2020-05-10 RX ORDER — AMIODARONE HYDROCHLORIDE 50 MG/ML
150 INJECTION, SOLUTION INTRAVENOUS ONCE
Status: DISCONTINUED | OUTPATIENT
Start: 2020-05-10 | End: 2020-05-10

## 2020-05-10 RX ORDER — TIMOLOL MALEATE 5 MG/ML
1 SOLUTION/ DROPS OPHTHALMIC EVERY 12 HOURS SCHEDULED
Status: DISCONTINUED | OUTPATIENT
Start: 2020-05-10 | End: 2020-05-12 | Stop reason: HOSPADM

## 2020-05-10 RX ORDER — TERAZOSIN 2 MG/1
2 CAPSULE ORAL NIGHTLY
Status: DISCONTINUED | OUTPATIENT
Start: 2020-05-10 | End: 2020-05-12 | Stop reason: HOSPADM

## 2020-05-10 RX ORDER — ACETAMINOPHEN 325 MG/1
650 TABLET ORAL EVERY 4 HOURS PRN
Status: DISCONTINUED | OUTPATIENT
Start: 2020-05-10 | End: 2020-05-12 | Stop reason: HOSPADM

## 2020-05-10 RX ADMIN — LISINOPRIL: 20 TABLET ORAL at 11:38

## 2020-05-10 RX ADMIN — CETIRIZINE HYDROCHLORIDE 5 MG: 5 TABLET ORAL at 11:37

## 2020-05-10 RX ADMIN — TERAZOSIN HYDROCHLORIDE ANHYDROUS 2 MG: 2 CAPSULE ORAL at 20:26

## 2020-05-10 RX ADMIN — SODIUM CHLORIDE, PRESERVATIVE FREE 10 ML: 5 INJECTION INTRAVENOUS at 20:28

## 2020-05-10 RX ADMIN — METOPROLOL TARTRATE 25 MG: 25 TABLET, FILM COATED ORAL at 11:39

## 2020-05-10 RX ADMIN — TIMOLOL MALEATE 1 DROP: 5 SOLUTION OPHTHALMIC at 20:27

## 2020-05-10 RX ADMIN — AMIODARONE HYDROCHLORIDE 150 MG: 1.5 INJECTION, SOLUTION INTRAVENOUS at 08:25

## 2020-05-10 RX ADMIN — TIMOLOL MALEATE 1 DROP: 5 SOLUTION OPHTHALMIC at 11:36

## 2020-05-10 RX ADMIN — Medication 1 TABLET: at 11:39

## 2020-05-10 RX ADMIN — APIXABAN 2.5 MG: 2.5 TABLET, FILM COATED ORAL at 20:26

## 2020-05-10 RX ADMIN — METOPROLOL TARTRATE 25 MG: 25 TABLET, FILM COATED ORAL at 20:26

## 2020-05-10 NOTE — ED PROVIDER NOTES
Subjective   History of Present Illness  Patient presents to the ED due to elevated heart rate. He was diagnosed with new onset a fib with rvr yesterday in the urgent care was sent here. Labs were normal yesterday though he had an elevated heart rate that came down with lopressor. Patient states he took 5mg this morning and still has a heart rate of 124. Patient denies chest pain shortness of air, fatigue, nausea, vomiting.   Review of Systems   Constitutional: Negative for activity change, appetite change, chills, fatigue and fever.   HENT: Negative for drooling, ear discharge, ear pain, facial swelling, hearing loss, mouth sores, rhinorrhea and sinus pain.         Tachycardia    Eyes: Negative for pain, redness and itching.   Respiratory: Negative for cough, choking, chest tightness, shortness of breath and stridor.    Cardiovascular: Negative for chest pain, palpitations and leg swelling.   Gastrointestinal: Negative for abdominal distention, abdominal pain, anal bleeding, blood in stool, constipation, diarrhea and nausea.   Endocrine: Negative for heat intolerance, polydipsia and polyphagia.   Genitourinary: Negative for dysuria, flank pain, frequency and genital sores.   Musculoskeletal: Negative for back pain, gait problem, joint swelling and myalgias.   Skin: Negative for pallor and rash.   Neurological: Negative for seizures, facial asymmetry, speech difficulty, light-headedness, numbness and headaches.   Hematological: Negative for adenopathy. Does not bruise/bleed easily.   Psychiatric/Behavioral: Negative for confusion, decreased concentration, dysphoric mood and hallucinations. The patient is not nervous/anxious and is not hyperactive.        Past Medical History:   Diagnosis Date   • Allergic rhinitis    • Arthritis     hips, knees, feet   • Artificial lens present     in position   • Benign neoplasm of skin of eyelid    • Bladder tumor    • Bladder tumor    • BPH (benign prostatic hyperplasia)    •  Cancer (CMS/HCC)     SKIN    • Cataract    • Coronary arteriosclerosis    • Cortical senile cataract    • Cough    • Essential hypertension    • Essential hypertension    • Hypercholesterolemia    • Hypercholesterolemia    • Kidney stones    • MI, old 1988   • Nonexudative age-related macular degeneration     mild   • Nuclear cataract    • Open-angle glaucoma     s/p trabec OS, doing well      • Primary open angle glaucoma     OS, s/p trabeculectomy; IOP down with timolol OD     • Pseudophakia of right eye    • Rhinitis    • Upper respiratory infection    • Vitreous detachment     peripheral       No Known Allergies    Past Surgical History:   Procedure Laterality Date   • ANKLE SURGERY  07/29/2000    Ankle surgery (ORIF right ankle fracture. Bimalleolar right fracture)   • APPENDECTOMY     • CARDIAC ABLATION     • CARDIAC CATHETERIZATION  07/02/1987    Cardiac cath 65727 (Coronary arthosclerotic heart disease. maintained patency of RCA. Mild inferior hypokinesis.)   • CARDIAC CATHETERIZATION  06/30/1987    Cardiac cath 87123 (Coronary atherosclerotic heart disease. Occluded RCA. Acute inferior infarction. S/P successful angioplasty of RCA)   • CATARACT EXTRACTION  04/01/2010    Remove cataract, insert lens (Complicated cataract extraction with intraocular lens implantation, right eye, Marcelo SN-60  serial # [id number]: 20.5 diopter)   • CATARACT EXTRACTION W/ INTRAOCULAR LENS IMPLANT Left 3/23/2018    Procedure: CATARACT PHACO EXTRACTION WITH INTRAOCULAR LENS IMPLANT;  Surgeon: Oli Haley MD;  Location: [facility] OR;  Service: Ophthalmology   • COLONOSCOPY N/A 5/9/2019    Procedure: COLONOSCOPY;  Surgeon: Rainer Gates DO;  Location: [facility] ENDOSCOPY;  Service: Gastroenterology   • ENDOSCOPY  02/19/1990    EGD 90060 (Olympus video endoscope.Fibrin-like patch in stomach)   • EYE SURGERY  02/02/2005    Eye surgery procedure (Repair of operative wound leak, left eye. S/P trabeculectomy with operative  wound leak)   • EYE SURGERY  2005    Eye surgery procedure (Trabeculectomy, left eye. Chronic open angle glaucoma, uncontrolled left eye)   • INJECTION OF MEDICATION  2014    Kenalog (Allergic rhinitis)    • OTHER SURGICAL HISTORY  2016    DIL MAC EXAM PERF INC DOC OF +/- MAC THICK 2019F (Nonexudative age-related macular degeneration)    • OTHER SURGICAL HISTORY  2015    EXTENDED VISUAL FIELDS STUDY 65610 (Primary open angle glaucoma)    • OTHER SURGICAL HISTORY  2015    EXTENDED VISUAL FIELDS STUDY 88677 (Primary open angle glaucoma)    • OTHER SURGICAL HISTORY  2016    OCT DISC NFL 64325 (Open-angle glaucoma)    • OTHER SURGICAL HISTORY      OCT DISC NFL 43453 (Primary open angle glaucoma) (3): 2015, 2014, 2013   • OTHER SURGICAL HISTORY  2016    OPTIC NERVE HEAD EVAL PERFORMED  (Open-angle glaucoma)    • TRANSURETHRAL RESECTION OF BLADDER TUMOR N/A 2018    Procedure: CYSTOSCOPY TRANSURETHRAL RESECTION OF BLADDER TUMOR;  Surgeon: Donovan Conner MD;  Location: [facility] OR;  Service: Urology   • TRANSURETHRAL RESECTION OF BLADDER TUMOR N/A 2019    Procedure: CYSTOSCOPY TRANSURETHRAL RESECTION OF BLADDER TUMOR;  Surgeon: Donovan Conner MD;  Location: [facility] OR;  Service: Urology   • UVULECTOMY  1969    EXCISION OF UVULA 16182 (partial uvulectomy with excision of papilloma. Redunant uvula with a small papilloma)       Family History   Problem Relation Age of Onset   • No Known Problems Mother    • No Known Problems Father        Social History     Socioeconomic History   • Marital status:      Spouse name: Not on file   • Number of children: Not on file   • Years of education: Not on file   • Highest education level: Not on file   Tobacco Use   • Smoking status: Former Smoker     Last attempt to quit: 1988     Years since quittin.3   • Smokeless tobacco: Current User     Types: Snuff   Substance and Sexual Activity   • Alcohol  use: No   • Drug use: No   • Sexual activity: Defer           Objective   Physical Exam   Constitutional: He is oriented to person, place, and time. He appears well-developed and well-nourished.   HENT:   Head: Normocephalic and atraumatic.   Right Ear: External ear normal.   Left Ear: External ear normal.   Nose: Nose normal.   Mouth/Throat: Oropharynx is clear and moist.   Eyes: Pupils are equal, round, and reactive to light. Conjunctivae and EOM are normal.   Neck: Normal range of motion. Neck supple.   Cardiovascular: Normal heart sounds and intact distal pulses.   Tachycardic rate   Irregular rhythm    Pulmonary/Chest: Effort normal and breath sounds normal.   Abdominal: Soft. Bowel sounds are normal.   Musculoskeletal: Normal range of motion.   Neurological: He is alert and oriented to person, place, and time.   Skin: Skin is warm and dry.   Psychiatric: He has a normal mood and affect. His behavior is normal. Judgment and thought content normal.       ECG 12 Lead    Date/Time: 5/10/2020 7:14 AM  Performed by: Amanda Alarcon MD  Authorized by: Levi Martínez MD   Interpreted by physician  Comparison: compared with previous ECG   Clinical impression: abnormal ECG  Comments: A fib with RVR                    ED Course  ED Course as of May 10 0821   Sun May 10, 2020   0714 Labs ordered, lopressor ordered     [SA]   0820 Patient will be admitted for a fib with rvr. Will be given amiodarone in the ed due to low blood pressure and elevated heart rate.     [SA]   0821 Dr. Arnold Holman is the accepting physician.     [SA]      ED Course User Index  [SA] Amanda Alarcon MD                  [facility]    Final diagnoses:   Atrial fibrillation with RVR (CMS/HCC)          Amanda Alarcon M.D. PGY2  [facility]  [address] [place] [address]  Office: [phone]    This document has been electronically signed by Amanda Alarcon MD on May 10, 2020 08:21           Dorian  MD Amanda  Resident  05/10/20 0873

## 2020-05-10 NOTE — H&P
"      [facility] Medicine Admission      Date of Admission: 5/10/2020      Primary Care Physician: Michael Douglas MD      Chief Complaint: heart racing     HPI: 86 year old  male with past medical history of bladder tumor s/p surgical resection, CAD, HTN, HLD, macular degeneration, paroxysmal atrial fibrillation with past ablation who presented on 5/10/2020 with complaints of \"heart racing.\"  He reports being seen at urgent care center on 5/9/2020 with similar complaints.  He was evaluated and noted to be in atrial fibrillation with RVR and sent to the ED yesterday where he received Lopressor which corrected his RVR and he was sent home.   He reports his complaints reoccurred overnight and into this morning and he has returned to the hospital for further evaluation.  Heart rate was initially in 120's in ED but has improved to 94 after receiving amiodarone.  He denies any other complaints such as chest pain, shortness of breath, dizziness, syncope, lightheaded sensation, nausea, vomiting, fever, chills.  His only complaint is anxiety when fluctuations of heart rate occur.  He will be admitted to the hospital for atrial fibrillation with variable ventricular rate and see his established cardiologist for management and medication adjustment.     Concurrent Medical History:  has a past medical history of Allergic rhinitis, Arthritis, Artificial lens present, Benign neoplasm of skin of eyelid, Bladder tumor, Bladder tumor, BPH (benign prostatic hyperplasia), Cancer (CMS/HCC), Cataract, Coronary arteriosclerosis, Cortical senile cataract, Cough, Essential hypertension, Essential hypertension, Hypercholesterolemia, Hypercholesterolemia, Kidney stones, MI, old (1988), Nonexudative age-related macular degeneration, Nuclear cataract, Open-angle glaucoma, Primary open angle glaucoma, Pseudophakia of right eye, Rhinitis, Upper respiratory infection, and Vitreous " detachment.    Past Surgical History:  has a past surgical history that includes Ankle surgery (07/29/2000); Appendectomy; Other surgical history (06/16/2016); Esophagogastroduodenoscopy (02/19/1990); Uvulectomy (1969); Other surgical history (02/26/2015); Eye surgery (02/02/2005); Eye surgery (01/19/2005); Other surgical history (02/26/2015); Injection of Medication (06/09/2014); Other surgical history (06/16/2016); Other surgical history; Other surgical history (06/16/2016); Cataract Extraction (04/01/2010); Cataract extraction w/ intraocular lens implant (Left, 3/23/2018); Cardiac Ablation; Transurethral resection of bladder tumor (N/A, 5/9/2018); Colonoscopy (N/A, 5/9/2019); Cardiac catheterization (07/02/1987); Cardiac catheterization (06/30/1987); and Transurethral resection of bladder tumor (N/A, 7/26/2019).    Family History: family history is significant for stomach cancer and asthma in patient's mother and dementia in patient's sister.     Social History:  reports that he quit smoking about 32 years ago. His smokeless tobacco use includes snuff. He reports that he does not drink alcohol or use drugs.    Allergies: No Known Allergies    Medications:   Prior to Admission medications    Medication Sig Start Date End Date Taking? Authorizing Provider   metoprolol tartrate (LOPRESSOR) 25 MG tablet Take 1 tablet by mouth 2 (Two) Times a Day. 3/27/20  Yes Moody Harrell MD   apixaban (ELIQUIS) 5 MG tablet tablet Take 2.5 mg by mouth 2 (Two) Times a Day. Last dose to be taken 5/5/18 prior to surgery    Ene Farmer MD   aspirin 81 MG tablet Take 81 mg by mouth Every Night. Last dose 5/1/18 10/23/16   Ene Farmer MD   lisinopril-hydrochlorothiazide (PRINZIDE,ZESTORETIC) 20-12.5 MG per tablet Take 1 tablet by mouth Daily. 10/23/16   Ene Farmer MD   loratadine (CLARITIN) 10 MG tablet Take 10 mg by mouth Daily.    Ene Farmer MD   metFORMIN (GLUCOPHAGE) 500 MG tablet Take  500 mg by mouth 2 (Two) Times a Day. 10/23/16   Ene Farmer MD   Multiple Vitamins-Minerals (MENS MULTIVITAMIN PLUS PO) Take 1 tablet by mouth Daily. 10/23/16   Ene Farmer MD   terazosin (HYTRIN) 2 MG capsule Take 2 mg by mouth Every Night.    Ene Farmer MD   timolol (TIMOPTIC) 0.5 % ophthalmic solution Administer 1 drop to both eyes 2 (Two) Times a Day.    Ene Farmer MD       Review of Systems:  Review of Systems   Constitutional: Negative for activity change, appetite change, chills, fatigue and fever.   HENT: Negative for congestion, rhinorrhea and sore throat.    Respiratory: Negative for cough, chest tightness, shortness of breath and wheezing.    Cardiovascular: Positive for palpitations. Negative for chest pain and leg swelling.   Gastrointestinal: Negative for abdominal pain, constipation, diarrhea, nausea and vomiting.   Musculoskeletal: Negative for back pain and neck pain.   Skin: Negative for pallor.   Neurological: Negative for dizziness, syncope, weakness, light-headedness and headaches.   Psychiatric/Behavioral: Negative for confusion. The patient is nervous/anxious.             Physical Exam:   Temp:  [97.9 °F (36.6 °C)-98 °F (36.7 °C)] 98 °F (36.7 °C)  Heart Rate:  [] 94  Resp:  [17-20] 20  BP: (105-146)/(64-99) 146/99  Physical Exam   Constitutional: He is oriented to person, place, and time. Vital signs are normal. He appears well-developed and well-nourished. He is cooperative. He does not have a sickly appearance. He does not appear ill. No distress.   HENT:   Head: Normocephalic and atraumatic.   Right Ear: External ear normal.   Left Ear: External ear normal.   Nose: Nose normal.   Eyes: Conjunctivae are normal. Right eye exhibits no discharge. Left eye exhibits no discharge. No scleral icterus.   Neck: Normal range of motion. Neck supple. No JVD present.   Cardiovascular: Normal rate, normal heart sounds and intact distal pulses. An irregularly  irregular rhythm present. Exam reveals no gallop and no friction rub.   No murmur heard.  Pulmonary/Chest: Effort normal and breath sounds normal. No accessory muscle usage or stridor. No apnea. No respiratory distress. He has no wheezes. He has no rales.   Abdominal: Soft. Bowel sounds are normal. He exhibits no distension. There is no tenderness.   Musculoskeletal: Normal range of motion. He exhibits no edema.   Neurological: He is alert and oriented to person, place, and time. No cranial nerve deficit. Coordination normal. GCS eye subscore is 4. GCS verbal subscore is 5. GCS motor subscore is 6.   Skin: Skin is warm, dry and intact. No rash noted. He is not diaphoretic. No erythema. No pallor.   Psychiatric: His behavior is normal. Thought content normal. His mood appears anxious.   Nursing note and vitals reviewed.        Results Reviewed:  I have personally reviewed current lab, radiology, and data and agree with results.  Lab Results (last 24 hours)     Procedure Component Value Units Date/Time    Magnesium [[id number]]  (Normal) Collected:  05/10/20 0650    Specimen:  Blood Updated:  05/10/20 0937     Magnesium 1.9 mg/dL     TSH [[id number]] Collected:  05/10/20 0650    Specimen:  Blood Updated:  05/10/20 0928    T4, Free [[id number]] Collected:  05/10/20 0650    Specimen:  Blood Updated:  05/10/20 0928    Burnett Draw [[id number]] Collected:  05/10/20 0649    Specimen:  Blood Updated:  05/10/20 0800    Narrative:       The following orders were created for panel order Burnett Draw.  Procedure                               Abnormality         Status                     ---------                               -----------         ------                     Light Blue Top[[id number]]                                   Final result               Green Top (Gel)[[id number]]                                  Final result               Lavender Top[[id number]]                                     Final result                Gold Top - SST[[id number]]                                   Final result                 Please view results for these tests on the individual orders.    Light Blue Top [[id number]] Collected:  05/10/20 0650    Specimen:  Blood Updated:  05/10/20 0800     Extra Tube hold for add-on     Comment: Auto resulted       Green Top (Gel) [[id number]] Collected:  05/10/20 0650    Specimen:  Blood Updated:  05/10/20 0800     Extra Tube Hold for add-ons.     Comment: Auto resulted.       Lavender Top [[id number]] Collected:  05/10/20 0650    Specimen:  Blood Updated:  05/10/20 0800     Extra Tube hold for add-on     Comment: Auto resulted       Gold Top - SST [[id number]] Collected:  05/10/20 0649    Specimen:  Blood Updated:  05/10/20 0800     Extra Tube Hold for add-ons.     Comment: Auto resulted.       Comprehensive Metabolic Panel [[id number]]  (Abnormal) Collected:  05/10/20 0650    Specimen:  Blood Updated:  05/10/20 0719     Glucose 209 mg/dL      BUN 25 mg/dL      Creatinine 1.20 mg/dL      Sodium 139 mmol/L      Potassium 4.0 mmol/L      Chloride 100 mmol/L      CO2 26.0 mmol/L      Calcium 9.5 mg/dL      Total Protein 6.7 g/dL      Albumin 4.00 g/dL      ALT (SGPT) 17 U/L      AST (SGOT) 18 U/L      Alkaline Phosphatase 66 U/L      Total Bilirubin 0.3 mg/dL      eGFR Non African Amer 57 mL/min/1.73      Globulin 2.7 gm/dL      A/G Ratio 1.5 g/dL      BUN/Creatinine Ratio 20.8     Anion Gap 13.0 mmol/L     Narrative:       GFR Normal >60  Chronic Kidney Disease <60  Kidney Failure <15      Troponin [[id number]]  (Normal) Collected:  05/10/20 0650    Specimen:  Blood Updated:  05/10/20 0718     Troponin T <0.010 ng/mL     Narrative:       Troponin T Reference Range:  <= 0.03 ng/mL-   Negative for AMI  >0.03 ng/mL-     Abnormal for myocardial necrosis.  Clinicians would have to utilize clinical acumen, EKG, Troponin and serial changes to determine if it is an Acute Myocardial Infarction or myocardial injury due to an  underlying chronic condition.       Results may be falsely decreased if patient taking Biotin.      BNP [[id number]]  (Abnormal) Collected:  05/10/20 0650    Specimen:  Blood Updated:  05/10/20 0717     proBNP 1,990.0 pg/mL     Narrative:       Among patients with dyspnea, NT-proBNP is highly sensitive for the detection of acute congestive heart failure. In addition NT-proBNP of <300 pg/ml effectively rules out acute congestive heart failure with 99% negative predictive value.    Results may be falsely decreased if patient taking Biotin.      Protime-INR [[id number]]  (Normal) Collected:  05/10/20 0650    Specimen:  Blood Updated:  05/10/20 0715     Protime 14.3 Seconds      INR 1.13    Narrative:       Therapeutic range for most indications is 2.0-3.0 INR,  or 2.5-3.5 for mechanical heart valves.    CBC & Differential [[id number]] Collected:  05/10/20 0650    Specimen:  Blood Updated:  05/10/20 0706    Narrative:       The following orders were created for panel order CBC & Differential.  Procedure                               Abnormality         Status                     ---------                               -----------         ------                     CBC Auto Differential[[id number]]        Abnormal            Final result                 Please view results for these tests on the individual orders.    CBC Auto Differential [[id number]]  (Abnormal) Collected:  05/10/20 0650    Specimen:  Blood Updated:  05/10/20 0706     WBC 7.47 10*3/mm3      RBC 3.69 10*6/mm3      Hemoglobin 12.2 g/dL      Hematocrit 36.1 %      MCV 97.8 fL      MCH 33.1 pg      MCHC 33.8 g/dL      RDW 12.3 %      RDW-SD 44.4 fl      MPV 10.3 fL      Platelets 210 10*3/mm3      Neutrophil % 54.9 %      Lymphocyte % 30.0 %      Monocyte % 12.2 %      Eosinophil % 2.5 %      Basophil % 0.3 %      Immature Grans % 0.1 %      Neutrophils, Absolute 4.10 10*3/mm3      Lymphocytes, Absolute 2.24 10*3/mm3      Monocytes, Absolute 0.91 10*3/mm3       Eosinophils, Absolute 0.19 10*3/mm3      Basophils, Absolute 0.02 10*3/mm3      Immature Grans, Absolute 0.01 10*3/mm3      nRBC 0.0 /100 WBC         Imaging Results (Last 24 Hours)     Procedure Component Value Units Date/Time    XR Chest 1 View [[id number]] Collected:  05/10/20 0658     Updated:  05/10/20 0719    Narrative:       Chest x-ray single view    CLINICAL INDICATION: Shortness of breath. Tachycardia    COMPARISON: Chest May 9, 2020    FINDINGS: Cardiac silhouette is normal in size. Pulmonary  vascularity is unremarkable.     No focal infiltrate or consolidation.  No pleural effusion.  No  pneumothorax.    Benign calcified granulomata both perihilar regions.        Impression:       No evidence of active disease. No change since prior  exam.    Electronically signed by:  Jeanmarie Talbot MD  5/10/2020 7:18 AM CDT  Workstation: [id number]            Assessment:    Active Hospital Problems    Diagnosis   • Atrial fibrillation with RVR (CMS/HCC)   • Coronary artery disease involving native coronary artery of native heart without angina pectoris   • Benign essential hypertension   • Type 2 diabetes mellitus without complication (CMS/HCC)   • Anxiety state   • Depression   • Hyperlipidemia             Plan:  1. Atrial fibrillation with RVR: currently rate controlled with heart rate of 94.  Continue Eliquis for anticoagulation and Metoprolol for rate control.  Will place on Dr. Forbes's list to see tomorrow.   2. HTN: continue Metoprolol, Lisinopril/HCTZ  3. HLD:   4. CAD: continue ASA, Lisinopril, Metoprolol  5. Type 2 DM: FSBS AC and HS with SSI. Heart healthy ADA diet.     Further orders will depend upon hospital course.  Plan of care discussed with patient.  Code status confirmed and patient wishes for full code status in the event of emergency.  He designates wife, Shweta Carter, as his decision maker in the event of emergency.            This document has been electronically signed by LIAS Guzmán on  May 10, 2020 09:40

## 2020-05-11 ENCOUNTER — APPOINTMENT (OUTPATIENT)
Dept: CARDIOLOGY | Facility: HOSPITAL | Age: 85
End: 2020-05-11

## 2020-05-11 LAB
ANION GAP SERPL CALCULATED.3IONS-SCNC: 11 MMOL/L (ref 5–15)
BASOPHILS # BLD AUTO: 0.03 10*3/MM3 (ref 0–0.2)
BASOPHILS NFR BLD AUTO: 0.5 % (ref 0–1.5)
BH CV ECHO MEAS - ACS: 1.6 CM
BH CV ECHO MEAS - AO MAX PG (FULL): 2.7 MMHG
BH CV ECHO MEAS - AO MAX PG: 8.1 MMHG
BH CV ECHO MEAS - AO MEAN PG (FULL): 3 MMHG
BH CV ECHO MEAS - AO MEAN PG: 6 MMHG
BH CV ECHO MEAS - AO ROOT AREA (BSA CORRECTED): 2
BH CV ECHO MEAS - AO ROOT AREA: 13.2 CM^2
BH CV ECHO MEAS - AO ROOT DIAM: 4.1 CM
BH CV ECHO MEAS - AO V2 MAX: 142 CM/SEC
BH CV ECHO MEAS - AO V2 MEAN: 118 CM/SEC
BH CV ECHO MEAS - AO V2 VTI: 26.9 CM
BH CV ECHO MEAS - ASC AORTA: 3.4 CM
BH CV ECHO MEAS - AVA(I,A): 3.2 CM^2
BH CV ECHO MEAS - AVA(I,D): 3.2 CM^2
BH CV ECHO MEAS - AVA(V,A): 3.4 CM^2
BH CV ECHO MEAS - AVA(V,D): 3.4 CM^2
BH CV ECHO MEAS - BSA(HAYCOCK): 2.1 M^2
BH CV ECHO MEAS - BSA: 2.1 M^2
BH CV ECHO MEAS - BZI_BMI: 26.9 KILOGRAMS/M^2
BH CV ECHO MEAS - BZI_METRIC_HEIGHT: 180.3 CM
BH CV ECHO MEAS - BZI_METRIC_WEIGHT: 87.5 KG
BH CV ECHO MEAS - EDV(CUBED): 111.3 ML
BH CV ECHO MEAS - EDV(MOD-SP2): 55.2 ML
BH CV ECHO MEAS - EDV(MOD-SP4): 38.4 ML
BH CV ECHO MEAS - EDV(TEICH): 108 ML
BH CV ECHO MEAS - EF(CUBED): 64.4 %
BH CV ECHO MEAS - EF(MOD-SP2): 55.8 %
BH CV ECHO MEAS - EF(MOD-SP4): 63.3 %
BH CV ECHO MEAS - EF(TEICH): 55.8 %
BH CV ECHO MEAS - ESV(CUBED): 39.7 ML
BH CV ECHO MEAS - ESV(MOD-SP2): 24.4 ML
BH CV ECHO MEAS - ESV(MOD-SP4): 14.1 ML
BH CV ECHO MEAS - ESV(TEICH): 47.8 ML
BH CV ECHO MEAS - FS: 29.1 %
BH CV ECHO MEAS - IVS/LVPW: 1
BH CV ECHO MEAS - IVSD: 1.2 CM
BH CV ECHO MEAS - LA DIMENSION: 4.3 CM
BH CV ECHO MEAS - LA/AO: 1
BH CV ECHO MEAS - LV DIASTOLIC VOL/BSA (35-75): 18.5 ML/M^2
BH CV ECHO MEAS - LV MASS(C)D: 226.4 GRAMS
BH CV ECHO MEAS - LV MASS(C)DI: 109 GRAMS/M^2
BH CV ECHO MEAS - LV MAX PG: 5.4 MMHG
BH CV ECHO MEAS - LV MEAN PG: 3 MMHG
BH CV ECHO MEAS - LV SYSTOLIC VOL/BSA (12-30): 6.8 ML/M^2
BH CV ECHO MEAS - LV V1 MAX: 116 CM/SEC
BH CV ECHO MEAS - LV V1 MEAN: 88.2 CM/SEC
BH CV ECHO MEAS - LV V1 VTI: 20.6 CM
BH CV ECHO MEAS - LVIDD: 4.8 CM
BH CV ECHO MEAS - LVIDS: 3.4 CM
BH CV ECHO MEAS - LVLD AP2: 7.7 CM
BH CV ECHO MEAS - LVLD AP4: 7.2 CM
BH CV ECHO MEAS - LVLS AP2: 7.5 CM
BH CV ECHO MEAS - LVLS AP4: 7.1 CM
BH CV ECHO MEAS - LVOT AREA (M): 4.2 CM^2
BH CV ECHO MEAS - LVOT AREA: 4.2 CM^2
BH CV ECHO MEAS - LVOT DIAM: 2.3 CM
BH CV ECHO MEAS - LVPWD: 1.2 CM
BH CV ECHO MEAS - MR MAX PG: 78.9 MMHG
BH CV ECHO MEAS - MR MAX VEL: 444 CM/SEC
BH CV ECHO MEAS - MV DEC SLOPE: 562 CM/SEC^2
BH CV ECHO MEAS - MV E MAX VEL: 144 CM/SEC
BH CV ECHO MEAS - MV P1/2T MAX VEL: 138 CM/SEC
BH CV ECHO MEAS - MV P1/2T: 71.9 MSEC
BH CV ECHO MEAS - MVA P1/2T LCG: 1.6 CM^2
BH CV ECHO MEAS - MVA(P1/2T): 3.1 CM^2
BH CV ECHO MEAS - PA MAX PG (FULL): 1.7 MMHG
BH CV ECHO MEAS - PA MAX PG: 3.1 MMHG
BH CV ECHO MEAS - PA V2 MAX: 88.2 CM/SEC
BH CV ECHO MEAS - RAP SYSTOLE: 5 MMHG
BH CV ECHO MEAS - RV MAX PG: 1.4 MMHG
BH CV ECHO MEAS - RV MEAN PG: 1 MMHG
BH CV ECHO MEAS - RV V1 MAX: 59.4 CM/SEC
BH CV ECHO MEAS - RV V1 MEAN: 40.1 CM/SEC
BH CV ECHO MEAS - RV V1 VTI: 11.5 CM
BH CV ECHO MEAS - RVDD: 3.1 CM
BH CV ECHO MEAS - RVSP: 18.8 MMHG
BH CV ECHO MEAS - SI(AO): 171 ML/M^2
BH CV ECHO MEAS - SI(CUBED): 34.5 ML/M^2
BH CV ECHO MEAS - SI(LVOT): 41.2 ML/M^2
BH CV ECHO MEAS - SI(MOD-SP2): 14.8 ML/M^2
BH CV ECHO MEAS - SI(MOD-SP4): 11.7 ML/M^2
BH CV ECHO MEAS - SI(TEICH): 29 ML/M^2
BH CV ECHO MEAS - SV(AO): 355.1 ML
BH CV ECHO MEAS - SV(CUBED): 71.6 ML
BH CV ECHO MEAS - SV(LVOT): 85.6 ML
BH CV ECHO MEAS - SV(MOD-SP2): 30.8 ML
BH CV ECHO MEAS - SV(MOD-SP4): 24.3 ML
BH CV ECHO MEAS - SV(TEICH): 60.3 ML
BH CV ECHO MEAS - TR MAX VEL: 186 CM/SEC
BUN BLD-MCNC: 21 MG/DL (ref 8–23)
BUN/CREAT SERPL: 17.4 (ref 7–25)
CALCIUM SPEC-SCNC: 9 MG/DL (ref 8.6–10.5)
CHLORIDE SERPL-SCNC: 103 MMOL/L (ref 98–107)
CO2 SERPL-SCNC: 26 MMOL/L (ref 22–29)
CREAT BLD-MCNC: 1.21 MG/DL (ref 0.76–1.27)
DEPRECATED RDW RBC AUTO: 42.9 FL (ref 37–54)
EOSINOPHIL # BLD AUTO: 0.18 10*3/MM3 (ref 0–0.4)
EOSINOPHIL NFR BLD AUTO: 2.8 % (ref 0.3–6.2)
ERYTHROCYTE [DISTWIDTH] IN BLOOD BY AUTOMATED COUNT: 12.1 % (ref 12.3–15.4)
GFR SERPL CREATININE-BSD FRML MDRD: 57 ML/MIN/1.73
GLUCOSE BLD-MCNC: 122 MG/DL (ref 65–99)
GLUCOSE BLDC GLUCOMTR-MCNC: 120 MG/DL (ref 70–130)
GLUCOSE BLDC GLUCOMTR-MCNC: 130 MG/DL (ref 70–130)
GLUCOSE BLDC GLUCOMTR-MCNC: 192 MG/DL (ref 70–130)
GLUCOSE BLDC GLUCOMTR-MCNC: 204 MG/DL (ref 70–130)
HCT VFR BLD AUTO: 34.7 % (ref 37.5–51)
HGB BLD-MCNC: 11.8 G/DL (ref 13–17.7)
IMM GRANULOCYTES # BLD AUTO: 0.03 10*3/MM3 (ref 0–0.05)
IMM GRANULOCYTES NFR BLD AUTO: 0.5 % (ref 0–0.5)
LV EF 2D ECHO EST: 61 %
LYMPHOCYTES # BLD AUTO: 2.19 10*3/MM3 (ref 0.7–3.1)
LYMPHOCYTES NFR BLD AUTO: 34 % (ref 19.6–45.3)
MAXIMAL PREDICTED HEART RATE: 134 BPM
MCH RBC QN AUTO: 33.1 PG (ref 26.6–33)
MCHC RBC AUTO-ENTMCNC: 34 G/DL (ref 31.5–35.7)
MCV RBC AUTO: 97.2 FL (ref 79–97)
MONOCYTES # BLD AUTO: 0.77 10*3/MM3 (ref 0.1–0.9)
MONOCYTES NFR BLD AUTO: 12 % (ref 5–12)
NEUTROPHILS # BLD AUTO: 3.24 10*3/MM3 (ref 1.7–7)
NEUTROPHILS NFR BLD AUTO: 50.2 % (ref 42.7–76)
NRBC BLD AUTO-RTO: 0 /100 WBC (ref 0–0.2)
PLATELET # BLD AUTO: 188 10*3/MM3 (ref 140–450)
PMV BLD AUTO: 10.1 FL (ref 6–12)
POTASSIUM BLD-SCNC: 4 MMOL/L (ref 3.5–5.2)
RBC # BLD AUTO: 3.57 10*6/MM3 (ref 4.14–5.8)
SODIUM BLD-SCNC: 140 MMOL/L (ref 136–145)
STRESS TARGET HR: 114 BPM
WBC NRBC COR # BLD: 6.44 10*3/MM3 (ref 3.4–10.8)

## 2020-05-11 PROCEDURE — 93306 TTE W/DOPPLER COMPLETE: CPT

## 2020-05-11 PROCEDURE — 99214 OFFICE O/P EST MOD 30 MIN: CPT | Performed by: INTERNAL MEDICINE

## 2020-05-11 PROCEDURE — 25010000002 AMIODARONE IN DEXTROSE 5% 150-4.21 MG/100ML-% SOLUTION: Performed by: NURSE PRACTITIONER

## 2020-05-11 PROCEDURE — 96365 THER/PROPH/DIAG IV INF INIT: CPT

## 2020-05-11 PROCEDURE — 25010000002 AMIODARONE IN DEXTROSE 5% 360-4.14 MG/200ML-% SOLUTION: Performed by: NURSE PRACTITIONER

## 2020-05-11 PROCEDURE — 82962 GLUCOSE BLOOD TEST: CPT

## 2020-05-11 PROCEDURE — 93005 ELECTROCARDIOGRAM TRACING: CPT | Performed by: NURSE PRACTITIONER

## 2020-05-11 PROCEDURE — 85025 COMPLETE CBC W/AUTO DIFF WBC: CPT | Performed by: NURSE PRACTITIONER

## 2020-05-11 PROCEDURE — 63710000001 INSULIN ASPART PER 5 UNITS: Performed by: NURSE PRACTITIONER

## 2020-05-11 PROCEDURE — 96375 TX/PRO/DX INJ NEW DRUG ADDON: CPT

## 2020-05-11 PROCEDURE — 93010 ELECTROCARDIOGRAM REPORT: CPT | Performed by: INTERNAL MEDICINE

## 2020-05-11 PROCEDURE — G0378 HOSPITAL OBSERVATION PER HR: HCPCS

## 2020-05-11 PROCEDURE — 80048 BASIC METABOLIC PNL TOTAL CA: CPT | Performed by: NURSE PRACTITIONER

## 2020-05-11 PROCEDURE — 96376 TX/PRO/DX INJ SAME DRUG ADON: CPT

## 2020-05-11 PROCEDURE — 96366 THER/PROPH/DIAG IV INF ADDON: CPT

## 2020-05-11 PROCEDURE — 93306 TTE W/DOPPLER COMPLETE: CPT | Performed by: INTERNAL MEDICINE

## 2020-05-11 PROCEDURE — 93005 ELECTROCARDIOGRAM TRACING: CPT | Performed by: HOSPITALIST

## 2020-05-11 RX ORDER — AMIODARONE HYDROCHLORIDE 200 MG/1
100 TABLET ORAL ONCE
Status: DISCONTINUED | OUTPATIENT
Start: 2020-05-12 | End: 2020-05-11

## 2020-05-11 RX ORDER — METOPROLOL TARTRATE 5 MG/5ML
5 INJECTION INTRAVENOUS ONCE
Status: COMPLETED | OUTPATIENT
Start: 2020-05-11 | End: 2020-05-11

## 2020-05-11 RX ORDER — AMIODARONE HYDROCHLORIDE 200 MG/1
100 TABLET ORAL
Status: DISCONTINUED | OUTPATIENT
Start: 2020-05-12 | End: 2020-05-12 | Stop reason: HOSPADM

## 2020-05-11 RX ORDER — AMIODARONE HYDROCHLORIDE 200 MG/1
100 TABLET ORAL
Status: DISCONTINUED | OUTPATIENT
Start: 2020-05-13 | End: 2020-05-11

## 2020-05-11 RX ORDER — SODIUM CHLORIDE 9 MG/ML
INJECTION, SOLUTION INTRAVENOUS
Status: COMPLETED
Start: 2020-05-11 | End: 2020-05-12

## 2020-05-11 RX ORDER — AMIODARONE HYDROCHLORIDE 200 MG/1
100 TABLET ORAL DAILY
Status: DISCONTINUED | OUTPATIENT
Start: 2020-06-16 | End: 2020-05-11

## 2020-05-11 RX ORDER — AMIODARONE HYDROCHLORIDE 200 MG/1
100 TABLET ORAL EVERY 12 HOURS
Status: DISCONTINUED | OUTPATIENT
Start: 2020-06-02 | End: 2020-05-11

## 2020-05-11 RX ADMIN — AMIODARONE HYDROCHLORIDE 0.5 MG/MIN: 1.8 INJECTION, SOLUTION INTRAVENOUS at 16:46

## 2020-05-11 RX ADMIN — APIXABAN 2.5 MG: 2.5 TABLET, FILM COATED ORAL at 08:18

## 2020-05-11 RX ADMIN — TIMOLOL MALEATE 1 DROP: 5 SOLUTION OPHTHALMIC at 20:02

## 2020-05-11 RX ADMIN — AMIODARONE HYDROCHLORIDE 150 MG: 1.5 INJECTION, SOLUTION INTRAVENOUS at 10:04

## 2020-05-11 RX ADMIN — CETIRIZINE HYDROCHLORIDE 5 MG: 5 TABLET ORAL at 08:18

## 2020-05-11 RX ADMIN — LISINOPRIL: 20 TABLET ORAL at 10:29

## 2020-05-11 RX ADMIN — TIMOLOL MALEATE 1 DROP: 5 SOLUTION OPHTHALMIC at 08:34

## 2020-05-11 RX ADMIN — SODIUM CHLORIDE 200 ML: 9 INJECTION, SOLUTION INTRAVENOUS at 23:41

## 2020-05-11 RX ADMIN — AMIODARONE HYDROCHLORIDE 1 MG/MIN: 1.8 INJECTION, SOLUTION INTRAVENOUS at 10:26

## 2020-05-11 RX ADMIN — ASPIRIN 81 MG: 81 TABLET, COATED ORAL at 08:18

## 2020-05-11 RX ADMIN — Medication 1 TABLET: at 08:18

## 2020-05-11 RX ADMIN — SODIUM CHLORIDE, PRESERVATIVE FREE 10 ML: 5 INJECTION INTRAVENOUS at 08:19

## 2020-05-11 RX ADMIN — TERAZOSIN HYDROCHLORIDE ANHYDROUS 2 MG: 2 CAPSULE ORAL at 20:01

## 2020-05-11 RX ADMIN — INSULIN ASPART 4 UNITS: 100 INJECTION, SOLUTION INTRAVENOUS; SUBCUTANEOUS at 12:06

## 2020-05-11 RX ADMIN — APIXABAN 2.5 MG: 2.5 TABLET, FILM COATED ORAL at 20:01

## 2020-05-11 RX ADMIN — METOPROLOL TARTRATE 5 MG: 5 INJECTION INTRAVENOUS at 08:26

## 2020-05-11 NOTE — CONSULTS
Cardiology at [facility]  Cardiovascular Consultation Note      Evgeny Carter Jr.  307/1  [id number]  6/11/1933    DATE OF ADMISSION: 5/10/2020  DATE OF CONSULTATION:  5/11/2020    Michael Douglas MD  Treatment Team:   Attending Provider: Arnold Holman DO  Consulting Physician: Garett Beltran MD  Nurse Practitioner: Karolina Purdy APRN  Consulting Physician: Johnny Forbes MD  Admitting Provider: Arnold Holman DO    Chief Complaint   Patient presents with   • Rapid Heart Rate       Chief complaint/ Reason for Consultation atrial fibrillation with rapid ventricular rate      History of Present Illness  Body mass index is 27.03 kg/m². BMI is above normal parameters. Recommendations include: exercise counseling, nutrition counseling and referral to primary care.    86 years old patient with a past medical history significant for bladder tumor status post surgical resection with initially presented on 5/9/2020 with a similar problem given IV Lopressor rate got better and subsequent discharge but readmitted the next day with complaints of rapid heartbeat associated palpitation and got admitted with a past medical history significant for hypertension, hypertensive heart disease, CAD status post PTCA stent, atrial flutter with a counterclockwise mechanism state  post-EP study and flutter ablation and noted to be in atrial fibrillation with a resting heart rate 110 bpm.  Patient started on oral anticoagulation and amiodarone. Last echoc reported mildly dilated aortic root and repeat echo similar aortic root diameter 4.0 in August 2018.  Post atrial flutter ablation patient developed A. fib initially managed with amiodarone 200 mg then subsequent decrease to 200 and then 50 mg and given the significant bradyarrhythmia amiodarone was discontinued  He denies orthopnea, PND, syncope or near syncopal episode.  Denies any polydipsia polyuria.  Denies any fever cough or  chills.  No dysuria or hematuria reported.     ECHO 8/15/18  ·  Mild tricuspid valve regurgitation is present.  · Mild pulmonic valve regurgitation is present.  · Left atrial cavity size is mild-to-moderately dilated.  · Left ventricular systolic function is normal. Estimated EF = 55%.  · Mild mitral valve regurgitation is present  Left ventricular diastolic dysfunction (grade I a) consistent with impaired relaxation  Past Medical History:   Diagnosis Date   • Allergic rhinitis    • Arthritis     hips, knees, feet   • Artificial lens present     in position   • Benign neoplasm of skin of eyelid    • Bladder tumor    • Bladder tumor    • BPH (benign prostatic hyperplasia)    • Cancer (CMS/HCC)     SKIN    • Cataract    • Coronary arteriosclerosis    • Cortical senile cataract    • Cough    • Essential hypertension    • Essential hypertension    • Hypercholesterolemia    • Hypercholesterolemia    • Kidney stones    • MI, old 1988   • Nonexudative age-related macular degeneration     mild   • Nuclear cataract    • Open-angle glaucoma     s/p trabec OS, doing well      • Primary open angle glaucoma     OS, s/p trabeculectomy; IOP down with timolol OD     • Pseudophakia of right eye    • Rhinitis    • Upper respiratory infection    • Vitreous detachment     peripheral       Past Surgical History:   Procedure Laterality Date   • ANKLE SURGERY  07/29/2000    Ankle surgery (ORIF right ankle fracture. Bimalleolar right fracture)   • APPENDECTOMY     • CARDIAC ABLATION     • CARDIAC CATHETERIZATION  07/02/1987    Cardiac cath 21252 (Coronary arthosclerotic heart disease. maintained patency of RCA. Mild inferior hypokinesis.)   • CARDIAC CATHETERIZATION  06/30/1987    Cardiac cath 31749 (Coronary atherosclerotic heart disease. Occluded RCA. Acute inferior infarction. S/P successful angioplasty of RCA)   • CATARACT EXTRACTION  04/01/2010    Remove cataract, insert lens (Complicated cataract extraction with intraocular lens  implantation, right eye, Marcelo SN-60 WF serial # [id number]: 20.5 diopter)   • CATARACT EXTRACTION W/ INTRAOCULAR LENS IMPLANT Left 3/23/2018    Procedure: CATARACT PHACO EXTRACTION WITH INTRAOCULAR LENS IMPLANT;  Surgeon: Oli Haley MD;  Location: [facility] OR;  Service: Ophthalmology   • COLONOSCOPY N/A 5/9/2019    Procedure: COLONOSCOPY;  Surgeon: Rainer Gates DO;  Location: [facility] ENDOSCOPY;  Service: Gastroenterology   • ENDOSCOPY  02/19/1990    EGD 88519 (Olympus video endoscope.Fibrin-like patch in stomach)   • EYE SURGERY  02/02/2005    Eye surgery procedure (Repair of operative wound leak, left eye. S/P trabeculectomy with operative wound leak)   • EYE SURGERY  01/19/2005    Eye surgery procedure (Trabeculectomy, left eye. 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 degeneration)    • OTHER SURGICAL HISTORY  02/26/2015    EXTENDED VISUAL FIELDS STUDY 39417 (Primary open angle glaucoma)    • OTHER SURGICAL HISTORY  02/26/2015    EXTENDED VISUAL FIELDS STUDY 96568 (Primary open angle glaucoma)    • OTHER SURGICAL HISTORY  06/16/2016    OCT DISC NFL 38619 (Open-angle glaucoma)    • OTHER SURGICAL HISTORY      OCT DISC NFL 26831 (Primary open angle glaucoma) (3): 06/17/2015, 03/19/2014, 03/04/2013   • OTHER SURGICAL HISTORY  06/16/2016    OPTIC NERVE HEAD EVAL PERFORMED 2027F (Open-angle glaucoma)    • TRANSURETHRAL RESECTION OF BLADDER TUMOR N/A 5/9/2018    Procedure: CYSTOSCOPY TRANSURETHRAL RESECTION OF BLADDER TUMOR;  Surgeon: Donovan Conner MD;  Location: [facility] OR;  Service: Urology   • TRANSURETHRAL RESECTION OF BLADDER TUMOR N/A 7/26/2019    Procedure: CYSTOSCOPY TRANSURETHRAL RESECTION OF BLADDER TUMOR;  Surgeon: Donovan Conner MD;  Location: [facility] OR;  Service: Urology   •  UVULECTOMY  1969    EXCISION OF UVULA 36047 (partial uvulectomy with excision of papilloma. Redunant uvula with a small papilloma)       No Known Allergies    No current facility-administered medications on file prior to encounter.      Current Outpatient Medications on File Prior to Encounter   Medication Sig Dispense Refill   • apixaban (ELIQUIS) 5 MG tablet tablet Take 2.5 mg by mouth 2 (Two) Times a Day. Last dose to be taken 18 prior to surgery     • aspirin 81 MG tablet Take 81 mg by mouth Every Night. Last dose 18     • lisinopril-hydrochlorothiazide (PRINZIDE,ZESTORETIC) 20-12.5 MG per tablet Take 1 tablet by mouth Daily.     • loratadine (CLARITIN) 10 MG tablet Take 10 mg by mouth Daily.     • metFORMIN (GLUCOPHAGE) 500 MG tablet Take 500 mg by mouth 2 (Two) Times a Day.     • metoprolol tartrate (LOPRESSOR) 25 MG tablet Take 1 tablet by mouth 2 (Two) Times a Day. 60 tablet 0   • Multiple Vitamins-Minerals (MENS MULTIVITAMIN PLUS PO) Take 1 tablet by mouth Daily.     • terazosin (HYTRIN) 2 MG capsule Take 2 mg by mouth Every Night.     • timolol (TIMOPTIC) 0.5 % ophthalmic solution Administer 1 drop to both eyes 2 (Two) Times a Day.         Social History     Socioeconomic History   • Marital status:      Spouse name: Not on file   • Number of children: Not on file   • Years of education: Not on file   • Highest education level: Not on file   Tobacco Use   • Smoking status: Former Smoker     Last attempt to quit: 1988     Years since quittin.3   • Smokeless tobacco: Current User     Types: Snuff   Substance and Sexual Activity   • Alcohol use: No   • Drug use: No   • Sexual activity: Defer           REVIEW OF SYSTEMS:   ROS  Constitutional: Negative for activity change, appetite change, chills, fatigue and fever.   HENT: Negative for congestion, rhinorrhea and sore throat.    Respiratory: Negative for cough, chest tightness, shortness of breath and wheezing.    Cardiovascular: Positive  "for palpitations. Negative for chest pain and leg swelling.   Gastrointestinal: Negative for abdominal pain, constipation, diarrhea, nausea and vomiting.   Musculoskeletal: Negative for back pain and neck pain.   Skin: Negative for pallor.   Neurological: Negative for dizziness, syncope, weakness, light-headedness and headaches.   Psychiatric/Behavioral: Negative for confusion. The patient is nervous/anxious.            Objective:     Vitals:    05/11/20 0815 05/11/20 0817 05/11/20 0844 05/11/20 1001   BP:    131/76   BP Location:    Left arm   Patient Position:    Lying   Pulse: (!) 123 (!) 163 107 107   Resp:    20   Temp:       TempSrc:       SpO2:    98%   Weight:       Height:         Body mass index is 27.03 kg/m².  Flowsheet Rows      First Filed Value   Admission Height  180.3 cm (71\") Documented at 05/10/2020 0643   Admission Weight  98.3 kg (216 lb 11.2 oz) Documented at 05/10/2020 0643        No intake or output data in the 24 hours ending 05/11/20 1015      Physical Exam   Physical Exam  Constitutional: He is oriented to person, place, and time. Vital signs are normal. He appears well-developed and well-nourished. He is cooperative. He does not have a sickly appearance. He does not appear ill. No distress.   HENT:   Head: Normocephalic and atraumatic. l.   Eyes: Conjunctivae are normal.   Neck: Normal range of motion. Neck supple. No JVD present.   Cardiovascular: Normal rate, normal heart sounds and intact distal pulses. An irregularly irregular rhythm present. Exam reveals no gallop and no friction rub.   No murmur heard.  Pulmonary/Chest: Effort normal and breath sounds normal.  Abdominal: Soft. Bowel sounds are normal. He exhibits no distension. There is no tenderness.   Musculoskeletal: Normal range of motion. He exhibits no edema.   Neurological: He is alert and oriented to person, place, and time. No cranial nerve deficit.   Skin: Skin is warm, dry and intact. No rash noted. He is not diaphoretic. " No erythema. No pallor.   Psychiatric: His behavior is normal.   Nursing note and vitals reviewed.  Radiology Review    XR Chest 1 View   Final Result   No evidence of active disease. No change since prior   exam.      Electronically signed by:  Jeanmarie Talbot MD  5/10/2020 7:18 AM CDT   Workstation: [id number]          Lab Results:  Lab Results (last 24 hours)     Procedure Component Value Units Date/Time    POC Glucose Once [[id number]]  (Normal) Collected:  05/11/20 0606    Specimen:  Blood Updated:  05/11/20 0639     Glucose 120 mg/dL      Comment: RN NotifiedOperator: [id number] REG ROBMeter ID: [id number]       POC Glucose Once [[id number]]  (Normal) Collected:  05/10/20 1958    Specimen:  Blood Updated:  05/11/20 0638     Glucose 130 mg/dL      Comment: RN NotifiedOperator: [id number] REG ROBMeter ID: [id number]       Basic Metabolic Panel [[id number]]  (Abnormal) Collected:  05/11/20 0459    Specimen:  Blood Updated:  05/11/20 0531     Glucose 122 mg/dL      BUN 21 mg/dL      Creatinine 1.21 mg/dL      Sodium 140 mmol/L      Potassium 4.0 mmol/L      Chloride 103 mmol/L      CO2 26.0 mmol/L      Calcium 9.0 mg/dL      eGFR Non African Amer 57 mL/min/1.73      BUN/Creatinine Ratio 17.4     Anion Gap 11.0 mmol/L     Narrative:       GFR Normal >60  Chronic Kidney Disease <60  Kidney Failure <15      CBC & Differential [[id number]] Collected:  05/11/20 0459    Specimen:  Blood Updated:  05/11/20 0512    Narrative:       The following orders were created for panel order CBC & Differential.  Procedure                               Abnormality         Status                     ---------                               -----------         ------                     CBC Auto Differential[[id number]]        Abnormal            Final result                 Please view results for these tests on the individual orders.    CBC Auto Differential [[id number]]  (Abnormal) Collected:  05/11/20 0459    Specimen:  Blood Updated:   05/11/20 0512     WBC 6.44 10*3/mm3      RBC 3.57 10*6/mm3      Hemoglobin 11.8 g/dL      Hematocrit 34.7 %      MCV 97.2 fL      MCH 33.1 pg      MCHC 34.0 g/dL      RDW 12.1 %      RDW-SD 42.9 fl      MPV 10.1 fL      Platelets 188 10*3/mm3      Neutrophil % 50.2 %      Lymphocyte % 34.0 %      Monocyte % 12.0 %      Eosinophil % 2.8 %      Basophil % 0.5 %      Immature Grans % 0.5 %      Neutrophils, Absolute 3.24 10*3/mm3      Lymphocytes, Absolute 2.19 10*3/mm3      Monocytes, Absolute 0.77 10*3/mm3      Eosinophils, Absolute 0.18 10*3/mm3      Basophils, Absolute 0.03 10*3/mm3      Immature Grans, Absolute 0.03 10*3/mm3      nRBC 0.0 /100 WBC     POC Glucose Once [[id number]]  (Normal) Collected:  05/10/20 1055    Specimen:  Blood Updated:  05/10/20 1802     Glucose 125 mg/dL      Comment: RN NotifiedOperator: [id number] [facility] ID: [id number]       POC Glucose Once [[id number]]  (Abnormal) Collected:  05/10/20 1614    Specimen:  Blood Updated:  05/10/20 1632     Glucose 133 mg/dL      Comment: RN NotifiedOperator: [id number] [facility] ID: [id number]             I personally viewed and interpreted the patient's EKG/Telemetry data       Assessment/Plan:       #1 paroxysmal atrial fibrillation    #2 asymptomatic sinus bradycardia    #3 history of atrial flutter status post EP study and flutter ablation   #4 hypertension good blood pressure control on lisinopril hydrochlorothiazide  #4 CAD stable on baby aspirin will continue  86 years old patient with a background history of CAD, hypertension, proximal atrial flutter status post EP study and flutter ablation and post ablation developed atrial fibrillation managed with amiodarone however was discontinued as the patient maintained sinus rhythm with heart rates in the low 50s left him up beta-blocker.  Patient presented for evaluations of palpitation noted in atrial fibrillation with rapid ventricular rate started on Cardizem rate  settled down last night developed again rapid heart rate up to 150.  Will DC metoprolol DC IV Cardizem and start the patient on amiodarone with bolus and continue drip and low-dose amiodarone 100 mg.  Recommend to continue oral anticoagulations.  Is no evidence of ongoing ischemia at the time of evaluation and hemodynamically stable.  Recommend to continue lisinopril for management of hypertension.  Further recommendation based on patient's clinical condition hospital course.      Thank you for allowing me to participate in the care of Evgenyyoel Carter Jr.. Feel free to contact me directly with any further questions or concerns.    Johnny Forbes MD  05/11/20  10:15.      Part of this note may be an electronic transcription/translation of spoken language to printed text using the Dragon Dictation system.

## 2020-05-11 NOTE — PROGRESS NOTES
"    [facility] Medicine Services  INPATIENT PROGRESS NOTE    Length of Stay: 0  Date of Admission: 5/10/2020  Primary Care Physician: Michael Douglas MD    Subjective   Chief Complaint: heart racing   HPI:  86 year old  male with past medical history of bladder tumor s/p surgical resection, CAD, HTN, HLD, macular degeneration, paroxysmal atrial fibrillation with past ablation who presented on 5/10/2020 with complaints of \"heart racing.\"  He is currently admitted related to atrial fibrillation with RVR.  Patient's heart rate increased this morning to 160's and case was discussed with Dr. Forbes and decision made to place patient on amiodarone drip.  During today's visit, patient denies any complaints aside from palpitations.     Review of Systems   Constitutional: Negative for chills, fatigue and fever.   Respiratory: Negative for cough, chest tightness, shortness of breath and wheezing.    Cardiovascular: Positive for palpitations. Negative for chest pain and leg swelling.   Gastrointestinal: Negative for abdominal pain, diarrhea, nausea and vomiting.   Musculoskeletal: Negative for back pain and neck pain.   Skin: Negative for pallor.   Neurological: Negative for dizziness, weakness and light-headedness.   Psychiatric/Behavioral: Negative for confusion. The patient is not nervous/anxious.         All pertinent negatives and positives are as above. All other systems have been reviewed and are negative unless otherwise stated.     Objective    Temp:  [96.4 °F (35.8 °C)-98.6 °F (37 °C)] 97.9 °F (36.6 °C)  Heart Rate:  [] 107  Resp:  [18-20] 20  BP: (103-138)/(56-87) 131/76    Physical Exam   Constitutional: He is oriented to person, place, and time. Vital signs are normal. He appears well-developed and well-nourished. He is cooperative. No distress.   HENT:   Head: Normocephalic and atraumatic.   Right Ear: External ear normal.   Left Ear: External ear normal. "   Nose: Nose normal.   Eyes: Conjunctivae are normal. Right eye exhibits no discharge. Left eye exhibits no discharge. No scleral icterus.   Neck: Normal range of motion. Neck supple. No JVD present.   Cardiovascular: Normal heart sounds and intact distal pulses. An irregularly irregular rhythm present. Tachycardia present. Exam reveals no gallop and no friction rub.   No murmur heard.  Pulmonary/Chest: Effort normal and breath sounds normal. No stridor. No respiratory distress. He has no wheezes. He has no rales.   Abdominal: Soft. Bowel sounds are normal. He exhibits no distension. There is no tenderness.   Musculoskeletal: Normal range of motion. He exhibits no edema.   Neurological: He is alert and oriented to person, place, and time.   Skin: Skin is warm and dry. He is not diaphoretic.   Psychiatric: He has a normal mood and affect. His behavior is normal.   Nursing note and vitals reviewed.          Results Review:  I have reviewed the labs, radiology results, and diagnostic studies.    Laboratory Data:   Results from last 7 days   Lab Units 05/11/20  0459 05/10/20  0650 05/09/20  1310   SODIUM mmol/L 140 139 139   POTASSIUM mmol/L 4.0 4.0 4.3   CHLORIDE mmol/L 103 100 100   CO2 mmol/L 26.0 26.0 26.0   BUN mg/dL 21 25* 23   CREATININE mg/dL 1.21 1.20 1.14   GLUCOSE mg/dL 122* 209* 130*   CALCIUM mg/dL 9.0 9.5 9.3   BILIRUBIN mg/dL  --  0.3 0.3   ALK PHOS U/L  --  66 67   ALT (SGPT) U/L  --  17 19   AST (SGOT) U/L  --  18 20   ANION GAP mmol/L 11.0 13.0 13.0     Estimated Creatinine Clearance: 54.5 mL/min (by C-G formula based on SCr of 1.21 mg/dL).  Results from last 7 days   Lab Units 05/10/20  0650   MAGNESIUM mg/dL 1.9         Results from last 7 days   Lab Units 05/11/20  0459 05/10/20  0650 05/09/20  1310   WBC 10*3/mm3 6.44 7.47 7.57   HEMOGLOBIN g/dL 11.8* 12.2* 12.9*   HEMATOCRIT % 34.7* 36.1* 38.0   PLATELETS 10*3/mm3 188 210 220     Results from last 7 days   Lab Units 05/10/20  0650 05/09/20  2351    INR  1.13 1.21*       Culture Data:   No results found for: BLOODCX  No results found for: URINECX  No results found for: RESPCX  No results found for: WOUNDCX  No results found for: STOOLCX  No components found for: BODYFLD    Radiology Data:   Imaging Results (Last 24 Hours)     ** No results found for the last 24 hours. **          I have reviewed the patient's current medications.     Assessment/Plan     Active Hospital Problems    Diagnosis   • Atrial fibrillation with RVR (CMS/HCC)   • Coronary artery disease involving native coronary artery of native heart without angina pectoris   • Benign essential hypertension   • Type 2 diabetes mellitus without complication (CMS/HCC)   • Anxiety state   • Depression   • Hyperlipidemia       Plan:      1. Atrial fibrillation with RVR:  Rates increased to 140-160's this morning.  Continue Eliquis for anticoagulation and Metoprolol for rate control.  Case discussed with Dr. Forbes and plans for amiodarone drip with taper to oral dosing in AM.    2. HTN: continue Metoprolol, Lisinopril/HCTZ  3. HLD:   4. CAD: continue ASA, Lisinopril, Metoprolol  5. Type 2 DM: FSBS AC and HS with SSI. Heart healthy ADA diet.     Discharge Planning: pending cardiology recommendations.           This document has been electronically signed by LISA Guzmán on May 11, 2020 10:09

## 2020-05-12 VITALS
OXYGEN SATURATION: 98 % | BODY MASS INDEX: 27.13 KG/M2 | WEIGHT: 193.8 LBS | HEART RATE: 61 BPM | DIASTOLIC BLOOD PRESSURE: 67 MMHG | HEIGHT: 71 IN | SYSTOLIC BLOOD PRESSURE: 148 MMHG | RESPIRATION RATE: 17 BRPM | TEMPERATURE: 97.5 F

## 2020-05-12 LAB
ANION GAP SERPL CALCULATED.3IONS-SCNC: 12 MMOL/L (ref 5–15)
BASOPHILS # BLD AUTO: 0.04 10*3/MM3 (ref 0–0.2)
BASOPHILS NFR BLD AUTO: 0.6 % (ref 0–1.5)
BUN BLD-MCNC: 28 MG/DL (ref 8–23)
BUN/CREAT SERPL: 20.4 (ref 7–25)
CALCIUM SPEC-SCNC: 9 MG/DL (ref 8.6–10.5)
CHLORIDE SERPL-SCNC: 102 MMOL/L (ref 98–107)
CO2 SERPL-SCNC: 26 MMOL/L (ref 22–29)
CREAT BLD-MCNC: 1.37 MG/DL (ref 0.76–1.27)
DEPRECATED RDW RBC AUTO: 43.1 FL (ref 37–54)
EOSINOPHIL # BLD AUTO: 0.19 10*3/MM3 (ref 0–0.4)
EOSINOPHIL NFR BLD AUTO: 3 % (ref 0.3–6.2)
ERYTHROCYTE [DISTWIDTH] IN BLOOD BY AUTOMATED COUNT: 12.1 % (ref 12.3–15.4)
GFR SERPL CREATININE-BSD FRML MDRD: 49 ML/MIN/1.73
GLUCOSE BLD-MCNC: 123 MG/DL (ref 65–99)
GLUCOSE BLDC GLUCOMTR-MCNC: 119 MG/DL (ref 70–130)
GLUCOSE BLDC GLUCOMTR-MCNC: 140 MG/DL (ref 70–130)
GLUCOSE BLDC GLUCOMTR-MCNC: 207 MG/DL (ref 70–130)
HCT VFR BLD AUTO: 32 % (ref 37.5–51)
HGB BLD-MCNC: 11.2 G/DL (ref 13–17.7)
IMM GRANULOCYTES # BLD AUTO: 0.02 10*3/MM3 (ref 0–0.05)
IMM GRANULOCYTES NFR BLD AUTO: 0.3 % (ref 0–0.5)
LYMPHOCYTES # BLD AUTO: 1.99 10*3/MM3 (ref 0.7–3.1)
LYMPHOCYTES NFR BLD AUTO: 31.2 % (ref 19.6–45.3)
MCH RBC QN AUTO: 33.8 PG (ref 26.6–33)
MCHC RBC AUTO-ENTMCNC: 35 G/DL (ref 31.5–35.7)
MCV RBC AUTO: 96.7 FL (ref 79–97)
MONOCYTES # BLD AUTO: 0.84 10*3/MM3 (ref 0.1–0.9)
MONOCYTES NFR BLD AUTO: 13.2 % (ref 5–12)
NEUTROPHILS # BLD AUTO: 3.29 10*3/MM3 (ref 1.7–7)
NEUTROPHILS NFR BLD AUTO: 51.7 % (ref 42.7–76)
NRBC BLD AUTO-RTO: 0 /100 WBC (ref 0–0.2)
PLATELET # BLD AUTO: 168 10*3/MM3 (ref 140–450)
PMV BLD AUTO: 10.2 FL (ref 6–12)
POTASSIUM BLD-SCNC: 3.7 MMOL/L (ref 3.5–5.2)
RBC # BLD AUTO: 3.31 10*6/MM3 (ref 4.14–5.8)
SODIUM BLD-SCNC: 140 MMOL/L (ref 136–145)
WBC NRBC COR # BLD: 6.37 10*3/MM3 (ref 3.4–10.8)

## 2020-05-12 PROCEDURE — 82962 GLUCOSE BLOOD TEST: CPT

## 2020-05-12 PROCEDURE — G0378 HOSPITAL OBSERVATION PER HR: HCPCS

## 2020-05-12 PROCEDURE — 85025 COMPLETE CBC W/AUTO DIFF WBC: CPT | Performed by: NURSE PRACTITIONER

## 2020-05-12 PROCEDURE — 63710000001 INSULIN ASPART PER 5 UNITS: Performed by: NURSE PRACTITIONER

## 2020-05-12 PROCEDURE — 80048 BASIC METABOLIC PNL TOTAL CA: CPT | Performed by: NURSE PRACTITIONER

## 2020-05-12 PROCEDURE — 93010 ELECTROCARDIOGRAM REPORT: CPT | Performed by: INTERNAL MEDICINE

## 2020-05-12 PROCEDURE — 93005 ELECTROCARDIOGRAM TRACING: CPT | Performed by: NURSE PRACTITIONER

## 2020-05-12 RX ORDER — AMIODARONE HYDROCHLORIDE 100 MG/1
100 TABLET ORAL
Qty: 30 TABLET | Refills: 0 | Status: SHIPPED | OUTPATIENT
Start: 2020-05-13 | End: 2020-06-09 | Stop reason: SDUPTHER

## 2020-05-12 RX ADMIN — INSULIN ASPART 4 UNITS: 100 INJECTION, SOLUTION INTRAVENOUS; SUBCUTANEOUS at 10:45

## 2020-05-12 RX ADMIN — LISINOPRIL: 20 TABLET ORAL at 08:31

## 2020-05-12 RX ADMIN — ASPIRIN 81 MG: 81 TABLET, COATED ORAL at 08:31

## 2020-05-12 RX ADMIN — Medication 100 MG: at 08:31

## 2020-05-12 RX ADMIN — Medication 1 TABLET: at 08:31

## 2020-05-12 RX ADMIN — TIMOLOL MALEATE 1 DROP: 5 SOLUTION OPHTHALMIC at 08:31

## 2020-05-12 RX ADMIN — SODIUM CHLORIDE, PRESERVATIVE FREE 10 ML: 5 INJECTION INTRAVENOUS at 08:33

## 2020-05-12 RX ADMIN — CETIRIZINE HYDROCHLORIDE 5 MG: 5 TABLET ORAL at 08:31

## 2020-05-12 RX ADMIN — APIXABAN 2.5 MG: 2.5 TABLET, FILM COATED ORAL at 08:31

## 2020-05-12 NOTE — DISCHARGE SUMMARY
[facility] Medicine Services  DISCHARGE SUMMARY        Date of Admission: 5/10/2020  Date of Discharge:  5/12/2020  Primary Care Physician: Michael Douglas MD    Presenting Problem/History of Present Illness:  Atrial fibrillation with RVR (CMS/HCC) [I48.91]     Final Discharge Diagnoses:  Active Hospital Problems    Diagnosis   • Atrial fibrillation with RVR (CMS/[facility])   • Coronary artery disease involving native coronary artery of native heart without angina pectoris   • Benign essential hypertension   • Type 2 diabetes mellitus without complication (CMS/[facility])   • Anxiety state   • Depression   • Hyperlipidemia       Consults:   Consults     Date and Time Order Name Status Description    5/11/2020 0030 Inpatient Cardiology Consult Completed     5/10/2020 0821 Hospitalist (on-call MD unless specified)            Procedures Performed:                 Pertinent Test Results:   Lab Results (last 24 hours)     Procedure Component Value Units Date/Time    POC Glucose Once [[id number]]  (Abnormal) Collected:  05/12/20 1026    Specimen:  Blood Updated:  05/12/20 1052     Glucose 207 mg/dL      Comment: RN NotifiedOperator: [id number] JASONALINE SYYMeter ID: [id number]       POC Glucose Once [[id number]]  (Abnormal) Collected:  05/12/20 0602    Specimen:  Blood Updated:  05/12/20 0918     Glucose 140 mg/dL      Comment: RN NotifiedOperator: [id number] BERT Vital ID: [id number]       Basic Metabolic Panel [[id number]]  (Abnormal) Collected:  05/12/20 0501    Specimen:  Blood Updated:  05/12/20 0549     Glucose 123 mg/dL      BUN 28 mg/dL      Creatinine 1.37 mg/dL      Sodium 140 mmol/L      Potassium 3.7 mmol/L      Chloride 102 mmol/L      CO2 26.0 mmol/L      Calcium 9.0 mg/dL      eGFR Non African Amer 49 mL/min/1.73      BUN/Creatinine Ratio 20.4     Anion Gap 12.0 mmol/L     Narrative:       GFR Normal >60  Chronic Kidney Disease <60  Kidney Failure <15      CBC &  Differential [[id number]] Collected:  05/12/20 0501    Specimen:  Blood Updated:  05/12/20 0523    Narrative:       The following orders were created for panel order CBC & Differential.  Procedure                               Abnormality         Status                     ---------                               -----------         ------                     CBC Auto Differential[[id number]]        Abnormal            Final result                 Please view results for these tests on the individual orders.    CBC Auto Differential [[id number]]  (Abnormal) Collected:  05/12/20 0501    Specimen:  Blood Updated:  05/12/20 0523     WBC 6.37 10*3/mm3      RBC 3.31 10*6/mm3      Hemoglobin 11.2 g/dL      Hematocrit 32.0 %      MCV 96.7 fL      MCH 33.8 pg      MCHC 35.0 g/dL      RDW 12.1 %      RDW-SD 43.1 fl      MPV 10.2 fL      Platelets 168 10*3/mm3      Neutrophil % 51.7 %      Lymphocyte % 31.2 %      Monocyte % 13.2 %      Eosinophil % 3.0 %      Basophil % 0.6 %      Immature Grans % 0.3 %      Neutrophils, Absolute 3.29 10*3/mm3      Lymphocytes, Absolute 1.99 10*3/mm3      Monocytes, Absolute 0.84 10*3/mm3      Eosinophils, Absolute 0.19 10*3/mm3      Basophils, Absolute 0.04 10*3/mm3      Immature Grans, Absolute 0.02 10*3/mm3      nRBC 0.0 /100 WBC     POC Glucose Once [[id number]]  (Normal) Collected:  05/11/20 1630    Specimen:  Blood Updated:  05/12/20 0102     Glucose 119 mg/dL      Comment: : [id number] Mobile [employer] ContentMeter ID: [id number]       POC Glucose Once [[id number]]  (Abnormal) Collected:  05/11/20 1925    Specimen:  Blood Updated:  05/11/20 2003     Glucose 192 mg/dL      Comment: RN NotifiedOperator: [id number] BERT Vital ID: [id number]       POC Glucose Once [[id number]]  (Abnormal) Collected:  05/11/20 1013    Specimen:  Blood Updated:  05/11/20 1107     Glucose 204 mg/dL      Comment: : [id number] [employer] ContentMeter ID: [id number]           Imaging Results (All)      "Procedure Component Value Units Date/Time    XR Chest 1 View [[id number]] Collected:  05/10/20 0658     Updated:  05/10/20 0719    Narrative:       Chest x-ray single view    CLINICAL INDICATION: Shortness of breath. Tachycardia    COMPARISON: Chest May 9, 2020    FINDINGS: Cardiac silhouette is normal in size. Pulmonary  vascularity is unremarkable.     No focal infiltrate or consolidation.  No pleural effusion.  No  pneumothorax.    Benign calcified granulomata both perihilar regions.        Impression:       No evidence of active disease. No change since prior  exam.    Electronically signed by:  Jeanmarie Talbot MD  5/10/2020 7:18 AM CDT  Workstation: [id number]            Chief Complaint on Day of Discharge: none    Hospital Course:  86 year old  male with past medical history of bladder tumor s/p surgical resection, CAD, HTN, HLD, macular degeneration, paroxysmal atrial fibrillation with past ablation who presented on 5/10/2020 with complaints of \"heart racing.\"  He was admitted related to atrial fibrillation with RVR and was evaluated by Dr. Forbes.  Patient required amiodarone drip for management and with drip, patient converted to normal sinus rhythm.  Heart rate decreased into the 50-60's and cardiology instructed to discontinue metoprolol and patient to remain on oral amiodarone for discharge.  Patient has been instructed on one week PCP follow up and one month cardiology follow up at discharge.  He will leave today in stable condition.     Condition on Discharge:  stable    Physical Exam on Discharge:  /68 (BP Location: Right arm, Patient Position: Lying)   Pulse 75   Temp 97.6 °F (36.4 °C)   Resp 18   Ht 180.3 cm (71\")   Wt 87.9 kg (193 lb 12.8 oz)   SpO2 94%   BMI 27.03 kg/m²   Physical Exam   Constitutional: He is oriented to person, place, and time. Vital signs are normal. He appears well-developed and well-nourished. He is cooperative. He does not have a sickly appearance. He does not " appear ill.   HENT:   Head: Normocephalic and atraumatic.   Right Ear: External ear normal.   Left Ear: External ear normal.   Nose: Nose normal.   Eyes: Conjunctivae are normal. Right eye exhibits no discharge. Left eye exhibits no discharge. No scleral icterus.   Neck: Normal range of motion. Neck supple. No JVD present.   Cardiovascular: Normal rate, regular rhythm, normal heart sounds and intact distal pulses. Exam reveals no gallop and no friction rub.   No murmur heard.  Normal sinus rhythm in 60's   Pulmonary/Chest: Effort normal and breath sounds normal. No stridor. No respiratory distress. He has no wheezes. He has no rales.   Abdominal: Soft. Bowel sounds are normal. He exhibits no distension. There is no tenderness.   Musculoskeletal: Normal range of motion. He exhibits no edema.   Neurological: He is alert and oriented to person, place, and time.   Skin: Skin is warm and dry.   Psychiatric: He has a normal mood and affect. His behavior is normal.   Nursing note and vitals reviewed.        Discharge Disposition:  Home or Self Care    Discharge Medications:     Discharge Medications      New Medications      Instructions Start Date   amiodarone 100 MG tablet  Commonly known as:  PACERONE   100 mg, Oral, Every 24 Hours Scheduled   Start Date:  May 13, 2020        Continue These Medications      Instructions Start Date   apixaban 5 MG tablet tablet  Commonly known as:  ELIQUIS  Notes to patient:  Take as directed   2.5 mg, Oral, 2 Times Daily, Last dose to be taken 5/5/18 prior to surgery       aspirin 81 MG tablet   81 mg, Oral, Nightly, Last dose 5/1/18      Claritin 10 MG tablet  Generic drug:  loratadine   10 mg, Oral, Daily      lisinopril-hydrochlorothiazide 20-12.5 MG per tablet  Commonly known as:  PRINZIDE,ZESTORETIC   1 tablet, Oral, Daily      MENS MULTIVITAMIN PLUS PO   1 tablet, Oral, Daily      metFORMIN 500 MG tablet  Commonly known as:  GLUCOPHAGE   500 mg, Oral, 2 Times Daily      terazosin  2 MG capsule  Commonly known as:  HYTRIN   2 mg, Oral, Nightly      timolol 0.5 % ophthalmic solution  Commonly known as:  TIMOPTIC   1 drop, Both Eyes, 2 Times Daily         Stop These Medications    metoprolol tartrate 25 MG tablet  Commonly known as:  LOPRESSOR            Discharge Diet:   Diet Instructions     Diet: Cardiac, Consistent Carbohydrate; Thin      Discharge Diet:   Cardiac  Consistent Carbohydrate       Fluid Consistency:  Thin          Activity at Discharge:   Activity Instructions     Activity as Tolerated            Discharge Care Plan/Instructions: Follow up with PCP within one week and with Dr. Forbes in one month.     Follow-up Appointments:   Additional Instructions for the Follow-ups that You Need to Schedule     Discharge Follow-up with PCP   As directed       Currently Documented PCP:    Michael Douglas MD    PCP Phone Number:    [phone]     Follow Up Details:  one week         Discharge Follow-up with Specified Provider: Leidy; 1 Month   As directed      To:  Leidy    Follow Up:  1 Month           Follow-up Information     Johnny Forbes MD Follow up on 6/12/2020.    Specialty:  Cardiology  Why:  Appointment scheduled for 8:15 a.m.  Contact information:  [address] [place] [address] [address]  [facility] [address] [place], [place] [id number] [phone]             Michael Douglas MD Follow up on 5/19/2020.    Specialty:  Internal Medicine  Why:  Appointment scheduled for 10:00 a.m.  Contact information:  [address] [id number] [phone]                   Test Results Pending at Discharge:           This document has been electronically signed by LISA Guzmán on May 12, 2020 11:00        Time: 35 minutes spent on assessment, discussion, management, and discharge planning for this patient.

## 2020-05-12 NOTE — PLAN OF CARE
Problem: Patient Care Overview  Goal: Plan of Care Review  Outcome: Ongoing (interventions implemented as appropriate)  Flowsheets  Taken 5/12/2020 3756  Progress: improving  Outcome Summary: Pt went back into sinus rhythm overnight. Amio gtt stopped overnight r/t to HR and SBP. VSS taken. Will continue to monitor.  Taken 5/11/2020 2000  Plan of Care Reviewed With: patient

## 2020-06-01 DIAGNOSIS — I48.92 ATRIAL FLUTTER, UNSPECIFIED TYPE (HCC): Primary | ICD-10-CM

## 2020-06-02 ENCOUNTER — DOCUMENTATION (OUTPATIENT)
Dept: CARDIOLOGY | Facility: CLINIC | Age: 85
End: 2020-06-02

## 2020-06-02 ENCOUNTER — OFFICE VISIT (OUTPATIENT)
Dept: CARDIOLOGY | Facility: CLINIC | Age: 85
End: 2020-06-02

## 2020-06-02 VITALS
SYSTOLIC BLOOD PRESSURE: 140 MMHG | BODY MASS INDEX: 27.61 KG/M2 | WEIGHT: 197.2 LBS | HEART RATE: 60 BPM | DIASTOLIC BLOOD PRESSURE: 80 MMHG | OXYGEN SATURATION: 98 % | HEIGHT: 71 IN

## 2020-06-02 DIAGNOSIS — I49.5 SICK SINUS SYNDROME (HCC): ICD-10-CM

## 2020-06-02 DIAGNOSIS — I25.10 CORONARY ARTERY DISEASE INVOLVING NATIVE CORONARY ARTERY OF NATIVE HEART WITHOUT ANGINA PECTORIS: ICD-10-CM

## 2020-06-02 DIAGNOSIS — I48.92 ATRIAL FLUTTER, UNSPECIFIED TYPE (HCC): Primary | ICD-10-CM

## 2020-06-02 DIAGNOSIS — I48.0 PAROXYSMAL ATRIAL FIBRILLATION (HCC): Primary | ICD-10-CM

## 2020-06-02 DIAGNOSIS — I48.0 PAROXYSMAL ATRIAL FIBRILLATION (HCC): ICD-10-CM

## 2020-06-02 DIAGNOSIS — E78.5 HYPERLIPIDEMIA, UNSPECIFIED HYPERLIPIDEMIA TYPE: ICD-10-CM

## 2020-06-02 DIAGNOSIS — I10 BENIGN ESSENTIAL HYPERTENSION: ICD-10-CM

## 2020-06-02 PROBLEM — I48.91 ATRIAL FIBRILLATION WITH RVR: Status: RESOLVED | Noted: 2020-05-10 | Resolved: 2020-06-02

## 2020-06-02 PROCEDURE — 99214 OFFICE O/P EST MOD 30 MIN: CPT | Performed by: INTERNAL MEDICINE

## 2020-06-02 PROCEDURE — 93000 ELECTROCARDIOGRAM COMPLETE: CPT | Performed by: INTERNAL MEDICINE

## 2020-06-02 RX ORDER — BUPIVACAINE HCL/0.9 % NACL/PF 0.1 %
2 PLASTIC BAG, INJECTION (ML) EPIDURAL ONCE
Status: CANCELLED | OUTPATIENT
Start: 2020-06-11 | End: 2020-06-02

## 2020-06-02 RX ORDER — SODIUM CHLORIDE 9 MG/ML
75 INJECTION, SOLUTION INTRAVENOUS CONTINUOUS
Status: CANCELLED | OUTPATIENT
Start: 2020-06-11

## 2020-06-02 NOTE — PROGRESS NOTES
Evgeny Carter Jr.  86 y.o. male    6/2/2020  1. Atrial flutter, unspecified type (CMS/HCC)    2. Coronary artery disease involving native coronary artery of native heart without angina pectoris    3. Benign essential hypertension    4. Hyperlipidemia, unspecified hyperlipidemia type    5. Paroxysmal atrial fibrillation (CMS/HCC)        History of Present Illness:    Body mass index is 27.52 kg/m². BMI is above normal parameters. Recommendations include: exercise counseling, nutrition counseling and referral to primary care.      86 years old patient admitted to [facility] for evaluation atrial fibrillation with a rapid ventricular rate with element of bradycardia tachycardia syndrome was adjusting his medication with amiodarone was decreased to 110s subsequently 50 mg now he had a problem with atrial fibrillation with variable heart rate and presented to discuss the options.  EKG sinus rhythm at 60 with underlying right bundle and left leftward axis.  No orthopnea or PND reported no fever cough or chills dysuria or hematuria reported.  On oral anticoagulation currently in sinus rhythm with a past medical history significant for hypertension, hypertensive heart disease, CAD status post PTCA stent, atrial flutter with a counterclockwise mechanism state  post-EP study and flutter ablation and noted to be in atrial fibrillation with a resting heart rate 110 bpm.  Patient started on oral anticoagulation and amiodarone. Last echoc reported mildly dilated aortic root and repeat echo similar aortic root diameter 4.0 in August 2018.  Post atrial flutter ablation patient developed A. fib initially managed with amiodarone 200 mg then subsequent decrease to 200 and then 50 mg and given the significant bradyarrhythmia amiodarone was discontinued  He denies orthopnea, PND, syncope or near syncopal episode.  Denies any polydipsia polyuria.  Denies any fever cough or chills.  No dysuria or hematuria  reported.     Echo 5/11/2020  · Left ventricular wall thickness is consistent with borderline concentric hypertrophy.  · Estimated EF = 61%.  · Left ventricular systolic function is normal.  · Left ventricular diastolic dysfunction (grade I a) consistent with impaired relaxation.  · Left atrial cavity size is mildly dilated.  · Mild mitral valve regurgitation is present    ECHO 8/15/18  ·  Mild tricuspid valve regurgitation is present.  · Mild pulmonic valve regurgitation is present.  · Left atrial cavity size is mild-to-moderately dilated.  · Left ventricular systolic function is normal. Estimated EF = 55%.  · Mild mitral valve regurgitation is present  Left ventricular diastolic dysfunction (grade I a) consistent with impaired relaxation     10/2016      Ref Range & Units 1yr ago      Total Cholesterol 0 - 199 mg/dl 169   Comments: CHOL DESIRED: < 200 MG/DL   Triglycerides 20 - 199 mg/dl 188   Comments: TRIG DESIRED: < 200 MG/DL   HDL Cholesterol 60 - 200 mg/dl 37 (L)   Comments: HDL AVERAGE RISK: 35 - 60 MG/DL   LDL Cholesterol  0 - 129 mg/dl 94       7/2019  Total Cholesterol  100 - 200 mg/dL 196    Triglycerides  30 - 150 mg/dL 199 Abnormally high     HDL Cholesterol  32 - 72 mg/dL 41    LDL Cholesterol   5 - 99 mg/dL 112 Abnormally high     Chol/HDL Ratio  4.2 - 5.6 4.8           Stress TEST ; 10/2016   Normal LV systolic function overall with an EF of 70% with inferior       wall hypokinesis.  2.   Moderate size predominantly fixed defect in the inferior wall and       apex with partial reversibility.     ECHO 10/2016  1.   Mild concentric left ventricular hypertrophy with estimated       ejection fraction 50% to 60%.  2.   Mildly dilated aortic root with diameter of 4.0.  3.   Mildly dilated left atrium with diameter of 4.4.  4.   Aortic valve appears mildly sclerotic without any hemodynamic       significant aortic stenosis. No aortic regurgitation seen.  5.   Pulmonic valve not well visualized. There is  trace pulmonic       regurgitation. There is a trace mitral regurgitation. No       intracardiac mass, pericardial effusion, cardiac thrombus seen.  6.   Normal right ventricular size and function          SUBJECTIVE:    No Known Allergies      Past Medical History:   Diagnosis Date   • Allergic rhinitis    • Arthritis     hips, knees, feet   • Artificial lens present     in position   • Benign neoplasm of skin of eyelid    • Bladder tumor    • Bladder tumor    • BPH (benign prostatic hyperplasia)    • Cancer (CMS/HCC)     SKIN    • Cataract    • Coronary arteriosclerosis    • Cortical senile cataract    • Cough    • Essential hypertension    • Essential hypertension    • Hypercholesterolemia    • Hypercholesterolemia    • Kidney stones    • MI, old 1988   • Nonexudative age-related macular degeneration     mild   • Nuclear cataract    • Open-angle glaucoma     s/p trabec OS, doing well      • Primary open angle glaucoma     OS, s/p trabeculectomy; IOP down with timolol OD     • Pseudophakia of right eye    • Rhinitis    • Upper respiratory infection    • Vitreous detachment     peripheral         Past Surgical History:   Procedure Laterality Date   • ANKLE SURGERY  07/29/2000    Ankle surgery (ORIF right ankle fracture. Bimalleolar right fracture)   • APPENDECTOMY     • CARDIAC ABLATION     • CARDIAC CATHETERIZATION  07/02/1987    Cardiac cath 17446 (Coronary arthosclerotic heart disease. maintained patency of RCA. Mild inferior hypokinesis.)   • CARDIAC CATHETERIZATION  06/30/1987    Cardiac cath 78778 (Coronary atherosclerotic heart disease. Occluded RCA. Acute inferior infarction. S/P successful angioplasty of RCA)   • CATARACT EXTRACTION  04/01/2010    Remove cataract, insert lens (Complicated cataract extraction with intraocular lens implantation, right eye, Marcelo SN-60  serial # [id number]: 20.5 diopter)   • CATARACT EXTRACTION W/ INTRAOCULAR LENS IMPLANT Left 3/23/2018    Procedure: CATARACT PHACO  EXTRACTION WITH INTRAOCULAR LENS IMPLANT;  Surgeon: Oli Haley MD;  Location: [facility] OR;  Service: Ophthalmology   • COLONOSCOPY N/A 5/9/2019    Procedure: COLONOSCOPY;  Surgeon: Rainer Gates DO;  Location: [facility] ENDOSCOPY;  Service: Gastroenterology   • ENDOSCOPY  02/19/1990    EGD 51377 (Olympus video endoscope.Fibrin-like patch in stomach)   • EYE SURGERY  02/02/2005    Eye surgery procedure (Repair of operative wound leak, left eye. S/P trabeculectomy with operative wound leak)   • EYE SURGERY  01/19/2005    Eye surgery procedure (Trabeculectomy, left eye. 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 degeneration)    • OTHER SURGICAL HISTORY  02/26/2015    EXTENDED VISUAL FIELDS STUDY 52092 (Primary open angle glaucoma)    • OTHER SURGICAL HISTORY  02/26/2015    EXTENDED VISUAL FIELDS STUDY 48214 (Primary open angle glaucoma)    • OTHER SURGICAL HISTORY  06/16/2016    OCT DISC NFL 03219 (Open-angle glaucoma)    • OTHER SURGICAL HISTORY      OCT DISC NFL 33035 (Primary open angle glaucoma) (3): 06/17/2015, 03/19/2014, 03/04/2013   • OTHER SURGICAL HISTORY  06/16/2016    OPTIC NERVE HEAD EVAL PERFORMED 2027F (Open-angle glaucoma)    • TRANSURETHRAL RESECTION OF BLADDER TUMOR N/A 5/9/2018    Procedure: CYSTOSCOPY TRANSURETHRAL RESECTION OF BLADDER TUMOR;  Surgeon: Donovan Conner MD;  Location: [facility] OR;  Service: Urology   • TRANSURETHRAL RESECTION OF BLADDER TUMOR N/A 7/26/2019    Procedure: CYSTOSCOPY TRANSURETHRAL RESECTION OF BLADDER TUMOR;  Surgeon: Donovan Conner MD;  Location: [facility] OR;  Service: Urology   • UVULECTOMY  1969    EXCISION OF UVULA 39681 (partial uvulectomy with excision of papilloma. Redunant uvula with a small papilloma)         Family History   Problem Relation Age  of Onset   • No Known Problems Mother    • No Known Problems Father          Social History     Socioeconomic History   • Marital status:      Spouse name: Not on file   • Number of children: Not on file   • Years of education: Not on file   • Highest education level: Not on file   Tobacco Use   • Smoking status: Former Smoker     Last attempt to quit:      Years since quittin.4   • Smokeless tobacco: Current User     Types: Snuff   Substance and Sexual Activity   • Alcohol use: No   • Drug use: No   • Sexual activity: Defer         Current Outpatient Medications   Medication Sig Dispense Refill   • amiodarone (PACERONE) 100 MG tablet Take 1 tablet by mouth Daily. 30 tablet 0   • apixaban (ELIQUIS) 5 MG tablet tablet Take 2.5 mg by mouth 2 (Two) Times a Day. Last dose to be taken 18 prior to surgery     • aspirin 81 MG tablet Take 81 mg by mouth Every Night. Last dose 18     • lisinopril-hydrochlorothiazide (PRINZIDE,ZESTORETIC) 20-12.5 MG per tablet Take 1 tablet by mouth Daily.     • loratadine (CLARITIN) 10 MG tablet Take 10 mg by mouth Daily.     • metFORMIN (GLUCOPHAGE) 500 MG tablet Take 500 mg by mouth 2 (Two) Times a Day.     • Multiple Vitamins-Minerals (MENS MULTIVITAMIN PLUS PO) Take 1 tablet by mouth Daily.     • terazosin (HYTRIN) 2 MG capsule Take 2 mg by mouth Every Night.     • timolol (TIMOPTIC) 0.5 % ophthalmic solution Administer 1 drop to both eyes 2 (Two) Times a Day.       No current facility-administered medications for this visit.            Review of Systems:     Constitutional:  Denies recent weight loss, weight gain, fever or chills, no change in exercise tolerance.     HENT:  Denies any hearing loss, epistaxis, hoarseness, or difficulty speaking.     Eyes: No blurring    Respiratory:  Denies dyspnea with exertion,no cough, wheezing, or hemoptysis.     Cardiovascular: See H&P    Gastrointestinal:  Denies change in bowel habits, dyspepsia, ulcer disease,  "hematochezia, or melena.     Endocrine: Negative for cold intolerance, heat intolerance, polydipsia, polyphagia and polyuria. Denies any history of weight change, polydipsia, polyuria.     Genitourinary: Negative.      Musculoskeletal: Denies any history of arthritic symptoms or back problems.     Skin:  Denies any change in hair or nails, rashes, or skin lesions.     Allergic/Immunologic: Negative.  Negative for environmental allergies, food allergies and immunocompromised state.     Neurological:  Denies any history of recurrent headaches, strokes, TIA, or seizure disorder.     Hematological: Denies any food allergies, seasonal allergies, bleeding disorders, or lymphadenopathy.     Psychiatric/Behavioral: Denies any history of depression, substance abuse, or change in cognitive function.       OBJECTIVE:    /80 (BP Location: Left arm, Patient Position: Sitting, Cuff Size: Adult)   Pulse 60   Ht 180.3 cm (70.98\")   Wt 89.4 kg (197 lb 3.2 oz)   SpO2 98%   BMI 27.52 kg/m²       Physical Exam:     Constitutional: Cooperative, alert and oriented, well-developed, well-nourished, in no acute distress.     HENT:   Head: Normocephalic, normal hair patterns, no masses or tenderness.  Ears, Nose, and Throat: No gross abnormalities. No pallor or cyanosis. Dentition good.   Eyes: EOMS intact, PERRL, conjunctivae and lids unremarkable. Fundoscopic exam and visual fields not performed.   Neck: No palpable masses or adenopathy, no thyromegaly, no JVD, carotid pulses are full and equal bilaterally and without  Bruits.     Cardiovascular: Regular rhythm, S1 and S2 normal, no S3 or S4. Apical impulse not displaced. No murmurs, gallops, or rubs detected.     Pulmonary/Chest: Chest: normal symmetry, no tenderness to palpation, normal respiratory excursion, no intercostal retraction, no use of accessory muscles. Pulmonary: Normal breath sounds. No rales or rhonchi.    Abdominal: Abdomen soft, bowel sounds normoactive, no " masses, no hepatosplenomegaly, non-tender, no bruits.     Musculoskeletal: No deformities, clubbing, cyanosis, erythema, or edema observed. There are no spinal abnormalities noted. Normal muscle strength and tone. Pulses full and equal in all extremities, no bruits auscultated.     Neurological: No gross motor or sensory deficits noted, affect appropriate, oriented to time, person, place.     Skin: Warm and dry to the touch, no apparent skin lesions or masses noted.     Psychiatric: He has a normal mood and affect. His behavior is normal. Judgment and thought content normal.         Procedures      Lab Results   Component Value Date    WBC 6.37 05/12/2020    HGB 11.2 (L) 05/12/2020    HCT 32.0 (L) 05/12/2020    MCV 96.7 05/12/2020     05/12/2020     Lab Results   Component Value Date    GLUCOSE 123 (H) 05/12/2020    BUN 28 (H) 05/12/2020    CREATININE 1.37 (H) 05/12/2020    EGFRIFNONA 49 (L) 05/12/2020    BCR 20.4 05/12/2020    CO2 26.0 05/12/2020    CALCIUM 9.0 05/12/2020    ALBUMIN 4.00 05/10/2020    AST 18 05/10/2020    ALT 17 05/10/2020     Lab Results   Component Value Date    CHOL 196 07/09/2019    CHOL 190 03/30/2018     Lab Results   Component Value Date    TRIG 199 (H) 07/09/2019    TRIG 198 03/30/2018    TRIG 188 10/20/2016     Lab Results   Component Value Date    HDL 41 07/09/2019    HDL 42 03/30/2018    HDL 37 (L) 10/20/2016     No components found for: LDLCALC  Lab Results   Component Value Date     (H) 07/09/2019     (H) 03/30/2018    LDL 94 10/20/2016     No results found for: HDLLDLRATIO  No components found for: CHOLHDL  Lab Results   Component Value Date    HGBA1C 6.1 07/09/2019     Lab Results   Component Value Date    TSH 1.850 05/10/2020           ASSESSMENT AND PLAN:    #1 paroxysmal atrial fibrillation currently in sinus rhythm on oral anticoagulation to decrease the cardiac embolization   #2 asymptomatic sinus bradycardia with a heart rate 48 bpm requiring  discontinuation's of amiodarone   #3 history of atrial flutter status post EP study and flutter ablation   #4 hypertension good blood pressure control on lisinopril hydrochlorothiazide  #4 CAD stable on baby aspirin will continue     86 years old patient admitted to [facility] for evaluation of palpitation noted in atrial fibrillation with a background of sick sinus syndrome significant bradyarrhythmia requiring discontinuation of AV niki blocking drug and testing of amiodarone.  Patient subsequent discharge and presented as an outpatient content having off-and-on atrial fibrillation's.  I have discussed with the patient given the element of sick sinus syndrome since significant bradyarrhythmia to consider a hybrid approach with a pacemaker for management of bradyarrhythmia and adjustment are full dose of amiodarone along with AV niki blocking drug.  Pros and cons of this option discussed with patient.  Understand willing to proceed forward.  Procedure risk explained.  Risk included but not limited to infection, bleeding, stroke, pneumothorax, hematoma, lead dislodgment.  He is understand willing to proceed forward.  Significant bradyarrhythmia requiring discontinuation.  Patient presented for routine follow-up and will continue Eliquis to decrease the cardiac embolization, lisinopril hydrochlorothiazide for management of hypertension with hypertensive heart disease with good blood pressure control, diabetes managed with metformin.  Patient had problem with a statin previously requiring discontinuation he preferred to take fish oil  Evgeny was seen today for follow-up.    Diagnoses and all orders for this visit:    Atrial flutter, unspecified type (CMS/HCC)    Coronary artery disease involving native coronary artery of native heart without angina pectoris    Benign essential hypertension    Hyperlipidemia, unspecified hyperlipidemia type    Paroxysmal atrial fibrillation (CMS/HCC)        Johnny  MD Leidy  6/2/2020  10:11

## 2020-06-02 NOTE — H&P (VIEW-ONLY)
Evgeny Carter Jr.  86 y.o. male    6/2/2020  1. Atrial flutter, unspecified type (CMS/HCC)    2. Coronary artery disease involving native coronary artery of native heart without angina pectoris    3. Benign essential hypertension    4. Hyperlipidemia, unspecified hyperlipidemia type    5. Paroxysmal atrial fibrillation (CMS/HCC)        History of Present Illness:    Body mass index is 27.52 kg/m². BMI is above normal parameters. Recommendations include: exercise counseling, nutrition counseling and referral to primary care.      86 years old patient admitted to [facility] for evaluation atrial fibrillation with a rapid ventricular rate with element of bradycardia tachycardia syndrome was adjusting his medication with amiodarone was decreased to 110s subsequently 50 mg now he had a problem with atrial fibrillation with variable heart rate and presented to discuss the options.  EKG sinus rhythm at 60 with underlying right bundle and left leftward axis.  No orthopnea or PND reported no fever cough or chills dysuria or hematuria reported.  On oral anticoagulation currently in sinus rhythm with a past medical history significant for hypertension, hypertensive heart disease, CAD status post PTCA stent, atrial flutter with a counterclockwise mechanism state  post-EP study and flutter ablation and noted to be in atrial fibrillation with a resting heart rate 110 bpm.  Patient started on oral anticoagulation and amiodarone. Last echoc reported mildly dilated aortic root and repeat echo similar aortic root diameter 4.0 in August 2018.  Post atrial flutter ablation patient developed A. fib initially managed with amiodarone 200 mg then subsequent decrease to 200 and then 50 mg and given the significant bradyarrhythmia amiodarone was discontinued  He denies orthopnea, PND, syncope or near syncopal episode.  Denies any polydipsia polyuria.  Denies any fever cough or chills.  No dysuria or hematuria  reported.     Echo 5/11/2020  · Left ventricular wall thickness is consistent with borderline concentric hypertrophy.  · Estimated EF = 61%.  · Left ventricular systolic function is normal.  · Left ventricular diastolic dysfunction (grade I a) consistent with impaired relaxation.  · Left atrial cavity size is mildly dilated.  · Mild mitral valve regurgitation is present    ECHO 8/15/18  ·  Mild tricuspid valve regurgitation is present.  · Mild pulmonic valve regurgitation is present.  · Left atrial cavity size is mild-to-moderately dilated.  · Left ventricular systolic function is normal. Estimated EF = 55%.  · Mild mitral valve regurgitation is present  Left ventricular diastolic dysfunction (grade I a) consistent with impaired relaxation     10/2016      Ref Range & Units 1yr ago      Total Cholesterol 0 - 199 mg/dl 169   Comments: CHOL DESIRED: < 200 MG/DL   Triglycerides 20 - 199 mg/dl 188   Comments: TRIG DESIRED: < 200 MG/DL   HDL Cholesterol 60 - 200 mg/dl 37 (L)   Comments: HDL AVERAGE RISK: 35 - 60 MG/DL   LDL Cholesterol  0 - 129 mg/dl 94       7/2019  Total Cholesterol  100 - 200 mg/dL 196    Triglycerides  30 - 150 mg/dL 199 Abnormally high     HDL Cholesterol  32 - 72 mg/dL 41    LDL Cholesterol   5 - 99 mg/dL 112 Abnormally high     Chol/HDL Ratio  4.2 - 5.6 4.8           Stress TEST ; 10/2016   Normal LV systolic function overall with an EF of 70% with inferior       wall hypokinesis.  2.   Moderate size predominantly fixed defect in the inferior wall and       apex with partial reversibility.     ECHO 10/2016  1.   Mild concentric left ventricular hypertrophy with estimated       ejection fraction 50% to 60%.  2.   Mildly dilated aortic root with diameter of 4.0.  3.   Mildly dilated left atrium with diameter of 4.4.  4.   Aortic valve appears mildly sclerotic without any hemodynamic       significant aortic stenosis. No aortic regurgitation seen.  5.   Pulmonic valve not well visualized. There is  trace pulmonic       regurgitation. There is a trace mitral regurgitation. No       intracardiac mass, pericardial effusion, cardiac thrombus seen.  6.   Normal right ventricular size and function          SUBJECTIVE:    No Known Allergies      Past Medical History:   Diagnosis Date   • Allergic rhinitis    • Arthritis     hips, knees, feet   • Artificial lens present     in position   • Benign neoplasm of skin of eyelid    • Bladder tumor    • Bladder tumor    • BPH (benign prostatic hyperplasia)    • Cancer (CMS/HCC)     SKIN    • Cataract    • Coronary arteriosclerosis    • Cortical senile cataract    • Cough    • Essential hypertension    • Essential hypertension    • Hypercholesterolemia    • Hypercholesterolemia    • Kidney stones    • MI, old 1988   • Nonexudative age-related macular degeneration     mild   • Nuclear cataract    • Open-angle glaucoma     s/p trabec OS, doing well      • Primary open angle glaucoma     OS, s/p trabeculectomy; IOP down with timolol OD     • Pseudophakia of right eye    • Rhinitis    • Upper respiratory infection    • Vitreous detachment     peripheral         Past Surgical History:   Procedure Laterality Date   • ANKLE SURGERY  07/29/2000    Ankle surgery (ORIF right ankle fracture. Bimalleolar right fracture)   • APPENDECTOMY     • CARDIAC ABLATION     • CARDIAC CATHETERIZATION  07/02/1987    Cardiac cath 88167 (Coronary arthosclerotic heart disease. maintained patency of RCA. Mild inferior hypokinesis.)   • CARDIAC CATHETERIZATION  06/30/1987    Cardiac cath 13787 (Coronary atherosclerotic heart disease. Occluded RCA. Acute inferior infarction. S/P successful angioplasty of RCA)   • CATARACT EXTRACTION  04/01/2010    Remove cataract, insert lens (Complicated cataract extraction with intraocular lens implantation, right eye, Marcelo SN-60  serial # [id number]: 20.5 diopter)   • CATARACT EXTRACTION W/ INTRAOCULAR LENS IMPLANT Left 3/23/2018    Procedure: CATARACT PHACO  EXTRACTION WITH INTRAOCULAR LENS IMPLANT;  Surgeon: Oli Haley MD;  Location: [facility] OR;  Service: Ophthalmology   • COLONOSCOPY N/A 5/9/2019    Procedure: COLONOSCOPY;  Surgeon: Rainer Gates DO;  Location: [facility] ENDOSCOPY;  Service: Gastroenterology   • ENDOSCOPY  02/19/1990    EGD 06555 (Olympus video endoscope.Fibrin-like patch in stomach)   • EYE SURGERY  02/02/2005    Eye surgery procedure (Repair of operative wound leak, left eye. S/P trabeculectomy with operative wound leak)   • EYE SURGERY  01/19/2005    Eye surgery procedure (Trabeculectomy, left eye. 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 degeneration)    • OTHER SURGICAL HISTORY  02/26/2015    EXTENDED VISUAL FIELDS STUDY 67379 (Primary open angle glaucoma)    • OTHER SURGICAL HISTORY  02/26/2015    EXTENDED VISUAL FIELDS STUDY 33149 (Primary open angle glaucoma)    • OTHER SURGICAL HISTORY  06/16/2016    OCT DISC NFL 38343 (Open-angle glaucoma)    • OTHER SURGICAL HISTORY      OCT DISC NFL 56465 (Primary open angle glaucoma) (3): 06/17/2015, 03/19/2014, 03/04/2013   • OTHER SURGICAL HISTORY  06/16/2016    OPTIC NERVE HEAD EVAL PERFORMED 2027F (Open-angle glaucoma)    • TRANSURETHRAL RESECTION OF BLADDER TUMOR N/A 5/9/2018    Procedure: CYSTOSCOPY TRANSURETHRAL RESECTION OF BLADDER TUMOR;  Surgeon: Donovan Conner MD;  Location: [facility] OR;  Service: Urology   • TRANSURETHRAL RESECTION OF BLADDER TUMOR N/A 7/26/2019    Procedure: CYSTOSCOPY TRANSURETHRAL RESECTION OF BLADDER TUMOR;  Surgeon: Donovan Conner MD;  Location: [facility] OR;  Service: Urology   • UVULECTOMY  1969    EXCISION OF UVULA 17698 (partial uvulectomy with excision of papilloma. Redunant uvula with a small papilloma)         Family History   Problem Relation Age  of Onset   • No Known Problems Mother    • No Known Problems Father          Social History     Socioeconomic History   • Marital status:      Spouse name: Not on file   • Number of children: Not on file   • Years of education: Not on file   • Highest education level: Not on file   Tobacco Use   • Smoking status: Former Smoker     Last attempt to quit:      Years since quittin.4   • Smokeless tobacco: Current User     Types: Snuff   Substance and Sexual Activity   • Alcohol use: No   • Drug use: No   • Sexual activity: Defer         Current Outpatient Medications   Medication Sig Dispense Refill   • amiodarone (PACERONE) 100 MG tablet Take 1 tablet by mouth Daily. 30 tablet 0   • apixaban (ELIQUIS) 5 MG tablet tablet Take 2.5 mg by mouth 2 (Two) Times a Day. Last dose to be taken 18 prior to surgery     • aspirin 81 MG tablet Take 81 mg by mouth Every Night. Last dose 18     • lisinopril-hydrochlorothiazide (PRINZIDE,ZESTORETIC) 20-12.5 MG per tablet Take 1 tablet by mouth Daily.     • loratadine (CLARITIN) 10 MG tablet Take 10 mg by mouth Daily.     • metFORMIN (GLUCOPHAGE) 500 MG tablet Take 500 mg by mouth 2 (Two) Times a Day.     • Multiple Vitamins-Minerals (MENS MULTIVITAMIN PLUS PO) Take 1 tablet by mouth Daily.     • terazosin (HYTRIN) 2 MG capsule Take 2 mg by mouth Every Night.     • timolol (TIMOPTIC) 0.5 % ophthalmic solution Administer 1 drop to both eyes 2 (Two) Times a Day.       No current facility-administered medications for this visit.            Review of Systems:     Constitutional:  Denies recent weight loss, weight gain, fever or chills, no change in exercise tolerance.     HENT:  Denies any hearing loss, epistaxis, hoarseness, or difficulty speaking.     Eyes: No blurring    Respiratory:  Denies dyspnea with exertion,no cough, wheezing, or hemoptysis.     Cardiovascular: See H&P    Gastrointestinal:  Denies change in bowel habits, dyspepsia, ulcer disease,  "hematochezia, or melena.     Endocrine: Negative for cold intolerance, heat intolerance, polydipsia, polyphagia and polyuria. Denies any history of weight change, polydipsia, polyuria.     Genitourinary: Negative.      Musculoskeletal: Denies any history of arthritic symptoms or back problems.     Skin:  Denies any change in hair or nails, rashes, or skin lesions.     Allergic/Immunologic: Negative.  Negative for environmental allergies, food allergies and immunocompromised state.     Neurological:  Denies any history of recurrent headaches, strokes, TIA, or seizure disorder.     Hematological: Denies any food allergies, seasonal allergies, bleeding disorders, or lymphadenopathy.     Psychiatric/Behavioral: Denies any history of depression, substance abuse, or change in cognitive function.       OBJECTIVE:    /80 (BP Location: Left arm, Patient Position: Sitting, Cuff Size: Adult)   Pulse 60   Ht 180.3 cm (70.98\")   Wt 89.4 kg (197 lb 3.2 oz)   SpO2 98%   BMI 27.52 kg/m²       Physical Exam:     Constitutional: Cooperative, alert and oriented, well-developed, well-nourished, in no acute distress.     HENT:   Head: Normocephalic, normal hair patterns, no masses or tenderness.  Ears, Nose, and Throat: No gross abnormalities. No pallor or cyanosis. Dentition good.   Eyes: EOMS intact, PERRL, conjunctivae and lids unremarkable. Fundoscopic exam and visual fields not performed.   Neck: No palpable masses or adenopathy, no thyromegaly, no JVD, carotid pulses are full and equal bilaterally and without  Bruits.     Cardiovascular: Regular rhythm, S1 and S2 normal, no S3 or S4. Apical impulse not displaced. No murmurs, gallops, or rubs detected.     Pulmonary/Chest: Chest: normal symmetry, no tenderness to palpation, normal respiratory excursion, no intercostal retraction, no use of accessory muscles. Pulmonary: Normal breath sounds. No rales or rhonchi.    Abdominal: Abdomen soft, bowel sounds normoactive, no " masses, no hepatosplenomegaly, non-tender, no bruits.     Musculoskeletal: No deformities, clubbing, cyanosis, erythema, or edema observed. There are no spinal abnormalities noted. Normal muscle strength and tone. Pulses full and equal in all extremities, no bruits auscultated.     Neurological: No gross motor or sensory deficits noted, affect appropriate, oriented to time, person, place.     Skin: Warm and dry to the touch, no apparent skin lesions or masses noted.     Psychiatric: He has a normal mood and affect. His behavior is normal. Judgment and thought content normal.         Procedures      Lab Results   Component Value Date    WBC 6.37 05/12/2020    HGB 11.2 (L) 05/12/2020    HCT 32.0 (L) 05/12/2020    MCV 96.7 05/12/2020     05/12/2020     Lab Results   Component Value Date    GLUCOSE 123 (H) 05/12/2020    BUN 28 (H) 05/12/2020    CREATININE 1.37 (H) 05/12/2020    EGFRIFNONA 49 (L) 05/12/2020    BCR 20.4 05/12/2020    CO2 26.0 05/12/2020    CALCIUM 9.0 05/12/2020    ALBUMIN 4.00 05/10/2020    AST 18 05/10/2020    ALT 17 05/10/2020     Lab Results   Component Value Date    CHOL 196 07/09/2019    CHOL 190 03/30/2018     Lab Results   Component Value Date    TRIG 199 (H) 07/09/2019    TRIG 198 03/30/2018    TRIG 188 10/20/2016     Lab Results   Component Value Date    HDL 41 07/09/2019    HDL 42 03/30/2018    HDL 37 (L) 10/20/2016     No components found for: LDLCALC  Lab Results   Component Value Date     (H) 07/09/2019     (H) 03/30/2018    LDL 94 10/20/2016     No results found for: HDLLDLRATIO  No components found for: CHOLHDL  Lab Results   Component Value Date    HGBA1C 6.1 07/09/2019     Lab Results   Component Value Date    TSH 1.850 05/10/2020           ASSESSMENT AND PLAN:    #1 paroxysmal atrial fibrillation currently in sinus rhythm on oral anticoagulation to decrease the cardiac embolization   #2 asymptomatic sinus bradycardia with a heart rate 48 bpm requiring  discontinuation's of amiodarone   #3 history of atrial flutter status post EP study and flutter ablation   #4 hypertension good blood pressure control on lisinopril hydrochlorothiazide  #4 CAD stable on baby aspirin will continue     86 years old patient admitted to [facility] for evaluation of palpitation noted in atrial fibrillation with a background of sick sinus syndrome significant bradyarrhythmia requiring discontinuation of AV niki blocking drug and testing of amiodarone.  Patient subsequent discharge and presented as an outpatient content having off-and-on atrial fibrillation's.  I have discussed with the patient given the element of sick sinus syndrome since significant bradyarrhythmia to consider a hybrid approach with a pacemaker for management of bradyarrhythmia and adjustment are full dose of amiodarone along with AV niki blocking drug.  Pros and cons of this option discussed with patient.  Understand willing to proceed forward.  Procedure risk explained.  Risk included but not limited to infection, bleeding, stroke, pneumothorax, hematoma, lead dislodgment.  He is understand willing to proceed forward.  Significant bradyarrhythmia requiring discontinuation.  Patient presented for routine follow-up and will continue Eliquis to decrease the cardiac embolization, lisinopril hydrochlorothiazide for management of hypertension with hypertensive heart disease with good blood pressure control, diabetes managed with metformin.  Patient had problem with a statin previously requiring discontinuation he preferred to take fish oil  Evgeny was seen today for follow-up.    Diagnoses and all orders for this visit:    Atrial flutter, unspecified type (CMS/HCC)    Coronary artery disease involving native coronary artery of native heart without angina pectoris    Benign essential hypertension    Hyperlipidemia, unspecified hyperlipidemia type    Paroxysmal atrial fibrillation (CMS/HCC)        Johnny  MD Leidy  6/2/2020  10:11

## 2020-06-02 NOTE — PROGRESS NOTES
Pacemaker insertion scheduled. Date and instructions given to patient's wife.No eliquis the day before or day of procedure. Covid preop test date, time, and instructions also given to patient. Patient verbalized understanding.

## 2020-06-08 PROCEDURE — U0003 INFECTIOUS AGENT DETECTION BY NUCLEIC ACID (DNA OR RNA); SEVERE ACUTE RESPIRATORY SYNDROME CORONAVIRUS 2 (SARS-COV-2) (CORONAVIRUS DISEASE [COVID-19]), AMPLIFIED PROBE TECHNIQUE, MAKING USE OF HIGH THROUGHPUT TECHNOLOGIES AS DESCRIBED BY CMS-2020-01-R: HCPCS | Performed by: INTERNAL MEDICINE

## 2020-06-09 LAB
COVID LABCORP PRIORITY: NORMAL
SARS-COV-2 RNA RESP QL NAA+PROBE: NOT DETECTED

## 2020-06-09 RX ORDER — AMIODARONE HYDROCHLORIDE 100 MG/1
100 TABLET ORAL
Qty: 30 TABLET | Refills: 6 | Status: ON HOLD | OUTPATIENT
Start: 2020-06-09 | End: 2020-06-12 | Stop reason: SDUPTHER

## 2020-06-11 ENCOUNTER — HOSPITAL ENCOUNTER (OUTPATIENT)
Facility: HOSPITAL | Age: 85
Discharge: HOME | End: 2020-06-12
Attending: INTERNAL MEDICINE | Admitting: INTERNAL MEDICINE

## 2020-06-11 ENCOUNTER — APPOINTMENT (OUTPATIENT)
Dept: GENERAL RADIOLOGY | Facility: HOSPITAL | Age: 85
End: 2020-06-11

## 2020-06-11 DIAGNOSIS — I49.5 SICK SINUS SYNDROME (HCC): ICD-10-CM

## 2020-06-11 DIAGNOSIS — I48.0 PAROXYSMAL ATRIAL FIBRILLATION (HCC): ICD-10-CM

## 2020-06-11 LAB
ANION GAP SERPL CALCULATED.3IONS-SCNC: 12 MMOL/L (ref 5–15)
BASOPHILS # BLD AUTO: 0.03 10*3/MM3 (ref 0–0.2)
BASOPHILS NFR BLD AUTO: 0.5 % (ref 0–1.5)
BUN BLD-MCNC: 17 MG/DL (ref 8–23)
BUN/CREAT SERPL: 14.7 (ref 7–25)
CALCIUM SPEC-SCNC: 8.8 MG/DL (ref 8.6–10.5)
CHLORIDE SERPL-SCNC: 104 MMOL/L (ref 98–107)
CO2 SERPL-SCNC: 24 MMOL/L (ref 22–29)
CREAT BLD-MCNC: 1.16 MG/DL (ref 0.76–1.27)
DEPRECATED RDW RBC AUTO: 44.3 FL (ref 37–54)
EOSINOPHIL # BLD AUTO: 0.17 10*3/MM3 (ref 0–0.4)
EOSINOPHIL NFR BLD AUTO: 2.8 % (ref 0.3–6.2)
ERYTHROCYTE [DISTWIDTH] IN BLOOD BY AUTOMATED COUNT: 12.5 % (ref 12.3–15.4)
GFR SERPL CREATININE-BSD FRML MDRD: 60 ML/MIN/1.73
GLUCOSE BLD-MCNC: 117 MG/DL (ref 65–99)
HCT VFR BLD AUTO: 31.8 % (ref 37.5–51)
HGB BLD-MCNC: 11 G/DL (ref 13–17.7)
IMM GRANULOCYTES # BLD AUTO: 0.04 10*3/MM3 (ref 0–0.05)
IMM GRANULOCYTES NFR BLD AUTO: 0.7 % (ref 0–0.5)
INR PPP: 1.07 (ref 0.8–1.2)
LYMPHOCYTES # BLD AUTO: 1.58 10*3/MM3 (ref 0.7–3.1)
LYMPHOCYTES NFR BLD AUTO: 25.8 % (ref 19.6–45.3)
MCH RBC QN AUTO: 33.6 PG (ref 26.6–33)
MCHC RBC AUTO-ENTMCNC: 34.6 G/DL (ref 31.5–35.7)
MCV RBC AUTO: 97.2 FL (ref 79–97)
MONOCYTES # BLD AUTO: 0.67 10*3/MM3 (ref 0.1–0.9)
MONOCYTES NFR BLD AUTO: 10.9 % (ref 5–12)
NEUTROPHILS # BLD AUTO: 3.63 10*3/MM3 (ref 1.7–7)
NEUTROPHILS NFR BLD AUTO: 59.3 % (ref 42.7–76)
NRBC BLD AUTO-RTO: 0 /100 WBC (ref 0–0.2)
PLATELET # BLD AUTO: 172 10*3/MM3 (ref 140–450)
PMV BLD AUTO: 9.7 FL (ref 6–12)
POTASSIUM BLD-SCNC: 3.7 MMOL/L (ref 3.5–5.2)
PROTHROMBIN TIME: 13.7 SECONDS (ref 11.1–15.3)
RBC # BLD AUTO: 3.27 10*6/MM3 (ref 4.14–5.8)
SODIUM BLD-SCNC: 140 MMOL/L (ref 136–145)
WBC NRBC COR # BLD: 6.12 10*3/MM3 (ref 3.4–10.8)

## 2020-06-11 PROCEDURE — C1898 LEAD, PMKR, OTHER THAN TRANS: HCPCS | Performed by: INTERNAL MEDICINE

## 2020-06-11 PROCEDURE — C1785 PMKR, DUAL, RATE-RESP: HCPCS | Performed by: INTERNAL MEDICINE

## 2020-06-11 PROCEDURE — 63710000001 TERAZOSIN 2 MG CAPSULE: Performed by: INTERNAL MEDICINE

## 2020-06-11 PROCEDURE — A9270 NON-COVERED ITEM OR SERVICE: HCPCS | Performed by: INTERNAL MEDICINE

## 2020-06-11 PROCEDURE — C1894 INTRO/SHEATH, NON-LASER: HCPCS | Performed by: INTERNAL MEDICINE

## 2020-06-11 PROCEDURE — 33208 INSRT HEART PM ATRIAL & VENT: CPT | Performed by: INTERNAL MEDICINE

## 2020-06-11 PROCEDURE — 63710000001 DILTIAZEM CD 180 MG CAPSULE SUSTAINED-RELEASE 24 HR: Performed by: INTERNAL MEDICINE

## 2020-06-11 PROCEDURE — 25010000002 MIDAZOLAM PER 1 MG: Performed by: INTERNAL MEDICINE

## 2020-06-11 PROCEDURE — 25010000002 GENTAMICIN PER 80 MG: Performed by: INTERNAL MEDICINE

## 2020-06-11 PROCEDURE — 80048 BASIC METABOLIC PNL TOTAL CA: CPT | Performed by: INTERNAL MEDICINE

## 2020-06-11 PROCEDURE — 0 IOPAMIDOL PER 1 ML: Performed by: INTERNAL MEDICINE

## 2020-06-11 PROCEDURE — 63710000001 HYDROCODONE-ACETAMINOPHEN 5-325 MG TABLET: Performed by: INTERNAL MEDICINE

## 2020-06-11 PROCEDURE — 82962 GLUCOSE BLOOD TEST: CPT

## 2020-06-11 PROCEDURE — 25010000002 FENTANYL CITRATE (PF) 100 MCG/2ML SOLUTION: Performed by: INTERNAL MEDICINE

## 2020-06-11 PROCEDURE — 63710000001 ASPIRIN 81 MG TABLET DELAYED-RELEASE: Performed by: INTERNAL MEDICINE

## 2020-06-11 PROCEDURE — 85610 PROTHROMBIN TIME: CPT | Performed by: INTERNAL MEDICINE

## 2020-06-11 PROCEDURE — 85025 COMPLETE CBC W/AUTO DIFF WBC: CPT | Performed by: INTERNAL MEDICINE

## 2020-06-11 PROCEDURE — 71045 X-RAY EXAM CHEST 1 VIEW: CPT

## 2020-06-11 PROCEDURE — 63710000001 CETIRIZINE 10 MG TABLET: Performed by: INTERNAL MEDICINE

## 2020-06-11 PROCEDURE — 63710000001 INSULIN ASPART PER 5 UNITS: Performed by: INTERNAL MEDICINE

## 2020-06-11 DEVICE — GEN PM ASSURITY MRI DR RF PM2272: Type: IMPLANTABLE DEVICE | Status: FUNCTIONAL

## 2020-06-11 DEVICE — LD PM TENDRIL STS 6F46CM 2088TC46: Type: IMPLANTABLE DEVICE | Status: FUNCTIONAL

## 2020-06-11 DEVICE — LD PM TENDRIL STS 6F58CM 2088TC58: Type: IMPLANTABLE DEVICE | Status: FUNCTIONAL

## 2020-06-11 RX ORDER — SODIUM CHLORIDE 0.9 % (FLUSH) 0.9 %
10 SYRINGE (ML) INJECTION AS NEEDED
Status: DISCONTINUED | OUTPATIENT
Start: 2020-06-11 | End: 2020-06-12 | Stop reason: HOSPADM

## 2020-06-11 RX ORDER — DEXTROSE MONOHYDRATE 25 G/50ML
25 INJECTION, SOLUTION INTRAVENOUS
Status: DISCONTINUED | OUTPATIENT
Start: 2020-06-11 | End: 2020-06-12 | Stop reason: HOSPADM

## 2020-06-11 RX ORDER — SODIUM CHLORIDE 0.9 % (FLUSH) 0.9 %
3 SYRINGE (ML) INJECTION EVERY 12 HOURS SCHEDULED
Status: DISCONTINUED | OUTPATIENT
Start: 2020-06-11 | End: 2020-06-12 | Stop reason: HOSPADM

## 2020-06-11 RX ORDER — CETIRIZINE HYDROCHLORIDE 10 MG/1
10 TABLET ORAL NIGHTLY
Status: DISCONTINUED | OUTPATIENT
Start: 2020-06-11 | End: 2020-06-12 | Stop reason: HOSPADM

## 2020-06-11 RX ORDER — ONDANSETRON 2 MG/ML
4 INJECTION INTRAMUSCULAR; INTRAVENOUS EVERY 6 HOURS PRN
Status: DISCONTINUED | OUTPATIENT
Start: 2020-06-11 | End: 2020-06-12 | Stop reason: HOSPADM

## 2020-06-11 RX ORDER — TERAZOSIN 2 MG/1
2 CAPSULE ORAL NIGHTLY
Status: DISCONTINUED | OUTPATIENT
Start: 2020-06-11 | End: 2020-06-12 | Stop reason: HOSPADM

## 2020-06-11 RX ORDER — BUPIVACAINE HCL/0.9 % NACL/PF 0.1 %
2 PLASTIC BAG, INJECTION (ML) EPIDURAL ONCE
Status: COMPLETED | OUTPATIENT
Start: 2020-06-11 | End: 2020-06-11

## 2020-06-11 RX ORDER — HYDROCODONE BITARTRATE AND ACETAMINOPHEN 5; 325 MG/1; MG/1
1 TABLET ORAL EVERY 4 HOURS PRN
Status: DISCONTINUED | OUTPATIENT
Start: 2020-06-11 | End: 2020-06-12 | Stop reason: HOSPADM

## 2020-06-11 RX ORDER — ASPIRIN 81 MG/1
81 TABLET ORAL NIGHTLY
Status: DISCONTINUED | OUTPATIENT
Start: 2020-06-11 | End: 2020-06-12 | Stop reason: HOSPADM

## 2020-06-11 RX ORDER — AMIODARONE HYDROCHLORIDE 200 MG/1
200 TABLET ORAL
Status: DISCONTINUED | OUTPATIENT
Start: 2020-06-12 | End: 2020-06-12 | Stop reason: HOSPADM

## 2020-06-11 RX ORDER — FENTANYL CITRATE 50 UG/ML
INJECTION, SOLUTION INTRAMUSCULAR; INTRAVENOUS AS NEEDED
Status: DISCONTINUED | OUTPATIENT
Start: 2020-06-11 | End: 2020-06-11 | Stop reason: HOSPADM

## 2020-06-11 RX ORDER — NICOTINE POLACRILEX 4 MG
15 LOZENGE BUCCAL
Status: DISCONTINUED | OUTPATIENT
Start: 2020-06-11 | End: 2020-06-12 | Stop reason: HOSPADM

## 2020-06-11 RX ORDER — LIDOCAINE HYDROCHLORIDE 20 MG/ML
INJECTION, SOLUTION INFILTRATION; PERINEURAL AS NEEDED
Status: DISCONTINUED | OUTPATIENT
Start: 2020-06-11 | End: 2020-06-11 | Stop reason: HOSPADM

## 2020-06-11 RX ORDER — MIDAZOLAM HYDROCHLORIDE 1 MG/ML
INJECTION INTRAMUSCULAR; INTRAVENOUS AS NEEDED
Status: DISCONTINUED | OUTPATIENT
Start: 2020-06-11 | End: 2020-06-11 | Stop reason: HOSPADM

## 2020-06-11 RX ORDER — DILTIAZEM HYDROCHLORIDE 180 MG/1
180 CAPSULE, COATED, EXTENDED RELEASE ORAL EVERY 24 HOURS
Status: DISCONTINUED | OUTPATIENT
Start: 2020-06-11 | End: 2020-06-12 | Stop reason: HOSPADM

## 2020-06-11 RX ORDER — SODIUM CHLORIDE 9 MG/ML
75 INJECTION, SOLUTION INTRAVENOUS CONTINUOUS
Status: DISCONTINUED | OUTPATIENT
Start: 2020-06-11 | End: 2020-06-11

## 2020-06-11 RX ADMIN — INSULIN ASPART 3 UNITS: 100 INJECTION, SOLUTION INTRAVENOUS; SUBCUTANEOUS at 17:33

## 2020-06-11 RX ADMIN — SODIUM CHLORIDE, PRESERVATIVE FREE 3 ML: 5 INJECTION INTRAVENOUS at 20:09

## 2020-06-11 RX ADMIN — SODIUM CHLORIDE 75 ML/HR: 9 INJECTION, SOLUTION INTRAVENOUS at 07:13

## 2020-06-11 RX ADMIN — HYDROCODONE BITARTRATE AND ACETAMINOPHEN 1 TABLET: 5; 325 TABLET ORAL at 12:26

## 2020-06-11 RX ADMIN — DILTIAZEM HYDROCHLORIDE 180 MG: 180 CAPSULE, COATED, EXTENDED RELEASE ORAL at 12:26

## 2020-06-11 RX ADMIN — TERAZOSIN HYDROCHLORIDE 2 MG: 2 CAPSULE ORAL at 20:09

## 2020-06-11 RX ADMIN — GENTAMICIN SULFATE 60 MG: 40 INJECTION, SOLUTION INTRAMUSCULAR; INTRAVENOUS at 09:27

## 2020-06-11 RX ADMIN — Medication 2 G: at 09:08

## 2020-06-11 RX ADMIN — ASPIRIN 81 MG: 81 TABLET, COATED ORAL at 20:09

## 2020-06-11 RX ADMIN — CETIRIZINE HYDROCHLORIDE 10 MG: 10 TABLET, FILM COATED ORAL at 20:09

## 2020-06-11 NOTE — INTERVAL H&P NOTE
Patient scheduled for pacemaker implantation as a part of hybrid approach given the bradycardia tachycardia syndrome with element of significant bradyarrhythmia requiring adjustment of amiodarone and AV niki blocking drug . procedure risk regarding the pacemaker implantation document during  office evaluation.  He is a Mallampati score 2 and ASA class II

## 2020-06-12 VITALS
RESPIRATION RATE: 18 BRPM | HEART RATE: 64 BPM | HEIGHT: 71 IN | TEMPERATURE: 97.7 F | OXYGEN SATURATION: 94 % | BODY MASS INDEX: 26.91 KG/M2 | SYSTOLIC BLOOD PRESSURE: 124 MMHG | DIASTOLIC BLOOD PRESSURE: 69 MMHG | WEIGHT: 192.2 LBS

## 2020-06-12 LAB
GLUCOSE BLDC GLUCOMTR-MCNC: 132 MG/DL (ref 70–130)
GLUCOSE BLDC GLUCOMTR-MCNC: 174 MG/DL (ref 70–130)
GLUCOSE BLDC GLUCOMTR-MCNC: 204 MG/DL (ref 70–130)

## 2020-06-12 PROCEDURE — 99024 POSTOP FOLLOW-UP VISIT: CPT | Performed by: INTERNAL MEDICINE

## 2020-06-12 PROCEDURE — 63710000001 HYDROCHLOROTHIAZIDE 12.5 MG CAPSULE 1 EACH BLISTER: Performed by: INTERNAL MEDICINE

## 2020-06-12 PROCEDURE — A9270 NON-COVERED ITEM OR SERVICE: HCPCS | Performed by: INTERNAL MEDICINE

## 2020-06-12 PROCEDURE — 63710000001 LISINOPRIL 10 MG TABLET 1 EACH BLISTER: Performed by: INTERNAL MEDICINE

## 2020-06-12 PROCEDURE — 63710000001 AMIODARONE 200 MG TABLET: Performed by: INTERNAL MEDICINE

## 2020-06-12 PROCEDURE — 82962 GLUCOSE BLOOD TEST: CPT

## 2020-06-12 RX ORDER — AMIODARONE HYDROCHLORIDE 100 MG/1
200 TABLET ORAL
Qty: 30 TABLET | Refills: 6 | Status: SHIPPED | OUTPATIENT
Start: 2020-06-12 | End: 2021-07-01 | Stop reason: SDUPTHER

## 2020-06-12 RX ORDER — DILTIAZEM HYDROCHLORIDE 180 MG/1
180 CAPSULE, COATED, EXTENDED RELEASE ORAL EVERY 24 HOURS
Qty: 30 CAPSULE | Refills: 5 | Status: SHIPPED | OUTPATIENT
Start: 2020-06-12 | End: 2020-12-03

## 2020-06-12 RX ADMIN — LISINOPRIL: 10 TABLET ORAL at 08:41

## 2020-06-12 RX ADMIN — AMIODARONE HYDROCHLORIDE 200 MG: 200 TABLET ORAL at 08:41

## 2020-06-12 NOTE — DISCHARGE SUMMARY
DISCHARGE SUMMARY      Date of Discharge:  6/12/2020    Discharge Diagnosis:   1. Paroxysmal atrial fibrillation (CMS/HCC)    2. Sick sinus syndrome (CMS/HCC)        Presenting Problem/History of Present Illness:  Paroxysmal atrial fibrillation (CMS/HCC) [I48.0]  Sick sinus syndrome (CMS/HCC) [I49.5]  Paroxysmal atrial fibrillation (CMS/HCC) [I48.0]     Hospital Course:  Patient is a 87 y.o. male presented with sick sinus syndrome with development of bradycardia tachycardia component requiring adjustment of AV niki blocking drug and and and amiodarone.  Patient underwent dual-chamber pacemaker implantation.  Chest x-ray no acute pathology reported.  Site no evidence of hematoma.  Post hospital management discussed with the patient.  Do not lift left arm above the shoulder and driving for 30 days.  Hold Eliquis for 2 more days.  Watch for signs of infection or hematoma.  Wound check with the pacer clinic in 1 week and see me in 1 month    Procedures Performed:  Procedure(s):  Pacer single lead       Consults:   Consults     No orders found for last 30 day(s).           Lab Results:  Lab Results (last 24 hours)     Procedure Component Value Units Date/Time    POC Glucose Once [[id number]]  (Abnormal) Collected:  06/11/20 2026    Specimen:  Blood Updated:  06/12/20 0210     Glucose 174 mg/dL      Comment: RN NotifiedOperator: [id number] EVELYNE Nguyener ID: [id number]       POC Glucose Once [[id number]]  (Abnormal) Collected:  06/11/20 1720    Specimen:  Blood Updated:  06/12/20 0159     Glucose 204 mg/dL      Comment: RN NotifiedOperator: [id number] BOB Carin ID: [id number]             Condition on Discharge: Stable    ROS  Patient denies orthopnea PND, nauseous, vomiting polydipsia polyuria fever cough chest pain or palpitations.  Vital Signs:  Temp:  [96.3 °F (35.7 °C)-97.7 °F (36.5 °C)] 97.7 °F (36.5 °C)  Heart Rate:  [60-68] 67  Resp:  [18] 18  BP: (123-170)/(53-77) 124/69    Physical Exam:  Physical  Exam  Lungs are clear to auscultation.  Cardiovascular-regular rate rhythm no S3 no pericardial rub.  Abdominal soft nontender extremity no cyanosis or clubbing.  Neurological no focal findings  Discharge Disposition:  Home or Self Care    Discharge Medications:     Discharge Medications      New Medications      Instructions Start Date   dilTIAZem  MG 24 hr capsule  Commonly known as:  CARDIZEM CD   180 mg, Oral, Every 24 Hours         Changes to Medications      Instructions Start Date   amiodarone 100 MG tablet  Commonly known as:  PACERONE  What changed:  how much to take   200 mg, Oral, Every 24 Hours Scheduled         Continue These Medications      Instructions Start Date   apixaban 5 MG tablet tablet  Commonly known as:  ELIQUIS   2.5 mg, Oral, 2 Times Daily, Last dose to be taken 5/5/18 prior to surgery       aspirin 81 MG tablet   81 mg, Oral, Nightly, Last dose 5/1/18      Claritin 10 MG tablet  Generic drug:  loratadine   10 mg, Oral, Daily      lisinopril-hydrochlorothiazide 20-12.5 MG per tablet  Commonly known as:  PRINZIDE,ZESTORETIC   1 tablet, Oral, Daily      MENS MULTIVITAMIN PLUS PO   1 tablet, Oral, Daily      metFORMIN 500 MG tablet  Commonly known as:  GLUCOPHAGE   500 mg, Oral, 2 Times Daily      terazosin 2 MG capsule  Commonly known as:  HYTRIN   2 mg, Oral, Nightly      timolol 0.5 % ophthalmic solution  Commonly known as:  TIMOPTIC   1 drop, Both Eyes, 2 Times Daily             Discharge Instructions: Do not lift weight or drive for 30 days.    Discharge Diet: Cardiac and diabetic    Discharge Condition: Stable    Activity at Discharge: As tolerated    Follow-up Appointments:  Future Appointments   Date Time Provider Department [place]   6/19/2020 10:30 AM JEANETTE [facility] NURSE [facility] CARD [place]   7/15/2020 10:30 AM Johnny Forbes MD [facility] None   9/15/2020 11:00 AM Johnny Forbes MD [facility] None         Johnny Forbes MD  06/12/20  08:37

## 2020-06-12 NOTE — PLAN OF CARE
VSS, no new complaints at this time, will continue to monitor.  Problem: Patient Care Overview  Goal: Plan of Care Review  Outcome: Ongoing (interventions implemented as appropriate)  Flowsheets (Taken 6/12/2020 7401)  Progress: no change  Plan of Care Reviewed With: patient

## 2020-06-12 NOTE — DISCHARGE INSTRUCTIONS
Keep dry for 1 week    , Wound check up in 1 week with pacer clinic    , Hold Eliquis for 2 more days    , Do not lift left arm above the shoulder and driving for 30 days

## 2020-06-18 ENCOUNTER — PREP FOR SURGERY (OUTPATIENT)
Dept: OTHER | Facility: HOSPITAL | Age: 85
End: 2020-06-18

## 2020-06-18 DIAGNOSIS — D49.4 BLADDER NEOPLASM: Primary | ICD-10-CM

## 2020-07-14 DIAGNOSIS — I48.92 ATRIAL FLUTTER, UNSPECIFIED TYPE (HCC): Primary | ICD-10-CM

## 2020-07-15 ENCOUNTER — OFFICE VISIT (OUTPATIENT)
Dept: CARDIOLOGY | Facility: CLINIC | Age: 85
End: 2020-07-15

## 2020-07-15 VITALS
HEART RATE: 60 BPM | OXYGEN SATURATION: 98 % | HEIGHT: 71 IN | DIASTOLIC BLOOD PRESSURE: 70 MMHG | BODY MASS INDEX: 27.72 KG/M2 | SYSTOLIC BLOOD PRESSURE: 120 MMHG | WEIGHT: 198 LBS

## 2020-07-15 DIAGNOSIS — I25.10 CORONARY ARTERY DISEASE INVOLVING NATIVE CORONARY ARTERY OF NATIVE HEART WITHOUT ANGINA PECTORIS: ICD-10-CM

## 2020-07-15 DIAGNOSIS — I48.92 ATRIAL FLUTTER, UNSPECIFIED TYPE (HCC): Primary | ICD-10-CM

## 2020-07-15 DIAGNOSIS — I48.0 PAROXYSMAL ATRIAL FIBRILLATION (HCC): ICD-10-CM

## 2020-07-15 DIAGNOSIS — I10 BENIGN ESSENTIAL HYPERTENSION: ICD-10-CM

## 2020-07-15 DIAGNOSIS — E78.5 HYPERLIPIDEMIA, UNSPECIFIED HYPERLIPIDEMIA TYPE: ICD-10-CM

## 2020-07-15 DIAGNOSIS — I49.5 SICK SINUS SYNDROME (HCC): ICD-10-CM

## 2020-07-15 PROCEDURE — 93000 ELECTROCARDIOGRAM COMPLETE: CPT | Performed by: INTERNAL MEDICINE

## 2020-07-15 PROCEDURE — 99214 OFFICE O/P EST MOD 30 MIN: CPT | Performed by: INTERNAL MEDICINE

## 2020-07-15 NOTE — PROGRESS NOTES
Evgeny Carter Jr.  87 y.o. male    7/15/2020  1. Atrial flutter, unspecified type (CMS/HCC)    2. Coronary artery disease involving native coronary artery of native heart without angina pectoris    3. Benign essential hypertension    4. Sick sinus syndrome (CMS/HCC)    5. Paroxysmal atrial fibrillation (CMS/HCC)    6. Hyperlipidemia, unspecified hyperlipidemia type        History of Present Illness:    Body mass index is 27.63 kg/m². BMI is above normal parameters. Recommendations include: exercise counseling, nutrition counseling and referral to primary care.      87 years old patient presented for postoperative follow-up status post pacemaker implantation for management management of symptomatic sick sinus syndrome with history of paroxysmal atrial fibrillation requiring interested AV niki and amiodarone and continued having breakthrough.  No orthopnea or PND reported no fever cough or chills dysuria or hematuria reported.  On oral anticoagulation currently in sinus rhythm with a past medical history significant for hypertension, hypertensive heart disease, CAD status post PTCA stent, atrial flutter with a counterclockwise mechanism state  post-EP study and flutter ablation and noted to be in atrial fibrillation with a resting heart rate 110 bpm.  Patient started on oral anticoagulation and amiodarone. Last echoc reported mildly dilated aortic root and repeat echo in May 2020 similar aortic root diameter 4.0 in August 2018.  Post atrial flutter ablation patient developed A. fib initially managed with amiodarone 200 mg then subsequent decrease to 200 and then 50 mg and given the significant bradyarrhythmia amiodarone was discontinued  He denies orthopnea, PND, syncope or near syncopal episode.  Denies any polydipsia polyuria.  Denies any fever cough or chills.  No dysuria or hematuria reported.  Waiting for bladder surgery with Fellow       Date of Procedure: 06/11/20          PROCEDURE(S) PERFORMED:     1. Implantation of a dual-chamber permanent pacemaker.        INDICATIONS FOR PROCDEDURE:            Sick sinus     Echo 5/11/2020  · Left ventricular wall thickness is consistent with borderline concentric hypertrophy.  · Estimated EF = 61%.  · Left ventricular systolic function is normal.  · Left ventricular diastolic dysfunction (grade I a) consistent with impaired relaxation.  · Left atrial cavity size is mildly dilated.  · Mild mitral valve regurgitation is present    ECHO 8/15/18  ·  Mild tricuspid valve regurgitation is present.  · Mild pulmonic valve regurgitation is present.  · Left atrial cavity size is mild-to-moderately dilated.  · Left ventricular systolic function is normal. Estimated EF = 55%.  · Mild mitral valve regurgitation is present  Left ventricular diastolic dysfunction (grade I a) consistent with impaired relaxation     10/2016      Ref Range & Units 1yr ago      Total Cholesterol 0 - 199 mg/dl 169   Comments: CHOL DESIRED: < 200 MG/DL   Triglycerides 20 - 199 mg/dl 188   Comments: TRIG DESIRED: < 200 MG/DL   HDL Cholesterol 60 - 200 mg/dl 37 (L)   Comments: HDL AVERAGE RISK: 35 - 60 MG/DL   LDL Cholesterol  0 - 129 mg/dl 94       7/2019  Total Cholesterol  100 - 200 mg/dL 196    Triglycerides  30 - 150 mg/dL 199 Abnormally high     HDL Cholesterol  32 - 72 mg/dL 41    LDL Cholesterol   5 - 99 mg/dL 112 Abnormally high     Chol/HDL Ratio  4.2 - 5.6 4.8           Stress TEST ; 10/2016   Normal LV systolic function overall with an EF of 70% with inferior       wall hypokinesis.  2.   Moderate size predominantly fixed defect in the inferior wall and       apex with partial reversibility.     ECHO 10/2016  1.   Mild concentric left ventricular hypertrophy with estimated       ejection fraction 50% to 60%.  2.   Mildly dilated aortic root with diameter of 4.0.  3.   Mildly dilated left atrium with diameter of 4.4.  4.   Aortic valve appears mildly sclerotic without any  hemodynamic       significant aortic stenosis. No aortic regurgitation seen.  5.   Pulmonic valve not well visualized. There is trace pulmonic       regurgitation. There is a trace mitral regurgitation. No       intracardiac mass, pericardial effusion, cardiac thrombus seen.  6.   Normal right ventricular size and function          SUBJECTIVE:    No Known Allergies      Past Medical History:   Diagnosis Date   • Allergic rhinitis    • Arthritis     hips, knees, feet   • Artificial lens present     in position   • Benign neoplasm of skin of eyelid    • Bladder tumor    • Bladder tumor    • BPH (benign prostatic hyperplasia)    • Cancer (CMS/HCC)     SKIN    • Cataract    • Coronary arteriosclerosis    • Cortical senile cataract    • Cough    • Essential hypertension    • Essential hypertension    • Hypercholesterolemia    • Hypercholesterolemia    • Kidney stones    • MI, old 1988   • Nonexudative age-related macular degeneration     mild   • Nuclear cataract    • Open-angle glaucoma     s/p trabec OS, doing well      • Primary open angle glaucoma     OS, s/p trabeculectomy; IOP down with timolol OD     • Pseudophakia of right eye    • Rhinitis    • Upper respiratory infection    • Vitreous detachment     peripheral         Past Surgical History:   Procedure Laterality Date   • ANKLE SURGERY  07/29/2000    Ankle surgery (ORIF right ankle fracture. Bimalleolar right fracture)   • APPENDECTOMY     • CARDIAC ABLATION     • CARDIAC CATHETERIZATION  07/02/1987    Cardiac cath 62766 (Coronary arthosclerotic heart disease. maintained patency of RCA. Mild inferior hypokinesis.)   • CARDIAC CATHETERIZATION  06/30/1987    Cardiac cath 64948 (Coronary atherosclerotic heart disease. Occluded RCA. Acute inferior infarction. S/P successful angioplasty of RCA)   • CARDIAC ELECTROPHYSIOLOGY PROCEDURE N/A 6/11/2020    Procedure: Pacer single lead;  Surgeon: Johnny Forbes MD;  Location: [facility] INVASIVE LOCATION;  Service:  Cardiology;  Laterality: N/A;   • CATARACT EXTRACTION  04/01/2010    Remove cataract, insert lens (Complicated cataract extraction with intraocular lens implantation, right eye, Marcelo SN-60 WF serial # [id number]: 20.5 diopter)   • CATARACT EXTRACTION W/ INTRAOCULAR LENS IMPLANT Left 3/23/2018    Procedure: CATARACT PHACO EXTRACTION WITH INTRAOCULAR LENS IMPLANT;  Surgeon: Oli Haley MD;  Location: [facility] OR;  Service: Ophthalmology   • COLONOSCOPY N/A 5/9/2019    Procedure: COLONOSCOPY;  Surgeon: Rainer Gates DO;  Location: [facility] ENDOSCOPY;  Service: Gastroenterology   • ENDOSCOPY  02/19/1990    EGD 67120 (Olympus video endoscope.Fibrin-like patch in stomach)   • EYE SURGERY  02/02/2005    Eye surgery procedure (Repair of operative wound leak, left eye. S/P trabeculectomy with operative wound leak)   • EYE SURGERY  01/19/2005    Eye surgery procedure (Trabeculectomy, left eye. 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 degeneration)    • OTHER SURGICAL HISTORY  02/26/2015    EXTENDED VISUAL FIELDS STUDY 88493 (Primary open angle glaucoma)    • OTHER SURGICAL HISTORY  02/26/2015    EXTENDED VISUAL FIELDS STUDY 33929 (Primary open angle glaucoma)    • OTHER SURGICAL HISTORY  06/16/2016    OCT DISC NFL 45814 (Open-angle glaucoma)    • OTHER SURGICAL HISTORY      OCT DISC NFL 10017 (Primary open angle glaucoma) (3): 06/17/2015, 03/19/2014, 03/04/2013   • OTHER SURGICAL HISTORY  06/16/2016    OPTIC NERVE HEAD EVAL PERFORMED 2027F (Open-angle glaucoma)    • TRANSURETHRAL RESECTION OF BLADDER TUMOR N/A 5/9/2018    Procedure: CYSTOSCOPY TRANSURETHRAL RESECTION OF BLADDER TUMOR;  Surgeon: Donovan Conner MD;  Location: [facility] OR;  Service: Urology   • TRANSURETHRAL RESECTION OF BLADDER TUMOR N/A  2019    Procedure: CYSTOSCOPY TRANSURETHRAL RESECTION OF BLADDER TUMOR;  Surgeon: Dalila, Donovan GILL MD;  Location: [facility];  Service: Urology   • UVULECTOMY  1969    EXCISION OF UVULA 07196 (partial uvulectomy with excision of papilloma. Redunant uvula with a small papilloma)         Family History   Problem Relation Age of Onset   • No Known Problems Mother    • No Known Problems Father          Social History     Socioeconomic History   • Marital status:      Spouse name: Not on file   • Number of children: Not on file   • Years of education: Not on file   • Highest education level: Not on file   Tobacco Use   • Smoking status: Former Smoker     Last attempt to quit:      Years since quittin.5   • Smokeless tobacco: Current User     Types: Snuff   Substance and Sexual Activity   • Alcohol use: No   • Drug use: No   • Sexual activity: Defer         Current Outpatient Medications   Medication Sig Dispense Refill   • apixaban (ELIQUIS) 5 MG tablet tablet Take 2.5 mg by mouth 2 (Two) Times a Day. Last dose to be taken 18 prior to surgery     • aspirin 81 MG tablet Take 81 mg by mouth Every Night. Last dose 18     • dilTIAZem CD (CARDIZEM CD) 180 MG 24 hr capsule Take 1 capsule by mouth Daily. 30 capsule 5   • lisinopril-hydrochlorothiazide (PRINZIDE,ZESTORETIC) 20-12.5 MG per tablet Take 1 tablet by mouth Daily.     • loratadine (CLARITIN) 10 MG tablet Take 10 mg by mouth Daily.     • metFORMIN (GLUCOPHAGE) 500 MG tablet Take 500 mg by mouth 2 (Two) Times a Day.     • Multiple Vitamins-Minerals (MENS MULTIVITAMIN PLUS PO) Take 1 tablet by mouth Daily.     • terazosin (HYTRIN) 2 MG capsule Take 2 mg by mouth Every Night.     • timolol (TIMOPTIC) 0.5 % ophthalmic solution Administer 1 drop to both eyes 2 (Two) Times a Day.     • amiodarone (PACERONE) 100 MG tablet Take 2 tablets by mouth Daily. 30 tablet 6     No current facility-administered medications for this visit.            Review  "of Systems:     Constitutional:  Denies recent weight loss, weight gain, fever or chills, no change in exercise tolerance.     HENT:  Denies any hearing loss, epistaxis, hoarseness, or difficulty speaking.     Eyes: No blurring    Respiratory:  Denies dyspnea with exertion,no cough, wheezing, or hemoptysis.     Cardiovascular: See H&P    Gastrointestinal:  Denies change in bowel habits, dyspepsia, ulcer disease, hematochezia, or melena.     Endocrine: Negative for cold intolerance, heat intolerance, polydipsia, polyphagia and polyuria. Denies any history of weight change, polydipsia, polyuria.     Genitourinary: Negative.      Musculoskeletal: Denies any history of arthritic symptoms or back problems.     Skin:  Denies any change in hair or nails, rashes, or skin lesions.     Allergic/Immunologic: Negative.  Negative for environmental allergies, food allergies and immunocompromised state.     Neurological:  Denies any history of recurrent headaches, strokes, TIA, or seizure disorder.     Hematological: Denies any food allergies, seasonal allergies, bleeding disorders, or lymphadenopathy.     Psychiatric/Behavioral: Denies any history of depression, substance abuse, or change in cognitive function.       OBJECTIVE:    /70   Ht 180.3 cm (70.98\")   Wt 89.8 kg (198 lb)   SpO2 98%   BMI 27.63 kg/m²       Physical Exam:     Constitutional: Cooperative, alert and oriented, well-developed, well-nourished, in no acute distress.     HENT:   Head: Normocephalic, normal hair patterns, no masses or tenderness.  Ears, Nose, and Throat: No gross abnormalities. No pallor or cyanosis. Dentition good.   Eyes: EOMS intact, PERRL, conjunctivae and lids unremarkable. Fundoscopic exam and visual fields not performed.   Neck: No palpable masses or adenopathy, no thyromegaly, no JVD, carotid pulses are full and equal bilaterally and without  Bruits.     Cardiovascular: Regular rhythm, S1 and S2 normal, no S3 or S4. Apical impulse " not displaced. No murmurs, gallops, or rubs detected.     Pulmonary/Chest: Chest: normal symmetry, no tenderness to palpation, normal respiratory excursion, no intercostal retraction, no use of accessory muscles. Pulmonary: Normal breath sounds. No rales or rhonchi.    Abdominal: Abdomen soft, bowel sounds normoactive, no masses, no hepatosplenomegaly, non-tender, no bruits.     Musculoskeletal: No deformities, clubbing, cyanosis, erythema, or edema observed. There are no spinal abnormalities noted. Normal muscle strength and tone. Pulses full and equal in all extremities, no bruits auscultated.     Neurological: No gross motor or sensory deficits noted, affect appropriate, oriented to time, person, place.     Skin: Warm and dry to the touch, no apparent skin lesions or masses noted.     Psychiatric: He has a normal mood and affect. His behavior is normal. Judgment and thought content normal.         Procedures      Lab Results   Component Value Date    WBC 6.12 06/11/2020    HGB 11.0 (L) 06/11/2020    HCT 31.8 (L) 06/11/2020    MCV 97.2 (H) 06/11/2020     06/11/2020     Lab Results   Component Value Date    GLUCOSE 117 (H) 06/11/2020    BUN 17 06/11/2020    CREATININE 1.16 06/11/2020    EGFRIFNONA 60 (L) 06/11/2020    BCR 14.7 06/11/2020    CO2 24.0 06/11/2020    CALCIUM 8.8 06/11/2020    ALBUMIN 4.00 05/10/2020    AST 18 05/10/2020    ALT 17 05/10/2020     Lab Results   Component Value Date    CHOL 196 07/09/2019    CHOL 190 03/30/2018     Lab Results   Component Value Date    TRIG 199 (H) 07/09/2019    TRIG 198 03/30/2018    TRIG 188 10/20/2016     Lab Results   Component Value Date    HDL 41 07/09/2019    HDL 42 03/30/2018    HDL 37 (L) 10/20/2016     No components found for: LDLCALC  Lab Results   Component Value Date     (H) 07/09/2019     (H) 03/30/2018    LDL 94 10/20/2016     No results found for: HDLLDLRATIO  No components found for: CHOLHDL  Lab Results   Component Value Date     HGBA1C 6.1 07/09/2019     Lab Results   Component Value Date    TSH 1.850 05/10/2020           ASSESSMENT AND PLAN:    #1 paroxysmal atrial fibrillation currently in sinus rhythm on oral anticoagulation to decrease the cardiac embolization   #2  Sick sinus syndrome with a significant bradyarrhythmia and paroxysmal atrial fibrillation status post pacemaker implantation for evaluations of AV niki and antiarrhythmic   #3 history of atrial flutter status post EP study and flutter ablation   #4 hypertension good blood pressure control on lisinopril hydrochlorothiazide  #4 CAD stable on baby aspirin will continue     86 years old patient admitted to [facility] for evaluation of palpitation noted in atrial fibrillation with a background of sick sinus syndrome significant bradyarrhythmia requiring discontinuation of AV niki blocking drug and testing of amiodarone.  Patient subsequent discharge and presented as an outpatient content having off-and-on atrial fibrillation's.   Patient presented for routine follow-up and will continue Eliquis to decrease the cardiac embolization, lisinopril hydrochlorothiazide for management of hypertension with hypertensive heart disease with good blood pressure control, diabetes managed with metformin.  Patient had problem with a statin previously requiring discontinuation he preferred to take fish oil.  Patient is pleased with clinical outcome of the pacemaker implantation.  EKG confirmed atrial pacing at rate of 60 bpm.  The patient has a question concern regarding the pacemaker and onset of dissatisfaction.  He waiting for bladder tumor surgery and there is no risk stratification needed prior to that given the asymptomatic clinical condition..  Patient is a moderate risk for the proposed procedure and any risk stratification will not change the outcome of the procedure.  The anticoagulation can be held for the desired.  Recommend to restart after the procedure.  We will see  him back in 6 months undependable patient clinical condition  Evgeny was seen today for follow-up and surgical clearance.    Diagnoses and all orders for this visit:    Atrial flutter, unspecified type (CMS/HCC)    Coronary artery disease involving native coronary artery of native heart without angina pectoris    Benign essential hypertension    Sick sinus syndrome (CMS/HCC)    Paroxysmal atrial fibrillation (CMS/HCC)    Hyperlipidemia, unspecified hyperlipidemia type        Johnny Forbes MD  7/15/2020  10:37

## 2020-07-23 ENCOUNTER — APPOINTMENT (OUTPATIENT)
Dept: PREADMISSION TESTING | Facility: HOSPITAL | Age: 85
End: 2020-07-23

## 2020-07-23 DIAGNOSIS — D49.4 BLADDER NEOPLASM: ICD-10-CM

## 2020-07-23 LAB
ANION GAP SERPL CALCULATED.3IONS-SCNC: 12 MMOL/L (ref 5–15)
BILIRUB UR QL STRIP: NEGATIVE
BUN SERPL-MCNC: 26 MG/DL (ref 8–23)
BUN/CREAT SERPL: 18.1 (ref 7–25)
CALCIUM SPEC-SCNC: 9.1 MG/DL (ref 8.6–10.5)
CHLORIDE SERPL-SCNC: 101 MMOL/L (ref 98–107)
CLARITY UR: CLEAR
CO2 SERPL-SCNC: 25 MMOL/L (ref 22–29)
COLOR UR: YELLOW
CREAT SERPL-MCNC: 1.44 MG/DL (ref 0.76–1.27)
GFR SERPL CREATININE-BSD FRML MDRD: 46 ML/MIN/1.73
GLUCOSE SERPL-MCNC: 122 MG/DL (ref 65–99)
GLUCOSE UR STRIP-MCNC: NEGATIVE MG/DL
HGB UR QL STRIP.AUTO: ABNORMAL
KETONES UR QL STRIP: NEGATIVE
LEUKOCYTE ESTERASE UR QL STRIP.AUTO: NEGATIVE
NITRITE UR QL STRIP: NEGATIVE
PH UR STRIP.AUTO: <=5 [PH] (ref 5–9)
POTASSIUM SERPL-SCNC: 4.5 MMOL/L (ref 3.5–5.2)
PROT UR QL STRIP: ABNORMAL
SODIUM SERPL-SCNC: 138 MMOL/L (ref 136–145)
SP GR UR STRIP: 1.02 (ref 1–1.03)
UROBILINOGEN UR QL STRIP: ABNORMAL

## 2020-07-23 PROCEDURE — 80048 BASIC METABOLIC PNL TOTAL CA: CPT | Performed by: UROLOGY

## 2020-07-23 PROCEDURE — 81003 URINALYSIS AUTO W/O SCOPE: CPT | Performed by: UROLOGY

## 2020-07-23 PROCEDURE — 36415 COLL VENOUS BLD VENIPUNCTURE: CPT

## 2020-07-23 RX ORDER — SODIUM CHLORIDE 9 MG/ML
1000 INJECTION, SOLUTION INTRAVENOUS CONTINUOUS
Status: CANCELLED | OUTPATIENT
Start: 2020-07-29

## 2020-07-23 NOTE — DISCHARGE INSTRUCTIONS
[facility]  Pre-op Information and Guidelines    You will be called after 2 p.m. the day before your surgery (Friday for Monday surgery) and notified of your time for arrival and approximate surgery time.  If you have not received a call by 4P.M., please contact Same Day Surgery at [phone] of if outside [facility] call [phone].    Please Follow these Important Safety Guidelines:    • The morning of your procedure, take only the medications listed below with   A sip of water:_____________________________________________       ______________________________________________    • DO NOT eat or drink anything after 12:00 midnight the night before surgery  Specific instructions concerning drinking clear liquids will be discussed during  the pre-surgery instruction call the day before your surgery.    • If you take a blood thinner (ex. Plavix, Coumadin, aspirin), ask your doctor when to stop it before surgery  STOP DATE: _________________    • Only 2 visitors are allowed in patient rooms at a time  Your visitors will be asked to wait in the lobby until the admission process is complete with the exception of a parent with a child and patients in need of special assistance.    • YOU CANNOT DRIVE YOURSELF HOME  You must be accompanied by someone who will be responsible for driving you home after surgery and for your care at home.    • DO NOT chew gum, use breath mints, hard candy, or smoke the day of surgery  • DO NOT drink alcohol for at least 24 hours before your surgery  • DO NOT wear any jewelry and remove all body piercing before coming to the hospital  • DO NOT wear make-up to the hospital  • If you are having surgery on an extremity (arm/leg/foot) remove nail polish/artificial nails on the surgical side  • Clothing, glasses, contacts, dentures, and hairpieces must be removed before surgery  • Bathe the night before or the morning of your surgery and do not use powders/lotions on  skin.

## 2020-07-26 ENCOUNTER — LAB (OUTPATIENT)
Dept: LAB | Facility: HOSPITAL | Age: 85
End: 2020-07-26

## 2020-07-26 LAB — SARS-COV-2 N GENE RESP QL NAA+PROBE: NOT DETECTED

## 2020-07-26 PROCEDURE — C9803 HOPD COVID-19 SPEC COLLECT: HCPCS | Performed by: UROLOGY

## 2020-07-26 PROCEDURE — 87635 SARS-COV-2 COVID-19 AMP PRB: CPT | Performed by: UROLOGY

## 2020-07-29 ENCOUNTER — HOSPITAL ENCOUNTER (OUTPATIENT)
Facility: HOSPITAL | Age: 85
Setting detail: HOSPITAL OUTPATIENT SURGERY
Discharge: HOME | End: 2020-07-29
Attending: UROLOGY | Admitting: UROLOGY

## 2020-07-29 ENCOUNTER — ANESTHESIA EVENT (OUTPATIENT)
Dept: PERIOP | Facility: HOSPITAL | Age: 85
End: 2020-07-29

## 2020-07-29 ENCOUNTER — ANESTHESIA (OUTPATIENT)
Dept: PERIOP | Facility: HOSPITAL | Age: 85
End: 2020-07-29

## 2020-07-29 VITALS
HEART RATE: 61 BPM | DIASTOLIC BLOOD PRESSURE: 71 MMHG | SYSTOLIC BLOOD PRESSURE: 171 MMHG | TEMPERATURE: 97 F | HEIGHT: 71 IN | OXYGEN SATURATION: 93 % | BODY MASS INDEX: 26.57 KG/M2 | WEIGHT: 189.82 LBS | RESPIRATION RATE: 16 BRPM

## 2020-07-29 DIAGNOSIS — D49.4 BLADDER NEOPLASM: ICD-10-CM

## 2020-07-29 LAB
GLUCOSE BLDC GLUCOMTR-MCNC: 110 MG/DL (ref 70–130)
GLUCOSE BLDC GLUCOMTR-MCNC: 128 MG/DL (ref 70–130)

## 2020-07-29 PROCEDURE — 25010000002 FENTANYL CITRATE (PF) 100 MCG/2ML SOLUTION: Performed by: NURSE ANESTHETIST, CERTIFIED REGISTERED

## 2020-07-29 PROCEDURE — 88305 TISSUE EXAM BY PATHOLOGIST: CPT

## 2020-07-29 PROCEDURE — 25010000002 CEFTRIAXONE: Performed by: UROLOGY

## 2020-07-29 PROCEDURE — 25010000002 PROPOFOL 10 MG/ML EMULSION: Performed by: NURSE ANESTHETIST, CERTIFIED REGISTERED

## 2020-07-29 PROCEDURE — 82962 GLUCOSE BLOOD TEST: CPT

## 2020-07-29 PROCEDURE — 25010000002 DEXAMETHASONE PER 1 MG: Performed by: NURSE ANESTHETIST, CERTIFIED REGISTERED

## 2020-07-29 RX ORDER — SODIUM CHLORIDE 9 MG/ML
1000 INJECTION, SOLUTION INTRAVENOUS CONTINUOUS
Status: DISCONTINUED | OUTPATIENT
Start: 2020-07-29 | End: 2020-07-29 | Stop reason: HOSPADM

## 2020-07-29 RX ORDER — LIDOCAINE HYDROCHLORIDE 20 MG/ML
INJECTION, SOLUTION INFILTRATION; PERINEURAL AS NEEDED
Status: DISCONTINUED | OUTPATIENT
Start: 2020-07-29 | End: 2020-07-29 | Stop reason: SURG

## 2020-07-29 RX ORDER — ONDANSETRON 2 MG/ML
4 INJECTION INTRAMUSCULAR; INTRAVENOUS ONCE AS NEEDED
Status: DISCONTINUED | OUTPATIENT
Start: 2020-07-29 | End: 2020-07-29 | Stop reason: HOSPADM

## 2020-07-29 RX ORDER — OXYCODONE AND ACETAMINOPHEN 7.5; 325 MG/1; MG/1
1-2 TABLET ORAL EVERY 4 HOURS PRN
Qty: 10 TABLET | Refills: 0 | Status: SHIPPED | OUTPATIENT
Start: 2020-07-29 | End: 2021-01-20

## 2020-07-29 RX ORDER — PROPOFOL 10 MG/ML
VIAL (ML) INTRAVENOUS AS NEEDED
Status: DISCONTINUED | OUTPATIENT
Start: 2020-07-29 | End: 2020-07-29 | Stop reason: SURG

## 2020-07-29 RX ORDER — DEXAMETHASONE SODIUM PHOSPHATE 4 MG/ML
INJECTION, SOLUTION INTRA-ARTICULAR; INTRALESIONAL; INTRAMUSCULAR; INTRAVENOUS; SOFT TISSUE AS NEEDED
Status: DISCONTINUED | OUTPATIENT
Start: 2020-07-29 | End: 2020-07-29 | Stop reason: SURG

## 2020-07-29 RX ORDER — LEVOFLOXACIN 250 MG/1
250 TABLET ORAL DAILY
Qty: 7 TABLET | Refills: 0 | Status: SHIPPED | OUTPATIENT
Start: 2020-07-29 | End: 2020-08-05

## 2020-07-29 RX ORDER — FENTANYL CITRATE 50 UG/ML
INJECTION, SOLUTION INTRAMUSCULAR; INTRAVENOUS AS NEEDED
Status: DISCONTINUED | OUTPATIENT
Start: 2020-07-29 | End: 2020-07-29 | Stop reason: SURG

## 2020-07-29 RX ADMIN — DEXAMETHASONE SODIUM PHOSPHATE 4 MG: 4 INJECTION, SOLUTION INTRAMUSCULAR; INTRAVENOUS at 17:29

## 2020-07-29 RX ADMIN — FENTANYL CITRATE 25 MCG: 50 INJECTION, SOLUTION INTRAMUSCULAR; INTRAVENOUS at 17:22

## 2020-07-29 RX ADMIN — SODIUM CHLORIDE 1000 ML: 9 INJECTION, SOLUTION INTRAVENOUS at 14:04

## 2020-07-29 RX ADMIN — PROPOFOL 80 MG: 10 INJECTION, EMULSION INTRAVENOUS at 17:13

## 2020-07-29 RX ADMIN — FENTANYL CITRATE 25 MCG: 50 INJECTION, SOLUTION INTRAMUSCULAR; INTRAVENOUS at 17:17

## 2020-07-29 RX ADMIN — LIDOCAINE HYDROCHLORIDE 50 MG: 20 INJECTION, SOLUTION INFILTRATION; PERINEURAL at 17:13

## 2020-07-29 RX ADMIN — CEFTRIAXONE 1 G: 1 INJECTION, POWDER, FOR SOLUTION INTRAMUSCULAR; INTRAVENOUS at 17:18

## 2020-07-29 NOTE — ANESTHESIA POSTPROCEDURE EVALUATION
Patient: Evgeny Carter Jr.    Procedure Summary     Date:  07/29/20 Room / Location:  [facility] OR 08 / [facility] OR    Anesthesia Start:  1708 Anesthesia Stop:  1740    Procedure:  CYSTOSCOPY TRANSURETHRAL RESECTION OF BLADDER TUMOR (N/A ) Diagnosis:       Bladder neoplasm      (Bladder neoplasm [D49.4])    Surgeon:  Donovan Conner MD Provider:  Mack Minor MD    Anesthesia Type:  general ASA Status:  3          Anesthesia Type: general    Vitals  No vitals data found for the desired time range.          Post Anesthesia Care and Evaluation    Patient location during evaluation: bedside  Patient participation: complete - patient participated  Level of consciousness: awake and alert  Pain score: 0  Pain management: adequate  Airway patency: patent  Anesthetic complications: No anesthetic complications  PONV Status: none  Cardiovascular status: acceptable  Respiratory status: acceptable  Hydration status: acceptable

## 2020-07-29 NOTE — ANESTHESIA PREPROCEDURE EVALUATION
Anesthesia Evaluation     Patient summary reviewed and Nursing notes reviewed   no history of anesthetic complications:  NPO Solid Status: > 8 hours  NPO Liquid Status: > 4 hours           Airway   Mallampati: II  TM distance: >3 FB  Neck ROM: full  Possible difficult intubation  Comment: General anesthesia 7/26/19 LMA #4.  Dental    (+) poor dentition and partials    Pulmonary - normal exam    breath sounds clear to auscultation  (+) a smoker Former,   (-) COPD, sleep apnea, lung cancer    ROS comment: FINDINGS: Cardiac silhouette is normal in size. Pulmonary  vascularity is unremarkable.      No focal infiltrate or consolidation.  No pleural effusion.  No  pneumothorax.     Benign calcified granulomata right hilar region.        IMPRESSION:  Pacemaker leads with tips in right atrium and right  ventricle in satisfactory position. There is no evidence of a  pneumothorax.     Electronically signed by:  Jeanmarie Talbot MD  6/11/2020 11:43 AM CDT  Cardiovascular   Exercise tolerance: good (4-7 METS)    ECG reviewed  PT is on anticoagulation therapy  Rhythm: regular  Rate: normal    (+) pacemaker (6/11/20) pacemaker, hypertension, valvular problems/murmurs, past MI  >12 months, CAD, dysrhythmias Atrial Flutter, Paroxysmal Atrial Fib, hyperlipidemia,   (-) angina, DVT    ROS comment: Electronic atrial pacemaker  Right bundle branch block  Left anterior fascicular block  ** Bifascicular block **  Left ventricular hypertrophy with QRS widening  Abnormal ECG  When compared with ECG of 02-JUN-2020 08:51,  Electronic atrial pacemaker has replaced Sinus rhythm  Criteria for Lateral infarct are no longer present  Confirmed by SEBASTIÁN CRAMER, VALENTINE ([id number]) on 7/16/2020 11:23:18 AM· Left ventricular wall thickness is consistent with borderline concentric hypertrophy.  · Estimated EF = 61%.  · Left ventricular systolic function is normal.  · Left ventricular diastolic dysfunction (grade I a) consistent with impaired relaxation.  · Left  atrial cavity size is mildly dilated.  · Mild mitral valve regurgitation is present    Neuro/Psych  (+) psychiatric history Anxiety and Depression,     (-) seizures, TIA, CVA, headaches  GI/Hepatic/Renal/Endo    (+)  GERD,  renal disease (Creatinine 1.44) stones, diabetes mellitus type 2,   (-) hepatitis, liver disease    ROS Comment: HGB 11.0 HCT 31.8    Musculoskeletal     (+) arthralgias,   Abdominal    Substance History - negative use  (-) alcohol use, drug use     OB/GYN negative ob/gyn ROS         Other   arthritis,    history of cancer (bladder)                      Anesthesia Plan    ASA 3     general     intravenous induction     Anesthetic plan, all risks, benefits, and alternatives have been provided, discussed and informed consent has been obtained with: patient and spouse/significant other.

## 2020-07-29 NOTE — ANESTHESIA PROCEDURE NOTES
Airway  Urgency: elective    Date/Time: 7/29/2020 5:15 PM    General Information and Staff    Patient location during procedure: OR  CRNA: Elder Escoto CRNA    Indications and Patient Condition  Indications for airway management: airway protection    Preoxygenated: yes  Mask difficulty assessment: 1 - vent by mask    Final Airway Details  Final airway type: supraglottic airway      Successful airway: I-gel  Size 4    Number of attempts at approach: 1  Assessment: lips, teeth, and gum same as pre-op and atraumatic intubation

## 2020-07-31 LAB
LAB AP CASE REPORT: NORMAL
PATH REPORT.FINAL DX SPEC: NORMAL

## 2020-09-15 ENCOUNTER — TELEPHONE (OUTPATIENT)
Dept: CARDIOLOGY | Facility: CLINIC | Age: 85
End: 2020-09-15

## 2020-09-15 NOTE — TELEPHONE ENCOUNTER
Called patients wife back to let her  Know that Mr. modi can take amiodarone 100mg daily, and to contact his PCP about any more issues of SOB, she is agreeable to this.       ----- Message from Norma Benson sent at 9/15/2020  9:02 AM CDT -----  Contact: [phone]  Pt called stating that he is frequently out of breath and does not feel well is wanting to get into see Dr. Forbes.

## 2020-10-14 ENCOUNTER — CLINICAL SUPPORT (OUTPATIENT)
Dept: CARDIOLOGY | Facility: CLINIC | Age: 85
End: 2020-10-14

## 2020-10-14 DIAGNOSIS — Z95.0 PACEMAKER: ICD-10-CM

## 2020-10-14 DIAGNOSIS — I49.5 SICK SINUS SYNDROME (HCC): Primary | ICD-10-CM

## 2020-10-14 PROCEDURE — 93280 PM DEVICE PROGR EVAL DUAL: CPT | Performed by: NURSE PRACTITIONER

## 2020-10-14 NOTE — PROGRESS NOTES
Pacemaker Evaluation Report    October 14, 2020    Primary Cardiologist: Dr Forbes  Implanting MD: Dr Forbes  :  [place]ott Model: Assurity MRI 2272 Serial Number: [id number]   Implant date: 06/11/2020     Reason for evaluation: routine PPM Office   Cardiac device indication(s): sick sinus syndrome    Battery  RICHARD: 7.9-9.5 years       Interrogation Results  Atrial sensing: P wave: 2.8 mV  Atrial capture: 0.75 V @ 0.5 ms   Atrial lead impedance: 450 ohms  Ventricular sensing: R wave: 7.4 mV  Ventricular capture: 1.0 V @ 0.5 ms  Ventricular lead impedance: right  390 ohms    Parameters  Mode: DDDR  Base Rate: 60    Diagnostic Data  Atrial paced: 83 %   Ventricular paced: 4.2 %  Mode switch: 0%  AT/AF [place]: 0%  AHR: 0  VHR: 0    Intrinsic Rate: 45    Changes made: PVARP change 350 ms to375 ms, rate response made more aggressive, Atrial and Ventricular pulse width decreased to 2.5 V    Conclusions: normal device function    Assessment:  1. Sick sinus syndrome (CMS/HCC)    2. Pacemaker              This document has been electronically signed by LISA Terrell on October 14, 2020 13:24 CDT

## 2020-11-09 ENCOUNTER — LAB (OUTPATIENT)
Dept: LAB | Facility: HOSPITAL | Age: 85
End: 2020-11-09

## 2020-11-09 DIAGNOSIS — Z01.818 PREOP TESTING: Primary | ICD-10-CM

## 2020-11-09 PROCEDURE — C9803 HOPD COVID-19 SPEC COLLECT: HCPCS

## 2020-11-09 PROCEDURE — U0003 INFECTIOUS AGENT DETECTION BY NUCLEIC ACID (DNA OR RNA); SEVERE ACUTE RESPIRATORY SYNDROME CORONAVIRUS 2 (SARS-COV-2) (CORONAVIRUS DISEASE [COVID-19]), AMPLIFIED PROBE TECHNIQUE, MAKING USE OF HIGH THROUGHPUT TECHNOLOGIES AS DESCRIBED BY CMS-2020-01-R: HCPCS | Performed by: INTERNAL MEDICINE

## 2020-11-10 LAB
COVID LABCORP PRIORITY: NORMAL
SARS-COV-2 RNA RESP QL NAA+PROBE: NOT DETECTED

## 2020-11-12 ENCOUNTER — ANESTHESIA EVENT (OUTPATIENT)
Dept: GASTROENTEROLOGY | Facility: HOSPITAL | Age: 85
End: 2020-11-12

## 2020-11-12 ENCOUNTER — HOSPITAL ENCOUNTER (OUTPATIENT)
Facility: HOSPITAL | Age: 85
Setting detail: HOSPITAL OUTPATIENT SURGERY
Discharge: HOME | End: 2020-11-12
Attending: INTERNAL MEDICINE | Admitting: INTERNAL MEDICINE

## 2020-11-12 ENCOUNTER — ANESTHESIA (OUTPATIENT)
Dept: GASTROENTEROLOGY | Facility: HOSPITAL | Age: 85
End: 2020-11-12

## 2020-11-12 VITALS
WEIGHT: 192 LBS | OXYGEN SATURATION: 98 % | RESPIRATION RATE: 20 BRPM | BODY MASS INDEX: 29.1 KG/M2 | HEIGHT: 68 IN | HEART RATE: 64 BPM | TEMPERATURE: 97.6 F | DIASTOLIC BLOOD PRESSURE: 61 MMHG | SYSTOLIC BLOOD PRESSURE: 143 MMHG

## 2020-11-12 DIAGNOSIS — K92.2 ACUTE GASTROINTESTINAL HEMORRHAGE: ICD-10-CM

## 2020-11-12 PROCEDURE — 88305 TISSUE EXAM BY PATHOLOGIST: CPT

## 2020-11-12 PROCEDURE — 25010000002 PROPOFOL 10 MG/ML EMULSION: Performed by: NURSE ANESTHETIST, CERTIFIED REGISTERED

## 2020-11-12 RX ORDER — DEXTROSE AND SODIUM CHLORIDE 5; .45 G/100ML; G/100ML
30 INJECTION, SOLUTION INTRAVENOUS CONTINUOUS PRN
Status: DISCONTINUED | OUTPATIENT
Start: 2020-11-12 | End: 2020-11-12 | Stop reason: HOSPADM

## 2020-11-12 RX ORDER — PROPOFOL 10 MG/ML
VIAL (ML) INTRAVENOUS AS NEEDED
Status: DISCONTINUED | OUTPATIENT
Start: 2020-11-12 | End: 2020-11-12 | Stop reason: SURG

## 2020-11-12 RX ORDER — LIDOCAINE HYDROCHLORIDE 20 MG/ML
INJECTION, SOLUTION INTRAVENOUS AS NEEDED
Status: DISCONTINUED | OUTPATIENT
Start: 2020-11-12 | End: 2020-11-12 | Stop reason: SURG

## 2020-11-12 RX ADMIN — PROPOFOL 40 MG: 10 INJECTION, EMULSION INTRAVENOUS at 11:16

## 2020-11-12 RX ADMIN — PROPOFOL 20 MG: 10 INJECTION, EMULSION INTRAVENOUS at 11:24

## 2020-11-12 RX ADMIN — PROPOFOL 30 MG: 10 INJECTION, EMULSION INTRAVENOUS at 11:19

## 2020-11-12 RX ADMIN — PROPOFOL 30 MG: 10 INJECTION, EMULSION INTRAVENOUS at 11:26

## 2020-11-12 RX ADMIN — PROPOFOL 60 MG: 10 INJECTION, EMULSION INTRAVENOUS at 11:13

## 2020-11-12 RX ADMIN — LIDOCAINE HYDROCHLORIDE 100 MG: 20 INJECTION, SOLUTION INTRAVENOUS at 11:13

## 2020-11-12 RX ADMIN — DEXTROSE AND SODIUM CHLORIDE 30 ML/HR: 5; 450 INJECTION, SOLUTION INTRAVENOUS at 10:32

## 2020-11-12 NOTE — ANESTHESIA POSTPROCEDURE EVALUATION
Patient: Evgeny Carter Jr.    Procedure Summary     Date: 11/12/20 Room / Location: [facility] ENDOSCOPY 2 / [facility] ENDOSCOPY    Anesthesia Start: 1104 Anesthesia Stop: 1130    Procedures:       ESOPHAGOGASTRODUODENOSCOPY (N/A )      COLONOSCOPY (N/A ) Diagnosis:       Acute gastrointestinal hemorrhage      (Acute gastrointestinal hemorrhage [K92.2])    Surgeon: Rainer Gates DO Provider: Dixie Fontana CRNA    Anesthesia Type: MAC ASA Status: 3          Anesthesia Type: MAC    Vitals  No vitals data found for the desired time range.          Post Anesthesia Care and Evaluation    Patient location during evaluation: bedside  Patient participation: complete - patient participated  Level of consciousness: sleepy but conscious  Pain score: 0  Pain management: adequate  Airway patency: patent  Anesthetic complications: No anesthetic complications  PONV Status: none  Cardiovascular status: acceptable  Respiratory status: acceptable  Hydration status: acceptable

## 2020-11-12 NOTE — ANESTHESIA PREPROCEDURE EVALUATION
Anesthesia Evaluation     NPO Solid Status: > 8 hours  NPO Liquid Status: > 8 hours           Airway   Mallampati: II  TM distance: >3 FB  Neck ROM: full  No difficulty expected  Dental    (+) upper dentures, poor dentition and lower dentures    Pulmonary - normal exam   (+) a smoker Former, shortness of breath,   Cardiovascular - normal exam    (+) hypertension, past MI  >12 months, CAD, dysrhythmias Atrial Fib, hyperlipidemia,       Neuro/Psych  (+) psychiatric history Anxiety and Depression,     GI/Hepatic/Renal/Endo    (+)   renal disease, diabetes mellitus type 2 well controlled,     Musculoskeletal     Abdominal    Substance History - negative use     OB/GYN          Other   arthritis,                    Anesthesia Plan    ASA 3     MAC     intravenous induction     Anesthetic plan, all risks, benefits, and alternatives have been provided, discussed and informed consent has been obtained with: patient.

## 2020-11-12 NOTE — H&P
Adilson Meneses DO,[facility]  Gastroenterology  Hepatology  Endoscopy  Board Certified in Internal Medicine and gastroenterology  [address], [address]  [place]. [address]   [phone]   [phone]     GASTROENTEROLOGY HISTORY AND PHYSICAL  NOTE   ADILSON MENESES DO.         SUBJECTIVE:   11/12/2020    Name: Evgeny Carter Jr.  DOD: 6/11/1933  *    Chief Complaint:       Subjective : Anemia due to chronic blood loss.  Evaluate etiology    Patient is 87 y.o. male presents with desire for elective EGD with colonoscopy with measures to control bleeding.      ROS/HISTORY/ CURRENT MEDICATIONS/OBJECTIVE/VS/PE:   Review of Systems:  All systems unremarkable unless specified below.  Constitutional   HENT  Eyes   Respiratory    Cardiovascular  Gastrointestinal   Endocrine  Genitourinary    Musculoskeletal   Skin  Allergic/Immunologic    Neurological    Hematological  Psychiatric/Behavioral    History:     Past Medical History:   Diagnosis Date   • Allergic rhinitis    • Arthritis     hips, knees, feet   • Artificial lens present     in position   • Benign neoplasm of skin of eyelid    • Bladder tumor    • Bladder tumor    • BPH (benign prostatic hyperplasia)    • Cancer (CMS/HCC)     SKIN    • Cataract    • Coronary arteriosclerosis    • Cortical senile cataract    • Cough    • Essential hypertension    • Essential hypertension    • Hypercholesterolemia    • Hypercholesterolemia    • Kidney stones    • MI, old 1988   • Nonexudative age-related macular degeneration     mild   • Nuclear cataract    • Open-angle glaucoma     s/p trabec OS, doing well      • Primary open angle glaucoma     OS, s/p trabeculectomy; IOP down with timolol OD     • Pseudophakia of right eye    • Rhinitis    • Upper respiratory infection    • Vitreous detachment     peripheral     Past Surgical History:   Procedure Laterality Date   • ANKLE SURGERY  07/29/2000    Ankle surgery (ORIF right ankle fracture. Bimalleolar  right fracture)   • APPENDECTOMY     • CARDIAC ABLATION     • CARDIAC CATHETERIZATION  07/02/1987    Cardiac cath 88201 (Coronary arthosclerotic heart disease. maintained patency of RCA. Mild inferior hypokinesis.)   • CARDIAC CATHETERIZATION  06/30/1987    Cardiac cath 69316 (Coronary atherosclerotic heart disease. Occluded RCA. Acute inferior infarction. S/P successful angioplasty of RCA)   • CARDIAC ELECTROPHYSIOLOGY PROCEDURE N/A 6/11/2020    Procedure: Pacer single lead;  Surgeon: Johnny Forbes MD;  Location: [facility] CATH INVASIVE LOCATION;  Service: Cardiology;  Laterality: N/A;   • CATARACT EXTRACTION  04/01/2010    Remove cataract, insert lens (Complicated cataract extraction with intraocular lens implantation, right eye, Marcelo SN-60 WF serial # [id number]: 20.5 diopter)   • CATARACT EXTRACTION W/ INTRAOCULAR LENS IMPLANT Left 3/23/2018    Procedure: CATARACT PHACO EXTRACTION WITH INTRAOCULAR LENS IMPLANT;  Surgeon: Oli Haley MD;  Location: [facility] OR;  Service: Ophthalmology   • COLONOSCOPY N/A 5/9/2019    Procedure: COLONOSCOPY;  Surgeon: Rainer Gates DO;  Location: [facility] ENDOSCOPY;  Service: Gastroenterology   • ENDOSCOPY  02/19/1990    EGD 46685 (Olympus video endoscope.Fibrin-like patch in stomach)   • EYE SURGERY  02/02/2005    Eye surgery procedure (Repair of operative wound leak, left eye. S/P trabeculectomy with operative wound leak)   • EYE SURGERY  01/19/2005    Eye surgery procedure (Trabeculectomy, left eye. 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 degeneration)    • OTHER SURGICAL HISTORY  02/26/2015    EXTENDED VISUAL FIELDS STUDY 47976 (Primary open angle glaucoma)    • OTHER SURGICAL HISTORY  02/26/2015    EXTENDED VISUAL FIELDS STUDY 18861 (Primary open angle  glaucoma)    • OTHER SURGICAL HISTORY  2016    OCT DISC NFL 98057 (Open-angle glaucoma)    • OTHER SURGICAL HISTORY      OCT DISC NFL 15027 (Primary open angle glaucoma) (3): 2015, 2014, 2013   • OTHER SURGICAL HISTORY  2016    OPTIC NERVE HEAD EVAL PERFORMED  (Open-angle glaucoma)    • TRANSURETHRAL RESECTION OF BLADDER TUMOR N/A 2018    Procedure: CYSTOSCOPY TRANSURETHRAL RESECTION OF BLADDER TUMOR;  Surgeon: Donovan Conner MD;  Location: [facility] OR;  Service: Urology   • TRANSURETHRAL RESECTION OF BLADDER TUMOR N/A 2019    Procedure: CYSTOSCOPY TRANSURETHRAL RESECTION OF BLADDER TUMOR;  Surgeon: Donovan Conner MD;  Location: [facility] OR;  Service: Urology   • TRANSURETHRAL RESECTION OF BLADDER TUMOR N/A 2020    Procedure: CYSTOSCOPY TRANSURETHRAL RESECTION OF BLADDER TUMOR;  Surgeon: Donovan Conner MD;  Location: [facility] OR;  Service: Urology;  Laterality: N/A;   • UVULECTOMY  1969    EXCISION OF UVULA 86178 (partial uvulectomy with excision of papilloma. Redunant uvula with a small papilloma)     Family History   Problem Relation Age of Onset   • No Known Problems Mother    • No Known Problems Father      Social History     Tobacco Use   • Smoking status: Former Smoker     Quit date:      Years since quittin.8   • Smokeless tobacco: Current User     Types: Snuff   Substance Use Topics   • Alcohol use: No   • Drug use: No     Prior to Admission medications    Medication Sig Start Date End Date Taking? Authorizing Provider   amiodarone (PACERONE) 100 MG tablet Take 2 tablets by mouth Daily.  Patient taking differently: Take 100 mg by mouth Daily. Takes 100mg daily 20  Yes Johnny Forbes MD   apixaban (ELIQUIS) 5 MG tablet tablet Take 2.5 mg by mouth 2 (Two) Times a Day. Last dose to be taken 18 prior to surgery   Yes Ene Farmer MD   aspirin 81 MG tablet Take 81 mg by mouth Daily. Last dose 5/1/18 10/23/16  Yes Ene Farmer  "MD   dilTIAZem CD (CARDIZEM CD) 180 MG 24 hr capsule Take 1 capsule by mouth Daily. 6/12/20  Yes Johnny Forbes MD   lisinopril-hydrochlorothiazide (PRINZIDE,ZESTORETIC) 20-12.5 MG per tablet Take 1 tablet by mouth Daily. 10/23/16  Yes Ene Farmer MD   loratadine (CLARITIN) 10 MG tablet Take 10 mg by mouth Daily.   Yes Ene Farmer MD   metFORMIN (GLUCOPHAGE) 500 MG tablet Take 500 mg by mouth 2 (Two) Times a Day. 10/23/16  Yes Ene Farmer MD   Multiple Vitamins-Minerals (MENS MULTIVITAMIN PLUS PO) Take 1 tablet by mouth Daily. 10/23/16  Yes Ene Farmer MD   oxyCODONE-acetaminophen (PERCOCET) 7.5-325 MG per tablet Take 1-2 tablets by mouth Every 4 (Four) Hours As Needed (Pain). 7/29/20  Yes [place]   terazosin (HYTRIN) 2 MG capsule Take 2 mg by mouth Every Night.   Yes ProviderEne MD   timolol (TIMOPTIC) 0.5 % ophthalmic solution Administer 1 drop to both eyes 2 (Two) Times a Day.   Yes ProviderEne MD     Allergies:  Patient has no known allergies.    I have reviewed the patients medical history, surgical history and family history in the available medical record system.     Current Medications:     No current facility-administered medications for this encounter.        Objective     Physical Exam:      /61   Pulse 64   Temp 97.6 °F (36.4 °C)   Resp 20   Ht 172.7 cm (68\")   Wt 87.1 kg (192 lb)   SpO2 98%   BMI 29.19 kg/m²   Physical Exam:  General Appearance:    Alert, cooperative, in no acute distress   Head:    Normocephalic, without obvious abnormality, atraumatic   Eyes:            Lids and lashes normal, conjunctivae and sclerae normal, no   icterus, no pallor, corneas clear, PERRLA   Ears:    Ears appear intact with no abnormalities noted   Throat:   No oral lesions, no thrush, oral mucosa moist   Neck:   No adenopathy, supple, trachea midline, no thyromegaly, no     carotid bruit, no JVD   Back:     No kyphosis present, no " scoliosis present, no skin lesions,       erythema or scars, no tenderness to percussion or                   palpation,   range of motion normal   Lungs:     Clear to auscultation,respirations regular, even and                   unlabored    Heart:    Regular rhythm and normal rate, normal S1 and S2, no            murmur, no gallop, no rub, no click   Breast Exam:    Deferred   Abdomen:     Normal bowel sounds, no masses, no organomegaly, soft        non-tender, non-distended, no guarding, no rebound                 tenderness   Genitalia:    Deferred   Extremities:   Moves all extremities well, no edema, no cyanosis, no              redness   Pulses:   Pulses palpable and equal bilaterally   Skin:   No bleeding, bruising or rash   Lymph nodes:   No palpable adenopathy   Neurologic:   Cranial nerves 2 - 12 grossly intact, sensation intact, DTR        present and equal bilaterally      Results Review:     Lab Results   Component Value Date    WBC 6.12 06/11/2020    WBC 6.37 05/12/2020    WBC 6.44 05/11/2020    HGB 11.0 (L) 06/11/2020    HGB 11.2 (L) 05/12/2020    HGB 11.8 (L) 05/11/2020    HCT 31.8 (L) 06/11/2020    HCT 32.0 (L) 05/12/2020    HCT 34.7 (L) 05/11/2020     06/11/2020     05/12/2020     05/11/2020             No results found for: LIPASE  Lab Results   Component Value Date    INR 1.07 06/11/2020    INR 1.13 05/10/2020    INR 1.21 (H) 05/09/2020     No results found for: CULTURE    Radiology Review:  Imaging Results (Last 72 Hours)     ** No results found for the last 72 hours. **           I reviewed the patient's new clinical results.  I reviewed the patient's new imaging results and agree with the interpretation.     ASSESSMENT/PLAN:   ASSESSMENT:  1.  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  Esophagogastroduodenoscopy with measures to control bleeding and colonoscopy with measures to control bleeding    Risk and benefits associated with the procedure are reviewed with  the patient.  The patient wished to proceed     Rainer Gates,   11/12/20  10:29 CST

## 2020-11-16 LAB
LAB AP CASE REPORT: NORMAL
PATH REPORT.FINAL DX SPEC: NORMAL

## 2020-12-03 RX ORDER — DILTIAZEM HYDROCHLORIDE 180 MG/1
CAPSULE, COATED, EXTENDED RELEASE ORAL
Qty: 30 CAPSULE | Refills: 5 | Status: SHIPPED | OUTPATIENT
Start: 2020-12-03 | End: 2021-06-01

## 2021-01-13 ENCOUNTER — IMMUNIZATION (OUTPATIENT)
Dept: VACCINE CLINIC | Facility: HOSPITAL | Age: 86
End: 2021-01-13

## 2021-01-13 PROCEDURE — 91300 HC SARSCOV02 VAC 30MCG/0.3ML IM: CPT | Performed by: THORACIC SURGERY (CARDIOTHORACIC VASCULAR SURGERY)

## 2021-01-13 PROCEDURE — 0001A: CPT | Performed by: THORACIC SURGERY (CARDIOTHORACIC VASCULAR SURGERY)

## 2021-01-20 ENCOUNTER — OFFICE VISIT (OUTPATIENT)
Dept: CARDIOLOGY | Facility: CLINIC | Age: 86
End: 2021-01-20

## 2021-01-20 VITALS
HEIGHT: 68 IN | SYSTOLIC BLOOD PRESSURE: 138 MMHG | OXYGEN SATURATION: 99 % | WEIGHT: 192 LBS | HEART RATE: 62 BPM | DIASTOLIC BLOOD PRESSURE: 84 MMHG | BODY MASS INDEX: 29.1 KG/M2

## 2021-01-20 DIAGNOSIS — I48.92 ATRIAL FLUTTER, UNSPECIFIED TYPE (HCC): Primary | ICD-10-CM

## 2021-01-20 DIAGNOSIS — I25.10 CORONARY ARTERY DISEASE INVOLVING NATIVE CORONARY ARTERY OF NATIVE HEART WITHOUT ANGINA PECTORIS: ICD-10-CM

## 2021-01-20 DIAGNOSIS — I48.0 PAROXYSMAL ATRIAL FIBRILLATION (HCC): ICD-10-CM

## 2021-01-20 DIAGNOSIS — I10 BENIGN ESSENTIAL HYPERTENSION: ICD-10-CM

## 2021-01-20 DIAGNOSIS — E78.5 HYPERLIPIDEMIA, UNSPECIFIED HYPERLIPIDEMIA TYPE: ICD-10-CM

## 2021-01-20 DIAGNOSIS — I49.5 SICK SINUS SYNDROME (HCC): ICD-10-CM

## 2021-01-20 PROCEDURE — 99214 OFFICE O/P EST MOD 30 MIN: CPT | Performed by: INTERNAL MEDICINE

## 2021-01-20 RX ORDER — FERROUS SULFATE 325(65) MG
325 TABLET ORAL 2 TIMES DAILY
COMMUNITY
Start: 2020-12-28

## 2021-01-20 NOTE — PROGRESS NOTES
Evgeny Carter Jr.  87 y.o. male    1/20/2021  1. Atrial flutter, unspecified type (CMS/HCC)    2. Coronary artery disease involving native coronary artery of native heart without angina pectoris    3. Benign essential hypertension    4. Hyperlipidemia, unspecified hyperlipidemia type    5. Paroxysmal atrial fibrillation (CMS/HCC)    6. Sick sinus syndrome (CMS/HCC)        History of Present Illness:    Body mass index is 29.2 kg/m². BMI is above normal parameters. Recommendations include: exercise counseling, nutrition counseling and referral to primary care.      87 years old patient presented for routine follow-up recently pacemaker interrogated good sensing pacing threshold reason for interrogation sudden change in clinical deconditioning with increased fatigability lack of energy noted have low hemoglobin high iron started on iron supplement: Get better there is significant improvement in quality of life. No symptom orthopnea PND chest pain or intermittent claudication reported status post pacemaker implantation for management management of symptomatic sick sinus syndrome with history of paroxysmal atrial fibrillation requiring interested AV niki and amiodarone and continued having breakthrough.  No orthopnea or PND reported no fever cough or chills dysuria or hematuria reported.  On oral anticoagulation currently in sinus rhythm with a past medical history significant for hypertension, hypertensive heart disease, CAD status post PTCA stent, atrial flutter with a counterclockwise mechanism state  post-EP study and flutter ablation and noted to be in atrial fibrillation with a resting heart rate 110 bpm.  Patient started on oral anticoagulation and amiodarone. Last echoc reported mildly dilated aortic root and repeat echo in May 2020 similar aortic root diameter 4.0 in August 2018.  Post atrial flutter ablation patient developed A. fib initially managed with amiodarone 200 mg then subsequent decrease  to 200 and then 50 mg and given the significant bradyarrhythmia amiodarone was discontinued  He denies orthopnea, PND, syncope or near syncopal episode.  Denies any polydipsia polyuria.  Denies any fever cough or chills.  No dysuria or hematuria reported.  Waiting for bladder surgery with        Date of Procedure: 06/11/20          PROCEDURE(S) PERFORMED:    1. Implantation of a dual-chamber permanent pacemaker.        INDICATIONS FOR PROCDEDURE:            Sick sinus     Echo 5/11/2020  · Left ventricular wall thickness is consistent with borderline concentric hypertrophy.  · Estimated EF = 61%.  · Left ventricular systolic function is normal.  · Left ventricular diastolic dysfunction (grade I a) consistent with impaired relaxation.  · Left atrial cavity size is mildly dilated.  · Mild mitral valve regurgitation is present    ECHO 8/15/18  ·  Mild tricuspid valve regurgitation is present.  · Mild pulmonic valve regurgitation is present.  · Left atrial cavity size is mild-to-moderately dilated.  · Left ventricular systolic function is normal. Estimated EF = 55%.  · Mild mitral valve regurgitation is present  Left ventricular diastolic dysfunction (grade I a) consistent with impaired relaxation         LFTs November 6, 2020      Total Protein   6.4 - 8.2 g/dL 7.4    Albumin   3.4 - 5.0 g/dL 3.7    Total Bilirubin   0.20 - 1.00 mg/dL 0.30    AST (SGOT)   15 - 37 U/L 35    ALT (SGPT)   16 - 63 U/L 44    Alkaline Phosphatase   46 - 116 U/L 87    GFR-mch    mL/min 48          Lipid October 28, 2020    Total Cholesterol   <200 mg/dL 218High     Triglycerides   30 - 150 mg/dL 142    HDL Cholesterol   32 - 72 mg/dL 46    LDL Cholesterol    5 - 99 mg/dL 140High     Chol/HDL Ratio   4.2 - 5.6 4.7        10/2016      Ref Range & Units 1yr ago      Total Cholesterol 0 - 199 mg/dl 169   Comments: CHOL DESIRED: < 200 MG/DL   Triglycerides 20 - 199 mg/dl 188   Comments: TRIG DESIRED: < 200 MG/DL   HDL Cholesterol 60 - 200  mg/dl 37 (L)   Comments: HDL AVERAGE RISK: 35 - 60 MG/DL   LDL Cholesterol  0 - 129 mg/dl 94       7/2019  Total Cholesterol  100 - 200 mg/dL 196    Triglycerides  30 - 150 mg/dL 199 Abnormally high     HDL Cholesterol  32 - 72 mg/dL 41    LDL Cholesterol   5 - 99 mg/dL 112 Abnormally high     Chol/HDL Ratio  4.2 - 5.6 4.8           Stress TEST ; 10/2016   Normal LV systolic function overall with an EF of 70% with inferior       wall hypokinesis.  2.   Moderate size predominantly fixed defect in the inferior wall and       apex with partial reversibility.     ECHO 10/2016  1.   Mild concentric left ventricular hypertrophy with estimated       ejection fraction 50% to 60%.  2.   Mildly dilated aortic root with diameter of 4.0.  3.   Mildly dilated left atrium with diameter of 4.4.  4.   Aortic valve appears mildly sclerotic without any hemodynamic       significant aortic stenosis. No aortic regurgitation seen.  5.   Pulmonic valve not well visualized. There is trace pulmonic       regurgitation. There is a trace mitral regurgitation. No       intracardiac mass, pericardial effusion, cardiac thrombus seen.  6.   Normal right ventricular size and function      X-ray chest June 11, 2020    IMPRESSION:  Pacemaker leads with tips in right atrium and right  ventricle in satisfactory position. There is no evidence of a  pneumothorax.    SUBJECTIVE:    No Known Allergies      Past Medical History:   Diagnosis Date   • Allergic rhinitis    • Arthritis     hips, knees, feet   • Artificial lens present     in position   • Benign neoplasm of skin of eyelid    • Bladder tumor    • Bladder tumor    • BPH (benign prostatic hyperplasia)    • Cancer (CMS/HCC)     SKIN    • Cataract    • Coronary arteriosclerosis    • Cortical senile cataract    • Cough    • Diabetes mellitus (CMS/HCC)    • Essential hypertension    • Essential hypertension    • Hypercholesterolemia    • Hypercholesterolemia    • Kidney stones    • MI, old 1988   •  Nonexudative age-related macular degeneration     mild   • Nuclear cataract    • Open-angle glaucoma     s/p trabec OS, doing well      • Primary open angle glaucoma     OS, s/p trabeculectomy; IOP down with timolol OD     • Pseudophakia of right eye    • Rhinitis    • Upper respiratory infection    • Vitreous detachment     peripheral         Past Surgical History:   Procedure Laterality Date   • ANKLE SURGERY  07/29/2000    Ankle surgery (ORIF right ankle fracture. Bimalleolar right fracture)   • APPENDECTOMY     • CARDIAC ABLATION     • CARDIAC CATHETERIZATION  07/02/1987    Cardiac cath 96825 (Coronary arthosclerotic heart disease. maintained patency of RCA. Mild inferior hypokinesis.)   • CARDIAC CATHETERIZATION  06/30/1987    Cardiac cath 72409 (Coronary atherosclerotic heart disease. Occluded RCA. Acute inferior infarction. S/P successful angioplasty of RCA)   • CARDIAC ELECTROPHYSIOLOGY PROCEDURE N/A 6/11/2020    Procedure: Pacer single lead;  Surgeon: Johnny Forbes MD;  Location: [facility] CATH INVASIVE LOCATION;  Service: Cardiology;  Laterality: N/A;   • CATARACT EXTRACTION  04/01/2010    Remove cataract, insert lens (Complicated cataract extraction with intraocular lens implantation, right eye, Marcelo SN-60  serial # [id number]: 20.5 diopter)   • CATARACT EXTRACTION W/ INTRAOCULAR LENS IMPLANT Left 3/23/2018    Procedure: CATARACT PHACO EXTRACTION WITH INTRAOCULAR LENS IMPLANT;  Surgeon: Oli Haley MD;  Location: [facility] OR;  Service: Ophthalmology   • COLONOSCOPY N/A 5/9/2019    Procedure: COLONOSCOPY;  Surgeon: Rainer Gates DO;  Location: [facility] ENDOSCOPY;  Service: Gastroenterology   • COLONOSCOPY N/A 11/12/2020    Procedure: COLONOSCOPY;  Surgeon: Rainer Gates DO;  Location: [facility] ENDOSCOPY;  Service: Gastroenterology;  Laterality: N/A;   • ENDOSCOPY  02/19/1990    EGD 98734 (Olympus video endoscope.Fibrin-like patch in stomach)   • ENDOSCOPY N/A 11/12/2020    Procedure:  ESOPHAGOGASTRODUODENOSCOPY;  Surgeon: Rainer Gates DO;  Location: [facility] ENDOSCOPY;  Service: Gastroenterology;  Laterality: N/A;   • EYE SURGERY  02/02/2005    Eye surgery procedure (Repair of operative wound leak, left eye. S/P trabeculectomy with operative wound leak)   • EYE SURGERY  01/19/2005    Eye surgery procedure (Trabeculectomy, left eye. 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 degeneration)    • OTHER SURGICAL HISTORY  02/26/2015    EXTENDED VISUAL FIELDS STUDY 99113 (Primary open angle glaucoma)    • OTHER SURGICAL HISTORY  02/26/2015    EXTENDED VISUAL FIELDS STUDY 60106 (Primary open angle glaucoma)    • OTHER SURGICAL HISTORY  06/16/2016    OCT DISC NFL 70649 (Open-angle glaucoma)    • OTHER SURGICAL HISTORY      OCT DISC NFL 93941 (Primary open angle glaucoma) (3): 06/17/2015, 03/19/2014, 03/04/2013   • OTHER SURGICAL HISTORY  06/16/2016    OPTIC NERVE HEAD EVAL PERFORMED 2027F (Open-angle glaucoma)    • TRANSURETHRAL RESECTION OF BLADDER TUMOR N/A 5/9/2018    Procedure: CYSTOSCOPY TRANSURETHRAL RESECTION OF BLADDER TUMOR;  Surgeon: Donovan Conner MD;  Location: [facility] OR;  Service: Urology   • TRANSURETHRAL RESECTION OF BLADDER TUMOR N/A 7/26/2019    Procedure: CYSTOSCOPY TRANSURETHRAL RESECTION OF BLADDER TUMOR;  Surgeon: Donovan Conner MD;  Location: [facility] OR;  Service: Urology   • TRANSURETHRAL RESECTION OF BLADDER TUMOR N/A 7/29/2020    Procedure: CYSTOSCOPY TRANSURETHRAL RESECTION OF BLADDER TUMOR;  Surgeon: Donovan Conner MD;  Location: [facility] OR;  Service: Urology;  Laterality: N/A;   • UVULECTOMY  1969    EXCISION OF UVULA 29044 (partial uvulectomy with excision of papilloma. Redunant uvula with a small papilloma)         Family History   Problem Relation Age of Onset    • No Known Problems Mother    • No Known Problems Father          Social History     Socioeconomic History   • Marital status:      Spouse name: Not on file   • Number of children: Not on file   • Years of education: Not on file   • Highest education level: Not on file   Tobacco Use   • Smoking status: Former Smoker     Quit date:      Years since quittin.0   • Smokeless tobacco: Current User     Types: Snuff   Substance and Sexual Activity   • Alcohol use: No   • Drug use: No   • Sexual activity: Defer         Current Outpatient Medications   Medication Sig Dispense Refill   • amiodarone (PACERONE) 100 MG tablet Take 2 tablets by mouth Daily. (Patient taking differently: Take 100 mg by mouth Daily. Takes 100mg daily) 30 tablet 6   • apixaban (ELIQUIS) 5 MG tablet tablet Take 2.5 mg by mouth 2 (Two) Times a Day. Last dose to be taken 18 prior to surgery     • aspirin 81 MG tablet Take 81 mg by mouth Daily. Last dose 18     • dilTIAZem CD (CARDIZEM CD) 180 MG 24 hr capsule TAKE ONE CAPSULE BY MOUTH DAILY 30 capsule 5   • FeroSul 325 (65 Fe) MG tablet Take 325 mg by mouth 2 (Two) Times a Day.     • lisinopril-hydrochlorothiazide (PRINZIDE,ZESTORETIC) 20-12.5 MG per tablet Take 1 tablet by mouth Daily.     • loratadine (CLARITIN) 10 MG tablet Take 10 mg by mouth Daily.     • metFORMIN (GLUCOPHAGE) 500 MG tablet Take 500 mg by mouth 2 (Two) Times a Day.     • Multiple Vitamins-Minerals (MENS MULTIVITAMIN PLUS PO) Take 1 tablet by mouth Daily.     • terazosin (HYTRIN) 2 MG capsule Take 2 mg by mouth Every Night.     • timolol (TIMOPTIC) 0.5 % ophthalmic solution Administer 1 drop to both eyes 2 (Two) Times a Day.       No current facility-administered medications for this visit.            Review of Systems:     Constitutional:  Denies recent weight loss, weight gain,      HENT:  Denies any hearing loss, epistaxis, hoarseness    Eyes: No blurring    Respiratory:  Denies dyspnea with  "exertion    Cardiovascular: See H&P    Gastrointestinal: No abdominal pain or vomiting or diarrhea    Endocrine: No polydipsia polyuria    Genitourinary: Negative.      Musculoskeletal: Denies any history of arthritic symptoms or back problems.     Skin:  Denies any change in hair or nails, rashes, or skin lesions.     Allergic/Immunologic: Negative.  Negative for environmental allergies    Neurological:  Denies any history of recurrent headaches, strokes, TIA, or seizure disorder.     Hematological: Denies any food allergies, seasonal allergies, bleeding disorders    Psychiatric/Behavioral: Denies any history of depression, substance abuse, or change in cognitive function.       OBJECTIVE:    /84 (BP Location: Left arm, Patient Position: Sitting, Cuff Size: Adult)   Pulse 62   Ht 172.7 cm (67.99\")   Wt 87.1 kg (192 lb)   SpO2 99%   BMI 29.20 kg/m²       Physical Exam:     Constitutional: Cooperative, alert and oriented, well-developed, well-nourished, in no acute distress.     HENT:   Head: Normocephalic and atraumatic, oral mucosa moist, conjunctive pink no jugular distention    Cardiovascular: Regular rhythm, S1 and S2 normal, no S3 or S4. Apical impulse not displaced. No murmurs, gallops, or rubs detected.     Pulmonary/Chest: Chest: Good bilateral air entry     Abdominal: Abdominal soft nontender    Musculoskeletal: Positive peripheral pulses no edema    Neurological: No gross motor or sensory deficits noted, affect appropriate    Skin: Warm and dry to the touch, no apparent skin lesions or masses noted.     Psychiatric: Good personality and good mood        Procedures      Lab Results   Component Value Date    WBC 6.12 06/11/2020    HGB 11.0 (L) 06/11/2020    HCT 31.8 (L) 06/11/2020    MCV 97.2 (H) 06/11/2020     06/11/2020     Lab Results   Component Value Date    GLUCOSE 122 (H) 07/23/2020    BUN 26 (H) 07/23/2020    CREATININE 1.44 (H) 07/23/2020    EGFRIFNONA 46 (L) 07/23/2020    BCR 18.1 " 07/23/2020    CO2 25.0 07/23/2020    CALCIUM 9.1 07/23/2020    ALBUMIN 4.00 05/10/2020    AST 18 05/10/2020    ALT 17 05/10/2020     Lab Results   Component Value Date    CHOL 218 (H) 10/28/2020    CHOL 196 07/09/2019    CHOL 190 03/30/2018     Lab Results   Component Value Date    TRIG 142 10/28/2020    TRIG 199 (H) 07/09/2019    TRIG 198 03/30/2018     Lab Results   Component Value Date    HDL 46 10/28/2020    HDL 41 07/09/2019    HDL 42 03/30/2018     No components found for: LDLCALC  Lab Results   Component Value Date     (H) 10/28/2020     (H) 07/09/2019     (H) 03/30/2018     No results found for: HDLLDLRATIO  No components found for: CHOLHDL  Lab Results   Component Value Date    HGBA1C 5.6 10/28/2020     Lab Results   Component Value Date    TSH 1.850 05/10/2020           ASSESSMENT AND PLAN:    #1 paroxysmal atrial fibrillation currently in sinus rhythm on oral anticoagulation to decrease the cardiac embolization    Continue amiodarone maintaining sinus rhythm recent LFTs within normal range. And previous chest x-ray no acute pathology reported.  Continue oral anticoagulation to decrease risk of cardiac embolization      #2  Sick sinus syndrome with a significant bradyarrhythmia and paroxysmal atrial fibrillation status post pacemaker implantation for evaluations of AV niki and antiarrhythmic    Recent pacer interrogation have good sensing and pacing threshold     #3 history of atrial flutter status post EP study and flutter ablation no further recurrence     #4 hypertension good blood pressure control on lisinopril hydrochlorothiazide    #4 CAD stable on baby aspirin will continue       Diagnoses and all orders for this visit:    1. Atrial flutter, unspecified type (CMS/HCC) (Primary)    2. Coronary artery disease involving native coronary artery of native heart without angina pectoris    3. Benign essential hypertension    4. Hyperlipidemia, unspecified hyperlipidemia type    5.  Paroxysmal atrial fibrillation (CMS/HCC)    6. Sick sinus syndrome (CMS/HCC)        Johnny Forbes MD  1/20/2021  11:27 CST

## 2021-02-03 ENCOUNTER — IMMUNIZATION (OUTPATIENT)
Dept: VACCINE CLINIC | Facility: HOSPITAL | Age: 86
End: 2021-02-03

## 2021-02-03 PROCEDURE — 0002A: CPT | Performed by: THORACIC SURGERY (CARDIOTHORACIC VASCULAR SURGERY)

## 2021-02-03 PROCEDURE — 91300 HC SARSCOV02 VAC 30MCG/0.3ML IM: CPT | Performed by: THORACIC SURGERY (CARDIOTHORACIC VASCULAR SURGERY)

## 2021-04-09 ENCOUNTER — OFFICE VISIT (OUTPATIENT)
Dept: SURGERY | Facility: CLINIC | Age: 86
End: 2021-04-09

## 2021-04-09 VITALS
DIASTOLIC BLOOD PRESSURE: 62 MMHG | HEART RATE: 62 BPM | HEIGHT: 67 IN | BODY MASS INDEX: 30.92 KG/M2 | OXYGEN SATURATION: 98 % | WEIGHT: 197 LBS | SYSTOLIC BLOOD PRESSURE: 138 MMHG

## 2021-04-09 DIAGNOSIS — E11.9 TYPE 2 DIABETES MELLITUS WITHOUT COMPLICATION, WITHOUT LONG-TERM CURRENT USE OF INSULIN (HCC): ICD-10-CM

## 2021-04-09 DIAGNOSIS — C67.9 MALIGNANT NEOPLASM OF URINARY BLADDER, UNSPECIFIED SITE (HCC): ICD-10-CM

## 2021-04-09 DIAGNOSIS — C44.91 CANCER OF THE SKIN, BASAL CELL: Primary | ICD-10-CM

## 2021-04-09 DIAGNOSIS — I77.810 DILATED AORTIC ROOT (HCC): ICD-10-CM

## 2021-04-09 PROCEDURE — 99203 OFFICE O/P NEW LOW 30 MIN: CPT | Performed by: SURGERY

## 2021-04-09 RX ORDER — FLUTICASONE PROPIONATE 50 MCG
SPRAY, SUSPENSION (ML) NASAL
COMMUNITY
Start: 2021-03-22

## 2021-04-09 NOTE — PATIENT INSTRUCTIONS
"BMI for Adults  What is BMI?  Body mass index (BMI) is a number that is calculated from a person's weight and height. BMI can help estimate how much of a person's weight is composed of fat. BMI does not measure body fat directly. Rather, it is an alternative to procedures that directly measure body fat, which can be difficult and expensive.  BMI can help identify people who may be at higher risk for certain medical problems.  What are BMI measurements used for?  BMI is used as a screening tool to identify possible weight problems. It helps determine whether a person is obese, overweight, a healthy weight, or underweight.  BMI is useful for:  · Identifying a weight problem that may be related to a medical condition or may increase the risk for medical problems.  · Promoting changes, such as changes in diet and exercise, to help reach a healthy weight. BMI screening can be repeated to see if these changes are working.  How is BMI calculated?  BMI involves measuring your weight in relation to your height. Both height and weight are measured, and the BMI is calculated from those numbers. This can be done either in English ([place] or metric measurements. Note that charts and online BMI calculators are available to help you find your BMI quickly and easily without having to do these calculations yourself.  To calculate your BMI in English ([place] measurements:    1. Measure your weight in pounds (lb).  2. Multiply the number of pounds by 703.  ? For example, for a person who weighs 180 lb, multiply that number by 703, which equals 126,540.  3. Measure your height in inches. Then multiply that number by itself to get a measurement called \"inches squared.\"  ? For example, for a person who is 70 inches tall, the \"inches squared\" measurement is 70 inches x 70 inches, which equals 4,900 inches squared.  4. Divide the total from step 2 (number of lb x 703) by the total from step 3 (inches squared): 126,540 ÷ 4,900 = 25.8. This is " "your BMI.  To calculate your BMI in metric measurements:  1. Measure your weight in kilograms (kg).  2. Measure your height in meters (m). Then multiply that number by itself to get a measurement called \"meters squared.\"  ? For example, for a person who is 1.75 m tall, the \"meters squared\" measurement is 1.75 m x 1.75 m, which is equal to 3.1 meters squared.  3. Divide the number of kilograms (your weight) by the meters squared number. In this example: 70 ÷ 3.1 = 22.6. This is your BMI.  What do the results mean?  BMI charts are used to identify whether you are underweight, normal weight, overweight, or obese. The following guidelines will be used:  · Underweight: BMI less than 18.5.  · Normal weight: BMI between 18.5 and 24.9.  · Overweight: BMI between 25 and 29.9.  · Obese: BMI of 30 or above.  Keep these notes in mind:  · Weight includes both fat and muscle, so someone with a muscular build, such as an athlete, may have a BMI that is higher than 24.9. In cases like these, BMI is not an accurate measure of body fat.  · To determine if excess body fat is the cause of a BMI of 25 or higher, further assessments may need to be done by a health care provider.  · BMI is usually interpreted in the same way for men and women.  Where to find more information  For more information about BMI, including tools to quickly calculate your BMI, go to these websites:  · Centers for Disease Control and Prevention: www.cdc.gov  · American Heart Association: www.heart.org  · National Heart, Lung, and Blood [place]: www.nhlbi.nih.gov  Summary  · Body mass index (BMI) is a number that is calculated from a person's weight and height.  · BMI may help estimate how much of a person's weight is composed of fat. BMI can help identify those who may be at higher risk for certain medical problems.  · BMI can be measured using English measurements or metric measurements.  · BMI charts are used to identify whether you are underweight, normal " weight, overweight, or obese.  This information is not intended to replace advice given to you by your health care provider. Make sure you discuss any questions you have with your health care provider.  Document Revised: 09/09/2020 Document Reviewed: 07/17/2020  [employer]vier Patient Education © 2021 [employer]

## 2021-04-09 NOTE — PROGRESS NOTES
Chief Complaint   Patient presents with   • Basal Cell Carcinoma     per jodee cotton        HPI  BCC on the central chest here for evaluation.  Past Medical History:   Diagnosis Date   • Allergic rhinitis    • Arthritis     hips, knees, feet   • Artificial lens present     in position   • Benign neoplasm of skin of eyelid    • Bladder tumor    • Bladder tumor    • BPH (benign prostatic hyperplasia)    • Cancer (CMS/HCC)     SKIN    • Cataract    • Coronary arteriosclerosis    • Cortical senile cataract    • Cough    • Diabetes mellitus (CMS/HCC)    • Essential hypertension    • Essential hypertension    • Hypercholesterolemia    • Hypercholesterolemia    • Kidney stones    • MI, old 1988   • Nonexudative age-related macular degeneration     mild   • Nuclear cataract    • Open-angle glaucoma     s/p trabec OS, doing well      • Primary open angle glaucoma     OS, s/p trabeculectomy; IOP down with timolol OD     • Pseudophakia of right eye    • Rhinitis    • Upper respiratory infection    • Vitreous detachment     peripheral       Past Surgical History:   Procedure Laterality Date   • ANKLE SURGERY  07/29/2000    Ankle surgery (ORIF right ankle fracture. Bimalleolar right fracture)   • APPENDECTOMY     • CARDIAC ABLATION     • CARDIAC CATHETERIZATION  07/02/1987    Cardiac cath 86717 (Coronary arthosclerotic heart disease. maintained patency of RCA. Mild inferior hypokinesis.)   • CARDIAC CATHETERIZATION  06/30/1987    Cardiac cath 01703 (Coronary atherosclerotic heart disease. Occluded RCA. Acute inferior infarction. S/P successful angioplasty of RCA)   • CARDIAC ELECTROPHYSIOLOGY PROCEDURE N/A 6/11/2020    Procedure: Pacer single lead;  Surgeon: Johnny Forbes MD;  Location: [facility] INVASIVE LOCATION;  Service: Cardiology;  Laterality: N/A;   • CATARACT EXTRACTION  04/01/2010    Remove cataract, insert lens (Complicated cataract extraction with intraocular lens implantation, right eye, Marcelo SN-60 WF serial  # [id number]: 20.5 diopter)   • CATARACT EXTRACTION W/ INTRAOCULAR LENS IMPLANT Left 3/23/2018    Procedure: CATARACT PHACO EXTRACTION WITH INTRAOCULAR LENS IMPLANT;  Surgeon: Oli Haley MD;  Location: [facility] OR;  Service: Ophthalmology   • COLONOSCOPY N/A 5/9/2019    Procedure: COLONOSCOPY;  Surgeon: Rainer Gates DO;  Location: [facility] ENDOSCOPY;  Service: Gastroenterology   • COLONOSCOPY N/A 11/12/2020    Procedure: COLONOSCOPY;  Surgeon: Rainer Gates DO;  Location: [facility] ENDOSCOPY;  Service: Gastroenterology;  Laterality: N/A;   • ENDOSCOPY  02/19/1990    EGD 00867 (Olympus video endoscope.Fibrin-like patch in stomach)   • ENDOSCOPY N/A 11/12/2020    Procedure: ESOPHAGOGASTRODUODENOSCOPY;  Surgeon: Rainer Gates DO;  Location: [facility] ENDOSCOPY;  Service: Gastroenterology;  Laterality: N/A;   • EYE SURGERY  02/02/2005    Eye surgery procedure (Repair of operative wound leak, left eye. S/P trabeculectomy with operative wound leak)   • EYE SURGERY  01/19/2005    Eye surgery procedure (Trabeculectomy, left eye. 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 degeneration)    • OTHER SURGICAL HISTORY  02/26/2015    EXTENDED VISUAL FIELDS STUDY 64253 (Primary open angle glaucoma)    • OTHER SURGICAL HISTORY  02/26/2015    EXTENDED VISUAL FIELDS STUDY 58997 (Primary open angle glaucoma)    • OTHER SURGICAL HISTORY  06/16/2016    OCT DISC NFL 72479 (Open-angle glaucoma)    • OTHER SURGICAL HISTORY      OCT DISC NFL 70408 (Primary open angle glaucoma) (3): 06/17/2015, 03/19/2014, 03/04/2013   • OTHER SURGICAL HISTORY  06/16/2016    OPTIC NERVE HEAD EVAL PERFORMED 2027F (Open-angle glaucoma)    • TRANSURETHRAL RESECTION OF BLADDER TUMOR N/A 5/9/2018    Procedure: CYSTOSCOPY TRANSURETHRAL  RESECTION OF BLADDER TUMOR;  Surgeon: Donovan Conner MD;  Location: [facility] OR;  Service: Urology   • TRANSURETHRAL RESECTION OF BLADDER TUMOR N/A 7/26/2019    Procedure: CYSTOSCOPY TRANSURETHRAL RESECTION OF BLADDER TUMOR;  Surgeon: Donovan Conner MD;  Location: [facility] OR;  Service: Urology   • TRANSURETHRAL RESECTION OF BLADDER TUMOR N/A 7/29/2020    Procedure: CYSTOSCOPY TRANSURETHRAL RESECTION OF BLADDER TUMOR;  Surgeon: Donovan Conner MD;  Location: [facility] OR;  Service: Urology;  Laterality: N/A;   • UVULECTOMY  1969    EXCISION OF UVULA 37767 (partial uvulectomy with excision of papilloma. Redunant uvula with a small papilloma)         Current Outpatient Medications:   •  amiodarone (PACERONE) 100 MG tablet, Take 2 tablets by mouth Daily. (Patient taking differently: Take 100 mg by mouth Daily. Takes 100mg daily), Disp: 30 tablet, Rfl: 6  •  apixaban (ELIQUIS) 5 MG tablet tablet, Take 2.5 mg by mouth 2 (Two) Times a Day. Last dose to be taken 5/5/18 prior to surgery, Disp: , Rfl:   •  aspirin 81 MG tablet, Take 81 mg by mouth Daily. Last dose 5/1/18, Disp: , Rfl:   •  dilTIAZem CD (CARDIZEM CD) 180 MG 24 hr capsule, TAKE ONE CAPSULE BY MOUTH DAILY, Disp: 30 capsule, Rfl: 5  •  FeroSul 325 (65 Fe) MG tablet, Take 325 mg by mouth 2 (Two) Times a Day., Disp: , Rfl:   •  lisinopril-hydrochlorothiazide (PRINZIDE,ZESTORETIC) 20-12.5 MG per tablet, Take 1 tablet by mouth Daily., Disp: , Rfl:   •  loratadine (CLARITIN) 10 MG tablet, Take 10 mg by mouth Daily., Disp: , Rfl:   •  metFORMIN (GLUCOPHAGE) 500 MG tablet, Take 500 mg by mouth 2 (Two) Times a Day., Disp: , Rfl:   •  Multiple Vitamins-Minerals (MENS MULTIVITAMIN PLUS PO), Take 1 tablet by mouth Daily., Disp: , Rfl:   •  terazosin (HYTRIN) 2 MG capsule, Take 2 mg by mouth Every Night., Disp: , Rfl:   •  timolol (TIMOPTIC) 0.5 % ophthalmic solution, Administer 1 drop to both eyes 2 (Two) Times a Day., Disp: , Rfl:   •  fluticasone (FLONASE) 50 MCG/ACT  nasal spray, ONE SPRAY IN EACH NOSTRIL DAILY, Disp: , Rfl:     No Known Allergies    Family History   Problem Relation Age of Onset   • Cancer Mother    • No Known Problems Father    • Cancer Brother        Social History     Socioeconomic History   • Marital status:      Spouse name: Not on file   • Number of children: Not on file   • Years of education: Not on file   • Highest education level: Not on file   Tobacco Use   • Smoking status: Former Smoker     Quit date:      Years since quittin.3   • Smokeless tobacco: Current User     Types: Snuff   Substance and Sexual Activity   • Alcohol use: No   • Drug use: No   • Sexual activity: Defer       Review of Systems   Constitutional: Negative.    HENT: Negative.    Eyes: Negative.    Respiratory: Negative.    Cardiovascular: Negative.    Gastrointestinal: Positive for constipation and diarrhea.   Endocrine: Negative.    Genitourinary: Positive for difficulty urinating.   Musculoskeletal: Negative.    Skin: Negative.    Allergic/Immunologic: Negative.    Neurological: Negative.    Hematological: Bruises/bleeds easily.   Psychiatric/Behavioral: Negative.        Physical Exam  Cardiovascular:      Rate and Rhythm: Normal rate and regular rhythm.   Pulmonary:      Effort: Pulmonary effort is normal. No respiratory distress.   Musculoskeletal:      Cervical back: Normal range of motion.   Skin:     General: Skin is warm and dry.                  ASSESSMENT    Diagnoses and all orders for this visit:    1. Cancer of the skin, basal cell (Primary)    2. Dilated aortic root (CMS/HCC)    3. Type 2 diabetes mellitus without complication, without long-term current use of insulin (CMS/HCC)    4. Malignant neoplasm of urinary bladder, unspecified site (CMS/HCC)        PLAN    1. Office excision when off Eliquis for 3 days              This document has been electronically signed by Mynor Miller MD on 2021 00:52 CDT

## 2021-04-14 ENCOUNTER — PROCEDURE VISIT (OUTPATIENT)
Dept: SURGERY | Facility: CLINIC | Age: 86
End: 2021-04-14

## 2021-04-14 VITALS
DIASTOLIC BLOOD PRESSURE: 63 MMHG | HEIGHT: 67 IN | BODY MASS INDEX: 31.08 KG/M2 | HEART RATE: 73 BPM | WEIGHT: 198 LBS | SYSTOLIC BLOOD PRESSURE: 126 MMHG | TEMPERATURE: 98 F | OXYGEN SATURATION: 98 %

## 2021-04-14 DIAGNOSIS — C44.519 BASAL CELL CARCINOMA (BCC) OF CHEST: Primary | ICD-10-CM

## 2021-04-14 DIAGNOSIS — C44.519 BASAL CELL CARCINOMA (BCC) OF CHEST: ICD-10-CM

## 2021-04-15 LAB
LAB AP CASE REPORT: NORMAL
PATH REPORT.FINAL DX SPEC: NORMAL

## 2021-04-16 NOTE — PROGRESS NOTES
Chief Complaint   Patient presents with   • MINOR SURGERY     CANCER OF THE SKIN, BASAL CELL        HPI  87 year old man here for a BCC of the chest wall.  Past Medical History:   Diagnosis Date   • Allergic rhinitis    • Arthritis     hips, knees, feet   • Artificial lens present     in position   • Benign neoplasm of skin of eyelid    • Bladder tumor    • Bladder tumor    • BPH (benign prostatic hyperplasia)    • Cancer (CMS/HCC)     SKIN    • Cataract    • Coronary arteriosclerosis    • Cortical senile cataract    • Cough    • Diabetes mellitus (CMS/HCC)    • Essential hypertension    • Essential hypertension    • Hypercholesterolemia    • Hypercholesterolemia    • Kidney stones    • MI, old 1988   • Nonexudative age-related macular degeneration     mild   • Nuclear cataract    • Open-angle glaucoma     s/p trabec OS, doing well      • Primary open angle glaucoma     OS, s/p trabeculectomy; IOP down with timolol OD     • Pseudophakia of right eye    • Rhinitis    • Upper respiratory infection    • Vitreous detachment     peripheral       Past Surgical History:   Procedure Laterality Date   • ANKLE SURGERY  07/29/2000    Ankle surgery (ORIF right ankle fracture. Bimalleolar right fracture)   • APPENDECTOMY     • CARDIAC ABLATION     • CARDIAC CATHETERIZATION  07/02/1987    Cardiac cath 95455 (Coronary arthosclerotic heart disease. maintained patency of RCA. Mild inferior hypokinesis.)   • CARDIAC CATHETERIZATION  06/30/1987    Cardiac cath 09071 (Coronary atherosclerotic heart disease. Occluded RCA. Acute inferior infarction. S/P successful angioplasty of RCA)   • CARDIAC ELECTROPHYSIOLOGY PROCEDURE N/A 6/11/2020    Procedure: Pacer single lead;  Surgeon: Johnny Forbes MD;  Location: [facility] INVASIVE LOCATION;  Service: Cardiology;  Laterality: N/A;   • CATARACT EXTRACTION  04/01/2010    Remove cataract, insert lens (Complicated cataract extraction with intraocular lens implantation, right eye, Marcelo SN-60   serial # [id number]: 20.5 diopter)   • CATARACT EXTRACTION W/ INTRAOCULAR LENS IMPLANT Left 3/23/2018    Procedure: CATARACT PHACO EXTRACTION WITH INTRAOCULAR LENS IMPLANT;  Surgeon: Oli Haley MD;  Location: [facility] OR;  Service: Ophthalmology   • COLONOSCOPY N/A 5/9/2019    Procedure: COLONOSCOPY;  Surgeon: Rainer Gates DO;  Location: [facility] ENDOSCOPY;  Service: Gastroenterology   • COLONOSCOPY N/A 11/12/2020    Procedure: COLONOSCOPY;  Surgeon: Rainer Gates DO;  Location: [facility] ENDOSCOPY;  Service: Gastroenterology;  Laterality: N/A;   • ENDOSCOPY  02/19/1990    EGD 56480 (Olympus video endoscope.Fibrin-like patch in stomach)   • ENDOSCOPY N/A 11/12/2020    Procedure: ESOPHAGOGASTRODUODENOSCOPY;  Surgeon: Rainer Gates DO;  Location: [facility] ENDOSCOPY;  Service: Gastroenterology;  Laterality: N/A;   • EYE SURGERY  02/02/2005    Eye surgery procedure (Repair of operative wound leak, left eye. S/P trabeculectomy with operative wound leak)   • EYE SURGERY  01/19/2005    Eye surgery procedure (Trabeculectomy, left eye. 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 degeneration)    • OTHER SURGICAL HISTORY  02/26/2015    EXTENDED VISUAL FIELDS STUDY 11159 (Primary open angle glaucoma)    • OTHER SURGICAL HISTORY  02/26/2015    EXTENDED VISUAL FIELDS STUDY 29372 (Primary open angle glaucoma)    • OTHER SURGICAL HISTORY  06/16/2016    OCT DISC NFL 52381 (Open-angle glaucoma)    • OTHER SURGICAL HISTORY      OCT DISC NFL 45539 (Primary open angle glaucoma) (3): 06/17/2015, 03/19/2014, 03/04/2013   • OTHER SURGICAL HISTORY  06/16/2016    OPTIC NERVE HEAD EVAL PERFORMED 2027F (Open-angle glaucoma)    • TRANSURETHRAL RESECTION OF BLADDER TUMOR N/A 5/9/2018    Procedure: CYSTOSCOPY  TRANSURETHRAL RESECTION OF BLADDER TUMOR;  Surgeon: Donovan Conner MD;  Location: [facility] OR;  Service: Urology   • TRANSURETHRAL RESECTION OF BLADDER TUMOR N/A 7/26/2019    Procedure: CYSTOSCOPY TRANSURETHRAL RESECTION OF BLADDER TUMOR;  Surgeon: Donovan Conner MD;  Location: [facility] OR;  Service: Urology   • TRANSURETHRAL RESECTION OF BLADDER TUMOR N/A 7/29/2020    Procedure: CYSTOSCOPY TRANSURETHRAL RESECTION OF BLADDER TUMOR;  Surgeon: Donovan Conner MD;  Location: [facility] OR;  Service: Urology;  Laterality: N/A;   • UVULECTOMY  1969    EXCISION OF UVULA 86262 (partial uvulectomy with excision of papilloma. Redunant uvula with a small papilloma)         Current Outpatient Medications:   •  amiodarone (PACERONE) 100 MG tablet, Take 2 tablets by mouth Daily. (Patient taking differently: Take 100 mg by mouth Daily. Takes 100mg daily), Disp: 30 tablet, Rfl: 6  •  apixaban (ELIQUIS) 5 MG tablet tablet, Take 2.5 mg by mouth 2 (Two) Times a Day. Last dose to be taken 5/5/18 prior to surgery, Disp: , Rfl:   •  aspirin 81 MG tablet, Take 81 mg by mouth Daily. Last dose 5/1/18, Disp: , Rfl:   •  dilTIAZem CD (CARDIZEM CD) 180 MG 24 hr capsule, TAKE ONE CAPSULE BY MOUTH DAILY, Disp: 30 capsule, Rfl: 5  •  FeroSul 325 (65 Fe) MG tablet, Take 325 mg by mouth 2 (Two) Times a Day., Disp: , Rfl:   •  fluticasone (FLONASE) 50 MCG/ACT nasal spray, ONE SPRAY IN EACH NOSTRIL DAILY, Disp: , Rfl:   •  lisinopril-hydrochlorothiazide (PRINZIDE,ZESTORETIC) 20-12.5 MG per tablet, Take 1 tablet by mouth Daily., Disp: , Rfl:   •  loratadine (CLARITIN) 10 MG tablet, Take 10 mg by mouth Daily., Disp: , Rfl:   •  metFORMIN (GLUCOPHAGE) 500 MG tablet, Take 500 mg by mouth 2 (Two) Times a Day., Disp: , Rfl:   •  Multiple Vitamins-Minerals (MENS MULTIVITAMIN PLUS PO), Take 1 tablet by mouth Daily., Disp: , Rfl:   •  terazosin (HYTRIN) 2 MG capsule, Take 2 mg by mouth Every Night., Disp: , Rfl:   •  timolol (TIMOPTIC) 0.5 % ophthalmic  solution, Administer 1 drop to both eyes 2 (Two) Times a Day., Disp: , Rfl:     No Known Allergies    Family History   Problem Relation Age of Onset   • Cancer Mother    • No Known Problems Father    • Cancer Brother        Social History     Socioeconomic History   • Marital status:      Spouse name: Not on file   • Number of children: Not on file   • Years of education: Not on file   • Highest education level: Not on file   Tobacco Use   • Smoking status: Former Smoker     Quit date:      Years since quittin.3   • Smokeless tobacco: Current User     Types: Snuff   Substance and Sexual Activity   • Alcohol use: No   • Drug use: No   • Sexual activity: Defer           Physical Exam  Chest:           Procedure    Pre-op dx: 1 cm BCC of the chest wall  Post-op dx: Same  Procedure: Excision 3 cm and closure  Surgeon: Mynor Miller MD  EBL: 5 cc  Findings: None  Complications: None  Drains:None  Anesthesia: 10 cc 1% xylocaine with epinephrine  Indications: See above  Informed consent; Risks of bleeding, infection, poor wound healing, open wounds, risk of local anesthesia are explained  Procedure: The patient is brought to the office treatment room and placed supine on the operating table.  A briefing and timeout are performed and the site of excision is assured.  The area was prepped and draped with Betadine and locally anesthetized.  The lesion is excised with a 15 blade knife.  Electrocautery is used to obtain hemostasis.  The wound was closed with interrupted 4-0 nylon sutures.  The procedure was tolerated well and and appropriate dressing was placed.      ASSESSMENT    Diagnoses and all orders for this visit:    1. Basal cell carcinoma (BCC) of chest  -     Tissue Pathology Exam        PLAN    1. Recheck in 10 days for suture removal              This document has been electronically signed by Mynor Miller MD on April 15, 2021 22:25 CDT

## 2021-04-23 ENCOUNTER — OFFICE VISIT (OUTPATIENT)
Dept: SURGERY | Facility: CLINIC | Age: 86
End: 2021-04-23

## 2021-04-23 VITALS
SYSTOLIC BLOOD PRESSURE: 118 MMHG | DIASTOLIC BLOOD PRESSURE: 62 MMHG | WEIGHT: 197.4 LBS | BODY MASS INDEX: 30.98 KG/M2 | HEIGHT: 67 IN | HEART RATE: 61 BPM | TEMPERATURE: 97.3 F

## 2021-04-23 DIAGNOSIS — C44.519 BASAL CELL CARCINOMA (BCC) OF CHEST: Primary | ICD-10-CM

## 2021-04-23 PROCEDURE — 99024 POSTOP FOLLOW-UP VISIT: CPT | Performed by: NURSE PRACTITIONER

## 2021-04-23 NOTE — PROGRESS NOTES
CHIEF COMPLAINT:   Chief Complaint   Patient presents with   • Follow-up     Removal of BCC of Chest Wall 4-14- 21       HPI: This patient presents for a post-operative visit after undergoing excision of skin lesion from chest wall. Doing well- no cellulitis, wound separation, or significant pain.    PATHOLOGY:       Physical Exam  Vitals reviewed.   Constitutional:       General: He is not in acute distress.     Appearance: Normal appearance. He is not ill-appearing.   HENT:      Head: Normocephalic and atraumatic.   Pulmonary:      Effort: Pulmonary effort is normal. No respiratory distress.   Skin:     General: Skin is warm and dry.          Neurological:      General: No focal deficit present.      Mental Status: He is alert and oriented to person, place, and time.   Psychiatric:         Mood and Affect: Mood normal.         Behavior: Behavior normal.         Thought Content: Thought content normal.         Judgment: Judgment normal.         ASSESSMENT:    Diagnoses and all orders for this visit:    1. Basal cell carcinoma (BCC) of chest (Primary)        PLAN:    1. The patient will follow-up as needed  2. May shower.   3. May return to normal activity without restrictions.                    This document has been electronically signed by LISA Lovell on April 23, 2021 10:45 CDT

## 2021-05-23 PROBLEM — K20.90 ESOPHAGITIS: Status: ACTIVE | Noted: 2021-05-23

## 2021-05-23 PROBLEM — C67.9 MALIGNANT TUMOR OF URINARY BLADDER (HCC): Status: ACTIVE | Noted: 2019-06-26

## 2021-05-23 PROBLEM — D64.9 ANEMIA: Status: ACTIVE | Noted: 2021-05-23

## 2021-06-01 RX ORDER — DILTIAZEM HYDROCHLORIDE 180 MG/1
CAPSULE, COATED, EXTENDED RELEASE ORAL
Qty: 90 CAPSULE | Refills: 5 | Status: SHIPPED | OUTPATIENT
Start: 2021-06-01

## 2021-06-23 ENCOUNTER — TRANSCRIBE ORDERS (OUTPATIENT)
Dept: GASTROENTEROLOGY | Facility: HOSPITAL | Age: 86
End: 2021-06-23

## 2021-06-23 DIAGNOSIS — K92.2 GASTROINTESTINAL BLEEDING: Primary | ICD-10-CM

## 2021-07-01 RX ORDER — AMIODARONE HYDROCHLORIDE 100 MG/1
200 TABLET ORAL
Qty: 30 TABLET | Refills: 6 | Status: SHIPPED | OUTPATIENT
Start: 2021-07-01 | End: 2021-09-14

## 2021-07-08 ENCOUNTER — HOSPITAL ENCOUNTER (OUTPATIENT)
Dept: GASTROENTEROLOGY | Facility: HOSPITAL | Age: 86
Setting detail: HOSPITAL OUTPATIENT SURGERY
Discharge: HOME | End: 2021-07-08
Admitting: INTERNAL MEDICINE

## 2021-07-08 DIAGNOSIS — K92.2 GASTROINTESTINAL BLEEDING: ICD-10-CM

## 2021-07-08 PROCEDURE — 91110 GI TRC IMG INTRAL ESOPH-ILE: CPT

## 2021-07-19 PROCEDURE — 93296 REM INTERROG EVL PM/IDS: CPT | Performed by: NURSE PRACTITIONER

## 2021-07-19 PROCEDURE — 93294 REM INTERROG EVL PM/LDLS PM: CPT | Performed by: NURSE PRACTITIONER

## 2021-09-14 ENCOUNTER — OFFICE VISIT (OUTPATIENT)
Dept: CARDIOLOGY | Facility: CLINIC | Age: 86
End: 2021-09-14

## 2021-09-14 VITALS
OXYGEN SATURATION: 99 % | WEIGHT: 200 LBS | DIASTOLIC BLOOD PRESSURE: 58 MMHG | BODY MASS INDEX: 28 KG/M2 | HEART RATE: 62 BPM | SYSTOLIC BLOOD PRESSURE: 118 MMHG | TEMPERATURE: 98 F | HEIGHT: 71 IN

## 2021-09-14 DIAGNOSIS — I48.92 ATRIAL FLUTTER, UNSPECIFIED TYPE (HCC): ICD-10-CM

## 2021-09-14 DIAGNOSIS — I25.10 CORONARY ARTERY DISEASE INVOLVING NATIVE CORONARY ARTERY OF NATIVE HEART WITHOUT ANGINA PECTORIS: ICD-10-CM

## 2021-09-14 DIAGNOSIS — E78.5 HYPERLIPIDEMIA, UNSPECIFIED HYPERLIPIDEMIA TYPE: ICD-10-CM

## 2021-09-14 DIAGNOSIS — I48.0 PAROXYSMAL ATRIAL FIBRILLATION (HCC): ICD-10-CM

## 2021-09-14 DIAGNOSIS — I10 BENIGN ESSENTIAL HYPERTENSION: Primary | ICD-10-CM

## 2021-09-14 DIAGNOSIS — I49.5 SICK SINUS SYNDROME (HCC): ICD-10-CM

## 2021-09-14 PROCEDURE — 99213 OFFICE O/P EST LOW 20 MIN: CPT | Performed by: INTERNAL MEDICINE

## 2021-09-14 PROCEDURE — 93000 ELECTROCARDIOGRAM COMPLETE: CPT | Performed by: INTERNAL MEDICINE

## 2021-09-14 RX ORDER — AMIODARONE HYDROCHLORIDE 200 MG/1
100 TABLET ORAL DAILY
COMMUNITY
End: 2022-11-01

## 2021-09-14 NOTE — PROGRESS NOTES
Evgeny Carter Jr.  88 y.o. male    9/14/2021  1. Benign essential hypertension    2. Atrial flutter, unspecified type (CMS/HCC)    3. Coronary artery disease involving native coronary artery of native heart without angina pectoris    4. Hyperlipidemia, unspecified hyperlipidemia type    5. Paroxysmal atrial fibrillation (CMS/HCC)    6. Sick sinus syndrome (CMS/HCC)        History of Present Illness:    Body mass index is 27.89 kg/m². BMI is above normal parameters. Recommendations include: exercise counseling, nutrition counseling and referral to primary care.      88 years old patient with history of sick sinus syndrome status post pacemaker, EP study and flutter ablation, paroxysmal atrial fibrillation maintained sinus rhythm with amiodarone presented routine follow-up pleased with the clinical outcome no symptom orthopnea PND chest pain or intermittent claudication reported status post pacemaker implantation for management management of symptomatic sick sinus syndrome with history of paroxysmal atrial fibrillation requiring interested AV niki and amiodarone and continued having breakthrough.  No orthopnea or PND reported no fever cough or chills dysuria or hematuria reported.  On oral anticoagulation currently in sinus rhythm with a past medical history significant for hypertension, hypertensive heart disease, CAD status post PTCA stent, atrial flutter with a counterclockwise mechanism state  post-EP study and flutter ablation and noted to be in atrial fibrillation with a resting heart rate 110 bpm.  Patient started on oral anticoagulation and amiodarone.  Repeated May 2020 echo has aortic root diameter similar to the old measured at [facility].  Post atrial flutter ablation patient developed A. fib initially managed with amiodarone 200 mg then subsequent decrease to 200 and then 50 mg and given the significant bradyarrhythmia amiodarone was discontinued  He denies orthopnea, PND, syncope or near  syncopal episode.  Denies any polydipsia polyuria.  Denies any fever cough or chills.  No dysuria or hematuria reported.  Waiting for bladder surgery with Fellow       Date of Procedure: 06/11/20          PROCEDURE(S) PERFORMED:    1. Implantation of a dual-chamber permanent pacemaker.        INDICATIONS FOR PROCDEDURE:            Sick sinus     Echo 5/11/2020  · Left ventricular wall thickness is consistent with borderline concentric hypertrophy.  · Estimated EF = 61%.  · Left ventricular systolic function is normal.  · Left ventricular diastolic dysfunction (grade I a) consistent with impaired relaxation.  · Left atrial cavity size is mildly dilated.  · Mild mitral valve regurgitation is present    ECHO 8/15/18  ·  Mild tricuspid valve regurgitation is present.  · Mild pulmonic valve regurgitation is present.  · Left atrial cavity size is mild-to-moderately dilated.  · Left ventricular systolic function is normal. Estimated EF = 55%.  · Mild mitral valve regurgitation is present  Left ventricular diastolic dysfunction (grade I a) consistent with impaired relaxation       LFTs July 2021    Protein   6.4 - 8.9 g/dL 6.3Low        Albumin   3.5 - 5.7 g/dL 4.0       Total Bilirubin   0.3 - 1.0 mg/dL 0.52       SGOT- AST   13 - 39 U/L 23       SGPT- ALT   7 - 52 U/L 22       Alkaline Phosphatase   34 - 104 U/L 67      TSH   0.42 - 5.47 uIU/mL 1.93        LFTs November 6, 2020      Total Protein   6.4 - 8.2 g/dL 7.4    Albumin   3.4 - 5.0 g/dL 3.7    Total Bilirubin   0.20 - 1.00 mg/dL 0.30    AST (SGOT)   15 - 37 U/L 35    ALT (SGPT)   16 - 63 U/L 44    Alkaline Phosphatase   46 - 116 U/L 87    GFR-mch    mL/min 48          Lipid October 28, 2020    Total Cholesterol   <200 mg/dL 218High     Triglycerides   30 - 150 mg/dL 142    HDL Cholesterol   32 - 72 mg/dL 46    LDL Cholesterol    5 - 99 mg/dL 140High     Chol/HDL Ratio   4.2 - 5.6 4.7        10/2016      Ref Range & Units 1yr ago      Total Cholesterol 0 - 199  mg/dl 169   Comments: CHOL DESIRED: < 200 MG/DL   Triglycerides 20 - 199 mg/dl 188   Comments: TRIG DESIRED: < 200 MG/DL   HDL Cholesterol 60 - 200 mg/dl 37 (L)   Comments: HDL AVERAGE RISK: 35 - 60 MG/DL   LDL Cholesterol  0 - 129 mg/dl 94       7/2019  Total Cholesterol  100 - 200 mg/dL 196    Triglycerides  30 - 150 mg/dL 199 Abnormally high     HDL Cholesterol  32 - 72 mg/dL 41    LDL Cholesterol   5 - 99 mg/dL 112 Abnormally high     Chol/HDL Ratio  4.2 - 5.6 4.8           Stress TEST ; 10/2016   Normal LV systolic function overall with an EF of 70% with inferior       wall hypokinesis.  2.   Moderate size predominantly fixed defect in the inferior wall and       apex with partial reversibility.     ECHO 10/2016  1.   Mild concentric left ventricular hypertrophy with estimated       ejection fraction 50% to 60%.  2.   Mildly dilated aortic root with diameter of 4.0.  3.   Mildly dilated left atrium with diameter of 4.4.  4.   Aortic valve appears mildly sclerotic without any hemodynamic       significant aortic stenosis. No aortic regurgitation seen.  5.   Pulmonic valve not well visualized. There is trace pulmonic       regurgitation. There is a trace mitral regurgitation. No       intracardiac mass, pericardial effusion, cardiac thrombus seen.  6.   Normal right ventricular size and function      X-ray chest June 11, 2020    IMPRESSION:  Pacemaker leads with tips in right atrium and right  ventricle in satisfactory position. There is no evidence of a  pneumothorax.    SUBJECTIVE:    No Known Allergies      Past Medical History:   Diagnosis Date   • Allergic rhinitis    • Arthritis     hips, knees, feet   • Artificial lens present     in position   • Benign neoplasm of skin of eyelid    • Bladder tumor    • Bladder tumor    • BPH (benign prostatic hyperplasia)    • Cancer (CMS/HCC)     SKIN    • Cataract    • Coronary arteriosclerosis    • Cortical senile cataract    • Cough    • Diabetes mellitus (CMS/HCC)     • Esophagitis 5/23/2021   • Essential hypertension    • Essential hypertension    • Hypercholesterolemia    • Hypercholesterolemia    • Kidney stones    • MI, old 1988   • Nonexudative age-related macular degeneration     mild   • Nuclear cataract    • Open-angle glaucoma     s/p trabec OS, doing well      • Primary open angle glaucoma     OS, s/p trabeculectomy; IOP down with timolol OD     • Pseudophakia of right eye    • Rhinitis    • Upper respiratory infection    • Vitreous detachment     peripheral         Past Surgical History:   Procedure Laterality Date   • ANKLE SURGERY  07/29/2000    Ankle surgery (ORIF right ankle fracture. Bimalleolar right fracture)   • APPENDECTOMY     • CARDIAC ABLATION     • CARDIAC CATHETERIZATION  07/02/1987    Cardiac cath 42822 (Coronary arthosclerotic heart disease. maintained patency of RCA. Mild inferior hypokinesis.)   • CARDIAC CATHETERIZATION  06/30/1987    Cardiac cath 38146 (Coronary atherosclerotic heart disease. Occluded RCA. Acute inferior infarction. S/P successful angioplasty of RCA)   • CARDIAC ELECTROPHYSIOLOGY PROCEDURE N/A 6/11/2020    Procedure: Pacer single lead;  Surgeon: Johnny Forbes MD;  Location: [facility] CATH INVASIVE LOCATION;  Service: Cardiology;  Laterality: N/A;   • CATARACT EXTRACTION  04/01/2010    Remove cataract, insert lens (Complicated cataract extraction with intraocular lens implantation, right eye, Marcelo SN-60  serial # [id number]: 20.5 diopter)   • CATARACT EXTRACTION W/ INTRAOCULAR LENS IMPLANT Left 3/23/2018    Procedure: CATARACT PHACO EXTRACTION WITH INTRAOCULAR LENS IMPLANT;  Surgeon: Oli Haley MD;  Location: [facility] OR;  Service: Ophthalmology   • COLONOSCOPY N/A 5/9/2019    Procedure: COLONOSCOPY;  Surgeon: Rainer Gates DO;  Location: [facility] ENDOSCOPY;  Service: Gastroenterology   • COLONOSCOPY N/A 11/12/2020    Procedure: COLONOSCOPY;  Surgeon: Rainer Gates DO;  Location: [facility] ENDOSCOPY;  Service:  Gastroenterology;  Laterality: N/A;   • ENDOSCOPY  02/19/1990    EGD 25642 (Olympus video endoscope.Fibrin-like patch in stomach)   • ENDOSCOPY N/A 11/12/2020    Procedure: ESOPHAGOGASTRODUODENOSCOPY;  Surgeon: Rainer Gates DO;  Location: [facility] ENDOSCOPY;  Service: Gastroenterology;  Laterality: N/A;   • EYE SURGERY  02/02/2005    Eye surgery procedure (Repair of operative wound leak, left eye. S/P trabeculectomy with operative wound leak)   • EYE SURGERY  01/19/2005    Eye surgery procedure (Trabeculectomy, left eye. 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 degeneration)    • OTHER SURGICAL HISTORY  02/26/2015    EXTENDED VISUAL FIELDS STUDY 07523 (Primary open angle glaucoma)    • OTHER SURGICAL HISTORY  02/26/2015    EXTENDED VISUAL FIELDS STUDY 71792 (Primary open angle glaucoma)    • OTHER SURGICAL HISTORY  06/16/2016    OCT DISC NFL 89505 (Open-angle glaucoma)    • OTHER SURGICAL HISTORY      OCT DISC NFL 43466 (Primary open angle glaucoma) (3): 06/17/2015, 03/19/2014, 03/04/2013   • OTHER SURGICAL HISTORY  06/16/2016    OPTIC NERVE HEAD EVAL PERFORMED 2027F (Open-angle glaucoma)    • TRANSURETHRAL RESECTION OF BLADDER TUMOR N/A 5/9/2018    Procedure: CYSTOSCOPY TRANSURETHRAL RESECTION OF BLADDER TUMOR;  Surgeon: Donovan Conner MD;  Location: [facility] OR;  Service: Urology   • TRANSURETHRAL RESECTION OF BLADDER TUMOR N/A 7/26/2019    Procedure: CYSTOSCOPY TRANSURETHRAL RESECTION OF BLADDER TUMOR;  Surgeon: Donovan Conner MD;  Location: [facility] OR;  Service: Urology   • TRANSURETHRAL RESECTION OF BLADDER TUMOR N/A 7/29/2020    Procedure: CYSTOSCOPY TRANSURETHRAL RESECTION OF BLADDER TUMOR;  Surgeon: Donovan Conner MD;  Location: [facility] OR;  Service: Urology;  Laterality: N/A;   • UVULECTOMY       EXCISION OF UVULA 99593 (partial uvulectomy with excision of papilloma. Redunant uvula with a small papilloma)         Family History   Problem Relation Age of Onset   • Cancer Mother    • No Known Problems Father    • Cancer Brother          Social History     Socioeconomic History   • Marital status:      Spouse name: Not on file   • Number of children: Not on file   • Years of education: Not on file   • Highest education level: Not on file   Tobacco Use   • Smoking status: Former Smoker     Quit date:      Years since quittin.7   • Smokeless tobacco: Current User     Types: Snuff   Substance and Sexual Activity   • Alcohol use: No   • Drug use: No   • Sexual activity: Defer         Current Outpatient Medications   Medication Sig Dispense Refill   • amiodarone (PACERONE) 200 MG tablet Take 100 mg by mouth Daily. Take 1/2 tablet daily     • apixaban (ELIQUIS) 5 MG tablet tablet Take 2.5 mg by mouth 2 (Two) Times a Day. Last dose to be taken 18 prior to surgery     • aspirin 81 MG tablet Take 81 mg by mouth Daily. Last dose 18     • ciprofloxacin (CILOXAN) 0.3 % ophthalmic solution 2 drops in eye(s) every 2 hours while awake x 2 days, then every 4 hours x 5 days. 5 mL 0   • dilTIAZem CD (CARDIZEM CD) 180 MG 24 hr capsule TAKE ONE CAPSULE BY MOUTH DAILY 90 capsule 5   • FeroSul 325 (65 Fe) MG tablet Take 325 mg by mouth 2 (Two) Times a Day.     • lisinopril-hydrochlorothiazide (PRINZIDE,ZESTORETIC) 20-12.5 MG per tablet Take 1 tablet by mouth Daily.     • loratadine (CLARITIN) 10 MG tablet Take 10 mg by mouth Daily.     • metFORMIN (GLUCOPHAGE) 500 MG tablet Take 500 mg by mouth 2 (Two) Times a Day.     • Multiple Vitamins-Minerals (MENS MULTIVITAMIN PLUS PO) Take 1 tablet by mouth Daily.     • terazosin (HYTRIN) 2 MG capsule Take 2 mg by mouth Every Night.     • timolol (TIMOPTIC) 0.5 % ophthalmic solution Administer 1 drop to both eyes 2 (Two) Times a Day.     • fluticasone (FLONASE)  "50 MCG/ACT nasal spray ONE SPRAY IN EACH NOSTRIL DAILY       No current facility-administered medications for this visit.           Review of Systems:     Constitutional:  Denies recent weight loss, weight gain,      HENT:  Denies any hearing loss, epistaxis, hoarseness    Eyes: No blurring    Respiratory:  Denies dyspnea with exertion    Cardiovascular: See H&P    Gastrointestinal: No abdominal pain or vomiting or diarrhea    Endocrine: No polydipsia polyuria    Genitourinary: Negative.      Musculoskeletal: Denies any history of arthritic symptoms or back problems.     Skin:  Denies any change in hair or nails, rashes, or skin lesions.     Allergic/Immunologic: Negative.  Negative for environmental allergies    Neurological:  Denies any history of recurrent headaches, strokes, TIA, or seizure disorder.     Hematological: Denies any food allergies, seasonal allergies, bleeding disorders    Psychiatric/Behavioral: Denies any history of depression, substance abuse, or change in cognitive function.       OBJECTIVE:    Ht 180.3 cm (71\")   Wt 90.7 kg (200 lb)   BMI 27.89 kg/m²       Physical Exam:     Constitutional: Cooperative, alert and oriented, well-developed, well-nourished, in no acute distress.     HENT:   Head: Normocephalic and atraumatic, oral mucosa moist, conjunctive pink no jugular distention    Cardiovascular: Regular rhythm, S1 and S2 normal, no S3 or S4. Apical impulse not displaced. No murmurs, gallops, or rubs detected.     Pulmonary/Chest: Chest: Good bilateral air entry     Abdominal: Abdominal soft nontender    Musculoskeletal: Positive peripheral pulses no edema    Neurological: No gross motor or sensory deficits noted, affect appropriate    Skin: Warm and dry to the touch, no apparent skin lesions or masses noted.     Psychiatric: Good personality and good mood        Procedures      Lab Results   Component Value Date    WBC 6.12 06/11/2020    HGB 11.0 (L) 06/11/2020    HCT 31.8 (L) 06/11/2020 "    MCV 97.2 (H) 06/11/2020     06/11/2020     Lab Results   Component Value Date    GLUCOSE 122 (H) 07/23/2020    BUN 26 (H) 07/23/2020    CREATININE 1.44 (H) 07/23/2020    EGFRIFNONA 46 (L) 07/23/2020    BCR 18.1 07/23/2020    CO2 25.0 07/23/2020    CALCIUM 9.1 07/23/2020    ALBUMIN 4.00 05/10/2020    AST 18 05/10/2020    ALT 17 05/10/2020     Lab Results   Component Value Date    CHOL 218 (H) 10/28/2020    CHOL 196 07/09/2019    CHOL 190 03/30/2018     Lab Results   Component Value Date    TRIG 142 10/28/2020    TRIG 199 (H) 07/09/2019    TRIG 198 03/30/2018     Lab Results   Component Value Date    HDL 46 10/28/2020    HDL 41 07/09/2019    HDL 42 03/30/2018     No components found for: LDLCALC  Lab Results   Component Value Date     (H) 10/28/2020     (H) 07/09/2019     (H) 03/30/2018     No results found for: HDLLDLRATIO  No components found for: CHOLHDL  Lab Results   Component Value Date    HGBA1C 5.1 07/27/2021     Lab Results   Component Value Date    TSH 1.93 07/27/2021           ASSESSMENT AND PLAN:    #1 paroxysmal atrial fibrillation currently in sinus rhythm on oral anticoagulation to decrease the cardiac embolization    Continue amiodarone 100 mg a day maintaining sinus rhythm recent LFTs within normal range. And previous chest x-ray no acute pathology reported.  Continue oral anticoagulation to decrease risk of cardiac embolization   EKG finding discussed with patient sinus rhythm with atrial pacing and ventricular conduction  [id number] score  Is 4 continue oral anticoagulation     #2  Sick sinus syndrome with a significant bradyarrhythmia and paroxysmal atrial fibrillation status post pacemaker implantation continue follow-up with the pacer clinic    Recent pacer interrogation have good sensing and pacing threshold     #3 history of atrial flutter status post EP study and flutter ablation no further recurrence     #4 hypertension good blood pressure control on  lisinopril hydrochlorothiazide    #4 CAD stable on baby aspirin will continue       Diagnoses and all orders for this visit:    1. Benign essential hypertension (Primary)  -     ECG 12 Lead    2. Atrial flutter, unspecified type (CMS/HCC)    3. Coronary artery disease involving native coronary artery of native heart without angina pectoris    4. Hyperlipidemia, unspecified hyperlipidemia type    5. Paroxysmal atrial fibrillation (CMS/HCC)    6. Sick sinus syndrome (CMS/HCC)        Johnny Forbes MD  9/14/2021  09:57 CDT

## 2021-09-17 LAB
QT INTERVAL: 472 MS
QTC INTERVAL: 479 MS

## 2021-10-13 ENCOUNTER — CLINICAL SUPPORT (OUTPATIENT)
Dept: CARDIOLOGY | Facility: CLINIC | Age: 86
End: 2021-10-13

## 2021-10-13 DIAGNOSIS — Z95.0 PACEMAKER: ICD-10-CM

## 2021-10-13 DIAGNOSIS — I49.5 SICK SINUS SYNDROME (HCC): Primary | ICD-10-CM

## 2021-10-13 PROCEDURE — 93280 PM DEVICE PROGR EVAL DUAL: CPT | Performed by: NURSE PRACTITIONER

## 2021-10-13 NOTE — PROGRESS NOTES
Pacemaker Evaluation Report    October 13, 2021    Primary Cardiologist: Dr Forbes  Implanting MD: Dr Forbes  :  [employer] Model: Assurity MRI 2272 Serial Number: [id number]   Implant date: 06/11/2020     Reason for evaluation: routine PPM Office   Cardiac device indication(s): sick sinus syndrome    Battery  RICHARD: 7.9-9.5 years       Interrogation Results  Atrial sensing: P wave: 3.5 mV  Atrial capture: 1.0 V @ 0.5 ms   Atrial lead impedance: 450 ohms  Ventricular sensing: R wave: 6.6 mV  Ventricular capture: 1.0 V @ 0.5 ms  Ventricular lead impedance: right  390 ohms    Parameters  Mode: DDDR  Base Rate: 60    Diagnostic Data  Atrial paced: 83 %   Ventricular paced: 3.5 %  Mode switch: 0%  AT/AF [place]: 0%  AHR: 0  VHR: 0    Intrinsic Rate: 45    Changes made: PVARP 375ms to 400ms     Conclusions: normal device function    Assessment:  1. Sick sinus syndrome (HCC)    2. Pacemaker              This document has been electronically signed by LISA Terrell on October 13, 2021 16:26 CDT

## 2022-01-17 PROCEDURE — 93296 REM INTERROG EVL PM/IDS: CPT | Performed by: NURSE PRACTITIONER

## 2022-01-17 PROCEDURE — 93294 REM INTERROG EVL PM/LDLS PM: CPT | Performed by: NURSE PRACTITIONER

## 2022-04-19 ENCOUNTER — OFFICE VISIT (OUTPATIENT)
Dept: CARDIOLOGY | Facility: CLINIC | Age: 87
End: 2022-04-19

## 2022-04-19 VITALS
TEMPERATURE: 97 F | HEART RATE: 81 BPM | HEIGHT: 71 IN | DIASTOLIC BLOOD PRESSURE: 68 MMHG | WEIGHT: 203 LBS | BODY MASS INDEX: 28.42 KG/M2 | SYSTOLIC BLOOD PRESSURE: 118 MMHG | OXYGEN SATURATION: 94 %

## 2022-04-19 DIAGNOSIS — E78.5 HYPERLIPIDEMIA, UNSPECIFIED HYPERLIPIDEMIA TYPE: ICD-10-CM

## 2022-04-19 DIAGNOSIS — I10 BENIGN ESSENTIAL HYPERTENSION: ICD-10-CM

## 2022-04-19 DIAGNOSIS — I48.0 PAROXYSMAL ATRIAL FIBRILLATION: ICD-10-CM

## 2022-04-19 DIAGNOSIS — I48.92 ATRIAL FLUTTER, UNSPECIFIED TYPE: Primary | ICD-10-CM

## 2022-04-19 DIAGNOSIS — I25.10 CORONARY ARTERY DISEASE INVOLVING NATIVE CORONARY ARTERY OF NATIVE HEART WITHOUT ANGINA PECTORIS: ICD-10-CM

## 2022-04-19 PROCEDURE — 99213 OFFICE O/P EST LOW 20 MIN: CPT | Performed by: INTERNAL MEDICINE

## 2022-04-19 RX ORDER — ERYTHROMYCIN 5 MG/G
OINTMENT OPHTHALMIC
COMMUNITY
Start: 2022-04-12

## 2022-04-19 NOTE — PROGRESS NOTES
Evgeny Carter Jr.  88 y.o. male    4/19/2022  1. Atrial flutter, unspecified type (HCC)    2. Coronary artery disease involving native coronary artery of native heart without angina pectoris    3. Benign essential hypertension    4. Paroxysmal atrial fibrillation (HCC)    5. Hyperlipidemia, unspecified hyperlipidemia type        History of Present Illness:    Body mass index is 28.31 kg/m². BMI is above normal parameters. Recommendations include: exercise counseling, nutrition counseling and referral to primary care.      88 years old patient appropriate to his age physically active presenting for routine follow-up with history of sick sinus syndrome s/p pacemaker, atrial flutter status post ablations, paroxysmal atrial fibrillation noted on pacemaker interrogation started on amiodarone with no further recurrences of please include clinical outcome.  No orthopnea no PND no chest pain no interval cardiac issues reported.  Previous echocardiogram results biventricular function.  History of CAD s/p PCI    No symptoms of cardiac decompensation such orthopnea PND chest pain lightheaded dizziness reported with the patient's.       Date of Procedure: 06/11/20          PROCEDURE(S) PERFORMED:    1. Implantation of a dual-chamber permanent pacemaker.        INDICATIONS FOR PROCDEDURE:            Sick sinus     Echo 5/11/2020  · Left ventricular wall thickness is consistent with borderline concentric hypertrophy.  · Estimated EF = 61%.  · Left ventricular systolic function is normal.  · Left ventricular diastolic dysfunction (grade I a) consistent with impaired relaxation.  · Left atrial cavity size is mildly dilated.  · Mild mitral valve regurgitation is present    ECHO 8/15/18  ·  Mild tricuspid valve regurgitation is present.  · Mild pulmonic valve regurgitation is present.  · Left atrial cavity size is mild-to-moderately dilated.  · Left ventricular systolic function is normal. Estimated EF = 55%.  · Mild mitral  valve regurgitation is present  Left ventricular diastolic dysfunction (grade I a) consistent with impaired relaxation     X-ray chest November 2020    PA and Lateral Chest:  The bones are intact.  The heart is borderline in size.  There is a dual-lead left subclavian transvenous pacemaker with intact, well-positioned leads.  The lungs are clear.  No heart failure or pleural fluid.  There is an old   calcified right hilar node.  Exam End: 11/06/20 12:55         LFTs July 2021    Protein   6.4 - 8.9 g/dL 6.3Low        Albumin   3.5 - 5.7 g/dL 4.0       Total Bilirubin   0.3 - 1.0 mg/dL 0.52       SGOT- AST   13 - 39 U/L 23       SGPT- ALT   7 - 52 U/L 22       Alkaline Phosphatase   34 - 104 U/L 67      TSH   0.42 - 5.47 uIU/mL 1.93        LFTs November 6, 2020      Total Protein   6.4 - 8.2 g/dL 7.4    Albumin   3.4 - 5.0 g/dL 3.7    Total Bilirubin   0.20 - 1.00 mg/dL 0.30    AST (SGOT)   15 - 37 U/L 35    ALT (SGPT)   16 - 63 U/L 44    Alkaline Phosphatase   46 - 116 U/L 87    GFR-mch    mL/min 48          Lipid October 28, 2020    Total Cholesterol   <200 mg/dL 218High     Triglycerides   30 - 150 mg/dL 142    HDL Cholesterol   32 - 72 mg/dL 46    LDL Cholesterol    5 - 99 mg/dL 140High     Chol/HDL Ratio   4.2 - 5.6 4.7        10/2016      Ref Range & Units 1yr ago      Total Cholesterol 0 - 199 mg/dl 169   Comments: CHOL DESIRED: < 200 MG/DL   Triglycerides 20 - 199 mg/dl 188   Comments: TRIG DESIRED: < 200 MG/DL   HDL Cholesterol 60 - 200 mg/dl 37 (L)   Comments: HDL AVERAGE RISK: 35 - 60 MG/DL   LDL Cholesterol  0 - 129 mg/dl 94       7/2019  Total Cholesterol  100 - 200 mg/dL 196    Triglycerides  30 - 150 mg/dL 199 Abnormally high     HDL Cholesterol  32 - 72 mg/dL 41    LDL Cholesterol   5 - 99 mg/dL 112 Abnormally high     Chol/HDL Ratio  4.2 - 5.6 4.8           Stress TEST ; 10/2016   Normal LV systolic function overall with an EF of 70% with inferior       wall hypokinesis.  2.   Moderate size  predominantly fixed defect in the inferior wall and       apex with partial reversibility.     ECHO 10/2016  1.   Mild concentric left ventricular hypertrophy with estimated       ejection fraction 50% to 60%.  2.   Mildly dilated aortic root with diameter of 4.0.  3.   Mildly dilated left atrium with diameter of 4.4.  4.   Aortic valve appears mildly sclerotic without any hemodynamic       significant aortic stenosis. No aortic regurgitation seen.  5.   Pulmonic valve not well visualized. There is trace pulmonic       regurgitation. There is a trace mitral regurgitation. No       intracardiac mass, pericardial effusion, cardiac thrombus seen.  6.   Normal right ventricular size and function      X-ray chest June 11, 2020    IMPRESSION:  Pacemaker leads with tips in right atrium and right  ventricle in satisfactory position. There is no evidence of a  pneumothorax.    SUBJECTIVE:    No Known Allergies      Past Medical History:   Diagnosis Date   • Allergic rhinitis    • Arthritis     hips, knees, feet   • Artificial lens present     in position   • Benign neoplasm of skin of eyelid    • Bladder tumor    • Bladder tumor    • BPH (benign prostatic hyperplasia)    • Cancer (HCC)     SKIN    • Cataract    • Coronary arteriosclerosis    • Cortical senile cataract    • Cough    • Diabetes mellitus (HCC)    • Esophagitis 5/23/2021   • Essential hypertension    • Essential hypertension    • Hypercholesterolemia    • Hypercholesterolemia    • Kidney stones    • MI, old 1988   • Nonexudative age-related macular degeneration     mild   • Nuclear cataract    • Open-angle glaucoma     s/p trabec OS, doing well      • Primary open angle glaucoma     OS, s/p trabeculectomy; IOP down with timolol OD     • Pseudophakia of right eye    • Rhinitis    • Upper respiratory infection    • Vitreous detachment     peripheral         Past Surgical History:   Procedure Laterality Date   • ANKLE SURGERY  07/29/2000    Ankle surgery (ORIF  right ankle fracture. Bimalleolar right fracture)   • APPENDECTOMY     • CARDIAC ABLATION     • CARDIAC CATHETERIZATION  07/02/1987    Cardiac cath 61775 (Coronary arthosclerotic heart disease. maintained patency of RCA. Mild inferior hypokinesis.)   • CARDIAC CATHETERIZATION  06/30/1987    Cardiac cath 87423 (Coronary atherosclerotic heart disease. Occluded RCA. Acute inferior infarction. S/P successful angioplasty of RCA)   • CARDIAC ELECTROPHYSIOLOGY PROCEDURE N/A 6/11/2020    Procedure: Pacer single lead;  Surgeon: Johnny Forbes MD;  Location: [facility] CATH INVASIVE LOCATION;  Service: Cardiology;  Laterality: N/A;   • CATARACT EXTRACTION  04/01/2010    Remove cataract, insert lens (Complicated cataract extraction with intraocular lens implantation, right eye, Marcelo SN-60  serial # [id number]: 20.5 diopter)   • CATARACT EXTRACTION W/ INTRAOCULAR LENS IMPLANT Left 3/23/2018    Procedure: CATARACT PHACO EXTRACTION WITH INTRAOCULAR LENS IMPLANT;  Surgeon: Oli Haley MD;  Location: [facility] OR;  Service: Ophthalmology   • COLONOSCOPY N/A 5/9/2019    Procedure: COLONOSCOPY;  Surgeon: Rainer Gates DO;  Location: [facility] ENDOSCOPY;  Service: Gastroenterology   • COLONOSCOPY N/A 11/12/2020    Procedure: COLONOSCOPY;  Surgeon: Rainer Gates DO;  Location: [facility] ENDOSCOPY;  Service: Gastroenterology;  Laterality: N/A;   • ENDOSCOPY  02/19/1990    EGD 04312 (Olympus video endoscope.Fibrin-like patch in stomach)   • ENDOSCOPY N/A 11/12/2020    Procedure: ESOPHAGOGASTRODUODENOSCOPY;  Surgeon: Rainer Gates DO;  Location: [facility] ENDOSCOPY;  Service: Gastroenterology;  Laterality: N/A;   • EYE SURGERY  02/02/2005    Eye surgery procedure (Repair of operative wound leak, left eye. S/P trabeculectomy with operative wound leak)   • EYE SURGERY  01/19/2005    Eye surgery procedure (Trabeculectomy, left eye. Chronic open angle glaucoma, uncontrolled left eye)   • INJECTION OF MEDICATION  06/09/2014     Kenalog (Allergic rhinitis)    • OTHER SURGICAL HISTORY  2016    DIL MAC EXAM PERF INC DOC OF +/- MAC THICK 2019F (Nonexudative age-related macular degeneration)    • OTHER SURGICAL HISTORY  2015    EXTENDED VISUAL FIELDS STUDY 37425 (Primary open angle glaucoma)    • OTHER SURGICAL HISTORY  2015    EXTENDED VISUAL FIELDS STUDY 67678 (Primary open angle glaucoma)    • OTHER SURGICAL HISTORY  2016    OCT DISC NFL 25096 (Open-angle glaucoma)    • OTHER SURGICAL HISTORY      OCT DISC NFL 73543 (Primary open angle glaucoma) (3): 2015, 2014, 2013   • OTHER SURGICAL HISTORY  2016    OPTIC NERVE HEAD EVAL PERFORMED  (Open-angle glaucoma)    • TRANSURETHRAL RESECTION OF BLADDER TUMOR N/A 2018    Procedure: CYSTOSCOPY TRANSURETHRAL RESECTION OF BLADDER TUMOR;  Surgeon: Donovan Conner MD;  Location: [facility];  Service: Urology   • TRANSURETHRAL RESECTION OF BLADDER TUMOR N/A 2019    Procedure: CYSTOSCOPY TRANSURETHRAL RESECTION OF BLADDER TUMOR;  Surgeon: Donovan Conner MD;  Location: [facility] OR;  Service: Urology   • TRANSURETHRAL RESECTION OF BLADDER TUMOR N/A 2020    Procedure: CYSTOSCOPY TRANSURETHRAL RESECTION OF BLADDER TUMOR;  Surgeon: Donovan Conner MD;  Location: [facility];  Service: Urology;  Laterality: N/A;   • UVULECTOMY  1969    EXCISION OF UVULA 24843 (partial uvulectomy with excision of papilloma. Redunant uvula with a small papilloma)         Family History   Problem Relation Age of Onset   • Cancer Mother    • No Known Problems Father    • Cancer Brother          Social History     Socioeconomic History   • Marital status:    Tobacco Use   • Smoking status: Former Smoker     Quit date:      Years since quittin.3   • Smokeless tobacco: Current User     Types: Snuff   • Tobacco comment: pouches   Substance and Sexual Activity   • Alcohol use: No   • Drug use: No   • Sexual activity: Defer         Current Outpatient  Medications   Medication Sig Dispense Refill   • amiodarone (PACERONE) 200 MG tablet Take 100 mg by mouth Daily. Take 1/2 tablet daily     • apixaban (ELIQUIS) 5 MG tablet tablet Take 2.5 mg by mouth 2 (Two) Times a Day. Last dose to be taken 5/5/18 prior to surgery     • aspirin 81 MG tablet Take 81 mg by mouth Daily. Last dose 5/1/18     • ciprofloxacin (CILOXAN) 0.3 % ophthalmic solution 2 drops in eye(s) every 2 hours while awake x 2 days, then every 4 hours x 5 days. 5 mL 0   • dilTIAZem CD (CARDIZEM CD) 180 MG 24 hr capsule TAKE ONE CAPSULE BY MOUTH DAILY 90 capsule 5   • erythromycin (ROMYCIN) 5 MG/GM ophthalmic ointment APPLY A THIN LAYER INTO LEFT EYE AT BEDTIME AS NEEDED     • FeroSul 325 (65 Fe) MG tablet Take 325 mg by mouth 2 (Two) Times a Day.     • fluticasone (FLONASE) 50 MCG/ACT nasal spray ONE SPRAY IN EACH NOSTRIL DAILY     • lisinopril-hydrochlorothiazide (PRINZIDE,ZESTORETIC) 20-12.5 MG per tablet Take 1 tablet by mouth Daily.     • loratadine (CLARITIN) 10 MG tablet Take 10 mg by mouth Daily.     • metFORMIN (GLUCOPHAGE) 500 MG tablet Take 500 mg by mouth 2 (Two) Times a Day.     • Multiple Vitamins-Minerals (MENS MULTIVITAMIN PLUS PO) Take 1 tablet by mouth Daily.     • terazosin (HYTRIN) 2 MG capsule Take 2 mg by mouth Every Night.     • timolol (TIMOPTIC) 0.5 % ophthalmic solution Administer 1 drop to both eyes 2 (Two) Times a Day.       No current facility-administered medications for this visit.           Review of Systems:     Constitutional:  Denies recent weight loss, weight gain,      HENT:  Denies any hearing loss, epistaxis, hoarseness    Eyes: No blurring    Respiratory:  Denies dyspnea with exertion    Cardiovascular: See H&P    Gastrointestinal: No abdominal pain or vomiting or diarrhea    Endocrine: No polydipsia polyuria    Genitourinary: Negative.      Musculoskeletal: Denies any history of arthritic symptoms or back problems.     Skin:  Denies any change in hair or nails,  "rashes, or skin lesions.     Allergic/Immunologic: Negative.  Negative for environmental allergies    Neurological:  Denies any history of recurrent headaches, strokes, TIA, or seizure disorder.     Hematological: Denies any food allergies, seasonal allergies, bleeding disorders    Psychiatric/Behavioral: Denies any history of depression, substance abuse, or change in cognitive function.       OBJECTIVE:    /68 (BP Location: Left arm, Patient Position: Sitting, Cuff Size: Adult)   Pulse 81   Temp 97 °F (36.1 °C)   Ht 180.3 cm (71\")   Wt 92.1 kg (203 lb)   SpO2 94%   BMI 28.31 kg/m²       Physical Exam:     Constitutional: Cooperative, alert and oriented, well-developed, well-nourished, in no acute distress.     HENT:   Head: Normocephalic and atraumatic, oral mucosa moist, conjunctive pink no jugular distention    Cardiovascular: Regular rhythm, S1 and S2 normal, no S3 or S4. Apical impulse not displaced. No murmurs, gallops, or rubs detected.     Pulmonary/Chest: Chest: Good bilateral air entry     Abdominal: Abdominal soft nontender    Musculoskeletal: Positive peripheral pulses no edema    Neurological: No gross motor or sensory deficits noted, affect appropriate    Skin: Warm and dry to the touch, no apparent skin lesions or masses noted.     Psychiatric: Good personality and good mood        Procedures      Lab Results   Component Value Date    WBC 6.12 06/11/2020    HGB 11.0 (L) 06/11/2020    HCT 31.8 (L) 06/11/2020    MCV 97.2 (H) 06/11/2020     06/11/2020     Lab Results   Component Value Date    GLUCOSE 122 (H) 07/23/2020    BUN 20 02/23/2022    CREATININE 1.2 02/23/2022    EGFRIFNONA 46 (L) 07/23/2020    EGFRIFAFRI 63 07/27/2021    BCR 18.1 07/23/2020    CO2 30 02/23/2022    CALCIUM 9.3 02/23/2022    ALBUMIN 4.1 02/23/2022    AST 23 02/23/2022    ALT 23 02/23/2022     Lab Results   Component Value Date    CHOL 218 (H) 10/28/2020    CHOL 196 07/09/2019    CHOL 190 03/30/2018     Lab " Results   Component Value Date    TRIG 203 (H) 02/23/2022    TRIG 142 10/28/2020    TRIG 199 (H) 07/09/2019     Lab Results   Component Value Date    HDL 49 02/23/2022    HDL 46 10/28/2020    HDL 41 07/09/2019     No components found for: LDLCALC  Lab Results   Component Value Date     02/23/2022     (H) 10/28/2020     (H) 07/09/2019     No results found for: HDLLDLRATIO  No components found for: CHOLHDL  Lab Results   Component Value Date    HGBA1C 5.0 02/23/2022     Lab Results   Component Value Date    TSH 1.93 07/27/2021           ASSESSMENT AND PLAN:    #1 paroxysmal atrial fibrillation currently in sinus rhythm on oral anticoagulation to decrease the cardiac embolization    Continue amiodarone 100 mg a day maintaining sinus rhythm recent LFTs within normal range. And previous chest x-ray no acute pathology reported.  Continue oral anticoagulation to decrease risk of cardiac embolization       [id number] score  Is 4 continue oral anticoagulation     #2  Sick sinus syndrome with a significant bradyarrhythmia and paroxysmal atrial fibrillation status post pacemaker implantation continue follow-up with the pacer clinic         #3 history of atrial flutter status post EP study and flutter ablation no further recurrence     #4 hypertension good blood pressure control on lisinopril hydrochlorothiazide    #4 CAD stable on baby aspirin will continue     I spent 15 minutes caring for Evgeny on this date of service. This time includes time spent by me of counseling/coordination of care as relates to the presenting problem and any ordered procedures/tests as outlined above.           This document has been electronically signed by Johnny Forbes MD on April 19, 2022 10:03 CDT      Diagnoses and all orders for this visit:    1. Atrial flutter, unspecified type (HCC) (Primary)    2. Coronary artery disease involving native coronary artery of native heart without angina pectoris    3. Benign  essential hypertension    4. Paroxysmal atrial fibrillation (HCC)    5. Hyperlipidemia, unspecified hyperlipidemia type        Johnny Forbes MD  4/19/2022  09:55 CDT

## 2022-10-12 ENCOUNTER — CLINICAL SUPPORT (OUTPATIENT)
Dept: CARDIOLOGY | Facility: CLINIC | Age: 87
End: 2022-10-12

## 2022-10-12 DIAGNOSIS — I49.5 SICK SINUS SYNDROME: Primary | ICD-10-CM

## 2022-10-12 PROCEDURE — 93288 INTERROG EVL PM/LDLS PM IP: CPT | Performed by: INTERNAL MEDICINE

## 2022-10-18 ENCOUNTER — OFFICE VISIT (OUTPATIENT)
Dept: CARDIOLOGY | Facility: CLINIC | Age: 87
End: 2022-10-18

## 2022-10-18 VITALS
SYSTOLIC BLOOD PRESSURE: 126 MMHG | HEART RATE: 60 BPM | OXYGEN SATURATION: 96 % | DIASTOLIC BLOOD PRESSURE: 68 MMHG | WEIGHT: 198 LBS | BODY MASS INDEX: 27.72 KG/M2 | HEIGHT: 71 IN

## 2022-10-18 DIAGNOSIS — I48.92 ATRIAL FLUTTER, UNSPECIFIED TYPE: Primary | ICD-10-CM

## 2022-10-18 DIAGNOSIS — I49.5 SICK SINUS SYNDROME: ICD-10-CM

## 2022-10-18 DIAGNOSIS — I48.0 PAROXYSMAL ATRIAL FIBRILLATION: ICD-10-CM

## 2022-10-18 DIAGNOSIS — E78.5 HYPERLIPIDEMIA, UNSPECIFIED HYPERLIPIDEMIA TYPE: ICD-10-CM

## 2022-10-18 DIAGNOSIS — I25.10 CORONARY ARTERY DISEASE INVOLVING NATIVE CORONARY ARTERY OF NATIVE HEART WITHOUT ANGINA PECTORIS: ICD-10-CM

## 2022-10-18 PROCEDURE — 99214 OFFICE O/P EST MOD 30 MIN: CPT | Performed by: INTERNAL MEDICINE

## 2022-10-18 NOTE — PROGRESS NOTES
Evgeny Carter Jr.  89 y.o. male    10/18/2022  1. Atrial flutter, unspecified type (HCC)    2. Coronary artery disease involving native coronary artery of native heart without angina pectoris    3. Hyperlipidemia, unspecified hyperlipidemia type    4. Paroxysmal atrial fibrillation (HCC)    5. Sick sinus syndrome (HCC)        History of Present Illness:    Body mass index is 27.62 kg/m². BMI is above normal parameters. Recommendations include: exercise counseling, nutrition counseling and referral to primary care.    89 years old patient physically active appropriate his age previously evaluated with Dr. Gates Per patient polyp was removed and the aunt help in maintaining hemoglobin he is on Eliquis 2.5 mg twice a day and iron supplement presented today dated 10/18/2022 for routine follow-up with concurrent medical problems sick sinus syndrome s/p pacemaker, history of atrial flutter s/p ablation, paroxysmal atrial fibrillation noted on pacemaker interrogations on amiodarone maintain sinus rhythm continue oral anticoagulation.  History of CAD s/p PCI.  No symptom of cardiac decompensation such orthopnea PND chest pain or dizziness reported.  Previous chest x-ray within normal range, normal LFT and TSH level.  Her last echocardiogram in May 2020 preserved left ventricle systolic function with mild mitral regurgitation      Date of Procedure: 06/11/20          PROCEDURE(S) PERFORMED:    1. Implantation of a dual-chamber permanent pacemaker.        INDICATIONS FOR PROCDEDURE:            Sick sinus     Echo 5/11/2020  · Left ventricular wall thickness is consistent with borderline concentric hypertrophy.  · Estimated EF = 61%.  · Left ventricular systolic function is normal.  · Left ventricular diastolic dysfunction (grade I a) consistent with impaired relaxation.  · Left atrial cavity size is mildly dilated.  · Mild mitral valve regurgitation is present    ECHO 8/15/18  ·  Mild tricuspid valve regurgitation  is present.  · Mild pulmonic valve regurgitation is present.  · Left atrial cavity size is mild-to-moderately dilated.  · Left ventricular systolic function is normal. Estimated EF = 55%.  · Mild mitral valve regurgitation is present  Left ventricular diastolic dysfunction (grade I a) consistent with impaired relaxation     X-ray chest November 2020    PA and Lateral Chest:  The bones are intact.  The heart is borderline in size.  There is a dual-lead left subclavian transvenous pacemaker with intact, well-positioned leads.  The lungs are clear.  No heart failure or pleural fluid.  There is an old   calcified right hilar node.  Exam End: 11/06/20 12:55         LFTs July 2021    Protein   6.4 - 8.9 g/dL 6.3Low        Albumin   3.5 - 5.7 g/dL 4.0       Total Bilirubin   0.3 - 1.0 mg/dL 0.52       SGOT- AST   13 - 39 U/L 23       SGPT- ALT   7 - 52 U/L 22       Alkaline Phosphatase   34 - 104 U/L 67      TSH   0.42 - 5.47 uIU/mL 1.93        LFTs November 6, 2020      Total Protein   6.4 - 8.2 g/dL 7.4    Albumin   3.4 - 5.0 g/dL 3.7    Total Bilirubin   0.20 - 1.00 mg/dL 0.30    AST (SGOT)   15 - 37 U/L 35    ALT (SGPT)   16 - 63 U/L 44    Alkaline Phosphatase   46 - 116 U/L 87    GFR-mch    mL/min 48          Lipid October 28, 2020    Total Cholesterol   <200 mg/dL 218High     Triglycerides   30 - 150 mg/dL 142    HDL Cholesterol   32 - 72 mg/dL 46    LDL Cholesterol    5 - 99 mg/dL 140High     Chol/HDL Ratio   4.2 - 5.6 4.7        10/2016      Ref Range & Units 1yr ago      Total Cholesterol 0 - 199 mg/dl 169   Comments: CHOL DESIRED: < 200 MG/DL   Triglycerides 20 - 199 mg/dl 188   Comments: TRIG DESIRED: < 200 MG/DL   HDL Cholesterol 60 - 200 mg/dl 37 (L)   Comments: HDL AVERAGE RISK: 35 - 60 MG/DL   LDL Cholesterol  0 - 129 mg/dl 94       7/2019  Total Cholesterol  100 - 200 mg/dL 196    Triglycerides  30 - 150 mg/dL 199 Abnormally high     HDL Cholesterol  32 - 72 mg/dL 41    LDL Cholesterol   5 - 99 mg/dL 112  Abnormally high     Chol/HDL Ratio  4.2 - 5.6 4.8           Stress TEST ; 10/2016   Normal LV systolic function overall with an EF of 70% with inferior       wall hypokinesis.  2.   Moderate size predominantly fixed defect in the inferior wall and       apex with partial reversibility.     ECHO 10/2016  1.   Mild concentric left ventricular hypertrophy with estimated       ejection fraction 50% to 60%.  2.   Mildly dilated aortic root with diameter of 4.0.  3.   Mildly dilated left atrium with diameter of 4.4.  4.   Aortic valve appears mildly sclerotic without any hemodynamic       significant aortic stenosis. No aortic regurgitation seen.  5.   Pulmonic valve not well visualized. There is trace pulmonic       regurgitation. There is a trace mitral regurgitation. No       intracardiac mass, pericardial effusion, cardiac thrombus seen.  6.   Normal right ventricular size and function      X-ray chest June 11, 2020    IMPRESSION:  Pacemaker leads with tips in right atrium and right  ventricle in satisfactory position. There is no evidence of a  pneumothorax.    SUBJECTIVE:    No Known Allergies      Past Medical History:   Diagnosis Date   • Allergic rhinitis    • Arthritis     hips, knees, feet   • Artificial lens present     in position   • Benign neoplasm of skin of eyelid    • Bladder tumor    • Bladder tumor    • BPH (benign prostatic hyperplasia)    • Cancer (HCC)     SKIN    • Cataract    • Coronary arteriosclerosis    • Cortical senile cataract    • Cough    • Diabetes mellitus (HCC)    • Esophagitis 5/23/2021   • Essential hypertension    • Essential hypertension    • Hypercholesterolemia    • Hypercholesterolemia    • Kidney stones    • MI, old 1988   • Nonexudative age-related macular degeneration     mild   • Nuclear cataract    • Open-angle glaucoma     s/p trabec OS, doing well      • Primary open angle glaucoma     OS, s/p trabeculectomy; IOP down with timolol OD     • Pseudophakia of right eye    •  Rhinitis    • Upper respiratory infection    • Vitreous detachment     peripheral         Past Surgical History:   Procedure Laterality Date   • ANKLE SURGERY  07/29/2000    Ankle surgery (ORIF right ankle fracture. Bimalleolar right fracture)   • APPENDECTOMY     • CARDIAC ABLATION     • CARDIAC CATHETERIZATION  07/02/1987    Cardiac cath 34576 (Coronary arthosclerotic heart disease. maintained patency of RCA. Mild inferior hypokinesis.)   • CARDIAC CATHETERIZATION  06/30/1987    Cardiac cath 77645 (Coronary atherosclerotic heart disease. Occluded RCA. Acute inferior infarction. S/P successful angioplasty of RCA)   • CARDIAC ELECTROPHYSIOLOGY PROCEDURE N/A 6/11/2020    Procedure: Pacer single lead;  Surgeon: Johnny Forbes MD;  Location: [facility] CATH INVASIVE LOCATION;  Service: Cardiology;  Laterality: N/A;   • CATARACT EXTRACTION  04/01/2010    Remove cataract, insert lens (Complicated cataract extraction with intraocular lens implantation, right eye, Marcelo SN-60 WF serial # [id number]: 20.5 diopter)   • CATARACT EXTRACTION W/ INTRAOCULAR LENS IMPLANT Left 3/23/2018    Procedure: CATARACT PHACO EXTRACTION WITH INTRAOCULAR LENS IMPLANT;  Surgeon: Oli Haley MD;  Location: [facility] OR;  Service: Ophthalmology   • COLONOSCOPY N/A 5/9/2019    Procedure: COLONOSCOPY;  Surgeon: Rainer Gates DO;  Location: [facility] ENDOSCOPY;  Service: Gastroenterology   • COLONOSCOPY N/A 11/12/2020    Procedure: COLONOSCOPY;  Surgeon: Rainer Gates DO;  Location: [facility] ENDOSCOPY;  Service: Gastroenterology;  Laterality: N/A;   • ENDOSCOPY  02/19/1990    EGD 66275 (Olympus video endoscope.Fibrin-like patch in stomach)   • ENDOSCOPY N/A 11/12/2020    Procedure: ESOPHAGOGASTRODUODENOSCOPY;  Surgeon: Rainer Gates DO;  Location: [facility] ENDOSCOPY;  Service: Gastroenterology;  Laterality: N/A;   • EYE SURGERY  02/02/2005    Eye surgery procedure (Repair of operative wound leak, left eye. S/P trabeculectomy with  operative wound leak)   • EYE SURGERY  01/19/2005    Eye surgery procedure (Trabeculectomy, left eye. 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 degeneration)    • OTHER SURGICAL HISTORY  02/26/2015    EXTENDED VISUAL FIELDS STUDY 16374 (Primary open angle glaucoma)    • OTHER SURGICAL HISTORY  02/26/2015    EXTENDED VISUAL FIELDS STUDY 20983 (Primary open angle glaucoma)    • OTHER SURGICAL HISTORY  06/16/2016    OCT DISC NFL 08587 (Open-angle glaucoma)    • OTHER SURGICAL HISTORY      OCT DISC NFL 51491 (Primary open angle glaucoma) (3): 06/17/2015, 03/19/2014, 03/04/2013   • OTHER SURGICAL HISTORY  06/16/2016    OPTIC NERVE HEAD EVAL PERFORMED 2027F (Open-angle glaucoma)    • TRANSURETHRAL RESECTION OF BLADDER TUMOR N/A 5/9/2018    Procedure: CYSTOSCOPY TRANSURETHRAL RESECTION OF BLADDER TUMOR;  Surgeon: Donovan Conner MD;  Location: [facility] OR;  Service: Urology   • TRANSURETHRAL RESECTION OF BLADDER TUMOR N/A 7/26/2019    Procedure: CYSTOSCOPY TRANSURETHRAL RESECTION OF BLADDER TUMOR;  Surgeon: Donovan Conner MD;  Location: [facility] OR;  Service: Urology   • TRANSURETHRAL RESECTION OF BLADDER TUMOR N/A 7/29/2020    Procedure: CYSTOSCOPY TRANSURETHRAL RESECTION OF BLADDER TUMOR;  Surgeon: Donovan Conner MD;  Location: [facility] OR;  Service: Urology;  Laterality: N/A;   • UVULECTOMY  1969    EXCISION OF UVULA 97711 (partial uvulectomy with excision of papilloma. Redunant uvula with a small papilloma)         Family History   Problem Relation Age of Onset   • Cancer Mother    • No Known Problems Father    • Cancer Brother          Social History     Socioeconomic History   • Marital status:    Tobacco Use   • Smoking status: Former   • Smokeless tobacco: Current     Types: Snuff   • Tobacco comments:      pouches   Substance and Sexual Activity   • Alcohol use: No   • Drug use: No   • Sexual activity: Defer         Current Outpatient Medications   Medication Sig Dispense Refill   • amiodarone (PACERONE) 200 MG tablet Take 100 mg by mouth Daily. Take 1/2 tablet daily     • apixaban (ELIQUIS) 5 MG tablet tablet Take 2.5 mg by mouth 2 (Two) Times a Day. Last dose to be taken 5/5/18 prior to surgery     • aspirin 81 MG tablet Take 81 mg by mouth Daily. Last dose 5/1/18     • ciprofloxacin (CILOXAN) 0.3 % ophthalmic solution 2 drops in eye(s) every 2 hours while awake x 2 days, then every 4 hours x 5 days. 5 mL 0   • dilTIAZem CD (CARDIZEM CD) 180 MG 24 hr capsule TAKE ONE CAPSULE BY MOUTH DAILY 90 capsule 5   • erythromycin (ROMYCIN) 5 MG/GM ophthalmic ointment APPLY A THIN LAYER INTO LEFT EYE AT BEDTIME AS NEEDED     • FeroSul 325 (65 Fe) MG tablet Take 325 mg by mouth 2 (Two) Times a Day.     • fluticasone (FLONASE) 50 MCG/ACT nasal spray ONE SPRAY IN EACH NOSTRIL DAILY     • lisinopril-hydrochlorothiazide (PRINZIDE,ZESTORETIC) 20-12.5 MG per tablet Take 1 tablet by mouth Daily.     • loratadine (CLARITIN) 10 MG tablet Take 10 mg by mouth Daily.     • metFORMIN (GLUCOPHAGE) 500 MG tablet Take 500 mg by mouth 2 (Two) Times a Day.     • Multiple Vitamins-Minerals (MENS MULTIVITAMIN PLUS PO) Take 1 tablet by mouth Daily.     • terazosin (HYTRIN) 2 MG capsule Take 2 mg by mouth Every Night.     • timolol (TIMOPTIC) 0.5 % ophthalmic solution Administer 1 drop to both eyes 2 (Two) Times a Day.       No current facility-administered medications for this visit.           Review of Systems:   Today dated 10/18/2022 no significant change was noted in the review of system  Constitutional:  Denies recent weight loss, weight gain,      HENT:  Denies any hearing loss, epistaxis, hoarseness    Eyes: No blurring    Respiratory:  Denies dyspnea with exertion    Cardiovascular: See H&P    Gastrointestinal: History of GI bleed per  "patient evaluated with Dr. Gates polyp was removed and currently he is on iron supplement    Endocrine: No polydipsia polyuria    Genitourinary: Negative.      Musculoskeletal: History of    Skin:  Denies any change in hair or nails, rashes, or skin lesions.     Allergic/Immunologic: Negative.  Negative for environmental allergies    Neurological:  Denies  headaches, strokes, TIA, or seizure disorder.     Hematological: Denies any food allergies, seasonal allergies, bleeding disorders    Psychiatric/Behavioral: Denies any history of depression, substance abuse, or change in cognitive function.       OBJECTIVE:    /68 (BP Location: Left arm, Patient Position: Sitting, Cuff Size: Adult)   Pulse 60   Ht 180.3 cm (71\")   Wt 89.8 kg (198 lb)   SpO2 96%   BMI 27.62 kg/m²       Physical Exam:   Today dated 10/18/2022 no significant change was noted in physical  Constitutional: Cooperative, alert and oriented, well-developed, well-nourished, in no acute distress.     HENT:   Head: Normocephalic and atraumatic, oral mucosa moist, conjunctive pink no jugular distention    Cardiovascular: Regular rhythm, S1 and S2 normal, no S3 or S4. Apical impulse not displaced. No murmurs, gallops, or rubs detected.     Pulmonary/Chest: Chest: Good bilateral air entry     Abdominal: Abdominal soft nontender    Musculoskeletal: Positive peripheral pulses no edema    Neurological: No gross motor or sensory deficits noted, affect appropriate    Skin: Warm and dry to the touch, no apparent skin lesions or masses noted.     Psychiatric: Good personality and good mood        Procedures      Lab Results   Component Value Date    WBC 6.12 06/11/2020    HGB 11.0 (L) 06/11/2020    HCT 31.8 (L) 06/11/2020    MCV 97.2 (H) 06/11/2020     06/11/2020     Lab Results   Component Value Date    GLUCOSE 122 (H) 07/23/2020    BUN 26 (H) 09/20/2022    CREATININE 1.2 09/20/2022    EGFRIFNONA 46 (L) 07/23/2020    EGFRIFAFRI 63 07/27/2021    BCR " 18.1 07/23/2020    CO2 28 09/20/2022    CALCIUM 8.6 09/20/2022    ALBUMIN 3.8 09/20/2022    AST 17 09/20/2022    ALT 16 09/20/2022     Lab Results   Component Value Date    CHOL 218 (H) 10/28/2020    CHOL 196 07/09/2019    CHOL 190 03/30/2018     Lab Results   Component Value Date    TRIG 83 09/20/2022    TRIG 203 (H) 02/23/2022    TRIG 142 10/28/2020     Lab Results   Component Value Date    HDL 49 09/20/2022    HDL 49 02/23/2022    HDL 46 10/28/2020     No components found for: LDLCALC  Lab Results   Component Value Date     09/20/2022     02/23/2022     (H) 10/28/2020     No results found for: HDLLDLRATIO  No components found for: CHOLHDL  Lab Results   Component Value Date    HGBA1C 5.2 09/20/2022     Lab Results   Component Value Date    TSH 1.77 09/20/2022           ASSESSMENT AND PLAN:    #1 paroxysmal atrial fibrillation currently in sinus rhythm on oral anticoagulation to decrease the cardiac embolization    Continue amiodarone 100 mg a day maintaining sinus rhythm recent LFTs within normal range. And previous chest x-ray no acute pathology reported.  Continue oral anticoagulation to decrease risk of cardiac embolization       [id number] score  Is 4 continue oral anticoagulation     #2  Sick sinus syndrome with a significant bradyarrhythmia  status post pacemaker implantation continue follow-up with the pacer clinic         #3 history of atrial flutter status post EP study and flutter ablation no further recurrence     #4 hypertension good blood pressure control on lisinopril hydrochlorothiazide    #4 CAD stable on baby aspirin will continue     I spent 16 minutes caring for Evgeny on this date of service. This time includes time spent by me of counseling/coordination of care as relates to the presenting problem and any ordered procedures/tests as outlined above.           This document has been electronically signed by Johnny Forbes MD on October 18, 2022 10:14 CDT      Diagnoses  and all orders for this visit:    1. Atrial flutter, unspecified type (HCC) (Primary)    2. Coronary artery disease involving native coronary artery of native heart without angina pectoris    3. Hyperlipidemia, unspecified hyperlipidemia type    4. Paroxysmal atrial fibrillation (HCC)    5. Sick sinus syndrome (HCC)        Johnny Forbes MD  10/18/2022  10:14 CDT

## 2022-10-20 NOTE — PROGRESS NOTES
Pacemaker Evaluation Report    October 12, 2022    Primary Cardiologist: Dr Forbes  Implanting MD: Dr Forbes  :  [employer] Model: Assurity MRI 2272 Serial Number: [id number]   Implant date: 06/11/2020     Reason for evaluation: routine PPM Office   Cardiac device indication(s): sick sinus syndrome    Battery  RICHARD: 6.3-7.6 years       Interrogation Results  Atrial sensing: P wave: 4.2 mV  Atrial capture: 1.0 V @ 0.5 ms   Atrial lead impedance: 440 ohms  Ventricular sensing: R wave: 7.2 mV  Ventricular capture: 1.0 V @ 0.5 ms  Ventricular lead impedance: right  410 ohms    Parameters  Mode: DDDR  Base Rate: 60    Diagnostic Data  Atrial paced: 75 %   Ventricular paced: 2.3 %  Mode switch: 0%  AT/AF [place]: 0%  AHR: 0  VHR: 0    Intrinsic Rate: 46    Changes made: PVARP increased to 4.25 ms for PMT.    Conclusions: normal device function. Follow up in one year, remote every 3 months.    Assessment:  Sick sinus syndrome status post pacemaker    Normal functioning pacemaker with good sensing and pacing threshold normal lead impedance and battery voltage greater than 7 years pacing the atrium about 75% ventricle less than 5%                This document has been electronically signed by Johnny Forbes MD on October 26, 2022 14:04 CDT

## 2022-11-01 RX ORDER — AMIODARONE HYDROCHLORIDE 200 MG/1
TABLET ORAL
Qty: 90 TABLET | Refills: 3 | Status: SHIPPED | OUTPATIENT
Start: 2022-11-01

## 2023-01-16 PROCEDURE — 93296 REM INTERROG EVL PM/IDS: CPT | Performed by: INTERNAL MEDICINE

## 2023-01-16 PROCEDURE — 93294 REM INTERROG EVL PM/LDLS PM: CPT | Performed by: INTERNAL MEDICINE

## 2023-03-25 ENCOUNTER — APPOINTMENT (OUTPATIENT)
Dept: GENERAL RADIOLOGY | Facility: HOSPITAL | Age: 88
DRG: 193 | End: 2023-03-25
Payer: MEDICARE

## 2023-03-25 ENCOUNTER — APPOINTMENT (OUTPATIENT)
Dept: CARDIOLOGY | Facility: HOSPITAL | Age: 88
DRG: 193 | End: 2023-03-25
Payer: MEDICARE

## 2023-03-25 ENCOUNTER — HOSPITAL ENCOUNTER (INPATIENT)
Facility: HOSPITAL | Age: 88
LOS: 5 days | Discharge: HOME HEALTH | DRG: 193 | End: 2023-04-01
Attending: EMERGENCY MEDICINE | Admitting: INTERNAL MEDICINE
Payer: MEDICARE

## 2023-03-25 DIAGNOSIS — R05.9 COUGH, UNSPECIFIED TYPE: ICD-10-CM

## 2023-03-25 DIAGNOSIS — M25.551 PAIN OF RIGHT HIP: ICD-10-CM

## 2023-03-25 DIAGNOSIS — Z74.09 IMPAIRED MOBILITY AND ADLS: ICD-10-CM

## 2023-03-25 DIAGNOSIS — J18.9 MULTIFOCAL PNEUMONIA: ICD-10-CM

## 2023-03-25 DIAGNOSIS — Z78.9 IMPAIRED MOBILITY AND ADLS: ICD-10-CM

## 2023-03-25 DIAGNOSIS — Z74.09 IMPAIRED FUNCTIONAL MOBILITY, BALANCE, GAIT, AND ENDURANCE: ICD-10-CM

## 2023-03-25 DIAGNOSIS — J81.0 ACUTE PULMONARY EDEMA: Primary | ICD-10-CM

## 2023-03-25 LAB
ALBUMIN SERPL-MCNC: 3.9 G/DL (ref 3.5–5.2)
ALBUMIN/GLOB SERPL: 1.5 G/DL
ALP SERPL-CCNC: 84 U/L (ref 39–117)
ALT SERPL W P-5'-P-CCNC: 11 U/L (ref 1–41)
ANION GAP SERPL CALCULATED.3IONS-SCNC: 12 MMOL/L (ref 5–15)
ANION GAP SERPL CALCULATED.3IONS-SCNC: 12 MMOL/L (ref 5–15)
APTT PPP: 40.2 SECONDS (ref 20–40.3)
APTT PPP: 76.8 SECONDS (ref 20–40.3)
AST SERPL-CCNC: 21 U/L (ref 1–40)
BASOPHILS # BLD AUTO: 0.01 10*3/MM3 (ref 0–0.2)
BASOPHILS # BLD AUTO: 0.02 10*3/MM3 (ref 0–0.2)
BASOPHILS NFR BLD AUTO: 0.2 % (ref 0–1.5)
BASOPHILS NFR BLD AUTO: 0.3 % (ref 0–1.5)
BILIRUB SERPL-MCNC: 0.4 MG/DL (ref 0–1.2)
BUN SERPL-MCNC: 16 MG/DL (ref 8–23)
BUN SERPL-MCNC: 18 MG/DL (ref 8–23)
BUN/CREAT SERPL: 14.7 (ref 7–25)
BUN/CREAT SERPL: 15.8 (ref 7–25)
CALCIUM SPEC-SCNC: 8.7 MG/DL (ref 8.6–10.5)
CALCIUM SPEC-SCNC: 9.1 MG/DL (ref 8.6–10.5)
CHLORIDE SERPL-SCNC: 98 MMOL/L (ref 98–107)
CHLORIDE SERPL-SCNC: 98 MMOL/L (ref 98–107)
CO2 SERPL-SCNC: 25 MMOL/L (ref 22–29)
CO2 SERPL-SCNC: 25 MMOL/L (ref 22–29)
CREAT SERPL-MCNC: 1.09 MG/DL (ref 0.76–1.27)
CREAT SERPL-MCNC: 1.14 MG/DL (ref 0.76–1.27)
DEPRECATED RDW RBC AUTO: 48.8 FL (ref 37–54)
DEPRECATED RDW RBC AUTO: 49.4 FL (ref 37–54)
EGFRCR SERPLBLD CKD-EPI 2021: 61.5 ML/MIN/1.73
EGFRCR SERPLBLD CKD-EPI 2021: 64.9 ML/MIN/1.73
EOSINOPHIL # BLD AUTO: 0.04 10*3/MM3 (ref 0–0.4)
EOSINOPHIL # BLD AUTO: 0.06 10*3/MM3 (ref 0–0.4)
EOSINOPHIL NFR BLD AUTO: 0.6 % (ref 0.3–6.2)
EOSINOPHIL NFR BLD AUTO: 1 % (ref 0.3–6.2)
ERYTHROCYTE [DISTWIDTH] IN BLOOD BY AUTOMATED COUNT: 13.2 % (ref 12.3–15.4)
ERYTHROCYTE [DISTWIDTH] IN BLOOD BY AUTOMATED COUNT: 13.2 % (ref 12.3–15.4)
FLUAV RNA RESP QL NAA+PROBE: NOT DETECTED
FLUBV RNA RESP QL NAA+PROBE: NOT DETECTED
GEN 5 2HR TROPONIN T REFLEX: 164 NG/L
GLOBULIN UR ELPH-MCNC: 2.6 GM/DL
GLUCOSE BLDC GLUCOMTR-MCNC: 155 MG/DL (ref 70–130)
GLUCOSE BLDC GLUCOMTR-MCNC: 283 MG/DL (ref 70–130)
GLUCOSE BLDC GLUCOMTR-MCNC: 328 MG/DL (ref 70–130)
GLUCOSE SERPL-MCNC: 149 MG/DL (ref 65–99)
GLUCOSE SERPL-MCNC: 168 MG/DL (ref 65–99)
HCT VFR BLD AUTO: 32.9 % (ref 37.5–51)
HCT VFR BLD AUTO: 34.1 % (ref 37.5–51)
HGB BLD-MCNC: 11 G/DL (ref 13–17.7)
HGB BLD-MCNC: 11.3 G/DL (ref 13–17.7)
HOLD SPECIMEN: NORMAL
IMM GRANULOCYTES # BLD AUTO: 0.03 10*3/MM3 (ref 0–0.05)
IMM GRANULOCYTES # BLD AUTO: 0.04 10*3/MM3 (ref 0–0.05)
IMM GRANULOCYTES NFR BLD AUTO: 0.5 % (ref 0–0.5)
IMM GRANULOCYTES NFR BLD AUTO: 0.6 % (ref 0–0.5)
INR PPP: 1.31 (ref 0.8–1.2)
LYMPHOCYTES # BLD AUTO: 0.76 10*3/MM3 (ref 0.7–3.1)
LYMPHOCYTES # BLD AUTO: 0.84 10*3/MM3 (ref 0.7–3.1)
LYMPHOCYTES NFR BLD AUTO: 12.9 % (ref 19.6–45.3)
LYMPHOCYTES NFR BLD AUTO: 13.2 % (ref 19.6–45.3)
MAGNESIUM SERPL-MCNC: 2.1 MG/DL (ref 1.6–2.4)
MCH RBC QN AUTO: 33.5 PG (ref 26.6–33)
MCH RBC QN AUTO: 34.1 PG (ref 26.6–33)
MCHC RBC AUTO-ENTMCNC: 33.1 G/DL (ref 31.5–35.7)
MCHC RBC AUTO-ENTMCNC: 33.4 G/DL (ref 31.5–35.7)
MCV RBC AUTO: 101.2 FL (ref 79–97)
MCV RBC AUTO: 101.9 FL (ref 79–97)
MONOCYTES # BLD AUTO: 0.41 10*3/MM3 (ref 0.1–0.9)
MONOCYTES # BLD AUTO: 0.89 10*3/MM3 (ref 0.1–0.9)
MONOCYTES NFR BLD AUTO: 15.5 % (ref 5–12)
MONOCYTES NFR BLD AUTO: 6.3 % (ref 5–12)
NEUTROPHILS NFR BLD AUTO: 4.01 10*3/MM3 (ref 1.7–7)
NEUTROPHILS NFR BLD AUTO: 5.16 10*3/MM3 (ref 1.7–7)
NEUTROPHILS NFR BLD AUTO: 69.6 % (ref 42.7–76)
NEUTROPHILS NFR BLD AUTO: 79.3 % (ref 42.7–76)
NRBC BLD AUTO-RTO: 0 /100 WBC (ref 0–0.2)
NRBC BLD AUTO-RTO: 0 /100 WBC (ref 0–0.2)
NT-PROBNP SERPL-MCNC: 4077 PG/ML (ref 0–1800)
PLATELET # BLD AUTO: 171 10*3/MM3 (ref 140–450)
PLATELET # BLD AUTO: 182 10*3/MM3 (ref 140–450)
PMV BLD AUTO: 9.9 FL (ref 6–12)
PMV BLD AUTO: 9.9 FL (ref 6–12)
POTASSIUM SERPL-SCNC: 4.2 MMOL/L (ref 3.5–5.2)
POTASSIUM SERPL-SCNC: 4.4 MMOL/L (ref 3.5–5.2)
PROT SERPL-MCNC: 6.5 G/DL (ref 6–8.5)
PROTHROMBIN TIME: 16.3 SECONDS (ref 11.1–15.3)
QT INTERVAL: 478 MS
QTC INTERVAL: 508 MS
RBC # BLD AUTO: 3.23 10*6/MM3 (ref 4.14–5.8)
RBC # BLD AUTO: 3.37 10*6/MM3 (ref 4.14–5.8)
SARS-COV-2 RNA RESP QL NAA+PROBE: NOT DETECTED
SODIUM SERPL-SCNC: 135 MMOL/L (ref 136–145)
SODIUM SERPL-SCNC: 135 MMOL/L (ref 136–145)
TROPONIN T DELTA: 26 NG/L
TROPONIN T SERPL HS-MCNC: 138 NG/L
WBC NRBC COR # BLD: 5.76 10*3/MM3 (ref 3.4–10.8)
WBC NRBC COR # BLD: 6.51 10*3/MM3 (ref 3.4–10.8)
WHOLE BLOOD HOLD COAG: NORMAL

## 2023-03-25 PROCEDURE — 99285 EMERGENCY DEPT VISIT HI MDM: CPT

## 2023-03-25 PROCEDURE — 93306 TTE W/DOPPLER COMPLETE: CPT

## 2023-03-25 PROCEDURE — 93306 TTE W/DOPPLER COMPLETE: CPT | Performed by: INTERNAL MEDICINE

## 2023-03-25 PROCEDURE — 25010000002 METHYLPREDNISOLONE PER 125 MG: Performed by: INTERNAL MEDICINE

## 2023-03-25 PROCEDURE — G0378 HOSPITAL OBSERVATION PER HR: HCPCS

## 2023-03-25 PROCEDURE — 94799 UNLISTED PULMONARY SVC/PX: CPT

## 2023-03-25 PROCEDURE — 93005 ELECTROCARDIOGRAM TRACING: CPT | Performed by: EMERGENCY MEDICINE

## 2023-03-25 PROCEDURE — 71046 X-RAY EXAM CHEST 2 VIEWS: CPT

## 2023-03-25 PROCEDURE — 82962 GLUCOSE BLOOD TEST: CPT

## 2023-03-25 PROCEDURE — 94761 N-INVAS EAR/PLS OXIMETRY MLT: CPT

## 2023-03-25 PROCEDURE — 85025 COMPLETE CBC W/AUTO DIFF WBC: CPT | Performed by: EMERGENCY MEDICINE

## 2023-03-25 PROCEDURE — 93010 ELECTROCARDIOGRAM REPORT: CPT | Performed by: INTERNAL MEDICINE

## 2023-03-25 PROCEDURE — 87636 SARSCOV2 & INF A&B AMP PRB: CPT | Performed by: EMERGENCY MEDICINE

## 2023-03-25 PROCEDURE — 83735 ASSAY OF MAGNESIUM: CPT | Performed by: INTERNAL MEDICINE

## 2023-03-25 PROCEDURE — 99232 SBSQ HOSP IP/OBS MODERATE 35: CPT | Performed by: INTERNAL MEDICINE

## 2023-03-25 PROCEDURE — 25010000002 FUROSEMIDE PER 20 MG: Performed by: EMERGENCY MEDICINE

## 2023-03-25 PROCEDURE — 94640 AIRWAY INHALATION TREATMENT: CPT

## 2023-03-25 PROCEDURE — 85730 THROMBOPLASTIN TIME PARTIAL: CPT | Performed by: INTERNAL MEDICINE

## 2023-03-25 PROCEDURE — 85025 COMPLETE CBC W/AUTO DIFF WBC: CPT | Performed by: INTERNAL MEDICINE

## 2023-03-25 PROCEDURE — 63710000001 INSULIN ASPART PER 5 UNITS: Performed by: INTERNAL MEDICINE

## 2023-03-25 PROCEDURE — 25010000002 HEPARIN (PORCINE) PER 1000 UNITS: Performed by: INTERNAL MEDICINE

## 2023-03-25 PROCEDURE — 25010000002 DEXAMETHASONE PER 1 MG: Performed by: EMERGENCY MEDICINE

## 2023-03-25 PROCEDURE — 25510000001 PERFLUTREN (DEFINITY) 8.476 MG IN SODIUM CHLORIDE (PF) 0.9 % 10 ML INJECTION: Performed by: INTERNAL MEDICINE

## 2023-03-25 PROCEDURE — 83880 ASSAY OF NATRIURETIC PEPTIDE: CPT | Performed by: EMERGENCY MEDICINE

## 2023-03-25 PROCEDURE — 85610 PROTHROMBIN TIME: CPT | Performed by: INTERNAL MEDICINE

## 2023-03-25 PROCEDURE — 80053 COMPREHEN METABOLIC PANEL: CPT | Performed by: EMERGENCY MEDICINE

## 2023-03-25 PROCEDURE — 84484 ASSAY OF TROPONIN QUANT: CPT | Performed by: INTERNAL MEDICINE

## 2023-03-25 RX ORDER — HEPARIN SODIUM 5000 [USP'U]/ML
4000 INJECTION, SOLUTION INTRAVENOUS; SUBCUTANEOUS ONCE
Status: COMPLETED | OUTPATIENT
Start: 2023-03-25 | End: 2023-03-25

## 2023-03-25 RX ORDER — SODIUM CHLORIDE 0.9 % (FLUSH) 0.9 %
10 SYRINGE (ML) INJECTION EVERY 12 HOURS SCHEDULED
Status: DISCONTINUED | OUTPATIENT
Start: 2023-03-25 | End: 2023-04-01 | Stop reason: HOSPADM

## 2023-03-25 RX ORDER — ASPIRIN 325 MG
325 TABLET ORAL ONCE
Status: COMPLETED | OUTPATIENT
Start: 2023-03-25 | End: 2023-03-25

## 2023-03-25 RX ORDER — INSULIN ASPART 100 [IU]/ML
0-7 INJECTION, SOLUTION INTRAVENOUS; SUBCUTANEOUS
Status: DISCONTINUED | OUTPATIENT
Start: 2023-03-25 | End: 2023-04-01 | Stop reason: HOSPADM

## 2023-03-25 RX ORDER — ASPIRIN 81 MG/1
81 TABLET ORAL DAILY
Status: DISCONTINUED | OUTPATIENT
Start: 2023-03-26 | End: 2023-04-01 | Stop reason: HOSPADM

## 2023-03-25 RX ORDER — HEPARIN SODIUM 5000 [USP'U]/ML
30 INJECTION, SOLUTION INTRAVENOUS; SUBCUTANEOUS AS NEEDED
Status: DISCONTINUED | OUTPATIENT
Start: 2023-03-25 | End: 2023-03-30

## 2023-03-25 RX ORDER — DOXYCYCLINE HYCLATE 100 MG/1
100 TABLET, DELAYED RELEASE ORAL 2 TIMES DAILY
Qty: 14 TABLET | Refills: 0 | Status: SHIPPED | OUTPATIENT
Start: 2023-03-25 | End: 2023-04-01 | Stop reason: HOSPADM

## 2023-03-25 RX ORDER — NICOTINE POLACRILEX 4 MG
15 LOZENGE BUCCAL
Status: DISCONTINUED | OUTPATIENT
Start: 2023-03-25 | End: 2023-04-01 | Stop reason: HOSPADM

## 2023-03-25 RX ORDER — FUROSEMIDE 10 MG/ML
40 INJECTION INTRAMUSCULAR; INTRAVENOUS ONCE
Status: DISCONTINUED | OUTPATIENT
Start: 2023-03-25 | End: 2023-03-25

## 2023-03-25 RX ORDER — DILTIAZEM HYDROCHLORIDE 180 MG/1
180 CAPSULE, COATED, EXTENDED RELEASE ORAL DAILY
Status: DISCONTINUED | OUTPATIENT
Start: 2023-03-26 | End: 2023-04-01 | Stop reason: HOSPADM

## 2023-03-25 RX ORDER — SODIUM CHLORIDE 9 MG/ML
40 INJECTION, SOLUTION INTRAVENOUS AS NEEDED
Status: DISCONTINUED | OUTPATIENT
Start: 2023-03-25 | End: 2023-04-01 | Stop reason: HOSPADM

## 2023-03-25 RX ORDER — FUROSEMIDE 20 MG/1
20 TABLET ORAL DAILY
Qty: 5 TABLET | Refills: 0 | Status: SHIPPED | OUTPATIENT
Start: 2023-03-25 | End: 2023-04-01 | Stop reason: SDUPTHER

## 2023-03-25 RX ORDER — GLUCAGON 1 MG/ML
1 KIT INJECTION
Status: DISCONTINUED | OUTPATIENT
Start: 2023-03-25 | End: 2023-04-01 | Stop reason: HOSPADM

## 2023-03-25 RX ORDER — ASPIRIN 81 MG/1
81 TABLET ORAL DAILY
Status: DISCONTINUED | OUTPATIENT
Start: 2023-03-25 | End: 2023-03-25

## 2023-03-25 RX ORDER — TERAZOSIN 2 MG/1
2 CAPSULE ORAL NIGHTLY
Status: DISCONTINUED | OUTPATIENT
Start: 2023-03-25 | End: 2023-04-01 | Stop reason: HOSPADM

## 2023-03-25 RX ORDER — DEXAMETHASONE SODIUM PHOSPHATE 4 MG/ML
10 INJECTION, SOLUTION INTRA-ARTICULAR; INTRALESIONAL; INTRAMUSCULAR; INTRAVENOUS; SOFT TISSUE ONCE
Status: COMPLETED | OUTPATIENT
Start: 2023-03-25 | End: 2023-03-25

## 2023-03-25 RX ORDER — TIMOLOL MALEATE 5 MG/ML
1 SOLUTION/ DROPS OPHTHALMIC EVERY 12 HOURS SCHEDULED
Status: DISCONTINUED | OUTPATIENT
Start: 2023-03-25 | End: 2023-04-01 | Stop reason: HOSPADM

## 2023-03-25 RX ORDER — FLUTICASONE PROPIONATE 50 MCG
1 SPRAY, SUSPENSION (ML) NASAL DAILY
Status: DISCONTINUED | OUTPATIENT
Start: 2023-03-26 | End: 2023-03-27

## 2023-03-25 RX ORDER — GUAIFENESIN 600 MG/1
600 TABLET, EXTENDED RELEASE ORAL EVERY 12 HOURS SCHEDULED
Status: DISCONTINUED | OUTPATIENT
Start: 2023-03-25 | End: 2023-04-01 | Stop reason: HOSPADM

## 2023-03-25 RX ORDER — ONDANSETRON 2 MG/ML
4 INJECTION INTRAMUSCULAR; INTRAVENOUS EVERY 6 HOURS PRN
Status: DISCONTINUED | OUTPATIENT
Start: 2023-03-25 | End: 2023-04-01 | Stop reason: HOSPADM

## 2023-03-25 RX ORDER — PANTOPRAZOLE SODIUM 40 MG/1
40 TABLET, DELAYED RELEASE ORAL
Status: DISCONTINUED | OUTPATIENT
Start: 2023-03-26 | End: 2023-04-01 | Stop reason: HOSPADM

## 2023-03-25 RX ORDER — FUROSEMIDE 10 MG/ML
80 INJECTION INTRAMUSCULAR; INTRAVENOUS ONCE
Status: COMPLETED | OUTPATIENT
Start: 2023-03-25 | End: 2023-03-25

## 2023-03-25 RX ORDER — DEXTROSE MONOHYDRATE 25 G/50ML
25 INJECTION, SOLUTION INTRAVENOUS
Status: DISCONTINUED | OUTPATIENT
Start: 2023-03-25 | End: 2023-04-01 | Stop reason: HOSPADM

## 2023-03-25 RX ORDER — ACETAMINOPHEN 325 MG/1
650 TABLET ORAL EVERY 6 HOURS PRN
Status: DISCONTINUED | OUTPATIENT
Start: 2023-03-25 | End: 2023-04-01 | Stop reason: HOSPADM

## 2023-03-25 RX ORDER — HEPARIN SODIUM 10000 [USP'U]/100ML
9.1 INJECTION, SOLUTION INTRAVENOUS
Status: DISCONTINUED | OUTPATIENT
Start: 2023-03-25 | End: 2023-03-30

## 2023-03-25 RX ORDER — LISINOPRIL 20 MG/1
20 TABLET ORAL
Status: DISCONTINUED | OUTPATIENT
Start: 2023-03-26 | End: 2023-04-01 | Stop reason: HOSPADM

## 2023-03-25 RX ORDER — ALBUTEROL SULFATE 2.5 MG/3ML
2.5 SOLUTION RESPIRATORY (INHALATION)
Status: DISCONTINUED | OUTPATIENT
Start: 2023-03-25 | End: 2023-04-01 | Stop reason: HOSPADM

## 2023-03-25 RX ORDER — SODIUM CHLORIDE 0.9 % (FLUSH) 0.9 %
10 SYRINGE (ML) INJECTION AS NEEDED
Status: DISCONTINUED | OUTPATIENT
Start: 2023-03-25 | End: 2023-04-01 | Stop reason: HOSPADM

## 2023-03-25 RX ORDER — HEPARIN SODIUM 5000 [USP'U]/ML
4000 INJECTION, SOLUTION INTRAVENOUS; SUBCUTANEOUS AS NEEDED
Status: DISCONTINUED | OUTPATIENT
Start: 2023-03-25 | End: 2023-03-30

## 2023-03-25 RX ORDER — AMIODARONE HYDROCHLORIDE 200 MG/1
200 TABLET ORAL DAILY
Status: DISCONTINUED | OUTPATIENT
Start: 2023-03-26 | End: 2023-04-01 | Stop reason: HOSPADM

## 2023-03-25 RX ORDER — HYDROCHLOROTHIAZIDE 12.5 MG/1
12.5 TABLET ORAL
Status: DISCONTINUED | OUTPATIENT
Start: 2023-03-26 | End: 2023-03-26

## 2023-03-25 RX ORDER — METHYLPREDNISOLONE SODIUM SUCCINATE 125 MG/2ML
125 INJECTION, POWDER, LYOPHILIZED, FOR SOLUTION INTRAMUSCULAR; INTRAVENOUS ONCE
Status: COMPLETED | OUTPATIENT
Start: 2023-03-25 | End: 2023-03-25

## 2023-03-25 RX ORDER — IPRATROPIUM BROMIDE AND ALBUTEROL SULFATE 2.5; .5 MG/3ML; MG/3ML
3 SOLUTION RESPIRATORY (INHALATION) ONCE
Status: COMPLETED | OUTPATIENT
Start: 2023-03-25 | End: 2023-03-25

## 2023-03-25 RX ADMIN — INSULIN ASPART 5 UNITS: 100 INJECTION, SOLUTION INTRAVENOUS; SUBCUTANEOUS at 18:02

## 2023-03-25 RX ADMIN — TIMOLOL MALEATE 1 DROP: 5 SOLUTION/ DROPS OPHTHALMIC at 22:06

## 2023-03-25 RX ADMIN — HEPARIN SODIUM 11.1 UNITS/KG/HR: 10000 INJECTION, SOLUTION INTRAVENOUS at 15:13

## 2023-03-25 RX ADMIN — TERAZOSIN HYDROCHLORIDE 2 MG: 2 CAPSULE ORAL at 20:56

## 2023-03-25 RX ADMIN — GUAIFENESIN 600 MG: 600 TABLET, EXTENDED RELEASE ORAL at 15:12

## 2023-03-25 RX ADMIN — ASPIRIN 325 MG: 325 TABLET ORAL at 15:12

## 2023-03-25 RX ADMIN — IPRATROPIUM BROMIDE AND ALBUTEROL SULFATE 3 ML: 2.5; .5 SOLUTION RESPIRATORY (INHALATION) at 10:55

## 2023-03-25 RX ADMIN — Medication 10 ML: at 15:31

## 2023-03-25 RX ADMIN — GUAIFENESIN 600 MG: 600 TABLET, EXTENDED RELEASE ORAL at 20:56

## 2023-03-25 RX ADMIN — HEPARIN SODIUM 4000 UNITS: 5000 INJECTION INTRAVENOUS; SUBCUTANEOUS at 15:12

## 2023-03-25 RX ADMIN — METHYLPREDNISOLONE SODIUM SUCCINATE 125 MG: 125 INJECTION, POWDER, FOR SOLUTION INTRAMUSCULAR; INTRAVENOUS at 15:12

## 2023-03-25 RX ADMIN — ALBUTEROL SULFATE 2.5 MG: 2.5 SOLUTION RESPIRATORY (INHALATION) at 20:18

## 2023-03-25 RX ADMIN — PERFLUTREN 4 ML: 6.52 INJECTION, SUSPENSION INTRAVENOUS at 15:01

## 2023-03-25 RX ADMIN — FUROSEMIDE 80 MG: 10 INJECTION, SOLUTION INTRAVENOUS at 12:00

## 2023-03-25 RX ADMIN — DEXAMETHASONE SODIUM PHOSPHATE 10 MG: 4 INJECTION, SOLUTION INTRAMUSCULAR; INTRAVENOUS at 10:38

## 2023-03-25 NOTE — PLAN OF CARE
Goal Outcome Evaluation:  Plan of Care Reviewed With: patient        Progress: no change  Outcome Evaluation: VSS at this time in the shift. New admit from ED. Heparin infusing as ordered.

## 2023-03-25 NOTE — ED PROVIDER NOTES
Subjective   History of Present Illness  This is an 89-year-old male who presents for evaluation of cough and malaise.  The patient was diagnosed several days ago as having bronchitis and returned home to rest but has not been resting well.  According to family he feels short of breath and has been coughing up some clear sputum.  His coughing has led him to not get very restful sleep and he has a lacking appetite.  The patient has a former history of smoking but has quit several years ago.  Family history of asthma.  Denies fevers or chills.  Denies headache.  Denies abdominal pain or nausea vomiting.  Denies chest pain.        Review of Systems   All other systems reviewed and are negative.      Past Medical History:   Diagnosis Date   • Allergic rhinitis    • Arthritis     hips, knees, feet   • Artificial lens present     in position   • Benign neoplasm of skin of eyelid    • Bladder tumor    • Bladder tumor    • BPH (benign prostatic hyperplasia)    • Cancer (HCC)     SKIN    • Cataract    • Coronary arteriosclerosis    • Cortical senile cataract    • Cough    • Diabetes mellitus (HCC)    • Esophagitis 5/23/2021   • Essential hypertension    • Essential hypertension    • Hypercholesterolemia    • Hypercholesterolemia    • Kidney stones    • MI, old 1988   • Nonexudative age-related macular degeneration     mild   • Nuclear cataract    • Open-angle glaucoma     s/p trabec OS, doing well      • Primary open angle glaucoma     OS, s/p trabeculectomy; IOP down with timolol OD     • Pseudophakia of right eye    • Rhinitis    • Upper respiratory infection    • Vitreous detachment     peripheral       No Known Allergies    Past Surgical History:   Procedure Laterality Date   • ANKLE SURGERY  07/29/2000    Ankle surgery (ORIF right ankle fracture. Bimalleolar right fracture)   • APPENDECTOMY     • CARDIAC ABLATION     • CARDIAC CATHETERIZATION  07/02/1987    Cardiac cath 84306 (Coronary arthosclerotic heart disease.  maintained patency of RCA. Mild inferior hypokinesis.)   • CARDIAC CATHETERIZATION  06/30/1987    Cardiac cath 80842 (Coronary atherosclerotic heart disease. Occluded RCA. Acute inferior infarction. S/P successful angioplasty of RCA)   • CARDIAC ELECTROPHYSIOLOGY PROCEDURE N/A 6/11/2020    Procedure: Pacer single lead;  Surgeon: Johnny Forbes MD;  Location: [facility] CATH INVASIVE LOCATION;  Service: Cardiology;  Laterality: N/A;   • CATARACT EXTRACTION  04/01/2010    Remove cataract, insert lens (Complicated cataract extraction with intraocular lens implantation, right eye, Marcelo SN-60 WF serial # [id number]: 20.5 diopter)   • CATARACT EXTRACTION W/ INTRAOCULAR LENS IMPLANT Left 3/23/2018    Procedure: CATARACT PHACO EXTRACTION WITH INTRAOCULAR LENS IMPLANT;  Surgeon: Oli Haley MD;  Location: [facility] OR;  Service: Ophthalmology   • COLONOSCOPY N/A 5/9/2019    Procedure: COLONOSCOPY;  Surgeon: Rainer Gates DO;  Location: [facility] ENDOSCOPY;  Service: Gastroenterology   • COLONOSCOPY N/A 11/12/2020    Procedure: COLONOSCOPY;  Surgeon: Rainer Gates DO;  Location: [facility] ENDOSCOPY;  Service: Gastroenterology;  Laterality: N/A;   • ENDOSCOPY  02/19/1990    EGD 04262 (Olympus video endoscope.Fibrin-like patch in stomach)   • ENDOSCOPY N/A 11/12/2020    Procedure: ESOPHAGOGASTRODUODENOSCOPY;  Surgeon: Rainer Gates DO;  Location: [facility] ENDOSCOPY;  Service: Gastroenterology;  Laterality: N/A;   • EYE SURGERY  02/02/2005    Eye surgery procedure (Repair of operative wound leak, left eye. S/P trabeculectomy with operative wound leak)   • EYE SURGERY  01/19/2005    Eye surgery procedure (Trabeculectomy, left eye. 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 degeneration)    • OTHER SURGICAL  HISTORY  02/26/2015    EXTENDED VISUAL FIELDS STUDY 84917 (Primary open angle glaucoma)    • OTHER SURGICAL HISTORY  02/26/2015    EXTENDED VISUAL FIELDS STUDY 66967 (Primary open angle glaucoma)    • OTHER SURGICAL HISTORY  06/16/2016    OCT DISC NFL 96294 (Open-angle glaucoma)    • OTHER SURGICAL HISTORY      OCT DISC NFL 58477 (Primary open angle glaucoma) (3): 06/17/2015, 03/19/2014, 03/04/2013   • OTHER SURGICAL HISTORY  06/16/2016    OPTIC NERVE HEAD EVAL PERFORMED 2027F (Open-angle glaucoma)    • TRANSURETHRAL RESECTION OF BLADDER TUMOR N/A 5/9/2018    Procedure: CYSTOSCOPY TRANSURETHRAL RESECTION OF BLADDER TUMOR;  Surgeon: Donovan Conner MD;  Location: [facility] OR;  Service: Urology   • TRANSURETHRAL RESECTION OF BLADDER TUMOR N/A 7/26/2019    Procedure: CYSTOSCOPY TRANSURETHRAL RESECTION OF BLADDER TUMOR;  Surgeon: Donovan Conner MD;  Location: [facility] OR;  Service: Urology   • TRANSURETHRAL RESECTION OF BLADDER TUMOR N/A 7/29/2020    Procedure: CYSTOSCOPY TRANSURETHRAL RESECTION OF BLADDER TUMOR;  Surgeon: Donovan Conner MD;  Location: [facility] OR;  Service: Urology;  Laterality: N/A;   • UVULECTOMY  1969    EXCISION OF UVULA 73039 (partial uvulectomy with excision of papilloma. Redunant uvula with a small papilloma)       Family History   Problem Relation Age of Onset   • Cancer Mother    • No Known Problems Father    • Cancer Brother        Social History     Socioeconomic History   • Marital status:    Tobacco Use   • Smoking status: Former   • Smokeless tobacco: Current     Types: Snuff   • Tobacco comments:     pouches   Vaping Use   • Vaping Use: Never used   Substance and Sexual Activity   • Alcohol use: No   • Drug use: No   • Sexual activity: Defer           Objective   Physical Exam  Vitals and nursing note reviewed.   Constitutional:       Appearance: He is not ill-appearing.   HENT:      Head: Normocephalic.   Neck:      Vascular: No JVD.   Cardiovascular:      Rate and Rhythm:  "Normal rate and regular rhythm.   Pulmonary:      Effort: Pulmonary effort is normal.      Breath sounds: Wheezing present.   Chest:      Chest wall: No tenderness.   Abdominal:      Palpations: Abdomen is soft.      Tenderness: There is no abdominal tenderness.   Musculoskeletal:      Right lower leg: No tenderness. No edema.      Left lower leg: No tenderness. No edema.   Skin:     General: Skin is warm and dry.   Neurological:      Mental Status: He is alert and oriented to person, place, and time.      Comments: Hard of hearing   Psychiatric:         Behavior: Behavior normal.         Procedures           ED Course  ED Course as of 03/25/23 1758   Sat Mar 25, 2023   1118 BNP(!)  BNP increased compared to baseline [JV]   1119 CBC & Differential(!)  No leukocytosis.  Anemia is mild and chronic. [JV]      ED Course User Index  [JV] Scot Pickett, DO                                           Medical Decision Making  The patient's recent past medical charts for this facility as well as outside facilities via the \"care everywhere\" application of epic reviewed.  No recent admissions or surgeries.  The patient was seen several days ago in urgent care and diagnosed with bronchitis.    The differential diagnosis includes bronchitis, pneumonia, COPD, and postnasal drip, among others.    On final reevaluation the patient is in stable condition with normal vital signs and pulse oximetry.  We discussed the possibility of mild pulmonary edema and need for diuretics in the short-term and cardiology follow-up in the longer term.  We will also hedge bets with antibiotics though I do not believe based on his work-up and exam that he has pneumonia.  The patient followed by his wife state that he is very elderly and they do not feel comfortable taking him home regardless of his diagnosis or my explained medical decision making.  They insist on him being admitted.  Therefore I will call the hospitalist service.      Acute pulmonary " edema (HCC): acute illness or injury  Cough, unspecified type: acute illness or injury  Amount and/or Complexity of Data Reviewed  Independent Historian: spouse     Details: The patient's wife provides additional history for the patient who is hard of hearing.  Labs: ordered. Decision-making details documented in ED Course.  Radiology: ordered.  ECG/medicine tests: ordered and independent interpretation performed.     Details: EKG interpretation: Interpreted myself.  Atrial fibrillation.  Rate 68.  Right bundle branch block.  Left axis deviation.  Prolonged QTc interval.  ST segments are nonspecific.  Discussion of management or test interpretation with external provider(s): Case discussed with hospitalist.  He agrees that at face value this patient does not appear to meet admission criteria.  In an effort to better satisfy the patient and family expectations we will place the patient in observation.    Risk  OTC drugs.  Prescription drug management.  Decision regarding hospitalization.          Final diagnoses:   Acute pulmonary edema (HCC)   Cough, unspecified type       ED Disposition  ED Disposition     ED Disposition   Decision to Admit    Condition   --    Comment   Level of Care: Telemetry [5]   Diagnosis: Acute pulmonary edema (HCC) [436853]   Admitting Physician: FRANKLIN BORJA [[id number]]   Attending Physician: FRANKLIN BORJA [[id number]]               Michael Douglas MD  [address] [place], [place] [id number] [phone]    Call in 2 days  To make an appointment to be reevaluated after an emergency department visit.    Johnny Forbes MD  [address] [place] [address] [address]  [facility] [address]  [place] [id number] [phone]    Call in 2 days  To make an appointment to be reevaluated for fluid on your lungs and to consider an echocardiogram test and continuation of your diuretic medication.         Medication List      New Prescriptions    doxycycline 100 MG enteric coated tablet  Commonly known as: DORYX  Take 1  tablet by mouth 2 (Two) Times a Day.     furosemide 20 MG tablet  Commonly known as: LASIX  Take 1 tablet by mouth Daily.        Changed    amiodarone 200 MG tablet  Commonly known as: PACERONE  TAKE ONE TABLET BY MOUTH DAILY  What changed:   · how much to take  · how to take this  · when to take this  · additional instructions        Stop    cyanocobalamin 1000 MCG/ML injection           Where to Get Your Medications      These medications were sent to [facility]/pharmacy #[id number] - [place], [place] - [address] [facility] [phone]  - [phone] [address] [address]    Phone: [phone]   · doxycycline 100 MG enteric coated tablet  · furosemide 20 MG tablet          Scot Pickett DO  03/25/23 4620

## 2023-03-25 NOTE — H&P
[facility] Medicine  HISTORY AND PHYSICAL      Date of Admission: 3/25/2023  Primary Care Physician: Michael Douglas MD    Subjective     Chief Complaint: SOB    History of Present Illness  88 y/o male was admitted to the ED with complaints of SOB, cough and malaise.  The patient was diagnosed with bronchitis at urgent care several days ago, but has not been improving despite getting antibiotics.  He notes that he woke up last night very short of breath and was worried he was having a heart attack, so he came to the ED for further work-up.  He currently notes he has been having mild fevers, a productive cough and SOB.  He denies any current chest pain, palpitations, jaw pain, arm pain, nausea or abdominal pain.  He has no other complaints at this time.    In the ED the patient had stable vitals.  His labs showed a Na of 135 and his imaging showed mild interstitial edema.  He was admitted for further treatment and observation.      Review of Systems   12 point ROS reviewed and negative except as mentioned in the HPI.    Past Medical History:   Past Medical History:   Diagnosis Date   • Allergic rhinitis    • Arthritis     hips, knees, feet   • Artificial lens present     in position   • Benign neoplasm of skin of eyelid    • Bladder tumor    • Bladder tumor    • BPH (benign prostatic hyperplasia)    • Cancer (HCC)     SKIN    • Cataract    • Coronary arteriosclerosis    • Cortical senile cataract    • Cough    • Diabetes mellitus (HCC)    • Esophagitis 5/23/2021   • Essential hypertension    • Essential hypertension    • Hypercholesterolemia    • Hypercholesterolemia    • Kidney stones    • MI, old 1988   • Nonexudative age-related macular degeneration     mild   • Nuclear cataract    • Open-angle glaucoma     s/p trabec OS, doing well      • Primary open angle glaucoma     OS, s/p trabeculectomy; IOP down with timolol OD     • Pseudophakia of right eye    •  Rhinitis    • Upper respiratory infection    • Vitreous detachment     peripheral     Past Surgical History:  Past Surgical History:   Procedure Laterality Date   • ANKLE SURGERY  07/29/2000    Ankle surgery (ORIF right ankle fracture. Bimalleolar right fracture)   • APPENDECTOMY     • CARDIAC ABLATION     • CARDIAC CATHETERIZATION  07/02/1987    Cardiac cath 80137 (Coronary arthosclerotic heart disease. maintained patency of RCA. Mild inferior hypokinesis.)   • CARDIAC CATHETERIZATION  06/30/1987    Cardiac cath 12774 (Coronary atherosclerotic heart disease. Occluded RCA. Acute inferior infarction. S/P successful angioplasty of RCA)   • CARDIAC ELECTROPHYSIOLOGY PROCEDURE N/A 6/11/2020    Procedure: Pacer single lead;  Surgeon: Johnny Forbes MD;  Location: [facility] CATH INVASIVE LOCATION;  Service: Cardiology;  Laterality: N/A;   • CATARACT EXTRACTION  04/01/2010    Remove cataract, insert lens (Complicated cataract extraction with intraocular lens implantation, right eye, Marcelo SN-60 WF serial # [id number]: 20.5 diopter)   • CATARACT EXTRACTION W/ INTRAOCULAR LENS IMPLANT Left 3/23/2018    Procedure: CATARACT PHACO EXTRACTION WITH INTRAOCULAR LENS IMPLANT;  Surgeon: Oli Haley MD;  Location: [facility] OR;  Service: Ophthalmology   • COLONOSCOPY N/A 5/9/2019    Procedure: COLONOSCOPY;  Surgeon: Rainer Gates DO;  Location: [facility] ENDOSCOPY;  Service: Gastroenterology   • COLONOSCOPY N/A 11/12/2020    Procedure: COLONOSCOPY;  Surgeon: Rainer Gates DO;  Location: [facility] ENDOSCOPY;  Service: Gastroenterology;  Laterality: N/A;   • ENDOSCOPY  02/19/1990    EGD 85915 (Olympus video endoscope.Fibrin-like patch in stomach)   • ENDOSCOPY N/A 11/12/2020    Procedure: ESOPHAGOGASTRODUODENOSCOPY;  Surgeon: Rainer Gates DO;  Location: [facility] ENDOSCOPY;  Service: Gastroenterology;  Laterality: N/A;   • EYE SURGERY  02/02/2005    Eye surgery procedure (Repair of operative wound leak, left eye. S/P  trabeculectomy with operative wound leak)   • EYE SURGERY  01/19/2005    Eye surgery procedure (Trabeculectomy, left eye. 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 degeneration)    • OTHER SURGICAL HISTORY  02/26/2015    EXTENDED VISUAL FIELDS STUDY 27483 (Primary open angle glaucoma)    • OTHER SURGICAL HISTORY  02/26/2015    EXTENDED VISUAL FIELDS STUDY 12993 (Primary open angle glaucoma)    • OTHER SURGICAL HISTORY  06/16/2016    OCT DISC NFL 13001 (Open-angle glaucoma)    • OTHER SURGICAL HISTORY      OCT DISC NFL 37668 (Primary open angle glaucoma) (3): 06/17/2015, 03/19/2014, 03/04/2013   • OTHER SURGICAL HISTORY  06/16/2016    OPTIC NERVE HEAD EVAL PERFORMED 2027F (Open-angle glaucoma)    • TRANSURETHRAL RESECTION OF BLADDER TUMOR N/A 5/9/2018    Procedure: CYSTOSCOPY TRANSURETHRAL RESECTION OF BLADDER TUMOR;  Surgeon: Donovan Conner MD;  Location: [facility];  Service: Urology   • TRANSURETHRAL RESECTION OF BLADDER TUMOR N/A 7/26/2019    Procedure: CYSTOSCOPY TRANSURETHRAL RESECTION OF BLADDER TUMOR;  Surgeon: Donovan Conner MD;  Location: [facility] OR;  Service: Urology   • TRANSURETHRAL RESECTION OF BLADDER TUMOR N/A 7/29/2020    Procedure: CYSTOSCOPY TRANSURETHRAL RESECTION OF BLADDER TUMOR;  Surgeon: Donovan Conner MD;  Location: [facility];  Service: Urology;  Laterality: N/A;   • UVULECTOMY  1969    EXCISION OF UVULA 61192 (partial uvulectomy with excision of papilloma. Redunant uvula with a small papilloma)     Social History:  reports that he has quit smoking. His smokeless tobacco use includes snuff. He reports that he does not drink alcohol and does not use drugs.    Family History: family history includes Cancer in his brother and mother; No Known Problems in his father.       Allergies:  No  Known Allergies    Medications:  Prior to Admission medications    Medication Sig Start Date End Date Taking? Authorizing Provider   albuterol sulfate  (90 Base) MCG/ACT inhaler INHALE 2 PUFFS INTO THE LUNGS EVERY 6 (SIX) HOURS AS NEEDED FOR WHEEZING (COUGH). 10/31/22  Yes Ene Farmer MD   amiodarone (PACERONE) 200 MG tablet TAKE ONE TABLET BY MOUTH DAILY  Patient taking differently: 100 mg. Per pt prescribed 200mg but only takes half 11/1/22  Yes Johnny Forbes MD   apixaban (ELIQUIS) 5 MG tablet tablet Take 2.5 mg by mouth 2 (Two) Times a Day. Last dose to be taken 5/5/18 prior to surgery   Yes Ene Farmer MD   aspirin 81 MG tablet Take 1 tablet by mouth Daily. Last dose 5/1/18 10/23/16  Yes Ene Farmer MD   dilTIAZem CD (CARDIZEM CD) 180 MG 24 hr capsule TAKE ONE CAPSULE BY MOUTH DAILY 6/1/21  Yes Johnny Forbes MD   FeroSul 325 (65 Fe) MG tablet Take 1 tablet by mouth 2 (Two) Times a Day. 12/28/20  Yes Ene Farmer MD   lisinopril-hydrochlorothiazide (PRINZIDE,ZESTORETIC) 20-12.5 MG per tablet Take 1 tablet by mouth Daily. 10/23/16  Yes Ene Farmer MD   loratadine (CLARITIN) 10 MG tablet Take 1 tablet by mouth Daily.   Yes Ene Farmer MD   metFORMIN (GLUCOPHAGE) 500 MG tablet Take 1 tablet by mouth 2 (Two) Times a Day. 10/23/16  Yes Ene Farmer MD   Multiple Vitamins-Minerals (MENS MULTIVITAMIN PLUS PO) Take 1 tablet by mouth Daily. 10/23/16  Yes Ene Farmer MD   terazosin (HYTRIN) 2 MG capsule Take 1 capsule by mouth every night at bedtime. 11/29/22  Yes Ene Farmer MD   timolol (TIMOPTIC) 0.5 % ophthalmic solution Administer 1 drop to both eyes 2 (Two) Times a Day.   Yes Ene Faremr MD   doxycycline (DORYX) 100 MG enteric coated tablet Take 1 tablet by mouth 2 (Two) Times a Day. 3/25/23   Scot Pickett,    erythromycin (ROMYCIN) 5 MG/GM ophthalmic ointment APPLY A THIN LAYER INTO LEFT EYE AT BEDTIME  "AS NEEDED 4/12/22   Ene Farmer MD   fluticasone (FLONASE) 50 MCG/ACT nasal spray ONE SPRAY IN EACH NOSTRIL DAILY 3/22/21   Ene Farmer MD   furosemide (LASIX) 20 MG tablet Take 1 tablet by mouth Daily. 3/25/23   Scot Pickett DO   cyanocobalamin 1000 MCG/ML injection INJECT 1 MILLILITER BY INTRAMUSCULAR ROUTE EVERY WEEK FOR 4 WEEKS 12/2/22 3/25/23  Ene Farmer MD     I have utilized all available immediate resources to obtain, update, and review the patient's current medications.    Objective     Vital Signs: /61 (BP Location: Left arm, Patient Position: Sitting)   Pulse 62   Temp 98.3 °F (36.8 °C) (Oral)   Resp 22   Ht 180.3 cm (71\")   Wt 89.8 kg (198 lb)   SpO2 94%   BMI 27.62 kg/m²   Physical Exam   General: NAD  HEENT: AT NC, EOMI  Neck: No JVD  CVS: S1/S2 present, RRR, no M/R/G  Lungs: coarse bilaterally in all fields  Abdomen: soft, NT, ND, BS+  Ext: no edema  Skin: no rashes, bruises or discolorations  Neuro: no focal deficits  Psych: Not agitated         Results Reviewed:  Lab Results (last 24 hours)     Procedure Component Value Units Date/Time    Extra Tubes [[id number]] Collected: 03/25/23 1048    Specimen: Blood, Venous Line Updated: 03/25/23 1202    Narrative:      The following orders were created for panel order Extra Tubes.  Procedure                               Abnormality         Status                     ---------                               -----------         ------                     Gold Top - SST[[id number]]                                   Final result               Light Blue Top[[id number]]                                   Final result                 Please view results for these tests on the individual orders.    Gold Top - SST [[id number]] Collected: 03/25/23 1048    Specimen: Blood Updated: 03/25/23 1202     Extra Tube Hold for add-ons.     Comment: Auto resulted.       Light Blue Top [[id number]] Collected: 03/25/23 1048    Specimen: " Blood Updated: 03/25/23 1202     Extra Tube Hold for add-ons.     Comment: Auto resulted       Comprehensive Metabolic Panel [[id number]]  (Abnormal) Collected: 03/25/23 1039    Specimen: Blood Updated: 03/25/23 1110     Glucose 168 mg/dL      BUN 16 mg/dL      Creatinine 1.09 mg/dL      Sodium 135 mmol/L      Potassium 4.2 mmol/L      Chloride 98 mmol/L      CO2 25.0 mmol/L      Calcium 8.7 mg/dL      Total Protein 6.5 g/dL      Albumin 3.9 g/dL      ALT (SGPT) 11 U/L      AST (SGOT) 21 U/L      Alkaline Phosphatase 84 U/L      Total Bilirubin 0.4 mg/dL      Globulin 2.6 gm/dL      A/G Ratio 1.5 g/dL      BUN/Creatinine Ratio 14.7     Anion Gap 12.0 mmol/L      eGFR 64.9 mL/min/1.73     Narrative:      GFR Normal >60  Chronic Kidney Disease <60  Kidney Failure <15    The GFR formula is only valid for adults with stable renal function between ages 18 and 70.    BNP [[id number]]  (Abnormal) Collected: 03/25/23 1039    Specimen: Blood Updated: 03/25/23 1108     proBNP 4,077.0 pg/mL     Narrative:      Among patients with dyspnea, NT-proBNP is highly sensitive for the detection of acute congestive heart failure. In addition NT-proBNP of <300 pg/ml effectively rules out acute congestive heart failure with 99% negative predictive value.      CBC & Differential [[id number]]  (Abnormal) Collected: 03/25/23 1039    Specimen: Blood Updated: 03/25/23 1051    Narrative:      The following orders were created for panel order CBC & Differential.  Procedure                               Abnormality         Status                     ---------                               -----------         ------                     CBC Auto Differential[[id number]]        Abnormal            Final result                 Please view results for these tests on the individual orders.    CBC Auto Differential [[id number]]  (Abnormal) Collected: 03/25/23 1039    Specimen: Blood Updated: 03/25/23 1051     WBC 5.76 10*3/mm3      RBC 3.23 10*6/mm3       Hemoglobin 11.0 g/dL      Hematocrit 32.9 %      .9 fL      MCH 34.1 pg      MCHC 33.4 g/dL      RDW 13.2 %      RDW-SD 49.4 fl      MPV 9.9 fL      Platelets 171 10*3/mm3      Neutrophil % 69.6 %      Lymphocyte % 13.2 %      Monocyte % 15.5 %      Eosinophil % 1.0 %      Basophil % 0.2 %      Immature Grans % 0.5 %      Neutrophils, Absolute 4.01 10*3/mm3      Lymphocytes, Absolute 0.76 10*3/mm3      Monocytes, Absolute 0.89 10*3/mm3      Eosinophils, Absolute 0.06 10*3/mm3      Basophils, Absolute 0.01 10*3/mm3      Immature Grans, Absolute 0.03 10*3/mm3      nRBC 0.0 /100 WBC     COVID-19 and FLU A/B PCR - Swab, Nasopharynx [[id number]]  (Normal) Collected: 03/25/23 0955    Specimen: Swab from Nasopharynx Updated: 03/25/23 1019     COVID19 Not Detected     Influenza A PCR Not Detected     Influenza B PCR Not Detected    Narrative:      Fact sheet for providers: https://www.fda.gov/media/396108/download    Fact sheet for patients: https://www.fda.gov/media/784072/download    Test performed by PCR.        Imaging Results (Last 24 Hours)     Procedure Component Value Units Date/Time    XR Chest 2 View [[id number]] Collected: 03/25/23 1002     Updated: 03/25/23 1038    Narrative:      EXAM: XR CHEST 2 VIEWS     HISTORY: cough.    COMPARISON: June 11, 2020     FINDINGS:    Heart: Borderline heart size.     Mediastinum: Left pacemaker    Lungs: Bibasilar congestion. Interval increased interstitial  markings. Calcified granuloma right upper lung    Bones: Degenerative changes in the shoulders.    Abdomen: Visualized upper abdomen is unremarkable.      Impression:        Mild interstitial edema    Electronically signed by:  Doron Slater DO  3/25/2023 10:36 AM  CDT Workstation: [id number]        I have personally reviewed and interpreted the radiology studies and ECG obtained at time of admission.     Assessment / Plan       1) NSTEMI  - troponin is elevated, repeat pending  - ASA, heparin  - TTE ordered  -  cardiology consulted, recs pending  - monitor on telemetry    2) HTN  - home meds    3) DM  - ISS, accuchecks, diet    4) diastolic CHF  - lasix  - repeat TTE pending    5) A-fib  - cardizem, amiodarone, eliquis    6) CAD  - home meds    7) glaucoma  - timolol    8) asthma  - albuterol steroids    9) GI/DVT ppx  - protonix/eliquis    Electronically signed by Lc Rucker MD, 03/25/23, 12:02 CDT.

## 2023-03-25 NOTE — CONSULTS
Cardiovascular Medicine          Scot Pickett, DO  [address] [place] [address]     Thank you for asking me to see Evgenyyoel Carter Jr. NSTEMI    History of Present Illness  This is a 89 y.o. male with past medical history of coronary disease status post acute MI treated by angioplasty 1988 diabetes hypertension hyperlipidemia with recent upper respiratory bronchitis-like symptoms including cough fever shortness of breath recently seen in urgent care center and treated with antibiotics with lack of response yesterday reports significant shortness of breath and unable to lay flat with an impending doom sensation self-reported as feeling like I am having a silent heart attack patient reports while he has never had a silent heart attack before and his previous MI was associated with significant discomfort he made the educational guess based on inability to lay flat orthopnea and significant shortness  EKG is unremarkable cardiac markers are mildly elevated  :        Review of Systems - ROS  Constitution: Negative for weakness, weight gain and weight loss.   HENT: Negative for congestion.    Eyes: Negative for blurred vision.   Cardiovascular: As mentioned above  Respiratory:+for cough and hemoptysis.    Endocrine: Negative for polydipsia and polyuria.   Hematologic/Lymphatic: Negative for bleeding problem. Does not bruise/bleed easily.   Skin: Negative for flushing.   Musculoskeletal: Negative for neck pain and stiffness.   Gastrointestinal: Negative for abdominal pain, diarrhea, jaundice, melena, nausea and vomiting.   Genitourinary: Negative for dysuria and hematuria.   Neurological: Negative for dizziness, focal weakness and numbness.   Psychiatric/Behavioral: Negative for altered mental status and depression.          All other systems were reviewed and were negative.    family history includes Cancer in his brother and mother; No Known Problems in his father.     reports that he has  quit smoking. His smokeless tobacco use includes snuff. He reports that he does not drink alcohol and does not use drugs.    No Known Allergies      Current Facility-Administered Medications:   •  acetaminophen (TYLENOL) tablet 650 mg, 650 mg, Oral, Q6H PRN, Lc Rucker MD  •  albuterol (PROVENTIL) nebulizer solution 0.083% 2.5 mg/3mL, 2.5 mg, Nebulization, Q4H - RT, Lc Rucker MD  •  [START ON 3/26/2023] amiodarone (PACERONE) tablet 200 mg, 200 mg, Oral, Daily, Lc Rucker MD  •  [START ON 3/26/2023] aspirin EC tablet 81 mg, 81 mg, Oral, Daily, Lc Rucker MD  •  aspirin tablet 325 mg, 325 mg, Oral, Once, Lc Rucker MD  •  dextrose (D50W) (25 g/50 mL) IV injection 25 g, 25 g, Intravenous, Q15 Min PRN, Lc Rucker MD  •  dextrose (GLUTOSE) oral gel 15 g, 15 g, Oral, Q15 Min PRN, Lc Rucker MD  •  [START ON 3/26/2023] dilTIAZem CD (CARDIZEM CD) 24 hr capsule 180 mg, 180 mg, Oral, Daily, Lc Rucker MD  •  [START ON 3/26/2023] fluticasone (FLONASE) 50 MCG/ACT nasal spray 1 spray, 1 spray, Each Nare, Daily, Lc Rucker MD  •  glucagon HCl (Diagnostic) injection 1 mg, 1 mg, Intramuscular, Q15 Min PRN, Lc Rucker MD  •  guaiFENesin (MUCINEX) 12 hr tablet 600 mg, 600 mg, Oral, Q12H, Lc Rucker MD  •  heparin (porcine) 5000 UNIT/ML injection 4,000 Units, 4,000 Units, Intravenous, Once **AND** heparin 74279 units/250 mL (100 units/mL) in 0.45 % NaCl infusion, 11.1 Units/kg/hr, Intravenous, Titrated **AND** heparin (porcine) 5000 UNIT/ML injection 4,000 Units, 4,000 Units, Intravenous, PRN **AND** heparin (porcine) 5000 UNIT/ML injection 2,700 Units, 30 Units/kg, Intravenous, PRN, Lc Rucker MD  •  [START ON 3/26/2023] lisinopril (PRINIVIL,ZESTRIL) tablet 20 mg, 20 mg, Oral, Q24H **AND** [START ON 3/26/2023] hydroCHLOROthiazide (HYDRODIURIL) tablet 12.5 mg, 12.5 mg, Oral, Q24H, Lc Rucker MD  •  Insulin Aspart (novoLOG) injection 0-7 Units, 0-7 Units,  Subcutaneous, TID AC, Lc Rucker MD  •  methylPREDNISolone sodium succinate (SOLU-Medrol) injection 125 mg, 125 mg, Intravenous, Once, Lc Rucker MD  •  ondansetron (ZOFRAN) injection 4 mg, 4 mg, Intravenous, Q6H PRN, Lc Rucker MD  •  [START ON 3/26/2023] pantoprazole (PROTONIX) EC tablet 40 mg, 40 mg, Oral, Q AM, Lc Rucker MD  •  sodium chloride 0.9 % flush 10 mL, 10 mL, Intravenous, Q12H, Lc Rucker MD  •  sodium chloride 0.9 % flush 10 mL, 10 mL, Intravenous, PRN, Lc Rucker MD  •  sodium chloride 0.9 % infusion 40 mL, 40 mL, Intravenous, PRN, Lc Rucker MD  •  terazosin (HYTRIN) capsule 2 mg, 2 mg, Oral, Nightly, Lc Rucker MD  •  timolol (TIMOPTIC) 0.5 % ophthalmic solution 1 drop, 1 drop, Both Eyes, Q12H, Lc Rucker MD    Physical Exam:  Vitals:    03/25/23 1200 03/25/23 1201 03/25/23 1222 03/25/23 1243   BP: 133/61 133/61  137/63   BP Location:  Left arm  Left arm   Patient Position:  Sitting  Lying   Pulse: 61 62 64 69   Resp:  22  20   Temp:    98.7 °F (37.1 °C)   TempSrc:    Oral   SpO2:  94%  90%   Weight:       Height:         Current Pain Level: None  Pulse Ox: Normal on room air  General: Alert, appears stated age and cooperative     Body Habitus: Well-nourished    HEENT: Head: Normocephalic, no lesions, without obvious abnormality. No arcus senilis, xanthelasma or xanthomas.    Neuro: Alert, oriented x3  Pulses: 2+ and symmetric  JVP: Volume/Pulsation: Normal.  Normal waveforms.   Appropriate inspiratory decrease.  No Kussmaul's. No Friedreich's.   Carotid Exam: No bruit normal pulsation bilaterally   Carotid Volume: Normal.     Respirations: No increased work of breathing   Chest:  Normal    Pulmonary: Normal   Precordium: Normal impulses. P2 is not palpable.  RV Heave: Absent  LV Heave: Absent  [place]:  Normal size and placement  Palpable S4: Absent.  Heart rate: Normal    Heart Rhythm: Regular     Heart Sounds: S1: Normal  S2: Normal  S3:  Absent   S4: Absent  Opening Snap: Absent    Pericardial Rub:  Absent: .    Abdomen:   Appearance: Normal .  Palpation: Soft, nontender to palpation, bowel sounds positive in all four quadrants; no guarding or rebound tenderness  Extremity: No edema.   LE Skin: No rashes  LE Hair:  Normal  LE Pulses: Well perfused with normal pulses in the distal extremities  Pallor on elevation: Absent. Rubor on dependency: None      DATA REVIEWED:     EKG. I personally reviewed and interpreted the EKG.  A-fib    --------------------------------------------------------------------------------------------------  LABS:     The CVD Risk score (Alex et al., 2008) failed to calculate for the following reasons:    The 2008 CVD risk score is only valid for ages 30 to 74    The patient has a prior MI, stroke, CHF, or peripheral vascular disease diagnosis         Lab Results   Component Value Date    GLUCOSE 149 (H) 03/25/2023    BUN 18 03/25/2023    CREATININE 1.14 03/25/2023    EGFRIFNONA 46 (L) 07/23/2020    EGFRIFAFRI 63 07/27/2021    BCR 15.8 03/25/2023    K 4.4 03/25/2023    CO2 25.0 03/25/2023    CALCIUM 9.1 03/25/2023    ALBUMIN 3.9 03/25/2023    AST 21 03/25/2023    ALT 11 03/25/2023     Lab Results   Component Value Date    WBC 6.51 03/25/2023    HGB 11.3 (L) 03/25/2023    HCT 34.1 (L) 03/25/2023    .2 (H) 03/25/2023     03/25/2023     Lab Results   Component Value Date    CHOL 218 (H) 10/28/2020    CHLPL 166 09/20/2022    TRIG 83 09/20/2022    HDL 49 09/20/2022     09/20/2022     Lab Results   Component Value Date    TSH 1.77 09/20/2022     Lab Results   Component Value Date    CKTOTAL 46 (L) 10/19/2016    CKMB 1.4 10/19/2016    TROPONINI 0.079 (H) 10/20/2016    TROPONINT 164 (C) 03/25/2023     Lab Results   Component Value Date    HGBA1C 5.2 09/20/2022     No results found for: DDIMER  Lab Results   Component Value Date    ALT 11 03/25/2023     Lab Results   Component Value Date    HGBA1C 5.2  09/20/2022    HGBA1C 5.0 02/23/2022    HGBA1C 5.1 07/27/2021     Lab Results   Component Value Date    CREATININE 1.14 03/25/2023     Lab Results   Component Value Date    IRON 171 11/30/2022    TIBC 339 11/30/2022    FERRITIN 26 11/30/2022     Lab Results   Component Value Date    INR 1.31 (H) 03/25/2023    INR 1.07 06/11/2020    INR 1.13 05/10/2020    PROTIME 16.3 (H) 03/25/2023    PROTIME 13.7 06/11/2020    PROTIME 14.3 05/10/2020       Assessment & Plan    Non-ST elevation MI suspected in the setting of severe infection with comorbid conditions versus CHF exacerbation at this point patient is hemodynamically stable and chest pain-free he is improving with medical management I would recommend continuing such aspirin 81 mg daily Plavix 75 mg daily beta-blocker statin and ACE inhibitor as tolerated lastly regarding anticoagulant would recommend continuing anticoagulant and holding of Eliquis in case invasive evaluation will be necessary    Atrial flutter atrial fib chronic  Pacemaker sick sinus syndrome  Long-term anticoagulation hold Eliquis  Hypertension controlled on medications  Hyperlipidemia  Congestive heart failure pending echo for evaluation suspected congestive heart failure exacerbation  Diastolic dysfunction known  Mild valvulopathy  COPD        No follow-ups on file.    This document has been electronically signed by Nate Snow DO on March 25, 2023 13:38 CDT

## 2023-03-25 NOTE — ED NOTES
"Nursing report ED to floor  Evgeny Carter .  89 y.o.  male    HPI:   Chief Complaint   Patient presents with    Cough    Shortness of Breath       Admitting doctor:   Lc Rucker MD    Consulting provider(s):  Consults       No orders found from 2/24/2023 to 3/26/2023.             Admitting diagnosis:   The primary encounter diagnosis was Acute pulmonary edema (HCC). A diagnosis of Cough, unspecified type was also pertinent to this visit.    Code status:   Current Code Status       Date Active Code Status Order ID Comments User Context       3/25/2023 1141 CPR (Attempt to Resuscitate) [id number]  Lc Rucker MD ED        Question Answer    Code Status (Patient has no pulse and is not breathing) CPR (Attempt to Resuscitate)    Medical Interventions (Patient has pulse or is breathing) Full Support                    Allergies:   Patient has no known allergies.    Intake and Output  No intake or output data in the 24 hours ending 03/25/23 1156    Weight:       03/25/23  0932   Weight: 89.8 kg (198 lb)       Most recent vitals:   Vitals:    03/25/23 0932 03/25/23 1046 03/25/23 1055   BP: 133/57 127/58    BP Location: Left arm     Patient Position: Sitting     Pulse: 70 62    Resp: 18  20   Temp: 98.3 °F (36.8 °C)     TempSrc: Oral     SpO2: 97% 94%    Weight: 89.8 kg (198 lb)     Height: 180.3 cm (71\")       Oxygen Therapy: NA    Active LDAs/IV Access:   Lines, Drains & Airways       Active LDAs       Name Placement date Placement time Site Days    Peripheral IV 03/25/23 1038 Posterior;Proximal;Right Forearm 03/25/23  1038  Forearm  less than 1    Urethral Catheter Other (Comment) 22 Fr. 07/29/20  1730  -- 968                    Labs (abnormal labs have a star):   Labs Reviewed   COMPREHENSIVE METABOLIC PANEL - Abnormal; Notable for the following components:       Result Value    Glucose 168 (*)     Sodium 135 (*)     All other components within normal limits    Narrative:     GFR Normal >60  Chronic " Kidney Disease <60  Kidney Failure <15    The GFR formula is only valid for adults with stable renal function between ages 18 and 70.   BNP (IN-HOUSE) - Abnormal; Notable for the following components:    proBNP 4,077.0 (*)     All other components within normal limits    Narrative:     Among patients with dyspnea, NT-proBNP is highly sensitive for the detection of acute congestive heart failure. In addition NT-proBNP of <300 pg/ml effectively rules out acute congestive heart failure with 99% negative predictive value.     CBC WITH AUTO DIFFERENTIAL - Abnormal; Notable for the following components:    RBC 3.23 (*)     Hemoglobin 11.0 (*)     Hematocrit 32.9 (*)     .9 (*)     MCH 34.1 (*)     Lymphocyte % 13.2 (*)     Monocyte % 15.5 (*)     All other components within normal limits   COVID-19 AND FLU A/B, NP SWAB IN TRANSPORT MEDIA 8-12 HR TAT - Normal    Narrative:     Fact sheet for providers: https://www.fda.gov/media/286831/download    Fact sheet for patients: https://www.fda.gov/media/548702/download    Test performed by PCR.   POCT GLUCOSE FINGERSTICK   POCT GLUCOSE FINGERSTICK   POCT GLUCOSE FINGERSTICK   CBC AND DIFFERENTIAL    Narrative:     The following orders were created for panel order CBC & Differential.  Procedure                               Abnormality         Status                     ---------                               -----------         ------                     CBC Auto Differential[[id number]]        Abnormal            Final result                 Please view results for these tests on the individual orders.   EXTRA TUBES    Narrative:     The following orders were created for panel order Extra Tubes.  Procedure                               Abnormality         Status                     ---------                               -----------         ------                     Gold Top - SST[[id number]]                                   In process                 Light Blue  Top[[id number]]                                   In process                   Please view results for these tests on the individual orders.   GOLD TOP - SST   LIGHT BLUE TOP       Meds given in ED:   Medications   furosemide (LASIX) injection 80 mg (has no administration in time range)   dextrose (GLUTOSE) oral gel 15 g (has no administration in time range)   dextrose (D50W) (25 g/50 mL) IV injection 25 g (has no administration in time range)   glucagon HCl (Diagnostic) injection 1 mg (has no administration in time range)   Insulin Aspart (novoLOG) injection 0-7 Units (has no administration in time range)   dexamethasone (DECADRON) injection 10 mg (10 mg Intravenous Given 3/25/23 1038)   ipratropium-albuterol (DUO-NEB) nebulizer solution 3 mL (3 mL Nebulization Given 3/25/23 1055)           NIH Stroke Scale:       Isolation/Infection(s):  No active isolations   COVID (rule out)     COVID Testing  Collected Yes  Resulted Neg    Nursing report ED to floor:  Mental status: A & O X 4  Ambulatory status: stand by  Precautions: NA    ED nurse phone [phone]

## 2023-03-25 NOTE — Clinical Note
Level of Care: Telemetry [5]   Diagnosis: Acute pulmonary edema (HCC) [469412]   Admitting Physician: FRANKLIN BORJA [[id number]]   Attending Physician: FRANKLIN BORJA [[id number]]

## 2023-03-26 ENCOUNTER — APPOINTMENT (OUTPATIENT)
Dept: CT IMAGING | Facility: HOSPITAL | Age: 88
DRG: 193 | End: 2023-03-26
Payer: MEDICARE

## 2023-03-26 LAB
ANION GAP SERPL CALCULATED.3IONS-SCNC: 12 MMOL/L (ref 5–15)
APTT PPP: 48.1 SECONDS (ref 20–40.3)
APTT PPP: 53.5 SECONDS (ref 20–40.3)
APTT PPP: 78 SECONDS (ref 20–40.3)
BASOPHILS # BLD AUTO: 0 10*3/MM3 (ref 0–0.2)
BASOPHILS NFR BLD AUTO: 0 % (ref 0–1.5)
BH CV ECHO MEAS - ACS: 1.71 CM
BH CV ECHO MEAS - AO MAX PG: 14.4 MMHG
BH CV ECHO MEAS - AO MEAN PG: 7.6 MMHG
BH CV ECHO MEAS - AO ROOT DIAM: 3.8 CM
BH CV ECHO MEAS - AO V2 MAX: 190 CM/SEC
BH CV ECHO MEAS - AO V2 VTI: 35.8 CM
BH CV ECHO MEAS - AVA(I,D): 3.5 CM2
BH CV ECHO MEAS - EDV(CUBED): 133.9 ML
BH CV ECHO MEAS - EDV(MOD-SP2): 73.5 ML
BH CV ECHO MEAS - EDV(MOD-SP4): 89 ML
BH CV ECHO MEAS - EF(MOD-BP): 74.6 %
BH CV ECHO MEAS - EF(MOD-SP2): 54.6 %
BH CV ECHO MEAS - EF(MOD-SP4): 84.2 %
BH CV ECHO MEAS - ESV(CUBED): 38.8 ML
BH CV ECHO MEAS - ESV(MOD-SP2): 33.4 ML
BH CV ECHO MEAS - ESV(MOD-SP4): 14.1 ML
BH CV ECHO MEAS - FS: 33.8 %
BH CV ECHO MEAS - IVS/LVPW: 1.03 CM
BH CV ECHO MEAS - IVSD: 1.57 CM
BH CV ECHO MEAS - LA DIMENSION: 4.1 CM
BH CV ECHO MEAS - LAT PEAK E' VEL: 8.4 CM/SEC
BH CV ECHO MEAS - LV DIASTOLIC VOL/BSA (35-75): 42.4 CM2
BH CV ECHO MEAS - LV MASS(C)D: 349.8 GRAMS
BH CV ECHO MEAS - LV MAX PG: 5.3 MMHG
BH CV ECHO MEAS - LV MEAN PG: 2.8 MMHG
BH CV ECHO MEAS - LV SYSTOLIC VOL/BSA (12-30): 6.7 CM2
BH CV ECHO MEAS - LV V1 MAX: 114.8 CM/SEC
BH CV ECHO MEAS - LV V1 VTI: 28.1 CM
BH CV ECHO MEAS - LVIDD: 5.1 CM
BH CV ECHO MEAS - LVIDS: 3.4 CM
BH CV ECHO MEAS - LVOT AREA: 4.5 CM2
BH CV ECHO MEAS - LVOT DIAM: 2.39 CM
BH CV ECHO MEAS - LVPWD: 1.53 CM
BH CV ECHO MEAS - MED PEAK E' VEL: 5.1 CM/SEC
BH CV ECHO MEAS - MR MAX PG: 96.1 MMHG
BH CV ECHO MEAS - MR MAX VEL: 490.2 CM/SEC
BH CV ECHO MEAS - MV A MAX VEL: 154.2 CM/SEC
BH CV ECHO MEAS - MV DEC SLOPE: 691.7 CM/SEC2
BH CV ECHO MEAS - MV E MAX VEL: 131 CM/SEC
BH CV ECHO MEAS - MV E/A: 0.85
BH CV ECHO MEAS - MV MAX PG: 8.6 MMHG
BH CV ECHO MEAS - MV MEAN PG: 4.1 MMHG
BH CV ECHO MEAS - MV P1/2T: 45.3 MSEC
BH CV ECHO MEAS - MV V2 VTI: 44.2 CM
BH CV ECHO MEAS - MVA(P1/2T): 4.9 CM2
BH CV ECHO MEAS - MVA(VTI): 2.9 CM2
BH CV ECHO MEAS - PA V2 MAX: 97.7 CM/SEC
BH CV ECHO MEAS - RAP SYSTOLE: 3 MMHG
BH CV ECHO MEAS - RVDD: 2.34 CM
BH CV ECHO MEAS - RVSP: 26.6 MMHG
BH CV ECHO MEAS - SI(MOD-SP2): 19.1 ML/M2
BH CV ECHO MEAS - SI(MOD-SP4): 35.7 ML/M2
BH CV ECHO MEAS - SV(LVOT): 126.3 ML
BH CV ECHO MEAS - SV(MOD-SP2): 40.1 ML
BH CV ECHO MEAS - SV(MOD-SP4): 74.9 ML
BH CV ECHO MEAS - TAPSE (>1.6): 2.6 CM
BH CV ECHO MEAS - TR MAX PG: 23.6 MMHG
BH CV ECHO MEAS - TR MAX VEL: 243 CM/SEC
BH CV ECHO MEASUREMENTS AVERAGE E/E' RATIO: 19.41
BUN SERPL-MCNC: 31 MG/DL (ref 8–23)
BUN/CREAT SERPL: 21.1 (ref 7–25)
CALCIUM SPEC-SCNC: 9.1 MG/DL (ref 8.6–10.5)
CHLORIDE SERPL-SCNC: 97 MMOL/L (ref 98–107)
CO2 SERPL-SCNC: 26 MMOL/L (ref 22–29)
CREAT SERPL-MCNC: 1.47 MG/DL (ref 0.76–1.27)
D-LACTATE SERPL-SCNC: 2.9 MMOL/L (ref 0.5–2)
D-LACTATE SERPL-SCNC: 3.1 MMOL/L (ref 0.5–2)
DEPRECATED RDW RBC AUTO: 47.6 FL (ref 37–54)
EGFRCR SERPLBLD CKD-EPI 2021: 45.3 ML/MIN/1.73
EOSINOPHIL # BLD AUTO: 0 10*3/MM3 (ref 0–0.4)
EOSINOPHIL NFR BLD AUTO: 0 % (ref 0.3–6.2)
ERYTHROCYTE [DISTWIDTH] IN BLOOD BY AUTOMATED COUNT: 13 % (ref 12.3–15.4)
GLUCOSE BLDC GLUCOMTR-MCNC: 219 MG/DL (ref 70–130)
GLUCOSE BLDC GLUCOMTR-MCNC: 250 MG/DL (ref 70–130)
GLUCOSE BLDC GLUCOMTR-MCNC: 279 MG/DL (ref 70–130)
GLUCOSE BLDC GLUCOMTR-MCNC: 307 MG/DL (ref 70–130)
GLUCOSE SERPL-MCNC: 278 MG/DL (ref 65–99)
HCT VFR BLD AUTO: 28.9 % (ref 37.5–51)
HGB BLD-MCNC: 9.7 G/DL (ref 13–17.7)
HOLD SPECIMEN: NORMAL
IMM GRANULOCYTES # BLD AUTO: 0.02 10*3/MM3 (ref 0–0.05)
IMM GRANULOCYTES NFR BLD AUTO: 0.5 % (ref 0–0.5)
LEFT ATRIUM VOLUME INDEX: 30.1 ML/M2
LYMPHOCYTES # BLD AUTO: 0.57 10*3/MM3 (ref 0.7–3.1)
LYMPHOCYTES NFR BLD AUTO: 14.2 % (ref 19.6–45.3)
MAGNESIUM SERPL-MCNC: 2 MG/DL (ref 1.6–2.4)
MAXIMAL PREDICTED HEART RATE: 131 BPM
MCH RBC QN AUTO: 33.8 PG (ref 26.6–33)
MCHC RBC AUTO-ENTMCNC: 33.6 G/DL (ref 31.5–35.7)
MCV RBC AUTO: 100.7 FL (ref 79–97)
MONOCYTES # BLD AUTO: 0.22 10*3/MM3 (ref 0.1–0.9)
MONOCYTES NFR BLD AUTO: 5.5 % (ref 5–12)
NEUTROPHILS NFR BLD AUTO: 3.21 10*3/MM3 (ref 1.7–7)
NEUTROPHILS NFR BLD AUTO: 79.8 % (ref 42.7–76)
NRBC BLD AUTO-RTO: 0 /100 WBC (ref 0–0.2)
PLATELET # BLD AUTO: 147 10*3/MM3 (ref 140–450)
PMV BLD AUTO: 9.9 FL (ref 6–12)
POTASSIUM SERPL-SCNC: 3.7 MMOL/L (ref 3.5–5.2)
PROCALCITONIN SERPL-MCNC: 0.08 NG/ML (ref 0–0.25)
RBC # BLD AUTO: 2.87 10*6/MM3 (ref 4.14–5.8)
SODIUM SERPL-SCNC: 135 MMOL/L (ref 136–145)
STJ: 3 CM
STRESS TARGET HR: 111 BPM
TROPONIN T SERPL HS-MCNC: 117 NG/L
WBC NRBC COR # BLD: 4.02 10*3/MM3 (ref 3.4–10.8)

## 2023-03-26 PROCEDURE — 99232 SBSQ HOSP IP/OBS MODERATE 35: CPT | Performed by: INTERNAL MEDICINE

## 2023-03-26 PROCEDURE — 97162 PT EVAL MOD COMPLEX 30 MIN: CPT

## 2023-03-26 PROCEDURE — 94761 N-INVAS EAR/PLS OXIMETRY MLT: CPT

## 2023-03-26 PROCEDURE — G0378 HOSPITAL OBSERVATION PER HR: HCPCS

## 2023-03-26 PROCEDURE — 63710000001 INSULIN ASPART PER 5 UNITS: Performed by: INTERNAL MEDICINE

## 2023-03-26 PROCEDURE — 84484 ASSAY OF TROPONIN QUANT: CPT | Performed by: INTERNAL MEDICINE

## 2023-03-26 PROCEDURE — 82962 GLUCOSE BLOOD TEST: CPT

## 2023-03-26 PROCEDURE — 83605 ASSAY OF LACTIC ACID: CPT | Performed by: INTERNAL MEDICINE

## 2023-03-26 PROCEDURE — 85730 THROMBOPLASTIN TIME PARTIAL: CPT | Performed by: INTERNAL MEDICINE

## 2023-03-26 PROCEDURE — 87040 BLOOD CULTURE FOR BACTERIA: CPT | Performed by: INTERNAL MEDICINE

## 2023-03-26 PROCEDURE — 97166 OT EVAL MOD COMPLEX 45 MIN: CPT

## 2023-03-26 PROCEDURE — 80048 BASIC METABOLIC PNL TOTAL CA: CPT | Performed by: INTERNAL MEDICINE

## 2023-03-26 PROCEDURE — 85025 COMPLETE CBC W/AUTO DIFF WBC: CPT | Performed by: INTERNAL MEDICINE

## 2023-03-26 PROCEDURE — 25010000002 CEFTRIAXONE PER 250 MG: Performed by: INTERNAL MEDICINE

## 2023-03-26 PROCEDURE — 25010000002 HEPARIN (PORCINE) PER 1000 UNITS: Performed by: INTERNAL MEDICINE

## 2023-03-26 PROCEDURE — 94799 UNLISTED PULMONARY SVC/PX: CPT

## 2023-03-26 PROCEDURE — 83735 ASSAY OF MAGNESIUM: CPT | Performed by: INTERNAL MEDICINE

## 2023-03-26 PROCEDURE — 84145 PROCALCITONIN (PCT): CPT | Performed by: INTERNAL MEDICINE

## 2023-03-26 PROCEDURE — 71250 CT THORAX DX C-: CPT

## 2023-03-26 RX ORDER — CLOPIDOGREL BISULFATE 75 MG/1
75 TABLET ORAL DAILY
Status: DISCONTINUED | OUTPATIENT
Start: 2023-03-26 | End: 2023-04-01 | Stop reason: HOSPADM

## 2023-03-26 RX ORDER — GUAIFENESIN 200 MG/10ML
200 LIQUID ORAL EVERY 4 HOURS PRN
Status: DISCONTINUED | OUTPATIENT
Start: 2023-03-26 | End: 2023-04-01 | Stop reason: HOSPADM

## 2023-03-26 RX ADMIN — DILTIAZEM HYDROCHLORIDE 180 MG: 180 CAPSULE, COATED, EXTENDED RELEASE ORAL at 08:11

## 2023-03-26 RX ADMIN — Medication 10 ML: at 20:21

## 2023-03-26 RX ADMIN — GUAIFENESIN 600 MG: 600 TABLET, EXTENDED RELEASE ORAL at 20:21

## 2023-03-26 RX ADMIN — GUAIFENESIN 200 MG: 200 SOLUTION ORAL at 20:51

## 2023-03-26 RX ADMIN — GUAIFENESIN 600 MG: 600 TABLET, EXTENDED RELEASE ORAL at 08:10

## 2023-03-26 RX ADMIN — FLUTICASONE PROPIONATE 1 SPRAY: 50 SPRAY, METERED NASAL at 09:00

## 2023-03-26 RX ADMIN — ALBUTEROL SULFATE 2.5 MG: 2.5 SOLUTION RESPIRATORY (INHALATION) at 14:46

## 2023-03-26 RX ADMIN — Medication 81 MG: at 08:10

## 2023-03-26 RX ADMIN — SODIUM CHLORIDE 500 ML: 9 INJECTION, SOLUTION INTRAVENOUS at 22:23

## 2023-03-26 RX ADMIN — LISINOPRIL 20 MG: 20 TABLET ORAL at 08:10

## 2023-03-26 RX ADMIN — TIMOLOL MALEATE 1 DROP: 5 SOLUTION/ DROPS OPHTHALMIC at 08:10

## 2023-03-26 RX ADMIN — PANTOPRAZOLE SODIUM 40 MG: 40 TABLET, DELAYED RELEASE ORAL at 05:02

## 2023-03-26 RX ADMIN — ALBUTEROL SULFATE 2.5 MG: 2.5 SOLUTION RESPIRATORY (INHALATION) at 20:02

## 2023-03-26 RX ADMIN — INSULIN ASPART 5 UNITS: 100 INJECTION, SOLUTION INTRAVENOUS; SUBCUTANEOUS at 11:44

## 2023-03-26 RX ADMIN — TIMOLOL MALEATE 1 DROP: 5 SOLUTION/ DROPS OPHTHALMIC at 20:21

## 2023-03-26 RX ADMIN — HEPARIN SODIUM 11.1 UNITS/KG/HR: 10000 INJECTION, SOLUTION INTRAVENOUS at 19:31

## 2023-03-26 RX ADMIN — HEPARIN SODIUM 2700 UNITS: 5000 INJECTION INTRAVENOUS; SUBCUTANEOUS at 20:45

## 2023-03-26 RX ADMIN — CEFTRIAXONE 2 G: 2 INJECTION, POWDER, FOR SOLUTION INTRAMUSCULAR; INTRAVENOUS at 20:30

## 2023-03-26 RX ADMIN — DOXYCYCLINE 100 MG: 100 INJECTION, POWDER, LYOPHILIZED, FOR SOLUTION INTRAVENOUS at 21:05

## 2023-03-26 RX ADMIN — ALBUTEROL SULFATE 2.5 MG: 2.5 SOLUTION RESPIRATORY (INHALATION) at 10:49

## 2023-03-26 RX ADMIN — AMIODARONE HYDROCHLORIDE 200 MG: 200 TABLET ORAL at 08:11

## 2023-03-26 RX ADMIN — CLOPIDOGREL BISULFATE 75 MG: 75 TABLET ORAL at 11:47

## 2023-03-26 RX ADMIN — Medication 10 ML: at 08:11

## 2023-03-26 RX ADMIN — TERAZOSIN HYDROCHLORIDE 2 MG: 2 CAPSULE ORAL at 20:21

## 2023-03-26 RX ADMIN — ALBUTEROL SULFATE 2.5 MG: 2.5 SOLUTION RESPIRATORY (INHALATION) at 07:10

## 2023-03-26 RX ADMIN — HYDROCHLOROTHIAZIDE 12.5 MG: 12.5 TABLET ORAL at 08:10

## 2023-03-26 RX ADMIN — INSULIN ASPART 4 UNITS: 100 INJECTION, SOLUTION INTRAVENOUS; SUBCUTANEOUS at 08:10

## 2023-03-26 RX ADMIN — INSULIN ASPART 3 UNITS: 100 INJECTION, SOLUTION INTRAVENOUS; SUBCUTANEOUS at 17:00

## 2023-03-26 NOTE — THERAPY EVALUATION
Patient Name: Evgeny Carter Jr.  : 1933    MRN: [id number]                              Today's Date: 3/26/2023       Admit Date: 3/25/2023    Visit Dx:     ICD-10-CM ICD-9-CM   1. Acute pulmonary edema (HCC)  J81.0 518.4   2. Cough, unspecified type  R05.9 786.2   3. Impaired functional mobility, balance, gait, and endurance  Z74.09 V49.89     Patient Active Problem List   Diagnosis   • Atrial flutter (HCC)   • Coronary artery disease involving native coronary artery of native heart without angina pectoris   • Benign essential hypertension   • Type 2 diabetes mellitus without complication (HCC)   • Anxiety state   • Depression   • Hyperlipidemia   • Dilated aortic root (HCC)   • Paroxysmal atrial fibrillation (HCC)   • Bladder tumor   • Malignant tumor of urinary bladder (HCC)   • Sick sinus syndrome (HCC)   • Pacemaker   • History of colonic polyps   • Esophagitis   • Diverticulosis of colon without diverticulitis   • Anemia   • Acute pulmonary edema (HCC)     Past Medical History:   Diagnosis Date   • Allergic rhinitis    • Arthritis     hips, knees, feet   • Artificial lens present     in position   • Benign neoplasm of skin of eyelid    • Bladder tumor    • Bladder tumor    • BPH (benign prostatic hyperplasia)    • Cancer (HCC)     SKIN    • Cataract    • Coronary arteriosclerosis    • Cortical senile cataract    • Cough    • Diabetes mellitus (HCC)    • Esophagitis 2021   • Essential hypertension    • Essential hypertension    • Hypercholesterolemia    • Hypercholesterolemia    • Kidney stones    • MI, old    • Nonexudative age-related macular degeneration     mild   • Nuclear cataract    • Open-angle glaucoma     s/p trabec OS, doing well      • Primary open angle glaucoma     OS, s/p trabeculectomy; IOP down with timolol OD     • Pseudophakia of right eye    • Rhinitis    • Upper respiratory infection    • Vitreous detachment     peripheral     Past Surgical History:   Procedure  Laterality Date   • ANKLE SURGERY  07/29/2000    Ankle surgery (ORIF right ankle fracture. Bimalleolar right fracture)   • APPENDECTOMY     • CARDIAC ABLATION     • CARDIAC CATHETERIZATION  07/02/1987    Cardiac cath 40953 (Coronary arthosclerotic heart disease. maintained patency of RCA. Mild inferior hypokinesis.)   • CARDIAC CATHETERIZATION  06/30/1987    Cardiac cath 82701 (Coronary atherosclerotic heart disease. Occluded RCA. Acute inferior infarction. S/P successful angioplasty of RCA)   • CARDIAC ELECTROPHYSIOLOGY PROCEDURE N/A 6/11/2020    Procedure: Pacer single lead;  Surgeon: Johnny Forbes MD;  Location: [facility] CATH INVASIVE LOCATION;  Service: Cardiology;  Laterality: N/A;   • CATARACT EXTRACTION  04/01/2010    Remove cataract, insert lens (Complicated cataract extraction with intraocular lens implantation, right eye, Marcelo SN-60 WF serial # [id number]: 20.5 diopter)   • CATARACT EXTRACTION W/ INTRAOCULAR LENS IMPLANT Left 3/23/2018    Procedure: CATARACT PHACO EXTRACTION WITH INTRAOCULAR LENS IMPLANT;  Surgeon: Oli Haley MD;  Location: [facility] OR;  Service: Ophthalmology   • COLONOSCOPY N/A 5/9/2019    Procedure: COLONOSCOPY;  Surgeon: Rainer Gates DO;  Location: [facility] ENDOSCOPY;  Service: Gastroenterology   • COLONOSCOPY N/A 11/12/2020    Procedure: COLONOSCOPY;  Surgeon: Rainer Gates DO;  Location: [facility] ENDOSCOPY;  Service: Gastroenterology;  Laterality: N/A;   • ENDOSCOPY  02/19/1990    EGD 10353 (Olympus video endoscope.Fibrin-like patch in stomach)   • ENDOSCOPY N/A 11/12/2020    Procedure: ESOPHAGOGASTRODUODENOSCOPY;  Surgeon: Rainer Gates DO;  Location: [facility] ENDOSCOPY;  Service: Gastroenterology;  Laterality: N/A;   • EYE SURGERY  02/02/2005    Eye surgery procedure (Repair of operative wound leak, left eye. S/P trabeculectomy with operative wound leak)   • EYE SURGERY  01/19/2005    Eye surgery procedure (Trabeculectomy, left eye. Chronic open angle  glaucoma, uncontrolled left eye)   • INJECTION OF MEDICATION  06/09/2014    Kenalog (Allergic rhinitis)    • OTHER SURGICAL HISTORY  06/16/2016    DIL MAC EXAM PERF INC DOC OF +/- MAC THICK 2019F (Nonexudative age-related macular degeneration)    • OTHER SURGICAL HISTORY  02/26/2015    EXTENDED VISUAL FIELDS STUDY 64078 (Primary open angle glaucoma)    • OTHER SURGICAL HISTORY  02/26/2015    EXTENDED VISUAL FIELDS STUDY 54134 (Primary open angle glaucoma)    • OTHER SURGICAL HISTORY  06/16/2016    OCT DISC NFL 35479 (Open-angle glaucoma)    • OTHER SURGICAL HISTORY      OCT DISC NFL 51184 (Primary open angle glaucoma) (3): 06/17/2015, 03/19/2014, 03/04/2013   • OTHER SURGICAL HISTORY  06/16/2016    OPTIC NERVE HEAD EVAL PERFORMED 2027F (Open-angle glaucoma)    • TRANSURETHRAL RESECTION OF BLADDER TUMOR N/A 5/9/2018    Procedure: CYSTOSCOPY TRANSURETHRAL RESECTION OF BLADDER TUMOR;  Surgeon: Donovan Conner MD;  Location: [facility] OR;  Service: Urology   • TRANSURETHRAL RESECTION OF BLADDER TUMOR N/A 7/26/2019    Procedure: CYSTOSCOPY TRANSURETHRAL RESECTION OF BLADDER TUMOR;  Surgeon: Donovan Conner MD;  Location: [facility] OR;  Service: Urology   • TRANSURETHRAL RESECTION OF BLADDER TUMOR N/A 7/29/2020    Procedure: CYSTOSCOPY TRANSURETHRAL RESECTION OF BLADDER TUMOR;  Surgeon: Donovan Conner MD;  Location: [facility] OR;  Service: Urology;  Laterality: N/A;   • UVULECTOMY  1969    EXCISION OF UVULA 53329 (partial uvulectomy with excision of papilloma. Redunant uvula with a small papilloma)      General Information     Row Name 03/26/23 5405          Physical Therapy Time and Intention    Document Type evaluation  -CZ     Mode of Treatment physical therapy;occupational therapy  -CZ     Row Name 03/26/23 2059          General Information    Patient Profile Reviewed yes  -CZ     Prior Level of Function independent:;all household mobility  -CZ     Existing Precautions/Restrictions fall  -CZ     Barriers to Rehab  medically complex;hearing deficit  -CZ     Row Name 03/26/23 0950          Living Environment    People in Home spouse  -CZ     Row Name 03/26/23 0950          Home Main Entrance    Number of Stairs, Main Entrance none  -CZ     Row Name 03/26/23 0950          Stairs Within Home, Primary    Stairs, Within Home, Primary (I) ambulation without an AD. Has FWW, SPC available.  -CZ     Number of Stairs, Within Home, Primary none  -CZ     Row Name 03/26/23 0950          Cognition    Orientation Status (Cognition) oriented x 4  -CZ     Row Name 03/26/23 0950          Safety Issues, Functional Mobility    Impairments Affecting Function (Mobility) strength;endurance/activity tolerance;balance  -CZ           User Key  (r) = Recorded By, (t) = Taken By, (c) = Cosigned By    Initials Name Provider Type    CZ Francois Gamino, PT Physical Therapist               Mobility     Row Name 03/26/23 0950          Bed Mobility    Bed Mobility supine-sit;sit-supine  -CZ     Supine-Sit [place] (Bed Mobility) modified independence  -CZ     Sit-Supine [place] (Bed Mobility) modified independence  -CZ     Assistive Device (Bed Mobility) head of bed elevated  -CZ     Row Name 03/26/23 0950          Sit-Stand Transfer    Sit-Stand [place] (Transfers) supervision  -CZ     Row Name 03/26/23 0950          Gait/Stairs (Locomotion)    [place] Level (Gait) supervision  -     Distance in Feet (Gait) 15;x2. 150'x1.  -CZ     Comment, (Gait/Stairs) Reaches for UE support when ambulating without AD.  Provided with FWW, fast kami, cues for proper use of walker. Has walker at home, may need wheels (spouse unsure).  -CZ           User Key  (r) = Recorded By, (t) = Taken By, (c) = Cosigned By    Initials Name Provider Type    Francois Marshall, PT Physical Therapist               Obj/Interventions     Row Name 03/26/23 0950          Range of Motion Comprehensive    General Range of Motion bilateral lower extremity ROM WFL  -CZ      Row Name 03/26/23 0950          Strength Comprehensive (MMT)    Comment, General Manual Muscle Testing (MMT) Assessment BLEs: 4/5 grossly.  -CZ     Row Name 03/26/23 0950          Sensory Assessment (Somatosensory)    Sensory Assessment (Somatosensory) bilateral LE  -CZ     Bilateral LE Sensory Assessment light touch awareness;impaired  -CZ           User Key  (r) = Recorded By, (t) = Taken By, (c) = Cosigned By    Initials Name Provider Type    CZ Francois Gamino, PT Physical Therapist               Goals/Plan     Row Name 03/26/23 0950          Bed Mobility Goal 1 (PT)    Activity/Assistive Device (Bed Mobility Goal 1, PT) sit to supine/supine to sit  -CZ     [place] Level/Cues Needed (Bed Mobility Goal 1, PT) independent  -CZ     Time Frame (Bed Mobility Goal 1, PT) by discharge  -CZ     Strategies/Barriers (Bed Mobility Goal 1, PT) HOB flat, no be rails.  -CZ     Progress/Outcomes (Bed Mobility Goal 1, PT) goal not met  -CZ     Row Name 03/26/23 0950          Transfer Goal 1 (PT)    Activity/Assistive Device (Transfer Goal 1, PT) sit-to-stand/stand-to-sit;bed-to-chair/chair-to-bed;walker, rolling  -CZ     [place]anks Level/Cues Needed (Transfer Goal 1, PT) modified independence  -CZ     Time Frame (Transfer Goal 1, PT) by discharge  -CZ     Progress/Outcome (Transfer Goal 1, PT) goal not met  -CZ     Row Name 03/26/23 0950          Gait Training Goal 1 (PT)    Activity/Assistive Device (Gait Training Goal 1, PT) walker, rolling  -CZ     Banks Level (Gait Training Goal 1, PT) modified independence  -CZ     Distance (Gait Training Goal 1, PT) 150'x2.  -CZ     Time Frame (Gait Training Goal 1, PT) by discharge  -CZ     Strategies/Barriers (Gait Training Goal 1, PT) May advance to (I) without an AD.  -CZ     Progress/Outcome (Gait Training Goal 1, PT) goal not met  -CZ     Row Name 03/26/23 0950          Problem Specific Goal 1 (PT)    Problem Specific Goal 1 (PT) Score 25/28 on Tinetti fall risk  "assessment.  -CZ     Time Frame (Problem Specific Goal 1, PT) by discharge  -CZ     Progress/Outcome (Problem Specific Goal 1, PT) goal not met  -CZ           User Key  (r) = Recorded By, (t) = Taken By, (c) = Cosigned By    Initials Name Provider Type    CZ Francois Gamino, PT Physical Therapist               Clinical Impression     Row Name 03/26/23 0950          Pain    Pretreatment Pain Rating 0/10 - no pain  -CZ     Posttreatment Pain Rating 0/10 - no pain  -CZ     Row Name 03/26/23 0950          Plan of Care Review    Plan of Care Reviewed With patient  -CZ     Outcome Evaluation Initial PT evaluation complete, co-evaluation with OT.  Patient is alert, cooperative, quite [place].  He demonstrates (I) with bed mobility, requires SPV with gait and transfers.  Patient ambulates 15'x2 without an AD, slow kami, reaches for UE support.  Patient provided with FWW, ambulates 150'x1, fast kami, no LOB.  Tinetti assessed; 23/28 or moderate fall risk.  Recommend use of FWW for gait.  Patient's spouse reports theyt have a walker at home, is unsure if it has wheels on it.  Patient may need FWW with 5\" wheels upon discharge, pending progress with I/P PT and available equipment at home.  Goals established, continue skilled I/P PT.  -CZ     Row Name 03/26/23 0950          Therapy Assessment/Plan (PT)    Rehab Potential (PT) good, to achieve stated therapy goals  -CZ     Criteria for Skilled Interventions Met (PT) yes;skilled treatment is necessary  -CZ     Therapy Frequency (PT) 5 times/wk  -CZ     Row Name 03/26/23 0950          Vital Signs    Pre Systolic BP Rehab 151  -CZ     Pre Treatment Diastolic BP 68  -CZ     Pretreatment Heart Rate (beats/min) 75  -CZ     Intratreatment Heart Rate (beats/min) 83  -CZ     Pre SpO2 (%) 94  -CZ     O2 Delivery Pre Treatment room air  -CZ     Intra SpO2 (%) 94  -CZ     O2 Delivery Intra Treatment room air  -CZ     Post SpO2 (%) 97  -CZ     O2 Delivery Post Treatment room air  -CZ     " "Pre Patient Position Supine  -CZ     Intra Patient Position Standing  -CZ     Post Patient Position Sitting  -CZ     Row Name 03/26/23 0950          Positioning and Restraints    Pre-Treatment Position in bed  -CZ     Post Treatment Position bed  -CZ     In Bed supine;call light within reach;exit alarm on;encouraged to call for assist;with family/caregiver  -CZ           User Key  (r) = Recorded By, (t) = Taken By, (c) = Cosigned By    Initials Name Provider Type    CZ Francois Gamino, PT Physical Therapist               Outcome Measures     Row Name 03/26/23 0950 03/26/23 0814       How much help from another person do you currently need...    Turning from your back to your side while in flat bed without using bedrails? 4  -CZ 4  -ML    Moving from lying on back to sitting on the side of a flat bed without bedrails? 4  -CZ 4  -ML    Moving to and from a bed to a chair (including a wheelchair)? 3  -CZ 4  -ML    Standing up from a chair using your arms (e.g., wheelchair, bedside chair)? 3  -CZ 4  -ML    Climbing 3-5 steps with a railing? 3  -CZ 4  -ML    To walk in hospital room? 3  -CZ 4  -ML    AM-PAC 6 Clicks Score (PT) 20  -CZ 24  -ML    Highest level of mobility 6 --> Walked 10 steps or more  -CZ 8 --> Walked 250 feet or more  -ML    Row Name 03/26/23 0950          Tinetti Assessment    Tinetti Assessment yes  -CZ     Sitting Balance 1  -CZ     Arises 2  -CZ     Attempts to Rise 2  -CZ     Immediate Standing Balance (first 5 sec) 2  -CZ     Standing Balance 1  -CZ     Sternal Nudge (feet close together) 1  -CZ     Eyes Closed (feet close together) 1  -CZ     Turning 360 Degrees- Steps 1  -CZ     Turning 360 Degrees- Steadiness 1  -CZ     Sitting Down 2  -CZ     Tinetti Balance Score 14  -CZ     Gait Initiation (immediate after told \"go\") 1  -CZ     Step Length- Right Swing 1  -CZ     Step Length- Left Swing 1  -CZ     Foot Clearance- Right Foot 1  -CZ     Foot Clearance- Left Foot 1  -CZ     Step Symmetry 1  " -CZ     Step Continuity 1  -CZ     Path (excursion) 1  -CZ     Trunk 0  -CZ     Base of Support 1  -CZ     Gait Score 9  -CZ     Tinetti Total Score 23  -CZ     Tinetti Assistive Device rolling walker  -CZ     Row Name 03/26/23 0950 03/26/23 0948       Functional Assessment    Outcome Measure Options AM-PAC 6 Clicks Basic Mobility (PT);Tinetti  -CZ AM-PAC 6 Clicks Daily Activity (OT)  -CM          User Key  (r) = Recorded By, (t) = Taken By, (c) = Cosigned By    Initials Name Provider Type    CZ Francois Gamino, PT Physical Therapist    Marci Spaulding LPN Licensed Nurse    Yolanda Hogan, OT Occupational Therapist                             Physical Therapy Education     Title: PT OT SLP Therapies (In Progress)     Topic: Physical Therapy (In Progress)     Point: Mobility training (Not Started)     Learner Progress:  Not documented in this visit.          Point: Home exercise program (Not Started)     Learner Progress:  Not documented in this visit.          Point: Body mechanics (Not Started)     Learner Progress:  Not documented in this visit.          Point: Precautions (Done)     Learning Progress Summary           Patient Acceptance, E, VU by  at 3/26/2023 1025    Comment: [place] assessed: 23/28 or moderate fall risk, recommend use of FWW.   Family Acceptance, E, VU by  at 3/26/2023 1025    Comment: [place] assessed: 23/28 or moderate fall risk, recommend use of FWW.                               User Key     Initials Effective Dates Name Provider Type Discipline     09/18/22 -  Francois Gamino, PT Physical Therapist PT              PT Recommendation and Plan     Plan of Care Reviewed With: patient  Outcome Evaluation: Initial PT evaluation complete, co-evaluation with OT.  Patient is alert, cooperative, quite [place].  He demonstrates (I) with bed mobility, requires SPV with gait and transfers.  Patient ambulates 15'x2 without an AD, slow kami, reaches for UE support.  Patient provided with FWW,  "ambulates 150'x1, fast kami, no LOB.  Tinetti assessed; 23/28 or moderate fall risk.  Recommend use of FWW for gait.  Patient's spouse reports theyt have a walker at home, is unsure if it has wheels on it.  Patient may need FWW with 5\" wheels upon discharge, pending progress with I/P PT and available equipment at home.  Goals established, continue skilled I/P PT.     Time Calculation:    PT Charges     Row Name 03/26/23 1034             Time Calculation    Start Time 0950  -CZ      Stop Time 1032  -CZ      Time Calculation (min) 42 min  -CZ      PT Received On 03/26/23  -CZ      PT Goal Re-Cert Due Date 04/08/23  -CZ         Untimed Charges    PT Eval/Re-eval Minutes 42  -CZ         Total Minutes    Untimed Charges Total Minutes 42  -CZ       Total Minutes 42  -CZ            User Key  (r) = Recorded By, (t) = Taken By, (c) = Cosigned By    Initials Name Provider Type    CZ Francois Gamino, PT Physical Therapist              Therapy Charges for Today     Code Description Service Date Service Provider Modifiers Qty    [id number] HC PT EVAL MOD COMPLEXITY 3 3/26/2023 Francois Gamino, PT GP 1          PT G-Codes  Outcome Measure Options: AM-PAC 6 Clicks Basic Mobility (PT), Tinetti  AM-PAC 6 Clicks Score (PT): 20  AM-PAC 6 Clicks Score (OT): 23  Tinetti Total Score: 23       Francois Gamino PT  3/26/2023    "

## 2023-03-26 NOTE — PROGRESS NOTES
[facility] Medicine Services  INPATIENT PROGRESS NOTE    Length of Stay: 0  Date of Admission: 3/25/2023  Primary Care Physician: Michael Douglas MD    Subjective   (S) Admitted for shortness of breath, low grade fever, and elevated troponin  Today, he feels with shortness of breath; no fever, mild cough     Review of Systems   All other systems reviewed and are negative.       All pertinent negatives and positives are as above. All other systems have been reviewed and are negative unless otherwise stated.     Prior to Admission medications    Medication Sig Start Date End Date Taking? Authorizing Provider   albuterol sulfate  (90 Base) MCG/ACT inhaler INHALE 2 PUFFS INTO THE LUNGS EVERY 6 (SIX) HOURS AS NEEDED FOR WHEEZING (COUGH). 10/31/22  Yes Ene Farmer MD   amiodarone (PACERONE) 200 MG tablet TAKE ONE TABLET BY MOUTH DAILY  Patient taking differently: 100 mg. Per pt prescribed 200mg but only takes half 11/1/22  Yes Johnny Forbes MD   apixaban (ELIQUIS) 5 MG tablet tablet Take 2.5 mg by mouth 2 (Two) Times a Day. Last dose to be taken 5/5/18 prior to surgery   Yes Ene Farmer MD   aspirin 81 MG tablet Take 1 tablet by mouth Daily. Last dose 5/1/18 10/23/16  Yes Ene Farmer MD   dilTIAZem CD (CARDIZEM CD) 180 MG 24 hr capsule TAKE ONE CAPSULE BY MOUTH DAILY 6/1/21  Yes Johnny Forbes MD   FeroSul 325 (65 Fe) MG tablet Take 1 tablet by mouth 2 (Two) Times a Day. 12/28/20  Yes Ene Farmer MD   lisinopril-hydrochlorothiazide (PRINZIDE,ZESTORETIC) 20-12.5 MG per tablet Take 1 tablet by mouth Daily. 10/23/16  Yes Ene Farmer MD   loratadine (CLARITIN) 10 MG tablet Take 1 tablet by mouth Daily.   Yes Ene Farmer MD   metFORMIN (GLUCOPHAGE) 500 MG tablet Take 1 tablet by mouth 2 (Two) Times a Day. 10/23/16  Yes Ene Farmer MD   Multiple Vitamins-Minerals (MENS MULTIVITAMIN PLUS PO) Take 1  tablet by mouth Daily. 10/23/16  Yes Ene Farmer MD   terazosin (HYTRIN) 2 MG capsule Take 1 capsule by mouth every night at bedtime. 11/29/22  Yes Ene Farmer MD   timolol (TIMOPTIC) 0.5 % ophthalmic solution Administer 1 drop to both eyes 2 (Two) Times a Day.   Yes Ene Farmer MD   doxycycline (DORYX) 100 MG enteric coated tablet Take 1 tablet by mouth 2 (Two) Times a Day. 3/25/23   Scot Pickett DO   erythromycin (ROMYCIN) 5 MG/GM ophthalmic ointment APPLY A THIN LAYER INTO LEFT EYE AT BEDTIME AS NEEDED 4/12/22   Ene Farmer MD   fluticasone (FLONASE) 50 MCG/ACT nasal spray ONE SPRAY IN EACH NOSTRIL DAILY 3/22/21   Ene Farmer MD   furosemide (LASIX) 20 MG tablet Take 1 tablet by mouth Daily. 3/25/23   Scot Pickett DO       albuterol, 2.5 mg, Nebulization, Q4H - RT  amiodarone, 200 mg, Oral, Daily  aspirin, 81 mg, Oral, Daily  clopidogrel, 75 mg, Oral, Daily  dilTIAZem CD, 180 mg, Oral, Daily  fluticasone, 1 spray, Each Nare, Daily  guaiFENesin, 600 mg, Oral, Q12H  lisinopril, 20 mg, Oral, Q24H   And  hydroCHLOROthiazide, 12.5 mg, Oral, Q24H  Insulin Aspart, 0-7 Units, Subcutaneous, TID AC  pantoprazole, 40 mg, Oral, Q AM  sodium chloride, 10 mL, Intravenous, Q12H  terazosin, 2 mg, Oral, Nightly  timolol, 1 drop, Both Eyes, Q12H      heparin, 11.1 Units/kg/hr, Last Rate: 11.1 Units/kg/hr (03/26/23 0423)        Objective    Temp:  [97 °F (36.1 °C)-98.9 °F (37.2 °C)] 97.5 °F (36.4 °C)  Heart Rate:  [63-90] 70  Resp:  [18] 18  BP: (115-133)/(54-62) 133/57    Physical Exam  Vitals reviewed.   Constitutional:       Appearance: Normal appearance.   HENT:      Head: Normocephalic.      Nose: Nose normal.   Eyes:      Extraocular Movements: Extraocular movements intact.   Cardiovascular:      Rate and Rhythm: Regular rhythm.      Heart sounds: Normal heart sounds.   Pulmonary:      Breath sounds: Normal breath sounds.   Abdominal:      Palpations: Abdomen is soft.    Musculoskeletal:         General: Normal range of motion.      Cervical back: Normal range of motion and neck supple.   Skin:     General: Skin is warm.   Neurological:      General: No focal deficit present.      Mental Status: He is alert. Mental status is at baseline.   Psychiatric:         Mood and Affect: Mood normal.         Results Review:  I have reviewed the labs, radiology results, and diagnostic studies.    Laboratory Data:   Results from last 7 days   Lab Units 03/26/23  0317 03/25/23  1251 03/25/23  1039   SODIUM mmol/L 135* 135* 135*   POTASSIUM mmol/L 3.7 4.4 4.2   CHLORIDE mmol/L 97* 98 98   CO2 mmol/L 26.0 25.0 25.0   BUN mg/dL 31* 18 16   CREATININE mg/dL 1.47* 1.14 1.09   GLUCOSE mg/dL 278* 149* 168*   CALCIUM mg/dL 9.1 9.1 8.7   BILIRUBIN mg/dL  --   --  0.4   ALK PHOS U/L  --   --  84   ALT (SGPT) U/L  --   --  11   AST (SGOT) U/L  --   --  21   ANION GAP mmol/L 12.0 12.0 12.0     Estimated Creatinine Clearance: 43.3 mL/min (A) (by C-G formula based on SCr of 1.47 mg/dL (H)).  Results from last 7 days   Lab Units 03/26/23 0317 03/25/23  1251   MAGNESIUM mg/dL 2.0 2.1         Results from last 7 days   Lab Units 03/26/23  0317 03/25/23  1251 03/25/23  1039   WBC 10*3/mm3 4.02 6.51 5.76   HEMOGLOBIN g/dL 9.7* 11.3* 11.0*   HEMATOCRIT % 28.9* 34.1* 32.9*   PLATELETS 10*3/mm3 147 182 171     Results from last 7 days   Lab Units 03/25/23  1048   INR  1.31*       Culture Data:   No results found for: BLOODCX  No results found for: URINECX  No results found for: RESPCX  No results found for: WOUNDCX  No results found for: STOOLCX  No components found for: BODYFLD    Radiology Data:   Imaging Results (Last 24 Hours)     ** No results found for the last 24 hours. **          I have reviewed the patient's current medications.     Assessment/Plan     Upper respiratory infection      Looks viral; check procalcitonin am; check procalcitonin and CT chest    Elevated troponin     Likely type II MI but  continue with heparin drip and Dr Forbes will decide tomorrow if further ischemic work up needs to be done    DOMINIQUE (not POA)      Likely due to diuretics use; hold these and montor    Medical Decision Making  Number and Complexity of problems: one major  Differential Diagnosis: PNA    Conditions and Status:        Condition is improving.     [facility] Data  External documents reviewed: previous medical records  My EKG interpretation: no acute events  My plain film interpretation: no acute events     Decision rules/scores evaluated (example [id number], Wells, etc): n/a     Discussed with: patient and his wife     Treatment Plan  CT chest wo    Care Planning  Shared decision making: his wife  Code status and discussions: full code    Disposition  Social Determinants of Health that impact treatment or disposition: none  I expect the patient to be discharged to home in 1 to 2 days      I confirmed that the patient's Advance Care Plan is present, code status is documented, or surrogate decision maker is listed in the patient's medical record.     Eliezer Thomas MD

## 2023-03-26 NOTE — PROGRESS NOTES
[facility] Cardiology  INPATIENT PROGRESS NOTE    Name: Evgeny Carter Jr.  Age/Sex: 89 y.o. male  :  1933        PCP: Michael Douglas MD          Subjective   No events overnight no new complaints no chest pain shortness of breath palpitation lightheadedness or dizziness    Patient Active Problem List   Diagnosis   • Atrial flutter (HCC)   • Coronary artery disease involving native coronary artery of native heart without angina pectoris   • Benign essential hypertension   • Type 2 diabetes mellitus without complication (HCC)   • Anxiety state   • Depression   • Hyperlipidemia   • Dilated aortic root (HCC)   • Paroxysmal atrial fibrillation (HCC)   • Bladder tumor   • Malignant tumor of urinary bladder (HCC)   • Sick sinus syndrome (HCC)   • Pacemaker   • History of colonic polyps   • Esophagitis   • Diverticulosis of colon without diverticulitis   • Anemia   • Acute pulmonary edema (HCC)       Past Medical History:   Diagnosis Date   • Allergic rhinitis    • Arthritis     hips, knees, feet   • Artificial lens present     in position   • Benign neoplasm of skin of eyelid    • Bladder tumor    • Bladder tumor    • BPH (benign prostatic hyperplasia)    • Cancer (HCC)     SKIN    • Cataract    • Coronary arteriosclerosis    • Cortical senile cataract    • Cough    • Diabetes mellitus (HCC)    • Esophagitis 2021   • Essential hypertension    • Essential hypertension    • Hypercholesterolemia    • Hypercholesterolemia    • Kidney stones    • MI, old    • Nonexudative age-related macular degeneration     mild   • Nuclear cataract    • Open-angle glaucoma     s/p trabec OS, doing well      • Primary open angle glaucoma     OS, s/p trabeculectomy; IOP down with timolol OD     • Pseudophakia of right eye    • Rhinitis    • Upper respiratory infection    • Vitreous detachment     peripheral       Current Facility-Administered Medications   Medication Dose Route Frequency  Provider Last Rate Last Admin   • acetaminophen (TYLENOL) tablet 650 mg  650 mg Oral Q6H PRN Lc Rucker MD       • albuterol (PROVENTIL) nebulizer solution 0.083% 2.5 mg/3mL  2.5 mg Nebulization Q4H - RT Lc Rucker MD   2.5 mg at 03/26/23 1049   • amiodarone (PACERONE) tablet 200 mg  200 mg Oral Daily Lc Rucker MD   200 mg at 03/26/23 0811   • aspirin EC tablet 81 mg  81 mg Oral Daily Lc Rucker MD   81 mg at 03/26/23 0810   • dextrose (D50W) (25 g/50 mL) IV injection 25 g  25 g Intravenous Q15 Min PRN Lc Rucker MD       • dextrose (GLUTOSE) oral gel 15 g  15 g Oral Q15 Min PRN Lc Rucker MD       • dilTIAZem CD (CARDIZEM CD) 24 hr capsule 180 mg  180 mg Oral Daily Lc Rucker MD   180 mg at 03/26/23 0811   • fluticasone (FLONASE) 50 MCG/ACT nasal spray 1 spray  1 spray Each Nare Daily Lc Rucker MD   1 spray at 03/26/23 0900   • glucagon HCl (Diagnostic) injection 1 mg  1 mg Intramuscular Q15 Min PRN Lc Rucker MD       • guaiFENesin (MUCINEX) 12 hr tablet 600 mg  600 mg Oral Q12H Lc Rucker MD   600 mg at 03/26/23 0810   • heparin 93733 units/250 mL (100 units/mL) in 0.45 % NaCl infusion  11.1 Units/kg/hr Intravenous Titrated Lc Rucker MD 9.96 mL/hr at 03/26/23 0423 11.1 Units/kg/hr at 03/26/23 0423    And   • heparin (porcine) 5000 UNIT/ML injection 4,000 Units  4,000 Units Intravenous PRN Lc Rucker MD        And   • heparin (porcine) 5000 UNIT/ML injection 2,700 Units  30 Units/kg Intravenous PRN Lc Rucker MD       • lisinopril (PRINIVIL,ZESTRIL) tablet 20 mg  20 mg Oral Q24H Lc Rucker MD   20 mg at 03/26/23 0810    And   • hydroCHLOROthiazide (HYDRODIURIL) tablet 12.5 mg  12.5 mg Oral Q24H Lc Rucker MD   12.5 mg at 03/26/23 0810   • Insulin Aspart (novoLOG) injection 0-7 Units  0-7 Units Subcutaneous TID AC Lc Rucker MD   4 Units at 03/26/23 0810   • ondansetron (ZOFRAN) injection 4 mg  4 mg Intravenous Q6H  PRN Lc Rucker MD       • pantoprazole (PROTONIX) EC tablet 40 mg  40 mg Oral Q AM Lc Rucker MD   40 mg at 03/26/23 0502   • sodium chloride 0.9 % flush 10 mL  10 mL Intravenous Q12H Lc Rucker MD   10 mL at 03/26/23 0811   • sodium chloride 0.9 % flush 10 mL  10 mL Intravenous PRN Lc Rucker MD       • sodium chloride 0.9 % infusion 40 mL  40 mL Intravenous PRN Lc Rucker MD       • terazosin (HYTRIN) capsule 2 mg  2 mg Oral Nightly Lc Rucker MD   2 mg at 03/25/23 2056   • timolol (TIMOPTIC) 0.5 % ophthalmic solution 1 drop  1 drop Both Eyes Q12H Lc Rucker MD   1 drop at 03/26/23 0810       Past Surgical History:   Procedure Laterality Date   • ANKLE SURGERY  07/29/2000    Ankle surgery (ORIF right ankle fracture. Bimalleolar right fracture)   • APPENDECTOMY     • CARDIAC ABLATION     • CARDIAC CATHETERIZATION  07/02/1987    Cardiac cath 17386 (Coronary arthosclerotic heart disease. maintained patency of RCA. Mild inferior hypokinesis.)   • CARDIAC CATHETERIZATION  06/30/1987    Cardiac cath 49540 (Coronary atherosclerotic heart disease. Occluded RCA. Acute inferior infarction. S/P successful angioplasty of RCA)   • CARDIAC ELECTROPHYSIOLOGY PROCEDURE N/A 6/11/2020    Procedure: Pacer single lead;  Surgeon: Johnny Forbes MD;  Location: [facility] [place] CATH INVASIVE LOCATION;  Service: Cardiology;  Laterality: N/A;   • CATARACT EXTRACTION  04/01/2010    Remove cataract, insert lens (Complicated cataract extraction with intraocular lens implantation, right eye, Marcelo SN-60  serial # [id number]: 20.5 diopter)   • CATARACT EXTRACTION W/ INTRAOCULAR LENS IMPLANT Left 3/23/2018    Procedure: CATARACT PHACO EXTRACTION WITH INTRAOCULAR LENS IMPLANT;  Surgeon: Oli Haley MD;  Location: [facility] [place] OR;  Service: Ophthalmology   • COLONOSCOPY N/A 5/9/2019    Procedure: COLONOSCOPY;  Surgeon: Rainer Gates DO;  Location: [facility] [place] ENDOSCOPY;  Service: Gastroenterology   •  COLONOSCOPY N/A 11/12/2020    Procedure: COLONOSCOPY;  Surgeon: Rainer Gates DO;  Location: [facility] ENDOSCOPY;  Service: Gastroenterology;  Laterality: N/A;   • ENDOSCOPY  02/19/1990    EGD 59345 (Olympus video endoscope.Fibrin-like patch in stomach)   • ENDOSCOPY N/A 11/12/2020    Procedure: ESOPHAGOGASTRODUODENOSCOPY;  Surgeon: Rainer Gates DO;  Location: [facility] ENDOSCOPY;  Service: Gastroenterology;  Laterality: N/A;   • EYE SURGERY  02/02/2005    Eye surgery procedure (Repair of operative wound leak, left eye. S/P trabeculectomy with operative wound leak)   • EYE SURGERY  01/19/2005    Eye surgery procedure (Trabeculectomy, left eye. 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 degeneration)    • OTHER SURGICAL HISTORY  02/26/2015    EXTENDED VISUAL FIELDS STUDY 04414 (Primary open angle glaucoma)    • OTHER SURGICAL HISTORY  02/26/2015    EXTENDED VISUAL FIELDS STUDY 68230 (Primary open angle glaucoma)    • OTHER SURGICAL HISTORY  06/16/2016    OCT DISC NFL 13119 (Open-angle glaucoma)    • OTHER SURGICAL HISTORY      OCT DISC NFL 59376 (Primary open angle glaucoma) (3): 06/17/2015, 03/19/2014, 03/04/2013   • OTHER SURGICAL HISTORY  06/16/2016    OPTIC NERVE HEAD EVAL PERFORMED 2027F (Open-angle glaucoma)    • TRANSURETHRAL RESECTION OF BLADDER TUMOR N/A 5/9/2018    Procedure: CYSTOSCOPY TRANSURETHRAL RESECTION OF BLADDER TUMOR;  Surgeon: Donovan Conner MD;  Location: [facility] OR;  Service: Urology   • TRANSURETHRAL RESECTION OF BLADDER TUMOR N/A 7/26/2019    Procedure: CYSTOSCOPY TRANSURETHRAL RESECTION OF BLADDER TUMOR;  Surgeon: Donovan Conner MD;  Location: [facility] OR;  Service: Urology   • TRANSURETHRAL RESECTION OF BLADDER TUMOR N/A 7/29/2020    Procedure: CYSTOSCOPY TRANSURETHRAL RESECTION OF  BLADDER TUMOR;  Surgeon: [place], Donovan GILL MD;  Location: [facility];  Service: Urology;  Laterality: N/A;   • UVULECTOMY  1969    EXCISION OF UVULA 75681 (partial uvulectomy with excision of papilloma. Redunant uvula with a small papilloma)       Social History     Socioeconomic History   • Marital status:    Tobacco Use   • Smoking status: Former   • Smokeless tobacco: Current     Types: Snuff   • Tobacco comments:     pouches   Vaping Use   • Vaping Use: Never used   Substance and Sexual Activity   • Alcohol use: No   • Drug use: No   • Sexual activity: Defer       Vital Signs  Temp:  [97 °F (36.1 °C)-98.9 °F (37.2 °C)] 97 °F (36.1 °C)  Heart Rate:  [61-90] 63  Resp:  [18-22] 18  BP: (115-137)/(54-63) 120/58  Body mass index is 27.64 kg/m².    P/E    General: No acute distress  Neck: Supple.  No JVD, no thyroid enlargement.  Chest: Air entry equal, normal respiration.  No rhonchi or creps.  Cardiovascular system:  Regular rate and rhythm, no murmurs.  Abdomen: Soft, no tenderness, bowel sounds present, no hepatosplenomegaly.  CNS: Alert, oriented to place and time.  No motor or sensory deficit.  Cranial nerves intact.  Musculoskeletal: No deformity of the back or spine.  Extremities:  No edema.  Pulses equal on both sides.      Lab Results (last 24 hours)     Procedure Component Value Units Date/Time    High Sensitivity Troponin T 2Hr [[id number]]  (Abnormal) Collected: 03/25/23 1251    Specimen: Blood Updated: 03/25/23 1321     HS Troponin T 164 ng/L      Troponin T Delta 26 ng/L     Narrative:      High Sensitive Troponin T Reference Range:  <10.0 ng/L- Negative Female for AMI  <15.0 ng/L- Negative Male for AMI  >=10 - Abnormal Female indicating possible myocardial injury.  >=15 - Abnormal Male indicating possible myocardial injury.   Clinicians would have to utilize clinical acumen, EKG, Troponin, and serial changes to determine if it is an Acute Myocardial Infarction or myocardial injury due to an  underlying chronic condition.         Basic Metabolic Panel [[id number]]  (Abnormal) Collected: 03/25/23 1251    Specimen: Blood Updated: 03/25/23 1316     Glucose 149 mg/dL      BUN 18 mg/dL      Creatinine 1.14 mg/dL      Sodium 135 mmol/L      Potassium 4.4 mmol/L      Chloride 98 mmol/L      CO2 25.0 mmol/L      Calcium 9.1 mg/dL      BUN/Creatinine Ratio 15.8     Anion Gap 12.0 mmol/L      eGFR 61.5 mL/min/1.73     Narrative:      GFR Normal >60  Chronic Kidney Disease <60  Kidney Failure <15    The GFR formula is only valid for adults with stable renal function between ages 18 and 70.    CBC & Differential [[id number]]  (Abnormal) Collected: 03/25/23 1251    Specimen: Blood Updated: 03/25/23 1258    Narrative:      The following orders were created for panel order CBC & Differential.  Procedure                               Abnormality         Status                     ---------                               -----------         ------                     CBC Auto Differential[[id number]]        Abnormal            Final result                 Please view results for these tests on the individual orders.    Magnesium [[id number]]  (Normal) Collected: 03/25/23 1251    Specimen: Blood Updated: 03/25/23 1316     Magnesium 2.1 mg/dL     CBC Auto Differential [[id number]]  (Abnormal) Collected: 03/25/23 1251    Specimen: Blood Updated: 03/25/23 1258     WBC 6.51 10*3/mm3      RBC 3.37 10*6/mm3      Hemoglobin 11.3 g/dL      Hematocrit 34.1 %      .2 fL      MCH 33.5 pg      MCHC 33.1 g/dL      RDW 13.2 %      RDW-SD 48.8 fl      MPV 9.9 fL      Platelets 182 10*3/mm3      Neutrophil % 79.3 %      Lymphocyte % 12.9 %      Monocyte % 6.3 %      Eosinophil % 0.6 %      Basophil % 0.3 %      Immature Grans % 0.6 %      Neutrophils, Absolute 5.16 10*3/mm3      Lymphocytes, Absolute 0.84 10*3/mm3      Monocytes, Absolute 0.41 10*3/mm3      Eosinophils, Absolute 0.04 10*3/mm3      Basophils, Absolute 0.02 10*3/mm3       Immature Grans, Absolute 0.04 10*3/mm3      nRBC 0.0 /100 WBC     POC Glucose Once [[id number]]  (Abnormal) Collected: 03/25/23 1258    Specimen: Blood Updated: 03/25/23 1310     Glucose 155 mg/dL      Comment: RN NotifiedOperator: [id number] DAVID PENAISSAMeter ID: [id number]       POC Glucose Once [[id number]]  (Abnormal) Collected: 03/25/23 1623    Specimen: Blood Updated: 03/25/23 1636     Glucose 328 mg/dL      Comment: RN NotifiedOperator: [id number] DAVID MELISSAMeter ID: [id number]       POC Glucose Once [[id number]]  (Abnormal) Collected: 03/25/23 1930    Specimen: Blood Updated: 03/25/23 1943     Glucose 283 mg/dL      Comment: RN NotifiedOperator: [id number] YAHIR HOLLEYEYMeter ID: [id number]       aPTT [[id number]]  (Abnormal) Collected: 03/25/23 2117    Specimen: Blood Updated: 03/25/23 2151     PTT 76.8 seconds     Narrative:      The recommended Heparin therapeutic range is 68-97 seconds.    Basic Metabolic Panel [[id number]]  (Abnormal) Collected: 03/26/23 0317    Specimen: Blood Updated: 03/26/23 0338     Glucose 278 mg/dL      BUN 31 mg/dL      Creatinine 1.47 mg/dL      Sodium 135 mmol/L      Potassium 3.7 mmol/L      Chloride 97 mmol/L      CO2 26.0 mmol/L      Calcium 9.1 mg/dL      BUN/Creatinine Ratio 21.1     Anion Gap 12.0 mmol/L      eGFR 45.3 mL/min/1.73     Narrative:      GFR Normal >60  Chronic Kidney Disease <60  Kidney Failure <15    The GFR formula is only valid for adults with stable renal function between ages 18 and 70.    CBC & Differential [[id number]]  (Abnormal) Collected: 03/26/23 0317    Specimen: Blood Updated: 03/26/23 0322    Narrative:      The following orders were created for panel order CBC & Differential.  Procedure                               Abnormality         Status                     ---------                               -----------         ------                     CBC Auto Differential[[id number]]        Abnormal            Final result                  Please view results for these tests on the individual orders.    Magnesium [[id number]]  (Normal) Collected: 03/26/23 0317    Specimen: Blood Updated: 03/26/23 0337     Magnesium 2.0 mg/dL     CBC Auto Differential [[id number]]  (Abnormal) Collected: 03/26/23 0317    Specimen: Blood Updated: 03/26/23 0322     WBC 4.02 10*3/mm3      RBC 2.87 10*6/mm3      Hemoglobin 9.7 g/dL      Hematocrit 28.9 %      .7 fL      MCH 33.8 pg      MCHC 33.6 g/dL      RDW 13.0 %      RDW-SD 47.6 fl      MPV 9.9 fL      Platelets 147 10*3/mm3      Neutrophil % 79.8 %      Lymphocyte % 14.2 %      Monocyte % 5.5 %      Eosinophil % 0.0 %      Basophil % 0.0 %      Immature Grans % 0.5 %      Neutrophils, Absolute 3.21 10*3/mm3      Lymphocytes, Absolute 0.57 10*3/mm3      Monocytes, Absolute 0.22 10*3/mm3      Eosinophils, Absolute 0.00 10*3/mm3      Basophils, Absolute 0.00 10*3/mm3      Immature Grans, Absolute 0.02 10*3/mm3      nRBC 0.0 /100 WBC     aPTT [[id number]]  (Abnormal) Collected: 03/26/23 0317    Specimen: Blood from Arm, Right Updated: 03/26/23 0341     PTT 78.0 seconds     Narrative:      The recommended Heparin therapeutic range is 68-97 seconds.    POC Glucose Once [[id number]]  (Abnormal) Collected: 03/26/23 0614    Specimen: Blood Updated: 03/26/23 0630     Glucose 250 mg/dL      Comment: RN NotifiedOperator: [id number] YAHIR AUSTINMeter ID: [id number]       POC Glucose Once [[id number]]  (Abnormal) Collected: 03/26/23 1040    Specimen: Blood Updated: 03/26/23 1056     Glucose 307 mg/dL      Comment: RN NotifiedOperator: [id number] DAVID HERNANDEZMeter ID: [id number]               A/P  Non-ST elevation MI suspected in the setting of severe infection with comorbid conditions versus CHF exacerbation at this point patient is hemodynamically stable and chest pain-free he is improving with medical management  continuing such aspirin 81 mg daily Plavix 75 mg daily CCB  statin and ACE inhibitor as tolerated lastly  regarding anticoagulant would recommend continuing anticoagulant and holding of Eliquis in case invasive evaluation will be necessary  We will repeat troponin today to see if it is trending down if such heparin could be stopped  Dr. Forbes to assume care in a.m.     Atrial flutter atrial fib chronic  Pacemaker sick sinus syndrome  Long-term anticoagulation hold Eliquis  Hypertension controlled on medications  Hyperlipidemia  Congestive heart failure pending echo for evaluation suspected congestive heart failure exacerbation  Diastolic dysfunction known  Mild valvulopathy  COPD      This document has been electronically signed by Nate Snow DO on March 26, 2023 11:35 CDT         Part of this note may be an electronic transcription/translation of spoken language to printed text using the Dragon Dictation System.

## 2023-03-26 NOTE — PLAN OF CARE
Goal Outcome Evaluation:  Plan of Care Reviewed With: patient        Progress: no change  Outcome Evaluation: v/s stable. no c/o pain or discomfort voiced. heparin gtt infusing. pt rested throughout the night.

## 2023-03-26 NOTE — PLAN OF CARE
Goal Outcome Evaluation:  Plan of Care Reviewed With: patient, spouse           Outcome Evaluation: OT eval completed. Co-eval with PT. Pt alerta and agreeable to therapy with spouse at bedside. VSS. Pt reports he is (I) with ADLs, IADLS, and mobility. Pt was Mod (I) for bed mobility. SPV for sit <> stand, mobility in room, and toilet t/f. (I) for LB dressing and toileting. Pt reports he is with increased fatigue after ADLs and mobility, and wishes to receive OT services while at the hospital for increased endurance and safety required for ADLs. I/P OT will follow. Goals established. anticipate d/c home with assist as needed.

## 2023-03-26 NOTE — THERAPY EVALUATION
Patient Name: Evgeny Carter Jr.  : 1933    MRN: [id number]                              Today's Date: 3/26/2023       Admit Date: 3/25/2023    Visit Dx:     ICD-10-CM ICD-9-CM   1. Acute pulmonary edema (HCC)  J81.0 518.4   2. Cough, unspecified type  R05.9 786.2   3. Impaired functional mobility, balance, gait, and endurance  Z74.09 V49.89   4. Impaired mobility and ADLs  Z74.09 V49.89    Z78.9      Patient Active Problem List   Diagnosis   • Atrial flutter (HCC)   • Coronary artery disease involving native coronary artery of native heart without angina pectoris   • Benign essential hypertension   • Type 2 diabetes mellitus without complication (HCC)   • Anxiety state   • Depression   • Hyperlipidemia   • Dilated aortic root (HCC)   • Paroxysmal atrial fibrillation (HCC)   • Bladder tumor   • Malignant tumor of urinary bladder (HCC)   • Sick sinus syndrome (HCC)   • Pacemaker   • History of colonic polyps   • Esophagitis   • Diverticulosis of colon without diverticulitis   • Anemia   • Acute pulmonary edema (HCC)     Past Medical History:   Diagnosis Date   • Allergic rhinitis    • Arthritis     hips, knees, feet   • Artificial lens present     in position   • Benign neoplasm of skin of eyelid    • Bladder tumor    • Bladder tumor    • BPH (benign prostatic hyperplasia)    • Cancer (HCC)     SKIN    • Cataract    • Coronary arteriosclerosis    • Cortical senile cataract    • Cough    • Diabetes mellitus (HCC)    • Esophagitis 2021   • Essential hypertension    • Essential hypertension    • Hypercholesterolemia    • Hypercholesterolemia    • Kidney stones    • MI, old    • Nonexudative age-related macular degeneration     mild   • Nuclear cataract    • Open-angle glaucoma     s/p trabec OS, doing well      • Primary open angle glaucoma     OS, s/p trabeculectomy; IOP down with timolol OD     • Pseudophakia of right eye    • Rhinitis    • Upper respiratory infection    • Vitreous detachment      peripheral     Past Surgical History:   Procedure Laterality Date   • ANKLE SURGERY  07/29/2000    Ankle surgery (ORIF right ankle fracture. Bimalleolar right fracture)   • APPENDECTOMY     • CARDIAC ABLATION     • CARDIAC CATHETERIZATION  07/02/1987    Cardiac cath 63497 (Coronary arthosclerotic heart disease. maintained patency of RCA. Mild inferior hypokinesis.)   • CARDIAC CATHETERIZATION  06/30/1987    Cardiac cath 27346 (Coronary atherosclerotic heart disease. Occluded RCA. Acute inferior infarction. S/P successful angioplasty of RCA)   • CARDIAC ELECTROPHYSIOLOGY PROCEDURE N/A 6/11/2020    Procedure: Pacer single lead;  Surgeon: Johnny Forbes MD;  Location: [facility] CATH INVASIVE LOCATION;  Service: Cardiology;  Laterality: N/A;   • CATARACT EXTRACTION  04/01/2010    Remove cataract, insert lens (Complicated cataract extraction with intraocular lens implantation, right eye, Marcelo SN-60 WF serial # [id number]: 20.5 diopter)   • CATARACT EXTRACTION W/ INTRAOCULAR LENS IMPLANT Left 3/23/2018    Procedure: CATARACT PHACO EXTRACTION WITH INTRAOCULAR LENS IMPLANT;  Surgeon: Oli Haley MD;  Location: [facility] OR;  Service: Ophthalmology   • COLONOSCOPY N/A 5/9/2019    Procedure: COLONOSCOPY;  Surgeon: Rainer Gates DO;  Location: [facility] ENDOSCOPY;  Service: Gastroenterology   • COLONOSCOPY N/A 11/12/2020    Procedure: COLONOSCOPY;  Surgeon: Rainer Gates DO;  Location: [facility] ENDOSCOPY;  Service: Gastroenterology;  Laterality: N/A;   • ENDOSCOPY  02/19/1990    EGD 73779 (Olympus video endoscope.Fibrin-like patch in stomach)   • ENDOSCOPY N/A 11/12/2020    Procedure: ESOPHAGOGASTRODUODENOSCOPY;  Surgeon: Rainer Gates DO;  Location: [facility] ENDOSCOPY;  Service: Gastroenterology;  Laterality: N/A;   • EYE SURGERY  02/02/2005    Eye surgery procedure (Repair of operative wound leak, left eye. S/P trabeculectomy with operative wound leak)   • EYE SURGERY  01/19/2005    Eye surgery  procedure (Trabeculectomy, left eye. 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 degeneration)    • OTHER SURGICAL HISTORY  02/26/2015    EXTENDED VISUAL FIELDS STUDY 33806 (Primary open angle glaucoma)    • OTHER SURGICAL HISTORY  02/26/2015    EXTENDED VISUAL FIELDS STUDY 28563 (Primary open angle glaucoma)    • OTHER SURGICAL HISTORY  06/16/2016    OCT DISC NFL 35398 (Open-angle glaucoma)    • OTHER SURGICAL HISTORY      OCT DISC NFL 77195 (Primary open angle glaucoma) (3): 06/17/2015, 03/19/2014, 03/04/2013   • OTHER SURGICAL HISTORY  06/16/2016    OPTIC NERVE HEAD EVAL PERFORMED 2027F (Open-angle glaucoma)    • TRANSURETHRAL RESECTION OF BLADDER TUMOR N/A 5/9/2018    Procedure: CYSTOSCOPY TRANSURETHRAL RESECTION OF BLADDER TUMOR;  Surgeon: Donovan Conner MD;  Location: [facility] OR;  Service: Urology   • TRANSURETHRAL RESECTION OF BLADDER TUMOR N/A 7/26/2019    Procedure: CYSTOSCOPY TRANSURETHRAL RESECTION OF BLADDER TUMOR;  Surgeon: Donovna Conner MD;  Location: [facility] OR;  Service: Urology   • TRANSURETHRAL RESECTION OF BLADDER TUMOR N/A 7/29/2020    Procedure: CYSTOSCOPY TRANSURETHRAL RESECTION OF BLADDER TUMOR;  Surgeon: Donovan Conner MD;  Location: [facility] OR;  Service: Urology;  Laterality: N/A;   • UVULECTOMY  1969    EXCISION OF UVULA 45416 (partial uvulectomy with excision of papilloma. Redunant uvula with a small papilloma)      General Information     Row Name 03/26/23 0948          OT Time and Intention    Document Type evaluation  -CM     Mode of Treatment physical therapy;occupational therapy  -CM     Row Name 03/26/23 0948          General Information    Patient Profile Reviewed yes  -CM     Prior Level of Function independent:;all household mobility;community mobility;ADL's;driving;yard  work  -CM     Existing Precautions/Restrictions fall  -CM     Barriers to Rehab medically complex;hearing deficit  -CM     Row Name 03/26/23 0948          Living Environment    People in Home spouse  -CM     Row Name 03/26/23 0948          Home Main Entrance    Number of Stairs, Main Entrance none  -CM     Row Name 03/26/23 0948          Stairs Within Home, Primary    Stairs, Within Home, Primary (I) ambulation without an AD. Has FWW, SPC available.  -CM     Row Name 03/26/23 0948          Cognition    Orientation Status (Cognition) oriented x 4  -CM     Row Name 03/26/23 0948          Safety Issues, Functional Mobility    Impairments Affecting Function (Mobility) strength;endurance/activity tolerance;balance  -CM           User Key  (r) = Recorded By, (t) = Taken By, (c) = Cosigned By    Initials Name Provider Type    CM Yolanda Sims, OT Occupational Therapist                 Mobility/ADL's     Row Name 03/26/23 0948          Bed Mobility    Bed Mobility supine-sit;sit-supine  -CM     Supine-Sit [place] (Bed Mobility) modified independence  -CM     Sit-Supine [place] (Bed Mobility) modified independence  -CM     Assistive Device (Bed Mobility) head of bed elevated  -CM     Row Name 03/26/23 0948          Transfers    Transfers sit-stand transfer;stand-sit transfer;toilet transfer  -CM     Row Name 03/26/23 0948          Sit-Stand Transfer    Sit-Stand [place] (Transfers) supervision  -CM     Row Name 03/26/23 0948          Stand-Sit Transfer    Stand-Sit [place] (Transfers) supervision  -CM     Row Name 03/26/23 0948          Toilet Transfer    Type (Toilet Transfer) stand-sit;sit-stand  -CM     [place] Level (Toilet Transfer) supervision  -CM     Row Name 03/26/23 0948          Functional Mobility    Functional Mobility- Ind. Level standby assist  -CM     Functional Mobility- Device walker, front-wheeled  -CM     Functional Mobility-Distance (Feet) 20  -CM     Row Name 03/26/23 0948           Activities of Daily Living    BADL Assessment/Intervention toileting;lower body dressing  -CM     Row Name 03/26/23 0948          Toileting Assessment/Training    [place] Level (Toileting) toileting skills;adjust/manage clothing;perform perineal hygiene;independent  -CM     Position (Toileting) supported sitting;supported standing  -CM     Row Name 03/26/23 0948          Lower Body Dressing Assessment/Training    [place] Level (Lower Body Dressing) doff;lower body dressing skills;don;socks  -CM     Position (Lower Body Dressing) edge of bed sitting  -CM           User Key  (r) = Recorded By, (t) = Taken By, (c) = Cosigned By    Initials Name Provider Type    Yolanda Hogan, MARICARMEN Occupational Therapist               Obj/Interventions     Row Name 03/26/23 0948          Sensory Assessment (Somatosensory)    Sensory Assessment (Somatosensory) UE sensation intact  -CM     Row Name 03/26/23 0948          Range of Motion Comprehensive    General Range of Motion bilateral upper extremity ROM WFL  -CM     Row Name 03/26/23 0948          Strength Comprehensive (MMT)    Comment, General Manual Muscle Testing (MMT) Assessment BUE 5/5 grossly  -CM           User Key  (r) = Recorded By, (t) = Taken By, (c) = Cosigned By    Initials Name Provider Type    Yolanda Hogan, OT Occupational Therapist               Goals/Plan     Row Name 03/26/23 1027          Bathing Goal 1 (OT)    Activity/Device (Bathing Goal 1, OT) lower body bathing;shower chair  -CM     [place] Level/Cues Needed (Bathing Goal 1, OT) modified independence  -CM     Time Frame (Bathing Goal 1, OT) long term goal (LTG);by discharge  -CM     Progress/Outcomes (Bathing Goal 1, OT) goal not met  -CM     Row Name 03/26/23 1027          Strength Goal 1 (OT)    Strength Goal 1 (OT) Pt will tolerate at least 20 minutes BUE therrapeutic exercises with SpO2 above 90% for increased endurance requried for ADLs and mobility.  -CM     Time Frame (Strength  Goal 1, OT) long term goal (LTG);by discharge  -CM     Progress/Outcome (Strength Goal 1, OT) goal not met  -CM     Row Name 03/26/23 1027          Problem Specific Goal 1 (OT)    Problem Specific Goal 1 (OT) Pt will tolerate at least 5 consecutive minutes standing ADLs with SPV and zero LOB for increased safety and balance required for ADLs and mobility.  -CM     Time Frame (Problem Specific Goal 1, OT) long term goal (LTG);by discharge  -CM     Progress/Outcome (Problem Specific Goal 1, OT) goal not met  -CM     Row Name 03/26/23 1027          Therapy Assessment/Plan (OT)    Planned Therapy Interventions (OT) activity tolerance training;adaptive equipment training;BADL retraining;cognitive/visual perception retraining;manual therapy/joint mobilization;IADL retraining;functional balance retraining;edema control/reduction;neuromuscular control/coordination retraining;occupation/activity based interventions;ROM/therapeutic exercise;patient/caregiver education/training;passive ROM/stretching;strengthening exercise;transfer/mobility retraining  -CM           User Key  (r) = Recorded By, (t) = Taken By, (c) = Cosigned By    Initials Name Provider Type    CM Yolanda Sims OT Occupational Therapist               Clinical Impression     Row Name 03/26/23 0948          Pain Assessment    Pretreatment Pain Rating 0/10 - no pain  -CM     Posttreatment Pain Rating 0/10 - no pain  -CM     Row Name 03/26/23 0948          Plan of Care Review    Plan of Care Reviewed With patient;spouse  -CM     Outcome Evaluation OT eval completed. Co-eval with PT. Pt alerta and agreeable to therapy with spouse at bedside. VSS. Pt reports he is (I) with ADLs, IADLS, and mobility. Pt was Mod (I) for bed mobility. SPV for sit <> stand, mobility in room, and toilet t/f. (I) for LB dressing and toileting. Pt reports he is with increased fatigue after ADLs and mobility, and wishes to receive OT services while at the hospital for increased endurance  and safety required for ADLs. I/P OT will follow. Goals established. anticipate d/c home with assist as needed.  -CM     Row Name 03/26/23 0948          Therapy Assessment/Plan (OT)    Patient/Family Therapy Goal Statement (OT) return home  -CM     Rehab Potential (OT) good, to achieve stated therapy goals  -CM     Criteria for Skilled Therapeutic Interventions Met (OT) yes;meets criteria  -CM     Therapy Frequency (OT) other (see comments)  5-7 d/wk  -CM     Predicted Duration of Therapy Intervention (OT) untl d/c or all goals met  -CM     Row Name 03/26/23 0948          Therapy Plan Review/Discharge Plan (OT)    Anticipated Discharge Disposition (OT) home with assist  -CM     Row Name 03/26/23 0948          Vital Signs    Pre Systolic BP Rehab 151  -CM     Pre Treatment Diastolic BP 68  -CM     Pretreatment Heart Rate (beats/min) 75  -CM     Intratreatment Heart Rate (beats/min) 83  -CM     Pre SpO2 (%) 94  -CM     O2 Delivery Pre Treatment room air  -CM     Intra SpO2 (%) 94  -CM     O2 Delivery Intra Treatment room air  -CM     Post SpO2 (%) 97  -CM     O2 Delivery Post Treatment room air  -CM     Pre Patient Position Supine  -CM     Intra Patient Position Standing  -CM     Post Patient Position Sitting  -CM     Row Name 03/26/23 0948          Positioning and Restraints    Pre-Treatment Position in bed  -CM     Post Treatment Position bed  -CM     In Bed supine;call light within reach;encouraged to call for assist;exit alarm on;side rails up x2;with family/caregiver  -CM           User Key  (r) = Recorded By, (t) = Taken By, (c) = Cosigned By    Initials Name Provider Type    Yolanda Hogan, MARICARMEN Occupational Therapist               Outcome Measures     Row Name 03/26/23 0948          How much help from another is currently needed...    Putting on and taking off regular lower body clothing? 4  -CM     Bathing (including washing, rinsing, and drying) 3  -CM     Toileting (which includes using toilet bed pan or  urinal) 4  -CM     Putting on and taking off regular upper body clothing 4  -CM     Taking care of personal grooming (such as brushing teeth) 4  -CM     Eating meals 4  -CM     AM-PAC 6 Clicks Score (OT) 23  -CM     Row Name 03/26/23 0950 03/26/23 0814       How much help from another person do you currently need...    Turning from your back to your side while in flat bed without using bedrails? 4  -CZ 4  -ML    Moving from lying on back to sitting on the side of a flat bed without bedrails? 4  -CZ 4  -ML    Moving to and from a bed to a chair (including a wheelchair)? 3  -CZ 4  -ML    Standing up from a chair using your arms (e.g., wheelchair, bedside chair)? 3  -CZ 4  -ML    Climbing 3-5 steps with a railing? 3  -CZ 4  -ML    To walk in hospital room? 3  -CZ 4  -ML    AM-PAC 6 Clicks Score (PT) 20  -CZ 24  -ML    Highest level of mobility 6 --> Walked 10 steps or more  -CZ 8 --> Walked 250 feet or more  -ML    Row Name 03/26/23 0950 03/26/23 0948       Functional Assessment    Outcome Measure Options AM-PAC 6 Clicks Basic Mobility (PT);T[place]etti  -CZ AM-PAC 6 Clicks Daily Activity (OT)  -CM          User Key  (r) = Recorded By, (t) = Taken By, (c) = Cosigned By    Initials Name Provider Type    CZ Francois Gamino, PT Physical Therapist    Marci Spaulding LPN Licensed Nurse    Yolanda Hogan OT Occupational Therapist                Occupational Therapy Education     Title: PT OT SLP Therapies (In Progress)     Topic: Occupational Therapy (In Progress)     Point: ADL training (Done)     Description:   Instruct learner(s) on proper safety adaptation and remediation techniques during self care or transfers.   Instruct in proper use of assistive devices.              Learning Progress Summary           Patient Acceptance, E,TB, VU by CM at 3/26/2023 1032    Comment: OT POC, Role of OT, d/c recommendations, DME recommendations   Family Acceptance, E,TB, VU by CM at 3/26/2023 1032    Comment: OT POC, Role of OT, d/c  recommendations, DME recommendations                   Point: Home exercise program (Not Started)     Description:   Instruct learner(s) on appropriate technique for monitoring, assisting and/or progressing therapeutic exercises/activities.              Learner Progress:  Not documented in this visit.          Point: Precautions (Done)     Description:   Instruct learner(s) on prescribed precautions during self-care and functional transfers.              Learning Progress Summary           Patient Acceptance, E,TB, VU by CM at 3/26/2023 1032    Comment: OT POC, Role of OT, d/c recommendations, DME recommendations   Family Acceptance, E,TB, VU by CM at 3/26/2023 1032    Comment: OT POC, Role of OT, d/c recommendations, DME recommendations                   Point: Body mechanics (Done)     Description:   Instruct learner(s) on proper positioning and spine alignment during self-care, functional mobility activities and/or exercises.              Learning Progress Summary           Patient Acceptance, E,TB, VU by CM at 3/26/2023 1032    Comment: OT POC, Role of OT, d/c recommendations, DME recommendations   Family Acceptance, E,TB, VU by CM at 3/26/2023 1032    Comment: OT POC, Role of OT, d/c recommendations, DME recommendations                               User Key     Initials Effective Dates Name Provider Type Discipline    SAUL 11/18/22 -  Yolanda Sims OT Occupational Therapist OT              OT Recommendation and Plan  Planned Therapy Interventions (OT): activity tolerance training, adaptive equipment training, BADL retraining, cognitive/visual perception retraining, manual therapy/joint mobilization, IADL retraining, functional balance retraining, edema control/reduction, neuromuscular control/coordination retraining, occupation/activity based interventions, ROM/therapeutic exercise, patient/caregiver education/training, passive ROM/stretching, strengthening exercise, transfer/mobility retraining  Therapy  Frequency (OT): other (see comments) (5-7 d/wk)  Plan of Care Review  Plan of Care Reviewed With: patient, spouse  Outcome Evaluation: OT eval completed. Co-eval with PT. Pt alerta and agreeable to therapy with spouse at bedside. VSS. Pt reports he is (I) with ADLs, IADLS, and mobility. Pt was Mod (I) for bed mobility. SPV for sit <> stand, mobility in room, and toilet t/f. (I) for LB dressing and toileting. Pt reports he is with increased fatigue after ADLs and mobility, and wishes to receive OT services while at the hospital for increased endurance and safety required for ADLs. I/P OT will follow. Goals established. anticipate d/c home with assist as needed.     Time Calculation:    Time Calculation- OT     Row Name 03/26/23 1216             Time Calculation- OT    OT Start Time 0950  -CM      OT Stop Time 1032  -CM      OT Time Calculation (min) 42 min  -CM      OT Received On 03/26/23  -CM      OT Goal Re-Cert Due Date 04/08/23  -CM         Untimed Charges    OT Eval/Re-eval Minutes 42  -CM         Total Minutes    Untimed Charges Total Minutes 42  -CM       Total Minutes 42  -CM            User Key  (r) = Recorded By, (t) = Taken By, (c) = Cosigned By    Initials Name Provider Type    Yolanda Hogan OT Occupational Therapist              Therapy Charges for Today     Code Description Service Date Service Provider Modifiers Qty    [id number] HC OT EVAL MOD COMPLEXITY 3 3/26/2023 Yolanda Sims OT GO 1               Yolanda Sims OT  3/26/2023

## 2023-03-26 NOTE — PLAN OF CARE
"Goal Outcome Evaluation:  Plan of Care Reviewed With: patient           Outcome Evaluation: Initial PT evaluation complete, co-evaluation with OT.  Patient is alert, cooperative, quite [place].  He demonstrates (I) with bed mobility, requires SPV with gait and transfers.  Patient ambulates 15'x2 without an AD, slow kami, reaches for UE support.  Patient provided with FWW, ambulates 150'x1, fast kami, no LOB.  T[place]etti assessed; 23/28 or moderate fall risk.  Recommend use of FWW for gait.  Patient's spouse reports theyt have a walker at home, is unsure if it has wheels on it.  Patient may need FWW with 5\" wheels upon discharge, pending progress with I/P PT and available equipment at home.  Goals established, continue skilled I/P PT.  "

## 2023-03-27 PROBLEM — J18.9 MULTIFOCAL PNEUMONIA: Status: ACTIVE | Noted: 2023-03-27

## 2023-03-27 LAB
ANION GAP SERPL CALCULATED.3IONS-SCNC: 12 MMOL/L (ref 5–15)
APTT PPP: 140.9 SECONDS (ref 20–40.3)
APTT PPP: 180 SECONDS (ref 20–40.3)
APTT PPP: 92.4 SECONDS (ref 20–40.3)
BASOPHILS # BLD AUTO: 0.01 10*3/MM3 (ref 0–0.2)
BASOPHILS NFR BLD AUTO: 0.1 % (ref 0–1.5)
BUN SERPL-MCNC: 46 MG/DL (ref 8–23)
BUN/CREAT SERPL: 29.9 (ref 7–25)
CALCIUM SPEC-SCNC: 8.6 MG/DL (ref 8.6–10.5)
CHLORIDE SERPL-SCNC: 100 MMOL/L (ref 98–107)
CO2 SERPL-SCNC: 24 MMOL/L (ref 22–29)
CREAT SERPL-MCNC: 1.54 MG/DL (ref 0.76–1.27)
D-LACTATE SERPL-SCNC: 2 MMOL/L (ref 0.5–2)
D-LACTATE SERPL-SCNC: 2.4 MMOL/L (ref 0.5–2)
DEPRECATED RDW RBC AUTO: 46.6 FL (ref 37–54)
EGFRCR SERPLBLD CKD-EPI 2021: 42.9 ML/MIN/1.73
EOSINOPHIL # BLD AUTO: 0 10*3/MM3 (ref 0–0.4)
EOSINOPHIL NFR BLD AUTO: 0 % (ref 0.3–6.2)
ERYTHROCYTE [DISTWIDTH] IN BLOOD BY AUTOMATED COUNT: 13.1 % (ref 12.3–15.4)
GLUCOSE BLDC GLUCOMTR-MCNC: 158 MG/DL (ref 70–130)
GLUCOSE BLDC GLUCOMTR-MCNC: 161 MG/DL (ref 70–130)
GLUCOSE BLDC GLUCOMTR-MCNC: 209 MG/DL (ref 70–130)
GLUCOSE BLDC GLUCOMTR-MCNC: 220 MG/DL (ref 70–130)
GLUCOSE SERPL-MCNC: 186 MG/DL (ref 65–99)
HCT VFR BLD AUTO: 27.4 % (ref 37.5–51)
HGB BLD-MCNC: 9.6 G/DL (ref 13–17.7)
IMM GRANULOCYTES # BLD AUTO: 0.05 10*3/MM3 (ref 0–0.05)
IMM GRANULOCYTES NFR BLD AUTO: 0.6 % (ref 0–0.5)
LYMPHOCYTES # BLD AUTO: 1.13 10*3/MM3 (ref 0.7–3.1)
LYMPHOCYTES NFR BLD AUTO: 13.9 % (ref 19.6–45.3)
MAGNESIUM SERPL-MCNC: 2.2 MG/DL (ref 1.6–2.4)
MCH RBC QN AUTO: 34.4 PG (ref 26.6–33)
MCHC RBC AUTO-ENTMCNC: 35 G/DL (ref 31.5–35.7)
MCV RBC AUTO: 98.2 FL (ref 79–97)
MONOCYTES # BLD AUTO: 0.99 10*3/MM3 (ref 0.1–0.9)
MONOCYTES NFR BLD AUTO: 12.2 % (ref 5–12)
NEUTROPHILS NFR BLD AUTO: 5.93 10*3/MM3 (ref 1.7–7)
NEUTROPHILS NFR BLD AUTO: 73.2 % (ref 42.7–76)
NRBC BLD AUTO-RTO: 0 /100 WBC (ref 0–0.2)
PLATELET # BLD AUTO: 166 10*3/MM3 (ref 140–450)
PMV BLD AUTO: 10.2 FL (ref 6–12)
POTASSIUM SERPL-SCNC: 3.7 MMOL/L (ref 3.5–5.2)
PROCALCITONIN SERPL-MCNC: 0.07 NG/ML (ref 0–0.25)
RBC # BLD AUTO: 2.79 10*6/MM3 (ref 4.14–5.8)
SODIUM SERPL-SCNC: 136 MMOL/L (ref 136–145)
WBC NRBC COR # BLD: 8.11 10*3/MM3 (ref 3.4–10.8)

## 2023-03-27 PROCEDURE — 63710000001 INSULIN ASPART PER 5 UNITS: Performed by: INTERNAL MEDICINE

## 2023-03-27 PROCEDURE — 85730 THROMBOPLASTIN TIME PARTIAL: CPT | Performed by: INTERNAL MEDICINE

## 2023-03-27 PROCEDURE — 83735 ASSAY OF MAGNESIUM: CPT | Performed by: INTERNAL MEDICINE

## 2023-03-27 PROCEDURE — 94799 UNLISTED PULMONARY SVC/PX: CPT

## 2023-03-27 PROCEDURE — 97535 SELF CARE MNGMENT TRAINING: CPT

## 2023-03-27 PROCEDURE — 87205 SMEAR GRAM STAIN: CPT | Performed by: INTERNAL MEDICINE

## 2023-03-27 PROCEDURE — 94664 DEMO&/EVAL PT USE INHALER: CPT

## 2023-03-27 PROCEDURE — 25010000002 CEFTRIAXONE PER 250 MG: Performed by: INTERNAL MEDICINE

## 2023-03-27 PROCEDURE — 85025 COMPLETE CBC W/AUTO DIFF WBC: CPT | Performed by: INTERNAL MEDICINE

## 2023-03-27 PROCEDURE — 82962 GLUCOSE BLOOD TEST: CPT

## 2023-03-27 PROCEDURE — 94760 N-INVAS EAR/PLS OXIMETRY 1: CPT

## 2023-03-27 PROCEDURE — 83605 ASSAY OF LACTIC ACID: CPT | Performed by: INTERNAL MEDICINE

## 2023-03-27 PROCEDURE — 84145 PROCALCITONIN (PCT): CPT | Performed by: INTERNAL MEDICINE

## 2023-03-27 PROCEDURE — 99232 SBSQ HOSP IP/OBS MODERATE 35: CPT | Performed by: INTERNAL MEDICINE

## 2023-03-27 PROCEDURE — 80048 BASIC METABOLIC PNL TOTAL CA: CPT | Performed by: INTERNAL MEDICINE

## 2023-03-27 PROCEDURE — 94761 N-INVAS EAR/PLS OXIMETRY MLT: CPT

## 2023-03-27 PROCEDURE — 97530 THERAPEUTIC ACTIVITIES: CPT

## 2023-03-27 PROCEDURE — 87070 CULTURE OTHR SPECIMN AEROBIC: CPT | Performed by: INTERNAL MEDICINE

## 2023-03-27 PROCEDURE — 63710000001 DIPHENHYDRAMINE PER 50 MG: Performed by: INTERNAL MEDICINE

## 2023-03-27 RX ORDER — DOXYCYCLINE 100 MG/1
100 CAPSULE ORAL EVERY 12 HOURS SCHEDULED
Status: COMPLETED | OUTPATIENT
Start: 2023-03-27 | End: 2023-03-31

## 2023-03-27 RX ORDER — DIPHENHYDRAMINE HCL 25 MG
25 CAPSULE ORAL ONCE
Status: COMPLETED | OUTPATIENT
Start: 2023-03-27 | End: 2023-03-27

## 2023-03-27 RX ORDER — LANOLIN ALCOHOL/MO/W.PET/CERES
6 CREAM (GRAM) TOPICAL NIGHTLY PRN
Status: DISCONTINUED | OUTPATIENT
Start: 2023-03-27 | End: 2023-04-01 | Stop reason: HOSPADM

## 2023-03-27 RX ADMIN — TIMOLOL MALEATE 1 DROP: 5 SOLUTION/ DROPS OPHTHALMIC at 20:28

## 2023-03-27 RX ADMIN — DOXYCYCLINE 100 MG: 100 INJECTION, POWDER, LYOPHILIZED, FOR SOLUTION INTRAVENOUS at 08:33

## 2023-03-27 RX ADMIN — AMIODARONE HYDROCHLORIDE 200 MG: 200 TABLET ORAL at 08:32

## 2023-03-27 RX ADMIN — DIPHENHYDRAMINE HYDROCHLORIDE 25 MG: 25 CAPSULE ORAL at 20:27

## 2023-03-27 RX ADMIN — DOXYCYCLINE 100 MG: 100 CAPSULE ORAL at 20:27

## 2023-03-27 RX ADMIN — ALBUTEROL SULFATE 2.5 MG: 2.5 SOLUTION RESPIRATORY (INHALATION) at 23:05

## 2023-03-27 RX ADMIN — CEFTRIAXONE 2 G: 2 INJECTION, POWDER, FOR SOLUTION INTRAMUSCULAR; INTRAVENOUS at 20:27

## 2023-03-27 RX ADMIN — CLOPIDOGREL BISULFATE 75 MG: 75 TABLET ORAL at 08:32

## 2023-03-27 RX ADMIN — Medication 81 MG: at 08:32

## 2023-03-27 RX ADMIN — MELATONIN 6 MG: 3 TAB ORAL at 21:43

## 2023-03-27 RX ADMIN — DILTIAZEM HYDROCHLORIDE 180 MG: 180 CAPSULE, COATED, EXTENDED RELEASE ORAL at 08:32

## 2023-03-27 RX ADMIN — Medication 10 ML: at 08:34

## 2023-03-27 RX ADMIN — PANTOPRAZOLE SODIUM 40 MG: 40 TABLET, DELAYED RELEASE ORAL at 05:02

## 2023-03-27 RX ADMIN — LISINOPRIL 20 MG: 20 TABLET ORAL at 08:32

## 2023-03-27 RX ADMIN — Medication 10 ML: at 20:27

## 2023-03-27 RX ADMIN — ALBUTEROL SULFATE 2.5 MG: 2.5 SOLUTION RESPIRATORY (INHALATION) at 06:48

## 2023-03-27 RX ADMIN — FLUTICASONE PROPIONATE 1 SPRAY: 50 SPRAY, METERED NASAL at 08:33

## 2023-03-27 RX ADMIN — ZINC OXIDE 1 APPLICATION: 200 OINTMENT TOPICAL at 08:33

## 2023-03-27 RX ADMIN — ALBUTEROL SULFATE 2.5 MG: 2.5 SOLUTION RESPIRATORY (INHALATION) at 19:26

## 2023-03-27 RX ADMIN — ALBUTEROL SULFATE 2.5 MG: 2.5 SOLUTION RESPIRATORY (INHALATION) at 11:17

## 2023-03-27 RX ADMIN — TIMOLOL MALEATE 1 DROP: 5 SOLUTION/ DROPS OPHTHALMIC at 08:34

## 2023-03-27 RX ADMIN — INSULIN ASPART 3 UNITS: 100 INJECTION, SOLUTION INTRAVENOUS; SUBCUTANEOUS at 08:32

## 2023-03-27 RX ADMIN — GUAIFENESIN 600 MG: 600 TABLET, EXTENDED RELEASE ORAL at 20:27

## 2023-03-27 RX ADMIN — INSULIN ASPART 2 UNITS: 100 INJECTION, SOLUTION INTRAVENOUS; SUBCUTANEOUS at 17:27

## 2023-03-27 RX ADMIN — TERAZOSIN HYDROCHLORIDE 2 MG: 2 CAPSULE ORAL at 20:27

## 2023-03-27 RX ADMIN — GUAIFENESIN 600 MG: 600 TABLET, EXTENDED RELEASE ORAL at 08:32

## 2023-03-27 RX ADMIN — ALBUTEROL SULFATE 2.5 MG: 2.5 SOLUTION RESPIRATORY (INHALATION) at 14:52

## 2023-03-27 NOTE — PLAN OF CARE
Goal Outcome Evaluation:  Plan of Care Reviewed With: patient, spouse           Outcome Evaluation: OT tx complete, patient alert and agreeable for tx. Sit to stand with CGA and RW. Functional mobility with CGA and RW, in hallway. Patient on turns tends to sciccor feet, has minor LOB on turns. With cues, does correct. Toilet t/f with CGA and RW. Toileting with CGA, grooming with CGA. With activity patient gets very SOA, SPO2 above 96% HR in 60's. Patient with 3 LOB this session with use of RW, will continue to work on transfers and ADLs, for falls prevention. Cont OT POC.

## 2023-03-27 NOTE — PROGRESS NOTES
[facility] Medicine Services  INPATIENT PROGRESS NOTE    Length of Stay: 0  Date of Admission: 3/25/2023  Primary Care Physician: Michael Douglas MD    Subjective   (S) Admitted for shortness of breath, low grade fever, and elevated troponin  As today he did not sleep well last night; cough; no fever    Review of Systems   All other systems reviewed and are negative.       All pertinent negatives and positives are as above. All other systems have been reviewed and are negative unless otherwise stated.     Prior to Admission medications    Medication Sig Start Date End Date Taking? Authorizing Provider   albuterol sulfate  (90 Base) MCG/ACT inhaler INHALE 2 PUFFS INTO THE LUNGS EVERY 6 (SIX) HOURS AS NEEDED FOR WHEEZING (COUGH). 10/31/22  Yes Ene Farmer MD   amiodarone (PACERONE) 200 MG tablet TAKE ONE TABLET BY MOUTH DAILY  Patient taking differently: 100 mg. Per pt prescribed 200mg but only takes half 11/1/22  Yes Johnny Forbes MD   apixaban (ELIQUIS) 5 MG tablet tablet Take 2.5 mg by mouth 2 (Two) Times a Day. Last dose to be taken 5/5/18 prior to surgery   Yes Ene Farmer MD   aspirin 81 MG tablet Take 1 tablet by mouth Daily. Last dose 5/1/18 10/23/16  Yes Ene Farmer MD   dilTIAZem CD (CARDIZEM CD) 180 MG 24 hr capsule TAKE ONE CAPSULE BY MOUTH DAILY 6/1/21  Yes Johnny Forbes MD   FeroSul 325 (65 Fe) MG tablet Take 1 tablet by mouth 2 (Two) Times a Day. 12/28/20  Yes Ene Farmer MD   lisinopril-hydrochlorothiazide (PRINZIDE,ZESTORETIC) 20-12.5 MG per tablet Take 1 tablet by mouth Daily. 10/23/16  Yes Ene Farmer MD   loratadine (CLARITIN) 10 MG tablet Take 1 tablet by mouth Daily.   Yes Ene Farmer MD   metFORMIN (GLUCOPHAGE) 500 MG tablet Take 1 tablet by mouth 2 (Two) Times a Day. 10/23/16  Yes Ene Farmer MD   Multiple Vitamins-Minerals (MENS MULTIVITAMIN PLUS PO) Take 1 tablet by  mouth Daily. 10/23/16  Yes Ene Farmer MD   terazosin (HYTRIN) 2 MG capsule Take 1 capsule by mouth every night at bedtime. 11/29/22  Yes Ene Farmer MD   timolol (TIMOPTIC) 0.5 % ophthalmic solution Administer 1 drop to both eyes 2 (Two) Times a Day.   Yes Ene Farmer MD   doxycycline (DORYX) 100 MG enteric coated tablet Take 1 tablet by mouth 2 (Two) Times a Day. 3/25/23   Scot Pickett DO   erythromycin (ROMYCIN) 5 MG/GM ophthalmic ointment APPLY A THIN LAYER INTO LEFT EYE AT BEDTIME AS NEEDED 4/12/22   Ene Farmer MD   fluticasone (FLONASE) 50 MCG/ACT nasal spray ONE SPRAY IN EACH NOSTRIL DAILY 3/22/21   Ene Farmer MD   furosemide (LASIX) 20 MG tablet Take 1 tablet by mouth Daily. 3/25/23   Scot Pickett DO       albuterol, 2.5 mg, Nebulization, Q4H - RT  amiodarone, 200 mg, Oral, Daily  aspirin, 81 mg, Oral, Daily  cefTRIAXone, 2 g, Intravenous, Q24H  clopidogrel, 75 mg, Oral, Daily  dilTIAZem CD, 180 mg, Oral, Daily  doxycycline, 100 mg, Intravenous, Q12H  fluticasone, 1 spray, Each Nare, Daily  guaiFENesin, 600 mg, Oral, Q12H  Insulin Aspart, 0-7 Units, Subcutaneous, TID AC  lisinopril, 20 mg, Oral, Q24H  pantoprazole, 40 mg, Oral, Q AM  sodium chloride, 10 mL, Intravenous, Q12H  terazosin, 2 mg, Oral, Nightly  timolol, 1 drop, Both Eyes, Q12H      heparin, 10.1 Units/kg/hr, Last Rate: 10.1 Units/kg/hr (03/27/23 3054)        Objective    Temp:  [97.4 °F (36.3 °C)-98.7 °F (37.1 °C)] 97.4 °F (36.3 °C)  Heart Rate:  [63-82] 70  Resp:  [18-22] 18  BP: (128-156)/(57-70) 156/70    Physical Exam  Vitals reviewed.   Constitutional:       Appearance: Normal appearance.   HENT:      Head: Normocephalic.      Nose: Nose normal.   Eyes:      Extraocular Movements: Extraocular movements intact.   Cardiovascular:      Rate and Rhythm: Regular rhythm.      Heart sounds: Normal heart sounds.   Pulmonary:      Breath sounds: Normal breath sounds.   Abdominal:       Palpations: Abdomen is soft.   Musculoskeletal:         General: Normal range of motion.      Cervical back: Normal range of motion and neck supple.   Skin:     General: Skin is warm.   Neurological:      General: No focal deficit present.      Mental Status: He is alert. Mental status is at baseline.   Psychiatric:         Mood and Affect: Mood normal.         Results Review:  I have reviewed the labs, radiology results, and diagnostic studies.    Laboratory Data:   Results from last 7 days   Lab Units 03/27/23  0301 03/26/23  0317 03/25/23  1251 03/25/23  1039   SODIUM mmol/L 136 135* 135* 135*   POTASSIUM mmol/L 3.7 3.7 4.4 4.2   CHLORIDE mmol/L 100 97* 98 98   CO2 mmol/L 24.0 26.0 25.0 25.0   BUN mg/dL 46* 31* 18 16   CREATININE mg/dL 1.54* 1.47* 1.14 1.09   GLUCOSE mg/dL 186* 278* 149* 168*   CALCIUM mg/dL 8.6 9.1 9.1 8.7   BILIRUBIN mg/dL  --   --   --  0.4   ALK PHOS U/L  --   --   --  84   ALT (SGPT) U/L  --   --   --  11   AST (SGOT) U/L  --   --   --  21   ANION GAP mmol/L 12.0 12.0 12.0 12.0     Estimated Creatinine Clearance: 41.2 mL/min (A) (by C-G formula based on SCr of 1.54 mg/dL (H)).  Results from last 7 days   Lab Units 03/27/23  0301 03/26/23 0317 03/25/23  1251   MAGNESIUM mg/dL 2.2 2.0 2.1         Results from last 7 days   Lab Units 03/27/23  0301 03/26/23  0317 03/25/23  1251 03/25/23  1039   WBC 10*3/mm3 8.11 4.02 6.51 5.76   HEMOGLOBIN g/dL 9.6* 9.7* 11.3* 11.0*   HEMATOCRIT % 27.4* 28.9* 34.1* 32.9*   PLATELETS 10*3/mm3 166 147 182 171     Results from last 7 days   Lab Units 03/25/23  1048   INR  1.31*       Culture Data:   No results found for: BLOODCX  No results found for: URINECX  No results found for: RESPCX  No results found for: WOUNDCX  No results found for: STOOLCX  No components found for: BODYFLD    Radiology Data:   Imaging Results (Last 24 Hours)     Procedure Component Value Units Date/Time    CT Chest Without Contrast Diagnostic [[id number]] Collected: 03/26/23 134      Updated: 03/26/23 1433    Narrative:      EXAM:  CT CHEST WITHOUT IV CONTRAST    ORDERING PROVIDER:  JERONIMO FAJARDO    CLINICAL HISTORY:  Pneumonia    COMPARISON:  CT scan of 10/10/2018    TECHNIQUE:   Unenhanced chest CT performed and reformatted in the sagittal and  coronal planes.     This examination was performed according to our departmental dose  optimization program which includes automated exposure control,  adjustment of the MA and kV according to patient size, and/or use  of iterative reconstruction technique.    FINDINGS:    LUNGS AND PLEURA: Scattered patchy air space process in the left  lingula, left lower lobe, and to a lesser extent in the right  lower lobe, concerning for multifocal pneumonia. Calcified  granulomas in the lung parenchyma due to prior granulomatous  disease. No pleural effusion. No significant pleural plaques. No  pneumothorax. No endotracheal or endobronchial lesion.    HEART: Normal size. Small pericardial effusion. A pacemaker is in  place on the left side.    MEDIASTINUM AND VANE: No significant adenopathy.     AORTA AND GREAT VESSELS: No aneurysm. No dissection. Scattered  atherosclerotic calcifications.    EXTRATHORACIC SOFT TISSUES: No axillary adenopathy. No mass.  Unremarkable supraclavicular soft tissues.     UPPER ABDOMEN: Scattered low attenuations in the liver which may  be due to cysts, among other etiologies..     MUSCULOSKELETAL:  No acute fracture. No dislocation. No  suspicious lytic or sclerotic lesion.         Impression:      Scattered patchy air space process in the left lingula, left  lower lobe, and to a lesser extent in the right lower lobe,  concerning for multifocal pneumonia.   Calcified granulomas in the lung parenchyma due to prior  granulomatous disease.   Scattered low attenuations in the liver which may be due to  cysts, among other etiologies..   Correlation with patient's symptoms and history is recommended.    Electronically signed by:  Nav  Nimo CRAMER  3/26/2023 2:31 PM CDT  Workstation: [id number]          I have reviewed the patient's current medications.     Assessment/Plan     Multifocal pneumonia     POA;  Continue with rocephin and doxycycline 2/5-7      CT chest corroborates this; will treat it as bacterial since lactic acid was elevated; procalcitonin normal;     Elevated troponin     Likely type II MI but continue with heparin drip      Pending Dr Forbes  Re  Evaluation    DOMINIQUE (not POA)      Likely due to diuretics use; hold these for now; stable    Insomnia     Give one dose of benadryl tonight     Medical Decision Making  Number and Complexity of problems: one major  Differential Diagnosis: PNA    Conditions and Status:        Condition is improving.     MDM Data  External documents reviewed: previous medical records  My EKG interpretation: no acute events  My plain film interpretation: no acute events     Decision rules/scores evaluated (example [id number], Wells, etc): n/a     Discussed with: patient and his wife     Treatment Plan  IV  Antibiotics     Care Planning  Shared decision making: his wife  Code status and discussions: full code    Disposition  Social Determinants of Health that impact treatment or disposition: none  I expect the patient to be discharged to home in  2-3 days      I confirmed that the patient's Advance Care Plan is present, code status is documented, or surrogate decision maker is listed in the patient's medical record.     Eliezer Thomas MD

## 2023-03-27 NOTE — THERAPY TREATMENT NOTE
Patient Name: Evgeny Carter Jr.  : 1933    MRN: [id number]                              Today's Date: 3/27/2023       Admit Date: 3/25/2023    Visit Dx:     ICD-10-CM ICD-9-CM   1. Acute pulmonary edema (HCC)  J81.0 518.4   2. Cough, unspecified type  R05.9 786.2   3. Impaired functional mobility, balance, gait, and endurance  Z74.09 V49.89   4. Impaired mobility and ADLs  Z74.09 V49.89    Z78.9      Patient Active Problem List   Diagnosis   • Atrial flutter (HCC)   • Coronary artery disease involving native coronary artery of native heart without angina pectoris   • Benign essential hypertension   • Type 2 diabetes mellitus without complication (HCC)   • Anxiety state   • Depression   • Hyperlipidemia   • Dilated aortic root (HCC)   • Paroxysmal atrial fibrillation (HCC)   • Bladder tumor   • Malignant tumor of urinary bladder (HCC)   • Sick sinus syndrome (HCC)   • Pacemaker   • History of colonic polyps   • Esophagitis   • Diverticulosis of colon without diverticulitis   • Anemia   • Acute pulmonary edema (HCC)     Past Medical History:   Diagnosis Date   • Allergic rhinitis    • Arthritis     hips, knees, feet   • Artificial lens present     in position   • Benign neoplasm of skin of eyelid    • Bladder tumor    • Bladder tumor    • BPH (benign prostatic hyperplasia)    • Cancer (HCC)     SKIN    • Cataract    • Coronary arteriosclerosis    • Cortical senile cataract    • Cough    • Diabetes mellitus (HCC)    • Esophagitis 2021   • Essential hypertension    • Essential hypertension    • Hypercholesterolemia    • Hypercholesterolemia    • Kidney stones    • MI, old    • Nonexudative age-related macular degeneration     mild   • Nuclear cataract    • Open-angle glaucoma     s/p trabec OS, doing well      • Primary open angle glaucoma     OS, s/p trabeculectomy; IOP down with timolol OD     • Pseudophakia of right eye    • Rhinitis    • Upper respiratory infection    • Vitreous detachment      peripheral     Past Surgical History:   Procedure Laterality Date   • ANKLE SURGERY  07/29/2000    Ankle surgery (ORIF right ankle fracture. Bimalleolar right fracture)   • APPENDECTOMY     • CARDIAC ABLATION     • CARDIAC CATHETERIZATION  07/02/1987    Cardiac cath 88195 (Coronary arthosclerotic heart disease. maintained patency of RCA. Mild inferior hypokinesis.)   • CARDIAC CATHETERIZATION  06/30/1987    Cardiac cath 09762 (Coronary atherosclerotic heart disease. Occluded RCA. Acute inferior infarction. S/P successful angioplasty of RCA)   • CARDIAC ELECTROPHYSIOLOGY PROCEDURE N/A 6/11/2020    Procedure: Pacer single lead;  Surgeon: Johnny Forbes MD;  Location: [facility] CATH INVASIVE LOCATION;  Service: Cardiology;  Laterality: N/A;   • CATARACT EXTRACTION  04/01/2010    Remove cataract, insert lens (Complicated cataract extraction with intraocular lens implantation, right eye, Marcelo SN-60 WF serial # [id number]: 20.5 diopter)   • CATARACT EXTRACTION W/ INTRAOCULAR LENS IMPLANT Left 3/23/2018    Procedure: CATARACT PHACO EXTRACTION WITH INTRAOCULAR LENS IMPLANT;  Surgeon: Oli Haley MD;  Location: [facility] OR;  Service: Ophthalmology   • COLONOSCOPY N/A 5/9/2019    Procedure: COLONOSCOPY;  Surgeon: Rainer Gates DO;  Location: [facility] ENDOSCOPY;  Service: Gastroenterology   • COLONOSCOPY N/A 11/12/2020    Procedure: COLONOSCOPY;  Surgeon: Rainer Gates DO;  Location: [facility] ENDOSCOPY;  Service: Gastroenterology;  Laterality: N/A;   • ENDOSCOPY  02/19/1990    EGD 87341 (Olympus video endoscope.Fibrin-like patch in stomach)   • ENDOSCOPY N/A 11/12/2020    Procedure: ESOPHAGOGASTRODUODENOSCOPY;  Surgeon: Rainer Gates DO;  Location: [facility] ENDOSCOPY;  Service: Gastroenterology;  Laterality: N/A;   • EYE SURGERY  02/02/2005    Eye surgery procedure (Repair of operative wound leak, left eye. S/P trabeculectomy with operative wound leak)   • EYE SURGERY  01/19/2005    Eye surgery  procedure (Trabeculectomy, left eye. 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 degeneration)    • OTHER SURGICAL HISTORY  02/26/2015    EXTENDED VISUAL FIELDS STUDY 40874 (Primary open angle glaucoma)    • OTHER SURGICAL HISTORY  02/26/2015    EXTENDED VISUAL FIELDS STUDY 43067 (Primary open angle glaucoma)    • OTHER SURGICAL HISTORY  06/16/2016    OCT DISC NFL 13402 (Open-angle glaucoma)    • OTHER SURGICAL HISTORY      OCT DISC NFL 00629 (Primary open angle glaucoma) (3): 06/17/2015, 03/19/2014, 03/04/2013   • OTHER SURGICAL HISTORY  06/16/2016    OPTIC NERVE HEAD EVAL PERFORMED 2027F (Open-angle glaucoma)    • TRANSURETHRAL RESECTION OF BLADDER TUMOR N/A 5/9/2018    Procedure: CYSTOSCOPY TRANSURETHRAL RESECTION OF BLADDER TUMOR;  Surgeon: Donovan Conner MD;  Location: [facility] OR;  Service: Urology   • TRANSURETHRAL RESECTION OF BLADDER TUMOR N/A 7/26/2019    Procedure: CYSTOSCOPY TRANSURETHRAL RESECTION OF BLADDER TUMOR;  Surgeon: Donovan Conner MD;  Location: [facility] OR;  Service: Urology   • TRANSURETHRAL RESECTION OF BLADDER TUMOR N/A 7/29/2020    Procedure: CYSTOSCOPY TRANSURETHRAL RESECTION OF BLADDER TUMOR;  Surgeon: Donovan Conner MD;  Location: [facility] OR;  Service: Urology;  Laterality: N/A;   • UVULECTOMY  1969    EXCISION OF UVULA 30400 (partial uvulectomy with excision of papilloma. Redunant uvula with a small papilloma)      General Information     Row Name 03/27/23 1043          OT Time and Intention    Document Type therapy note (daily note)  -SJ     Mode of Treatment occupational therapy  -SJ     Row Name 03/27/23 1043          General Information    Patient Profile Reviewed yes  -SJ     Existing Precautions/Restrictions fall  -SJ     Barriers to Rehab medically complex  -SJ     Row Name  03/27/23 1043          Cognition    Orientation Status (Cognition) oriented x 4  -SJ     Row Name 03/27/23 1043          Safety Issues, Functional Mobility    Impairments Affecting Function (Mobility) strength;endurance/activity tolerance;balance  -SJ           User Key  (r) = Recorded By, (t) = Taken By, (c) = Cosigned By    Initials Name Provider Type    Gary Olivera, MARICARMEN Occupational Therapist                 Mobility/ADL's     Row Name 03/27/23 1043          Transfers    Transfers sit-stand transfer;toilet transfer  -SJ     Row Name 03/27/23 1043          Sit-Stand Transfer    Sit-Stand Tyrrell (Transfers) contact guard  -SJ     Assistive Device (Sit-Stand Transfers) walker, front-wheeled  -SJ     Row Name 03/27/23 1043          Stand-Sit Transfer    Stand-Sit Tyrrell (Transfers) contact guard  -SJ     Assistive Device (Stand-Sit Transfers) walker, front-wheeled  -SJ     Row Name 03/27/23 1043          Toilet Transfer    Type (Toilet Transfer) stand-sit;sit-stand  -SJ     Tyrrell Level (Toilet Transfer) contact guard  -SJ     Assistive Device (Toilet Transfer) walker, front-wheeled  -SJ     Row Name 03/27/23 1043          Activities of Daily Living    BADL Assessment/Intervention toileting;grooming  -SJ     Row Name 03/27/23 1043          Toileting Assessment/Training    Tyrrell Level (Toileting) contact guard assist  -SJ     Row Name 03/27/23 1043          Grooming Assessment/Training    Tyrrell Level (Grooming) wash face, hands;contact guard assist  -SJ     Position (Grooming) sink side  -           User Key  (r) = Recorded By, (t) = Taken By, (c) = Cosigned By    Initials Name Provider Type    Gary Olivera OT Occupational Therapist               Obj/Interventions    No documentation.                Goals/Plan    No documentation.                Clinical Impression     Row Name 03/27/23 1043          Pain Assessment    Pretreatment Pain Rating 0/10 - no pain  -      Posttreatment Pain Rating 0/10 - no pain  -     Row Name 03/27/23 1043          Plan of Care Review    Plan of Care Reviewed With patient;spouse  -     Outcome Evaluation OT tx complete, patient alert and agreeable for tx. Sit to stand with CGA and RW. Functional mobility with CGA and RW, in hallway. Patient on turns tends to sciccor feet, has minor LOB on turns. With cues, does correct. Toilet t/f with CGA and RW. Toileting with CGA, grooming with CGA. With activity patient gets very SOA, SPO2 above 96% HR in 60's. Patient with 3 LOB this session with use of RW, will continue to work on transfers and ADLs, for falls prevention. Cont OT POC.  -     Row Name 03/27/23 1043          Vital Signs    Pre Systolic BP Rehab 188  -SJ     Pre Treatment Diastolic BP 79  -SJ     Post Systolic BP Rehab 165  -SJ     Post Treatment Diastolic BP 94  -SJ     Pretreatment Heart Rate (beats/min) 61  -SJ     Posttreatment Heart Rate (beats/min) 66  -SJ     Pre SpO2 (%) 94  -SJ     O2 Delivery Pre Treatment room air  -SJ     Intra SpO2 (%) 96  -SJ     O2 Delivery Intra Treatment room air  -SJ     Post SpO2 (%) 97  -SJ     O2 Delivery Post Treatment room air  -SJ     Pre Patient Position Sitting  -SJ     Intra Patient Position Standing  -SJ     Post Patient Position Sitting  -     Row Name 03/27/23 1043          Positioning and Restraints    Pre-Treatment Position in bed  -SJ     Post Treatment Position bed  -SJ     In Bed sitting EOB;call light within reach;encouraged to call for assist;exit alarm on;side rails up x2;with family/caregiver  -           User Key  (r) = Recorded By, (t) = Taken By, (c) = Cosigned By    Initials Name Provider Type     Gary Peres OT Occupational Therapist               Outcome Measures     Row Name 03/27/23 1043          How much help from another is currently needed...    Putting on and taking off regular lower body clothing? 4  -SJ     Bathing (including washing, rinsing, and drying) 3   -SJ     Toileting (which includes using toilet bed pan or urinal) 3  -SJ     Putting on and taking off regular upper body clothing 4  -SJ     Taking care of personal grooming (such as brushing teeth) 3  -SJ     Eating meals 4  -SJ     AM-PAC 6 Clicks Score (OT) 21  -SJ     Row Name 03/27/23 0832          How much help from another person do you currently need...    Turning from your back to your side while in flat bed without using bedrails? 3  -LC     Moving from lying on back to sitting on the side of a flat bed without bedrails? 3  -LC     Moving to and from a bed to a chair (including a wheelchair)? 3  -LC     Standing up from a chair using your arms (e.g., wheelchair, bedside chair)? 3  -LC     Climbing 3-5 steps with a railing? 2  -LC     To walk in hospital room? 3  -LC     AM-PAC 6 Clicks Score (PT) 17  -LC     Highest level of mobility 5 --> Static standing  -     Row Name 03/27/23 1043          Functional Assessment    Outcome Measure Options AM-PAC 6 Clicks Daily Activity (OT)  -           User Key  (r) = Recorded By, (t) = Taken By, (c) = Cosigned By    Initials Name Provider Type     Gary Peres OT Occupational Therapist    Liberty Mcmillan, RN Registered Nurse                Occupational Therapy Education     Title: PT OT SLP Therapies (In Progress)     Topic: Occupational Therapy (In Progress)     Point: ADL training (Done)     Description:   Instruct learner(s) on proper safety adaptation and remediation techniques during self care or transfers.   Instruct in proper use of assistive devices.              Learning Progress Summary           Patient Acceptance, E,TB, VU by CM at 3/26/2023 1032    Comment: OT POC, Role of OT, d/c recommendations, DME recommendations   Family Acceptance, E,TB, VU by CM at 3/26/2023 1032    Comment: OT POC, Role of OT, d/c recommendations, DME recommendations                   Point: Home exercise program (Not Started)     Description:   Instruct  learner(s) on appropriate technique for monitoring, assisting and/or progressing therapeutic exercises/activities.              Learner Progress:  Not documented in this visit.          Point: Precautions (Done)     Description:   Instruct learner(s) on prescribed precautions during self-care and functional transfers.              Learning Progress Summary           Patient Acceptance, E,TB, VU by CM at 3/26/2023 1032    Comment: OT POC, Role of OT, d/c recommendations, DME recommendations   Family Acceptance, E,TB, VU by CM at 3/26/2023 1032    Comment: OT POC, Role of OT, d/c recommendations, DME recommendations                   Point: Body mechanics (Done)     Description:   Instruct learner(s) on proper positioning and spine alignment during self-care, functional mobility activities and/or exercises.              Learning Progress Summary           Patient Acceptance, E,TB, VU by CM at 3/26/2023 1032    Comment: OT POC, Role of OT, d/c recommendations, DME recommendations   Family Acceptance, E,TB, VU by CM at 3/26/2023 1032    Comment: OT POC, Role of OT, d/c recommendations, DME recommendations                               User Key     Initials Effective Dates Name Provider Type Discipline     11/18/22 -  Yolanda Sims OT Occupational Therapist OT              OT Recommendation and Plan     Plan of Care Review  Plan of Care Reviewed With: patient, spouse  Outcome Evaluation: OT tx complete, patient alert and agreeable for tx. Sit to stand with CGA and RW. Functional mobility with CGA and RW, in hallway. Patient on turns tends to sciccor feet, has minor LOB on turns. With cues, does correct. Toilet t/f with CGA and RW. Toileting with CGA, grooming with CGA. With activity patient gets very SOA, SPO2 above 96% HR in 60's. Patient with 3 LOB this session with use of RW, will continue to work on transfers and ADLs, for falls prevention. Cont OT POC.     Time Calculation:    Time Calculation- OT     Row Name  03/27/23 1123             Time Calculation- OT    OT Start Time 1043  -      OT Stop Time 1115  -      OT Time Calculation (min) 32 min  -      Total Timed Code Minutes- OT 32 minute(s)  -      OT Received On 03/27/23  -         Timed Charges    28098 - OT Therapeutic Activity Minutes 20  -      99618 - OT Self Care/Mgmt Minutes 12  -         Total Minutes    Timed Charges Total Minutes 32  -       Total Minutes 32  -            User Key  (r) = Recorded By, (t) = Taken By, (c) = Cosigned By    Initials Name Provider Type     Gary Peres OT Occupational Therapist              Therapy Charges for Today     Code Description Service Date Service Provider Modifiers Qty    46493999836 HC OT THERAPEUTIC ACT EA 15 MIN 3/27/2023 Gary Peres OT GO 1    45439911796 HC OT SELF CARE/MGMT/TRAIN EA 15 MIN 3/27/2023 Gary Peres OT GO 1               Gary Peres OT  3/27/2023

## 2023-03-27 NOTE — PROGRESS NOTES
Notified by RN that lactic acid level continues to trend upward; now 3.1 up from earlier result of 2.9. Per further review of chart and vitals, patient is being treated for mild DOMINIQUE and his diuretics have been withheld. He has also has had recent development of cough and inflammatory markers are being followed for this reason. I have ordered a small bolus of normal saline at 500cc to help with DOMINIQUE and treat the lactic acidosis, keeping in mind his history of CHF and not wanting to potentially overload him. He is already on antibiotics at this time. Robitussin also ordered for cough per patient request.

## 2023-03-27 NOTE — PLAN OF CARE
Goal Outcome Evaluation:  Plan of Care Reviewed With: patient        Progress: improving  Outcome Evaluation: Patient sitting up in bed at this time eating supper.  Wife has been in to visit today.  Patient has worked with PT today.  No s/s of distress noted.  Will continue to monitor.

## 2023-03-27 NOTE — PLAN OF CARE
Goal Outcome Evaluation:  Plan of Care Reviewed With: patient        Progress: improving  Outcome Evaluation: v/s stable. no c/o pain or discomfort voiced. continues to be on heparin gtt. rested throughout the night.

## 2023-03-27 NOTE — PROGRESS NOTES
LOS: 0 days   Patient Care Team:  Michael Douglas MD as PCP - General    Chief Complaint:     Patient examined chart reviewed   89 years old patient with concurrent medical problems CAD s/p PCI several years ago, sick sinus syndrome with element of atrial fibrillation s/p pacemaker on long-term oral anticoagulation and amiodarone, hyperlipidemia who presented for evaluations of Shortness of Breath with History of Bronchitis Being Treated as an Outpatient without significant Promit admitted for further evaluation with a diagnosis of bilateral pneumonia   The Family Present the Room.  The Patient Evaluated with Dr. Snow.  Troponin is mildly elevated probably second to bilateral pneumonia patient and the family not interested in further invasive cardiac evaluation       Review of Systems:     Constitution: Positive fatigue and tired    HENT:  Denies any headache, hearing impairment.    Eyes:  Denies any blurring of vision     Cardiovascular:  As per history of present illness.     Respiratory: Shortness of breath with bilateral pneumonia       Gastrointestinal:  No nausea, vomiting, or melena.    Extremity: No edema, positive pulses    Objective     Current Facility-Administered Medications   Medication Dose Route Frequency Provider Last Rate Last Admin   • acetaminophen (TYLENOL) tablet 650 mg  650 mg Oral Q6H PRN Lc Rucker MD       • albuterol (PROVENTIL) nebulizer solution 0.083% 2.5 mg/3mL  2.5 mg Nebulization Q4H - RT Lc Rucker MD   2.5 mg at 03/27/23 1117   • amiodarone (PACERONE) tablet 200 mg  200 mg Oral Daily Lc Rucker MD   200 mg at 03/27/23 0832   • aspirin EC tablet 81 mg  81 mg Oral Daily Lc Rucker MD   81 mg at 03/27/23 0832   • cefTRIAXone (ROCEPHIN) 2 g/100 mL 0.9% NS IVPB (MBP)  2 g Intravenous Q24H Eliezer Thomas MD   Stopped at 03/26/23 2102   • clopidogrel (PLAVIX) tablet 75 mg  75 mg Oral Daily Nate Snow DO   75 mg at 03/27/23 0832   • dextrose (D50W)  (25 g/50 mL) IV injection 25 g  25 g Intravenous Q15 Min PRN Lc Rucker MD       • dextrose (GLUTOSE) oral gel 15 g  15 g Oral Q15 Min PRN Lc Rucker MD       • dilTIAZem CD (CARDIZEM CD) 24 hr capsule 180 mg  180 mg Oral Daily Lc Rucker MD   180 mg at 03/27/23 0832   • diphenhydrAMINE (BENADRYL) capsule 25 mg  25 mg Oral Once Eliezer Thomas MD       • doxycycline (VIBRAMYCIN) 100 mg/100 mL 0.9% NS MBP  100 mg Intravenous Q12H Eliezer Thomas MD   Stopped at 03/27/23 0933   • glucagon HCl (Diagnostic) injection 1 mg  1 mg Intramuscular Q15 Min PRN Lc Rucker MD       • guaiFENesin (MUCINEX) 12 hr tablet 600 mg  600 mg Oral Q12H Lc Rucker MD   600 mg at 03/27/23 0832   • guaifenesin (ROBITUSSIN) 100 MG/5ML liquid 200 mg  200 mg Oral Q4H PRN Martha Gutierrez PA-C   200 mg at 03/26/23 2051   • heparin 37277 units/250 mL (100 units/mL) in 0.45 % NaCl infusion  7.1 Units/kg/hr Intravenous Titrated Eliezer Thomas MD 6.37 mL/hr at 03/27/23 1246 7.1 Units/kg/hr at 03/27/23 1246    And   • heparin (porcine) 5000 UNIT/ML injection 4,000 Units  4,000 Units Intravenous PRN Eliezer Thomas MD        And   • heparin (porcine) 5000 UNIT/ML injection 2,700 Units  30 Units/kg Intravenous PRN Eliezer Thomas MD   2,700 Units at 03/26/23 2045   • hydrocortisone-bacitracin-zinc oxide-nystatin (MAGIC BARRIER) ointment 1 application  1 application Topical BID PRN Eliezer Thomas MD   1 application at 03/27/23 0833   • Insulin Aspart (novoLOG) injection 0-7 Units  0-7 Units Subcutaneous TID AC Lc Rucker MD   3 Units at 03/27/23 0832   • lisinopril (PRINIVIL,ZESTRIL) tablet 20 mg  20 mg Oral Q24H Lc Rucker MD   20 mg at 03/27/23 0832   • ondansetron (ZOFRAN) injection 4 mg  4 mg Intravenous Q6H PRN Lc Rucker MD       • pantoprazole (PROTONIX) EC tablet 40 mg  40 mg Oral Q AM Lc Rucker MD   40 mg at 03/27/23 0502   • sodium chloride 0.9 % flush 10  "mL  10 mL Intravenous Q12H Lc Rucker MD   10 mL at 03/27/23 0834   • sodium chloride 0.9 % flush 10 mL  10 mL Intravenous PRN Lc Rucker MD       • sodium chloride 0.9 % infusion 40 mL  40 mL Intravenous PRN Lc Rucker MD       • terazosin (HYTRIN) capsule 2 mg  2 mg Oral Nightly Lc Rucker MD   2 mg at 03/26/23 2021   • timolol (TIMOPTIC) 0.5 % ophthalmic solution 1 drop  1 drop Both Eyes Q12H Lc Rucker MD   1 drop at 03/27/23 0834       Vital Sign Min/Max for last 24 hours  Temp  Min: 97.4 °F (36.3 °C)  Max: 98.7 °F (37.1 °C)   BP  Min: 128/60  Max: 163/94   Pulse  Min: 60  Max: 82   Resp  Min: 18  Max: 22   SpO2  Min: 94 %  Max: 99 %   No data recorded   Weight  Min: 89.6 kg (197 lb 9.6 oz)  Max: 89.6 kg (197 lb 9.6 oz)     Flowsheet Rows    Flowsheet Row First Filed Value   Admission Height 180.3 cm (71\") Documented at 03/25/2023 0932   Admission Weight 89.8 kg (198 lb) Documented at 03/25/2023 0932            Intake/Output Summary (Last 24 hours) at 3/27/2023 1426  Last data filed at 3/26/2023 2300  Gross per 24 hour   Intake 240 ml   Output 300 ml   Net -60 ml       Physical Exam:  Neck: Supple.  No JVD, no thyroid enlargement.  Chest: Bilateral decreased air entry  Cardiovascular system:  Regular rate and rhythm, no murmurs.  Abdomen: Soft, no tenderness, bowel sounds present,  CNS: Alert, oriented to place and time.  No motor or sensory deficit.  Cranial nerves intact.  Musculoskeletal: No deformity of the back or spine.  Extremities:  No edema.  Pulses equal on both sides.     Results Review:   I reviewed the patient's new clinical results.  Lab Results   Component Value Date    WBC 8.11 03/27/2023    HGB 9.6 (L) 03/27/2023    HCT 27.4 (L) 03/27/2023    MCV 98.2 (H) 03/27/2023     03/27/2023     Lab Results   Component Value Date    GLUCOSE 186 (H) 03/27/2023    BUN 46 (H) 03/27/2023    CREATININE 1.54 (H) 03/27/2023    EGFRIFNONA 46 (L) 07/23/2020    EGFRIFAFRI 63 " 07/27/2021    BCR 29.9 (H) 03/27/2023    CO2 24.0 03/27/2023    CALCIUM 8.6 03/27/2023    ALBUMIN 3.9 03/25/2023    AST 21 03/25/2023    ALT 11 03/25/2023     Lab Results   Component Value Date    TSH 1.77 09/20/2022     Lab Results   Component Value Date    INR 1.31 (H) 03/25/2023    INR 1.07 06/11/2020    INR 1.13 05/10/2020    PROTIME 16.3 (H) 03/25/2023    PROTIME 13.7 06/11/2020    PROTIME 14.3 05/10/2020     Lab Results   Component Value Date    .9 (H) 03/27/2023       EKG:     Telemetry:    Ejection Fraction  No results found for: EF    Echo EF Estimated  Lab Results   Component Value Date    ECHOEFEST 61 05/11/2020         Present on Admission:  • Acute pulmonary edema (HCC)    Assessment & Plan   #1 pneumonia    89 years old patient with history of bronchitis mentating as an outpatient without significant improvement admit to evaluation shortness of breath.  Clinically is euvolemic and well perfused.  No evidence of congestive heart failure.  His shortness of breath is likely second underlying bilateral pneumonia.  Management as per hospitalist    2 history of CAD s/p PCI with abnormal troponin    Abnormal troponin may be secondary acute lung conditions.  However the possibility of CAD cannot be excluded.  I have discussed with the patient and the family regarding further risk stratification.  Not interested and do not want to have any invasive or noninvasive evaluations continue aspirin and plan.  Patient will heparin we will discontinue to    #3 sick sinus syndrome post pacer with paroxysmal atrial for    Continue amiodarone continue IV heparin we will discontinue heparin start the patient oral anticoagulation from              This document has been electronically signed by Johnny Forbes MD on March 27, 2023 14:31 CDT        Johnny Forbes MD  03/27/23  14:26 CDT      Part of this note may be an electronic transcription/translation of spoken language to printed text using the Dragon Dictation  system.

## 2023-03-27 NOTE — CONSULTS
"Adult Nutrition  Assessment/PES    Patient Name:  Evgeny Carter Jr.  YOB: 1933  MRN: [id number]  Admit Date:  3/25/2023    Assessment Date:  3/27/2023    Comments:  Nutrition F/U:    RD visited X 3 for MST of 2.  Pt was sleeping and would not wake when his name was called.  He was admitted with Multifocal Pneumonia;  DOMINIQUE likely due to diuretics;  and Insomnia.  His intake since admit has been excellent.  Gluc elevated with pt only on SSI.  He may need long acting insulin added.  Rd will monitor POC and attempt to revisit tomorrow.       Reason for Assessment     Row Name 03/27/23 1536          Reason for Assessment    Reason For Assessment identified at risk by screening criteria     Diagnosis pulmonary disease     Identified At Risk by Screening Criteria MST SCORE 2+                Nutrition/Diet History     Row Name 03/27/23 1536          Nutrition/Diet History    Typical Intake (Food/Fluid/EN/PN) Visited x 3.  Pt would not wake when his name was called                Labs/Tests/Procedures/Meds     Row Name 03/27/23 1537          Labs/Procedures/Meds    Lab Results Reviewed reviewed, pertinent     Lab Results Comments Gluc 250/307/279/220; bun 46; Creat 1.54        Diagnostic Tests/Procedures    Diagnostic Test/Procedure Reviewed reviewed, pertinent     Diagnostic Test/Procedures Comments Abx;        Medications    Pertinent Medications Reviewed reviewed, pertinent     Pertinent Medications Comments Amio; Rocephin; Cardizem; Doxycycline; SSI; Heaprin                  Estimated/Assessed Needs - Anthropometrics     Row Name 03/27/23 153          Anthropometrics    Height for Calculation 1.803 m (5' 11\")     Weight for Calculation 89.4 kg (197 lb)        Estimated/Assessed Needs    Additional Documentation Additional Requirements (Group);Fluid Requirements (Group);Modified [place] Equation (Group);Estimated Calorie Needs (Group);KCAL/KG (Group);[place]-St. Jeor Equation (Group);Protein " Requirements (Group)        KCAL/KG    KCAL/KG 25 Kcal/Kg (kcal)     25 Kcal/Kg (kcal) 2233.975        [place]-St. Jeor Equation    RMR ([place]-St. Jeor Equation) 1580.715        Protein Requirements    Weight Used For Protein Calculations 78 kg (172 lb)     Est Protein Requirement Amount (gms/kg) 1.0 gm protein     Estimated Protein Requirements (gms/day) 78.02        Fluid Requirements    Fluid Requirements (mL/day) 2000     Estimated Fluid Requirement Method other (see comments)  2000cc with fluid overload     RDA Method (mL) 2000                Nutrition Prescription Ordered     Row Name 03/27/23 1539          Nutrition Prescription PO    Current PO Diet Regular     Common Modifiers Consistent Carbohydrate                Evaluation of Received Nutrient/Fluid Intake     Row Name 03/27/23 1539          PO Evaluation    Number of Meals 5     % PO Intake 95% average                   Problem/Interventions:   Problem 1     Row Name 03/27/23 1540          Nutrition Diagnoses Problem 1    Problem 1 No Nutrition Diagnosis at this Time                      Intervention Goal     Row Name 03/27/23 1540          Intervention Goal    General Reduce/improve symptoms     Weight No significant weight loss                Nutrition Intervention     Row Name 03/27/23 1540          Nutrition Intervention    RD/Tech Action Follow Tx progress                  Education/Evaluation     Row Name 03/27/23 1540          Education    Education Will Instruct as appropriate        Monitor/Evaluation    Monitor Per protocol     Education Follow-up --                 Electronically signed by:  Teri Mane RD  03/27/23 15:40 CDT

## 2023-03-28 LAB
ANION GAP SERPL CALCULATED.3IONS-SCNC: 13 MMOL/L (ref 5–15)
APTT PPP: 60.4 SECONDS (ref 20–40.3)
APTT PPP: 70.4 SECONDS (ref 20–40.3)
APTT PPP: 72.8 SECONDS (ref 20–40.3)
APTT PPP: 84.7 SECONDS (ref 20–40.3)
BASOPHILS # BLD AUTO: 0.02 10*3/MM3 (ref 0–0.2)
BASOPHILS NFR BLD AUTO: 0.4 % (ref 0–1.5)
BUN SERPL-MCNC: 44 MG/DL (ref 8–23)
BUN/CREAT SERPL: 30.6 (ref 7–25)
CALCIUM SPEC-SCNC: 8.6 MG/DL (ref 8.6–10.5)
CHLORIDE SERPL-SCNC: 102 MMOL/L (ref 98–107)
CO2 SERPL-SCNC: 23 MMOL/L (ref 22–29)
CREAT SERPL-MCNC: 1.44 MG/DL (ref 0.76–1.27)
DEPRECATED RDW RBC AUTO: 47.3 FL (ref 37–54)
EGFRCR SERPLBLD CKD-EPI 2021: 46.4 ML/MIN/1.73
EOSINOPHIL # BLD AUTO: 0.08 10*3/MM3 (ref 0–0.4)
EOSINOPHIL NFR BLD AUTO: 1.4 % (ref 0.3–6.2)
ERYTHROCYTE [DISTWIDTH] IN BLOOD BY AUTOMATED COUNT: 13.2 % (ref 12.3–15.4)
GLUCOSE BLDC GLUCOMTR-MCNC: 135 MG/DL (ref 70–130)
GLUCOSE BLDC GLUCOMTR-MCNC: 181 MG/DL (ref 70–130)
GLUCOSE BLDC GLUCOMTR-MCNC: 212 MG/DL (ref 70–130)
GLUCOSE SERPL-MCNC: 165 MG/DL (ref 65–99)
HCT VFR BLD AUTO: 27.1 % (ref 37.5–51)
HGB BLD-MCNC: 9.3 G/DL (ref 13–17.7)
IMM GRANULOCYTES # BLD AUTO: 0.05 10*3/MM3 (ref 0–0.05)
IMM GRANULOCYTES NFR BLD AUTO: 0.9 % (ref 0–0.5)
LYMPHOCYTES # BLD AUTO: 1.32 10*3/MM3 (ref 0.7–3.1)
LYMPHOCYTES NFR BLD AUTO: 23.2 % (ref 19.6–45.3)
MCH RBC QN AUTO: 34.1 PG (ref 26.6–33)
MCHC RBC AUTO-ENTMCNC: 34.3 G/DL (ref 31.5–35.7)
MCV RBC AUTO: 99.3 FL (ref 79–97)
MONOCYTES # BLD AUTO: 0.75 10*3/MM3 (ref 0.1–0.9)
MONOCYTES NFR BLD AUTO: 13.2 % (ref 5–12)
NEUTROPHILS NFR BLD AUTO: 3.48 10*3/MM3 (ref 1.7–7)
NEUTROPHILS NFR BLD AUTO: 60.9 % (ref 42.7–76)
NRBC BLD AUTO-RTO: 0 /100 WBC (ref 0–0.2)
PLATELET # BLD AUTO: 156 10*3/MM3 (ref 140–450)
PMV BLD AUTO: 9.9 FL (ref 6–12)
POTASSIUM SERPL-SCNC: 3.5 MMOL/L (ref 3.5–5.2)
RBC # BLD AUTO: 2.73 10*6/MM3 (ref 4.14–5.8)
SODIUM SERPL-SCNC: 138 MMOL/L (ref 136–145)
WBC NRBC COR # BLD: 5.7 10*3/MM3 (ref 3.4–10.8)

## 2023-03-28 PROCEDURE — 94799 UNLISTED PULMONARY SVC/PX: CPT

## 2023-03-28 PROCEDURE — 82962 GLUCOSE BLOOD TEST: CPT

## 2023-03-28 PROCEDURE — 85025 COMPLETE CBC W/AUTO DIFF WBC: CPT | Performed by: INTERNAL MEDICINE

## 2023-03-28 PROCEDURE — 85730 THROMBOPLASTIN TIME PARTIAL: CPT | Performed by: INTERNAL MEDICINE

## 2023-03-28 PROCEDURE — 94760 N-INVAS EAR/PLS OXIMETRY 1: CPT

## 2023-03-28 PROCEDURE — 97110 THERAPEUTIC EXERCISES: CPT

## 2023-03-28 PROCEDURE — 25010000002 HEPARIN (PORCINE) PER 1000 UNITS: Performed by: INTERNAL MEDICINE

## 2023-03-28 PROCEDURE — 94664 DEMO&/EVAL PT USE INHALER: CPT

## 2023-03-28 PROCEDURE — 97116 GAIT TRAINING THERAPY: CPT

## 2023-03-28 PROCEDURE — 99232 SBSQ HOSP IP/OBS MODERATE 35: CPT | Performed by: INTERNAL MEDICINE

## 2023-03-28 PROCEDURE — 97535 SELF CARE MNGMENT TRAINING: CPT

## 2023-03-28 PROCEDURE — 25010000002 CEFTRIAXONE PER 250 MG: Performed by: INTERNAL MEDICINE

## 2023-03-28 PROCEDURE — 63710000001 INSULIN ASPART PER 5 UNITS: Performed by: INTERNAL MEDICINE

## 2023-03-28 PROCEDURE — 97530 THERAPEUTIC ACTIVITIES: CPT

## 2023-03-28 PROCEDURE — 80048 BASIC METABOLIC PNL TOTAL CA: CPT | Performed by: INTERNAL MEDICINE

## 2023-03-28 RX ADMIN — LISINOPRIL 20 MG: 20 TABLET ORAL at 08:29

## 2023-03-28 RX ADMIN — TERAZOSIN HYDROCHLORIDE 2 MG: 2 CAPSULE ORAL at 20:53

## 2023-03-28 RX ADMIN — ALBUTEROL SULFATE 2.5 MG: 2.5 SOLUTION RESPIRATORY (INHALATION) at 03:41

## 2023-03-28 RX ADMIN — GUAIFENESIN 600 MG: 600 TABLET, EXTENDED RELEASE ORAL at 19:51

## 2023-03-28 RX ADMIN — DILTIAZEM HYDROCHLORIDE 180 MG: 180 CAPSULE, COATED, EXTENDED RELEASE ORAL at 08:29

## 2023-03-28 RX ADMIN — PANTOPRAZOLE SODIUM 40 MG: 40 TABLET, DELAYED RELEASE ORAL at 05:37

## 2023-03-28 RX ADMIN — HEPARIN SODIUM 7.1 UNITS/KG/HR: 10000 INJECTION, SOLUTION INTRAVENOUS at 12:14

## 2023-03-28 RX ADMIN — Medication 81 MG: at 08:29

## 2023-03-28 RX ADMIN — ALBUTEROL SULFATE 2.5 MG: 2.5 SOLUTION RESPIRATORY (INHALATION) at 10:49

## 2023-03-28 RX ADMIN — CLOPIDOGREL BISULFATE 75 MG: 75 TABLET ORAL at 08:30

## 2023-03-28 RX ADMIN — INSULIN ASPART 2 UNITS: 100 INJECTION, SOLUTION INTRAVENOUS; SUBCUTANEOUS at 08:31

## 2023-03-28 RX ADMIN — MELATONIN 6 MG: 3 TAB ORAL at 20:54

## 2023-03-28 RX ADMIN — INSULIN ASPART 3 UNITS: 100 INJECTION, SOLUTION INTRAVENOUS; SUBCUTANEOUS at 11:31

## 2023-03-28 RX ADMIN — HEPARIN SODIUM 2700 UNITS: 5000 INJECTION INTRAVENOUS; SUBCUTANEOUS at 08:36

## 2023-03-28 RX ADMIN — AMIODARONE HYDROCHLORIDE 200 MG: 200 TABLET ORAL at 08:29

## 2023-03-28 RX ADMIN — DOXYCYCLINE 100 MG: 100 CAPSULE ORAL at 08:29

## 2023-03-28 RX ADMIN — Medication 10 ML: at 08:30

## 2023-03-28 RX ADMIN — DOXYCYCLINE 100 MG: 100 CAPSULE ORAL at 19:51

## 2023-03-28 RX ADMIN — TIMOLOL MALEATE 1 DROP: 5 SOLUTION/ DROPS OPHTHALMIC at 19:54

## 2023-03-28 RX ADMIN — ALBUTEROL SULFATE 2.5 MG: 2.5 SOLUTION RESPIRATORY (INHALATION) at 15:03

## 2023-03-28 RX ADMIN — ACETAMINOPHEN 650 MG: 325 TABLET ORAL at 23:00

## 2023-03-28 RX ADMIN — CEFTRIAXONE 2 G: 2 INJECTION, POWDER, FOR SOLUTION INTRAMUSCULAR; INTRAVENOUS at 19:52

## 2023-03-28 RX ADMIN — ALBUTEROL SULFATE 2.5 MG: 2.5 SOLUTION RESPIRATORY (INHALATION) at 07:05

## 2023-03-28 RX ADMIN — Medication 10 ML: at 19:54

## 2023-03-28 RX ADMIN — GUAIFENESIN 600 MG: 600 TABLET, EXTENDED RELEASE ORAL at 08:29

## 2023-03-28 RX ADMIN — TIMOLOL MALEATE 1 DROP: 5 SOLUTION/ DROPS OPHTHALMIC at 08:31

## 2023-03-28 NOTE — PLAN OF CARE
Goal Outcome Evaluation:  Plan of Care Reviewed With: patient        Progress: improving  Outcome Evaluation: OT tx completed. Pt fowlers in bed upon entering and agreeable to the session. Pt is mod I for bed mobility with HOB elevated. Pt is independent for donning/doffing socks while sitting EOB. Pt completed 10x sit to stands with SBA-CGA and RW while performing fxnl reaching with B hands while standing. Pt CGA for fxnl mobility in the room with RW. Pt completed oral care routine while standing supported at the sink with RW and s/u. Pt is making good progress towards his goals. This OT educated pt on importance of using RW at home. Recommend d/c home with assist, HHOT.

## 2023-03-28 NOTE — PROGRESS NOTES
LOS: 1 day   Patient Care Team:  Michael Douglas MD as PCP - General    Chief Complaint: No new complaint [id number] feeling much better reading newspaper at the time of evaluation    Patient examined chart reviewed   89 years old patient with concurrent medical problems CAD s/p PCI several years ago, sick sinus syndrome with element of atrial fibrillation s/p pacemaker on long-term oral anticoagulation and amiodarone, hyperlipidemia who presented for evaluations Of Shortness of Breath with History of Bronchitis Being Treated as an Outpatient without significant improvement admitted for evaluation chest x-ray confirmed the diagnosis of pneumonia started on antibiotic with significant improvement   The Family Present the Room.  The Patient Evaluated with Dr. Snow.  Troponin is mildly elevated probably second to bilateral pneumonia patient and the family not interested in further invasive cardiac evaluation       Review of Systems:     Constitution: Positive fatigue and tired    HENT:  Denies any headache, hearing impairment.    Eyes:  Denies any blurring of vision     Cardiovascular:  As per history of present illness.     Respiratory: Shortness of breath with bilateral pneumonia       Gastrointestinal:  No nausea, vomiting, or melena.    Extremity: No edema, positive pulses    Objective     Current Facility-Administered Medications   Medication Dose Route Frequency Provider Last Rate Last Admin   • acetaminophen (TYLENOL) tablet 650 mg  650 mg Oral Q6H PRN Lc Rucker MD       • albuterol (PROVENTIL) nebulizer solution 0.083% 2.5 mg/3mL  2.5 mg Nebulization Q4H - RT Lc Rucker MD   2.5 mg at 03/28/23 1049   • amiodarone (PACERONE) tablet 200 mg  200 mg Oral Daily Lc Rucker MD   200 mg at 03/28/23 0829   • aspirin EC tablet 81 mg  81 mg Oral Daily Lc Rucker MD   81 mg at 03/28/23 0829   • cefTRIAXone (ROCEPHIN) 2 g/100 mL 0.9% NS IVPB (MBP)  2 g Intravenous Q24H Eliezer Thomas,  MD 0 mL/hr at 03/26/23 2102 2 g at 03/27/23 2027   • clopidogrel (PLAVIX) tablet 75 mg  75 mg Oral Daily Nate Snow DO   75 mg at 03/28/23 0830   • dextrose (D50W) (25 g/50 mL) IV injection 25 g  25 g Intravenous Q15 Min PRN Lc Rucker MD       • dextrose (GLUTOSE) oral gel 15 g  15 g Oral Q15 Min PRN Lc Rucker MD       • dilTIAZem CD (CARDIZEM CD) 24 hr capsule 180 mg  180 mg Oral Daily Lc Rucker MD   180 mg at 03/28/23 0829   • doxycycline (MONODOX) capsule 100 mg  100 mg Oral Q12H Eliezer Thomas MD   100 mg at 03/28/23 0829   • glucagon HCl (Diagnostic) injection 1 mg  1 mg Intramuscular Q15 Min PRN Lc Rucker MD       • guaiFENesin (MUCINEX) 12 hr tablet 600 mg  600 mg Oral Q12H Lc Rucker MD   600 mg at 03/28/23 0829   • guaifenesin (ROBITUSSIN) 100 MG/5ML liquid 200 mg  200 mg Oral Q4H PRN Martha Gutierrez PA-C   200 mg at 03/26/23 2051   • heparin 75749 units/250 mL (100 units/mL) in 0.45 % NaCl infusion  7.1 Units/kg/hr Intravenous Titrated Eliezer Thomas MD 6.37 mL/hr at 03/28/23 1214 7.1 Units/kg/hr at 03/28/23 1214    And   • heparin (porcine) 5000 UNIT/ML injection 4,000 Units  4,000 Units Intravenous PRN Eliezer Thomas MD        And   • heparin (porcine) 5000 UNIT/ML injection 2,700 Units  30 Units/kg Intravenous PRN Eliezer Thomas MD   2,700 Units at 03/28/23 0836   • hydrocortisone-bacitracin-zinc oxide-nystatin (MAGIC BARRIER) ointment 1 application  1 application Topical BID PRN Eliezer Thomas MD   1 application at 03/27/23 0833   • Insulin Aspart (novoLOG) injection 0-7 Units  0-7 Units Subcutaneous TID AC Lc Rucker MD   3 Units at 03/28/23 1131   • lisinopril (PRINIVIL,ZESTRIL) tablet 20 mg  20 mg Oral Q24H Lc Rucker MD   20 mg at 03/28/23 0829   • melatonin tablet 6 mg  6 mg Oral Nightly PRN Bandar Ann MD   6 mg at 03/27/23 3478   • ondansetron (ZOFRAN) injection 4 mg  4 mg Intravenous Q6H PRN Chaim  "MD Lc       • pantoprazole (PROTONIX) EC tablet 40 mg  40 mg Oral Q AM Lc Rucker MD   40 mg at 03/28/23 0537   • sodium chloride 0.9 % flush 10 mL  10 mL Intravenous Q12H Lc Rucker MD   10 mL at 03/28/23 0830   • sodium chloride 0.9 % flush 10 mL  10 mL Intravenous PRN Lc Rucker MD       • sodium chloride 0.9 % infusion 40 mL  40 mL Intravenous PRN Lc Rucker MD       • terazosin (HYTRIN) capsule 2 mg  2 mg Oral Nightly Lc Rucker MD   2 mg at 03/27/23 2027   • timolol (TIMOPTIC) 0.5 % ophthalmic solution 1 drop  1 drop Both Eyes Q12H Lc Rucker MD   1 drop at 03/28/23 0831       Vital Sign Min/Max for last 24 hours  Temp  Min: 97.4 °F (36.3 °C)  Max: 98 °F (36.7 °C)   BP  Min: 114/63  Max: 170/73   Pulse  Min: 61  Max: 81   Resp  Min: 16  Max: 20   SpO2  Min: 93 %  Max: 96 %   No data recorded   Weight  Min: 89.8 kg (198 lb)  Max: 89.8 kg (198 lb)     Flowsheet Rows    Flowsheet Row First Filed Value   Admission Height 180.3 cm (71\") Documented at 03/25/2023 0932   Admission Weight 89.8 kg (198 lb) Documented at 03/25/2023 0932            Intake/Output Summary (Last 24 hours) at 3/28/2023 1251  Last data filed at 3/28/2023 0800  Gross per 24 hour   Intake 240 ml   Output 600 ml   Net -360 ml       Physical Exam:  Neck: Supple.  No JVD, no thyroid enlargement.  Chest: Bilateral decreased air entry  Cardiovascular system:  Regular rate and rhythm, no murmurs.  Abdomen: Soft, no tenderness, bowel sounds present,  CNS: Alert, oriented to place and time.  No motor or sensory deficit.  Cranial nerves intact.  Musculoskeletal: No deformity of the back or spine.  Extremities:  No edema.  Pulses equal on both sides.     Results Review:   I reviewed the patient's new clinical results.  Lab Results   Component Value Date    WBC 5.70 03/28/2023    HGB 9.3 (L) 03/28/2023    HCT 27.1 (L) 03/28/2023    MCV 99.3 (H) 03/28/2023     03/28/2023     Lab Results   Component Value Date "    GLUCOSE 165 (H) 03/28/2023    BUN 44 (H) 03/28/2023    CREATININE 1.44 (H) 03/28/2023    EGFRIFNONA 46 (L) 07/23/2020    EGFRIFAFRI 63 07/27/2021    BCR 30.6 (H) 03/28/2023    CO2 23.0 03/28/2023    CALCIUM 8.6 03/28/2023    ALBUMIN 3.9 03/25/2023    AST 21 03/25/2023    ALT 11 03/25/2023     Lab Results   Component Value Date    TSH 1.77 09/20/2022     Lab Results   Component Value Date    INR 1.31 (H) 03/25/2023    INR 1.07 06/11/2020    INR 1.13 05/10/2020    PROTIME 16.3 (H) 03/25/2023    PROTIME 13.7 06/11/2020    PROTIME 14.3 05/10/2020     Lab Results   Component Value Date    PTT 60.4 (H) 03/28/2023       EKG:     Telemetry:    Ejection Fraction  No results found for: EF    Echo EF Estimated  Lab Results   Component Value Date    ECHOEFEST 61 05/11/2020         Present on Admission:  • Acute pulmonary edema (HCC)  • Multifocal pneumonia    Assessment & Plan   #1 pneumonia    89 years old patient with history of bronchitis mentating as an outpatient without significant improvement admit to evaluation shortness of breath.  Clinically , euvolemic and well perfused.  No evidence of congestive heart failure.  His shortness of breath is likely second underlying bilateral pneumonia.  Management as per hospitalist    2 history of CAD s/p PCI with abnormal troponin    Abnormal troponin may be secondary acute lung conditions.  However the possibility of CAD cannot be excluded patient and family not interested in further invasive cardiac evaluation    #3 sick sinus syndrome post pacer with paroxysmal atrial for    Continue amiodarone   We will discontinue heparin and can resume Eliquis 2.5 mg twice a      We will sign off and see 1 month in office          This document has been electronically signed by Johnny Forbes MD on March 28, 2023 12:51 CDT        Johnny Forbes MD  03/28/23  12:51 CDT      Part of this note may be an electronic transcription/translation of spoken language to printed text using the Dragon  Dictation system.

## 2023-03-28 NOTE — PROGRESS NOTES
[facility] Medicine Services  INPATIENT PROGRESS NOTE    Length of Stay: 1  Date of Admission: 3/25/2023  Primary Care Physician: Michael Douglas MD    Subjective   (S) Admitted for shortness of breath, low grade fever, and elevated troponin  Today, he is coughing less, no fever and shortness of breath is improving    Review of Systems   All other systems reviewed and are negative.       All pertinent negatives and positives are as above. All other systems have been reviewed and are negative unless otherwise stated.     Prior to Admission medications    Medication Sig Start Date End Date Taking? Authorizing Provider   albuterol sulfate  (90 Base) MCG/ACT inhaler INHALE 2 PUFFS INTO THE LUNGS EVERY 6 (SIX) HOURS AS NEEDED FOR WHEEZING (COUGH). 10/31/22  Yes Ene Farmer MD   amiodarone (PACERONE) 200 MG tablet TAKE ONE TABLET BY MOUTH DAILY  Patient taking differently: 100 mg. Per pt prescribed 200mg but only takes half 11/1/22  Yes Johnny Forbes MD   apixaban (ELIQUIS) 5 MG tablet tablet Take 2.5 mg by mouth 2 (Two) Times a Day. Last dose to be taken 5/5/18 prior to surgery   Yes Ene Farmer MD   aspirin 81 MG tablet Take 1 tablet by mouth Daily. Last dose 5/1/18 10/23/16  Yes Ene Farmer MD   dilTIAZem CD (CARDIZEM CD) 180 MG 24 hr capsule TAKE ONE CAPSULE BY MOUTH DAILY 6/1/21  Yes Johnny Forbes MD   FeroSul 325 (65 Fe) MG tablet Take 1 tablet by mouth 2 (Two) Times a Day. 12/28/20  Yes Ene Farmer MD   lisinopril-hydrochlorothiazide (PRINZIDE,ZESTORETIC) 20-12.5 MG per tablet Take 1 tablet by mouth Daily. 10/23/16  Yes Ene Farmer MD   loratadine (CLARITIN) 10 MG tablet Take 1 tablet by mouth Daily.   Yes Ene Farmer MD   metFORMIN (GLUCOPHAGE) 500 MG tablet Take 1 tablet by mouth 2 (Two) Times a Day. 10/23/16  Yes Ene Farmer MD   Multiple Vitamins-Minerals (MENS MULTIVITAMIN PLUS PO)  Take 1 tablet by mouth Daily. 10/23/16  Yes Ene Farmer MD   terazosin (HYTRIN) 2 MG capsule Take 1 capsule by mouth every night at bedtime. 11/29/22  Yes Ene Farmer MD   timolol (TIMOPTIC) 0.5 % ophthalmic solution Administer 1 drop to both eyes 2 (Two) Times a Day.   Yes Ene Farmer MD   doxycycline (DORYX) 100 MG enteric coated tablet Take 1 tablet by mouth 2 (Two) Times a Day. 3/25/23   Scot Pickett DO   erythromycin (ROMYCIN) 5 MG/GM ophthalmic ointment APPLY A THIN LAYER INTO LEFT EYE AT BEDTIME AS NEEDED 4/12/22   Ene Farmer MD   fluticasone (FLONASE) 50 MCG/ACT nasal spray ONE SPRAY IN EACH NOSTRIL DAILY 3/22/21   Ene Farmer MD   furosemide (LASIX) 20 MG tablet Take 1 tablet by mouth Daily. 3/25/23   Scot Pickett DO       albuterol, 2.5 mg, Nebulization, Q4H - RT  amiodarone, 200 mg, Oral, Daily  aspirin, 81 mg, Oral, Daily  cefTRIAXone, 2 g, Intravenous, Q24H  clopidogrel, 75 mg, Oral, Daily  dilTIAZem CD, 180 mg, Oral, Daily  doxycycline, 100 mg, Oral, Q12H  guaiFENesin, 600 mg, Oral, Q12H  Insulin Aspart, 0-7 Units, Subcutaneous, TID AC  lisinopril, 20 mg, Oral, Q24H  pantoprazole, 40 mg, Oral, Q AM  sodium chloride, 10 mL, Intravenous, Q12H  terazosin, 2 mg, Oral, Nightly  timolol, 1 drop, Both Eyes, Q12H      heparin, 5.1 Units/kg/hr, Last Rate: 5.1 Units/kg/hr (03/27/23 2039)        Objective    Temp:  [97.4 °F (36.3 °C)-98 °F (36.7 °C)] 98 °F (36.7 °C)  Heart Rate:  [60-82] 68  Resp:  [16-20] 16  BP: (114-170)/(62-94) 118/68    Physical Exam  Vitals reviewed.   Constitutional:       Comments: Better looking compared to 2 days ago; less cough   HENT:      Head: Normocephalic.      Nose: Nose normal.   Eyes:      Extraocular Movements: Extraocular movements intact.   Cardiovascular:      Rate and Rhythm: Regular rhythm.      Heart sounds: Normal heart sounds.   Pulmonary:      Breath sounds: Normal breath sounds.   Abdominal:      Palpations:  Abdomen is soft.   Musculoskeletal:         General: Normal range of motion.      Cervical back: Normal range of motion and neck supple.   Skin:     General: Skin is warm.   Neurological:      General: No focal deficit present.      Mental Status: He is alert. Mental status is at baseline.   Psychiatric:         Mood and Affect: Mood normal.         Results Review:  I have reviewed the labs, radiology results, and diagnostic studies.    Laboratory Data:   Results from last 7 days   Lab Units 03/28/23  0203 03/27/23  0301 03/26/23 0317 03/25/23  1251 03/25/23  1039   SODIUM mmol/L 138 136 135*   < > 135*   POTASSIUM mmol/L 3.5 3.7 3.7   < > 4.2   CHLORIDE mmol/L 102 100 97*   < > 98   CO2 mmol/L 23.0 24.0 26.0   < > 25.0   BUN mg/dL 44* 46* 31*   < > 16   CREATININE mg/dL 1.44* 1.54* 1.47*   < > 1.09   GLUCOSE mg/dL 165* 186* 278*   < > 168*   CALCIUM mg/dL 8.6 8.6 9.1   < > 8.7   BILIRUBIN mg/dL  --   --   --   --  0.4   ALK PHOS U/L  --   --   --   --  84   ALT (SGPT) U/L  --   --   --   --  11   AST (SGOT) U/L  --   --   --   --  21   ANION GAP mmol/L 13.0 12.0 12.0   < > 12.0    < > = values in this interval not displayed.     Estimated Creatinine Clearance: 44.2 mL/min (A) (by C-G formula based on SCr of 1.44 mg/dL (H)).  Results from last 7 days   Lab Units 03/27/23  0301 03/26/23 0317 03/25/23  1251   MAGNESIUM mg/dL 2.2 2.0 2.1         Results from last 7 days   Lab Units 03/28/23  0203 03/27/23  0301 03/26/23 0317 03/25/23  1251 03/25/23  1039   WBC 10*3/mm3 5.70 8.11 4.02 6.51 5.76   HEMOGLOBIN g/dL 9.3* 9.6* 9.7* 11.3* 11.0*   HEMATOCRIT % 27.1* 27.4* 28.9* 34.1* 32.9*   PLATELETS 10*3/mm3 156 166 147 182 171     Results from last 7 days   Lab Units 03/25/23  1048   INR  1.31*       Culture Data:   Blood Culture   Date Value Ref Range Status   03/26/2023 No growth at 24 hours  Preliminary   03/26/2023 No growth at 24 hours  Preliminary     No results found for: URINECX  No results found for: RESPCX  No  results found for: WOUNDCX  No results found for: STOOLCX  No components found for: BODYFLD    Radiology Data:   Imaging Results (Last 24 Hours)     ** No results found for the last 24 hours. **          I have reviewed the patient's current medications.     Assessment/Plan     Multifocal pneumonia     POA;  Continue with rocephin and doxycycline 3/5     CT chest corroborates this; will treat it as bacterial since lactic acid was elevated, which has normalized now    Elevated troponin     Likely type II MI; no chest pain     Defer to cardiology anticoagulation orders    DOMINIQUE (not POA)      Likely due to diuretics use; hold these for now; stable and improving    Insomnia     Benadryl helped him to get a good night of sleep    Medical Decision Making  Number and Complexity of problems: one major  Differential Diagnosis: PNA    Conditions and Status:        Condition is improving.     [facility] Data  External documents reviewed: previous medical records  My EKG interpretation: no acute events  My plain film interpretation: no acute events     Decision rules/scores evaluated (example [id number], Wells, etc): n/a     Discussed with: patient and his wife     Treatment Plan  IV  Antibiotics     Care Planning  Shared decision making: his wife  Code status and discussions: full code    Disposition  Social Determinants of Health that impact treatment or disposition: none  I expect the patient to be discharged to home in  1-2 days      I confirmed that the patient's Advance Care Plan is present, code status is documented, or surrogate decision maker is listed in the patient's medical record.     Eliezer Thomas MD

## 2023-03-28 NOTE — THERAPY TREATMENT NOTE
Acute Care - Physical Therapy Treatment Note  [facility]     Patient Name: Evgeny Carter Jr.  : 1933  MRN: [id number]  Today's Date: 3/28/2023      Visit Dx:     ICD-10-CM ICD-9-CM   1. Acute pulmonary edema (HCC)  J81.0 518.4   2. Cough, unspecified type  R05.9 786.2   3. Impaired functional mobility, balance, gait, and endurance  Z74.09 V49.89   4. Impaired mobility and ADLs  Z74.09 V49.89    Z78.9      Patient Active Problem List   Diagnosis   • Atrial flutter (HCC)   • Coronary artery disease involving native coronary artery of native heart without angina pectoris   • Benign essential hypertension   • Type 2 diabetes mellitus without complication (HCC)   • Anxiety state   • Depression   • Hyperlipidemia   • Dilated aortic root (HCC)   • Paroxysmal atrial fibrillation (HCC)   • Bladder tumor   • Malignant tumor of urinary bladder (HCC)   • Sick sinus syndrome (HCC)   • Pacemaker   • History of colonic polyps   • Esophagitis   • Diverticulosis of colon without diverticulitis   • Anemia   • Acute pulmonary edema (HCC)   • Multifocal pneumonia     Past Medical History:   Diagnosis Date   • Allergic rhinitis    • Arthritis     hips, knees, feet   • Artificial lens present     in position   • Benign neoplasm of skin of eyelid    • Bladder tumor    • Bladder tumor    • BPH (benign prostatic hyperplasia)    • Cancer (HCC)     SKIN    • Cataract    • Coronary arteriosclerosis    • Cortical senile cataract    • Cough    • Diabetes mellitus (HCC)    • Esophagitis 2021   • Essential hypertension    • Essential hypertension    • Hypercholesterolemia    • Hypercholesterolemia    • Kidney stones    • MI, old    • Nonexudative age-related macular degeneration     mild   • Nuclear cataract    • Open-angle glaucoma     s/p trabec OS, doing well      • Primary open angle glaucoma     OS, s/p trabeculectomy; IOP down with timolol OD     • Pseudophakia of right eye    • Rhinitis    • Upper respiratory  infection    • Vitreous detachment     peripheral     Past Surgical History:   Procedure Laterality Date   • ANKLE SURGERY  07/29/2000    Ankle surgery (ORIF right ankle fracture. Bimalleolar right fracture)   • APPENDECTOMY     • CARDIAC ABLATION     • CARDIAC CATHETERIZATION  07/02/1987    Cardiac cath 77926 (Coronary arthosclerotic heart disease. maintained patency of RCA. Mild inferior hypokinesis.)   • CARDIAC CATHETERIZATION  06/30/1987    Cardiac cath 46789 (Coronary atherosclerotic heart disease. Occluded RCA. Acute inferior infarction. S/P successful angioplasty of RCA)   • CARDIAC ELECTROPHYSIOLOGY PROCEDURE N/A 6/11/2020    Procedure: Pacer single lead;  Surgeon: Johnny Forbes MD;  Location: [facility] CATH INVASIVE LOCATION;  Service: Cardiology;  Laterality: N/A;   • CATARACT EXTRACTION  04/01/2010    Remove cataract, insert lens (Complicated cataract extraction with intraocular lens implantation, right eye, Marcelo SN-60 WF serial # [id number]: 20.5 diopter)   • CATARACT EXTRACTION W/ INTRAOCULAR LENS IMPLANT Left 3/23/2018    Procedure: CATARACT PHACO EXTRACTION WITH INTRAOCULAR LENS IMPLANT;  Surgeon: Oli Haley MD;  Location: [facility] OR;  Service: Ophthalmology   • COLONOSCOPY N/A 5/9/2019    Procedure: COLONOSCOPY;  Surgeon: Rainer Gates DO;  Location: [facility] ENDOSCOPY;  Service: Gastroenterology   • COLONOSCOPY N/A 11/12/2020    Procedure: COLONOSCOPY;  Surgeon: Rainer Gates DO;  Location: [facility] ENDOSCOPY;  Service: Gastroenterology;  Laterality: N/A;   • ENDOSCOPY  02/19/1990    EGD 38772 (Olympus video endoscope.Fibrin-like patch in stomach)   • ENDOSCOPY N/A 11/12/2020    Procedure: ESOPHAGOGASTRODUODENOSCOPY;  Surgeon: Rainer Gates DO;  Location: [facility] ENDOSCOPY;  Service: Gastroenterology;  Laterality: N/A;   • EYE SURGERY  02/02/2005    Eye surgery procedure (Repair of operative wound leak, left eye. S/P trabeculectomy with operative wound leak)   • EYE SURGERY   01/19/2005    Eye surgery procedure (Trabeculectomy, left eye. 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 degeneration)    • OTHER SURGICAL HISTORY  02/26/2015    EXTENDED VISUAL FIELDS STUDY 72789 (Primary open angle glaucoma)    • OTHER SURGICAL HISTORY  02/26/2015    EXTENDED VISUAL FIELDS STUDY 46436 (Primary open angle glaucoma)    • OTHER SURGICAL HISTORY  06/16/2016    OCT DISC NFL 17338 (Open-angle glaucoma)    • OTHER SURGICAL HISTORY      OCT DISC NFL 79992 (Primary open angle glaucoma) (3): 06/17/2015, 03/19/2014, 03/04/2013   • OTHER SURGICAL HISTORY  06/16/2016    OPTIC NERVE HEAD EVAL PERFORMED 2027F (Open-angle glaucoma)    • TRANSURETHRAL RESECTION OF BLADDER TUMOR N/A 5/9/2018    Procedure: CYSTOSCOPY TRANSURETHRAL RESECTION OF BLADDER TUMOR;  Surgeon: Donovan Conner MD;  Location: [facility] OR;  Service: Urology   • TRANSURETHRAL RESECTION OF BLADDER TUMOR N/A 7/26/2019    Procedure: CYSTOSCOPY TRANSURETHRAL RESECTION OF BLADDER TUMOR;  Surgeon: Donovan Conner MD;  Location: [facility] OR;  Service: Urology   • TRANSURETHRAL RESECTION OF BLADDER TUMOR N/A 7/29/2020    Procedure: CYSTOSCOPY TRANSURETHRAL RESECTION OF BLADDER TUMOR;  Surgeon: Donovan Conner MD;  Location: [facility] OR;  Service: Urology;  Laterality: N/A;   • UVULECTOMY  1969    EXCISION OF UVULA 42676 (partial uvulectomy with excision of papilloma. Redunant uvula with a small papilloma)     PT Assessment (last 12 hours)     PT Evaluation and Treatment     Row Name 03/28/23 1410          Physical Therapy Time and Intention    Subjective Information no complaints  -TA     Document Type wound care  -TA     Mode of Treatment physical therapy  -TA     Row Name 03/28/23 1413          General Information    Patient Profile Reviewed yes   -TA     Existing Precautions/Restrictions fall  -TA     Row Name 03/28/23 1410          Pain    Pretreatment Pain Rating 0/10 - no pain  -TA     Posttreatment Pain Rating 0/10 - no pain  -TA     Row Name 03/28/23 1410          Cognition    Orientation Status (Cognition) oriented x 4  -TA     Personal Safety Interventions fall prevention program maintained;gait belt;muscle strengthening facilitated;nonskid shoes/slippers when out of bed;supervised activity  -TA     Row Name 03/28/23 1410          Range of Motion Comprehensive    General Range of Motion bilateral lower extremity ROM WFL  -TA     Row Name 03/28/23 1410          Bed Mobility    Bed Mobility supine-sit;sit-supine  -TA     Supine-Sit [place] (Bed Mobility) modified independence  -TA     Sit-Supine [place]ity (Bed Mobility) modified independence  -TA     Assistive Device (Bed Mobility) head of bed elevated  -TA     Row Name 03/28/23 1410          Sit-Stand Transfer    Sit-Stand [place] (Transfers) standby assist  -TA     Assistive Device (Sit-Stand Transfers) walker, front-wheeled  -TA     Row Name 03/28/23 1410          Stand-Sit Transfer    Stand-Sit [place] (Transfers) standby assist  -TA     Assistive Device (Stand-Sit Transfers) walker, front-wheeled  -TA     Row Name 03/28/23 1410          Gait/Stairs (Locomotion)    [place] Level (Gait) supervision  -TA     Assistive Device (Gait) walker, front-wheeled  -TA     Distance in Feet (Gait) 300`  -TA     Row Name 03/28/23 1410          Safety Issues, Functional Mobility    Impairments Affecting Function (Mobility) strength;endurance/activity tolerance;balance  -TA     Row Name 03/28/23 1410          Hip (Therapeutic Exercise)    Hip (Therapeutic Exercise) AROM (active range of motion)  -TA     Hip AROM (Therapeutic Exercise) bilateral;flexion;aBduction;aDduction;sitting;15 repititions  -TA     Row Name 03/28/23 1410          Knee (Therapeutic Exercise)    Knee (Therapeutic Exercise)  AROM (active range of motion)  -TA     Knee AROM (Therapeutic Exercise) bilateral;LAQ (long arc quad);sitting;15 repititions  -TA     Row Name 03/28/23 1410          Ankle (Therapeutic Exercise)    Ankle (Therapeutic Exercise) AROM (active range of motion)  -TA     Ankle AROM (Therapeutic Exercise) bilateral;dorsiflexion;plantarflexion;sitting;15 repititions  -TA     Row Name 03/28/23 1410          Plan of Care Review    Plan of Care Reviewed With patient  -TA     Outcome Evaluation pt sup<>sit with independence, sit<>stand with SBA, pt ambulated 300` with RW & SBA. pt would benefit from home with assistance & HHPT  -TA     Row Name 03/28/23 1410          Vital Signs    Pre Systolic BP Rehab 138  -TA     Pre Treatment Diastolic BP 63  -TA     Post Systolic BP Rehab 158  -TA     Post Treatment Diastolic BP 72  -TA     Pretreatment Heart Rate (beats/min) 60  -TA     Intratreatment Heart Rate (beats/min) 69  -TA     Posttreatment Heart Rate (beats/min) 60  -TA     Pre SpO2 (%) 94  -TA     O2 Delivery Pre Treatment room air  -TA     Intra SpO2 (%) 94  -TA     O2 Delivery Intra Treatment room air  -TA     Post SpO2 (%) 95  -TA     O2 Delivery Post Treatment room air  -TA     Pre Patient Position Supine  -TA     Post Patient Position Supine  -TA     Row Name 03/28/23 1410          Therapy Assessment/Plan (PT)    Rehab Potential (PT) good, to achieve stated therapy goals  -TA     Criteria for Skilled Interventions Met (PT) yes;skilled treatment is necessary  -TA     Therapy Frequency (PT) 5 times/wk  -TA     Comment, Therapy Assessment/Plan (PT) continue  -TA     Row Name 03/28/23 1410          Bed Mobility Goal 1 (PT)    Activity/Assistive Device (Bed Mobility Goal 1, PT) sit to supine/supine to sit  -TA     [place] Level/Cues Needed (Bed Mobility Goal 1, PT) independent  -TA     Time Frame (Bed Mobility Goal 1, PT) by discharge  -TA     Strategies/Barriers (Bed Mobility Goal 1, PT) HOB flat, no be rails.  -TA      Progress/Outcomes (Bed Mobility Goal 1, PT) goal not met  -TA     Row Name 03/28/23 1410          Transfer Goal 1 (PT)    Activity/Assistive Device (Transfer Goal 1, PT) sit-to-stand/stand-to-sit;bed-to-chair/chair-to-bed;walker, rolling  -TA     [place] Level/Cues Needed (Transfer Goal 1, PT) modified independence  -TA     Time Frame (Transfer Goal 1, PT) by discharge  -TA     Progress/Outcome (Transfer Goal 1, PT) goal not met  -TA     Row Name 03/28/23 1410          Gait Training Goal 1 (PT)    Activity/Assistive Device (Gait Training Goal 1, PT) walker, rolling  -TA     [place] Level (Gait Training Goal 1, PT) modified independence  -TA     Distance (Gait Training Goal 1, PT) 150'x2.  -TA     Time Frame (Gait Training Goal 1, PT) by discharge  -TA     Strategies/Barriers (Gait Training Goal 1, PT) May advance to (I) without an AD.  -TA     Progress/Outcome (Gait Training Goal 1, PT) goal not met  -TA     Row Name 03/28/23 1410          Problem Specific Goal 1 (PT)    Problem Specific Goal 1 (PT) Score 25/28 on Tinetti fall risk assessment.  -TA     Time Frame (Problem Specific Goal 1, PT) by discharge  -TA     Progress/Outcome (Problem Specific Goal 1, PT) goal not met  -TA           User Key  (r) = Recorded By, (t) = Taken By, (c) = Cosigned By    Initials Name Provider Type    Amy Block, MACI Physical Therapist Assistant                Physical Therapy Education     Title: PT OT SLP Therapies (In Progress)     Topic: Physical Therapy (In Progress)     Point: Mobility training (Not Started)     Learner Progress:  Not documented in this visit.          Point: Home exercise program (Not Started)     Learner Progress:  Not documented in this visit.          Point: Body mechanics (Not Started)     Learner Progress:  Not documented in this visit.          Point: Precautions (Done)     Learning Progress Summary           Patient Acceptance, E, VU by DIVINA at 3/26/2023 1025    Comment: Dale assessed:  23/28 or moderate fall risk, recommend use of FWW.   Family Acceptance, E, VU by DIVINA at 3/26/2023 1025    Comment: [place] assessed: 23/28 or moderate fall risk, recommend use of FWW.                               User Key     Initials Effective Dates Name Provider Type Discipline    DIVINA 09/18/22 -  Francois Gamino, PT Physical Therapist PT              PT Recommendation and Plan  Therapy Frequency (PT): 5 times/wk  Plan of Care Reviewed With: patient  Outcome Evaluation: pt sup<>sit with independence, sit<>stand with SBA, pt ambulated 300` with RW & SBA. pt would benefit from home with assistance & HHPT   Outcome Measures     Row Name 03/28/23 1500             How much help from another person do you currently need...    Turning from your back to your side while in flat bed without using bedrails? 4  -TA      Moving from lying on back to sitting on the side of a flat bed without bedrails? 4  -TA      Moving to and from a bed to a chair (including a wheelchair)? 3  -TA      Standing up from a chair using your arms (e.g., wheelchair, bedside chair)? 3  -TA      Climbing 3-5 steps with a railing? 2  -TA      To walk in hospital room? 3  -TA      AM-PAC 6 Clicks Score (PT) 19  -TA         Functional Assessment    Outcome Measure Options AM-PAC 6 Clicks Basic Mobility (PT)  -TA            User Key  (r) = Recorded By, (t) = Taken By, (c) = Cosigned By    Initials Name Provider Type    TA Amy Infante, PTA Physical Therapist Assistant                 Time Calculation:    PT Charges     Row Name 03/28/23 1543             Time Calculation    Start Time 1410  -TA      Stop Time 1435  -TA      Time Calculation (min) 25 min  -TA      PT Received On 03/28/23  -TA         Time Calculation- PT    Total Timed Code Minutes- PT 25 minute(s)  -TA         Timed Charges    37304 - PT Therapeutic Exercise Minutes 13  -TA      77777 - Gait Training Minutes  12  -TA         Total Minutes    Timed Charges Total Minutes 25  -TA        Total Minutes 25  -TA            User Key  (r) = Recorded By, (t) = Taken By, (c) = Cosigned By    Initials Name Provider Type    Amy Block, MACI Physical Therapist Assistant              Therapy Charges for Today     Code Description Service Date Service Provider Modifiers Qty    63297208831 HC PT THER PROC EA 15 MIN 3/28/2023 Amy Infante, MACI GP 1    64444544051 HC GAIT TRAINING EA 15 MIN 3/28/2023 Amy Infante, MACI GP 1          PT G-Codes  Outcome Measure Options: AM-PAC 6 Clicks Basic Mobility (PT)  AM-PAC 6 Clicks Score (PT): 19  AM-PAC 6 Clicks Score (OT): 21  Tinetti Total Score: 23    Amy Infante PTA  3/28/2023

## 2023-03-28 NOTE — PROGRESS NOTES
Nutrition Services    Patient Name:  Evgeny Carter Jr.  YOB: 1933  MRN: [id number]  Admit Date:  3/25/2023    Nutrition F/U:  Pt drowsy but able to answer questions. He continues to eat well, 100% with a good appetite.  NO eating difficulties identified.  NKFA.    He continues to be managed for Multifocal pneumonia; Elevated Troponin likely due to Type 2 MI--Cardiology consulted; and DOMINIQUE due to diuretics.     Labs: Gluc 212; Bun 44; Creat 1.44  Meds: Amio; Rocephin; Cardizem; Doxycycline; SSI    RD will continue to monitor POC and po intake.  No need for supplements as pt is eating well.  But he is reminded that snacks are available in the pantry prn.         Electronically signed by:  Teri Mane RD  03/28/23 14:40 CDT

## 2023-03-28 NOTE — PLAN OF CARE
Goal Outcome Evaluation:  Plan of Care Reviewed With: patient           Outcome Evaluation: pt sup<>sit with independence, sit<>stand with SBA, pt ambulated 300` with RW & SBA. pt would benefit from home with assistance & HHPT

## 2023-03-28 NOTE — THERAPY TREATMENT NOTE
Patient Name: Evgeny Carter Jr.  : 1933    MRN: [id number]                              Today's Date: 3/28/2023       Admit Date: 3/25/2023    Visit Dx:     ICD-10-CM ICD-9-CM   1. Acute pulmonary edema (HCC)  J81.0 518.4   2. Cough, unspecified type  R05.9 786.2   3. Impaired functional mobility, balance, gait, and endurance  Z74.09 V49.89   4. Impaired mobility and ADLs  Z74.09 V49.89    Z78.9      Patient Active Problem List   Diagnosis   • Atrial flutter (HCC)   • Coronary artery disease involving native coronary artery of native heart without angina pectoris   • Benign essential hypertension   • Type 2 diabetes mellitus without complication (HCC)   • Anxiety state   • Depression   • Hyperlipidemia   • Dilated aortic root (HCC)   • Paroxysmal atrial fibrillation (HCC)   • Bladder tumor   • Malignant tumor of urinary bladder (HCC)   • Sick sinus syndrome (HCC)   • Pacemaker   • History of colonic polyps   • Esophagitis   • Diverticulosis of colon without diverticulitis   • Anemia   • Acute pulmonary edema (HCC)   • Multifocal pneumonia     Past Medical History:   Diagnosis Date   • Allergic rhinitis    • Arthritis     hips, knees, feet   • Artificial lens present     in position   • Benign neoplasm of skin of eyelid    • Bladder tumor    • Bladder tumor    • BPH (benign prostatic hyperplasia)    • Cancer (HCC)     SKIN    • Cataract    • Coronary arteriosclerosis    • Cortical senile cataract    • Cough    • Diabetes mellitus (HCC)    • Esophagitis 2021   • Essential hypertension    • Essential hypertension    • Hypercholesterolemia    • Hypercholesterolemia    • Kidney stones    • MI, old    • Nonexudative age-related macular degeneration     mild   • Nuclear cataract    • Open-angle glaucoma     s/p trabec OS, doing well      • Primary open angle glaucoma     OS, s/p trabeculectomy; IOP down with timolol OD     • Pseudophakia of right eye    • Rhinitis    • Upper respiratory infection     • Vitreous detachment     peripheral     Past Surgical History:   Procedure Laterality Date   • ANKLE SURGERY  07/29/2000    Ankle surgery (ORIF right ankle fracture. Bimalleolar right fracture)   • APPENDECTOMY     • CARDIAC ABLATION     • CARDIAC CATHETERIZATION  07/02/1987    Cardiac cath 11789 (Coronary arthosclerotic heart disease. maintained patency of RCA. Mild inferior hypokinesis.)   • CARDIAC CATHETERIZATION  06/30/1987    Cardiac cath 24693 (Coronary atherosclerotic heart disease. Occluded RCA. Acute inferior infarction. S/P successful angioplasty of RCA)   • CARDIAC ELECTROPHYSIOLOGY PROCEDURE N/A 6/11/2020    Procedure: Pacer single lead;  Surgeon: Johnny Forbes MD;  Location: [facility] CATH INVASIVE LOCATION;  Service: Cardiology;  Laterality: N/A;   • CATARACT EXTRACTION  04/01/2010    Remove cataract, insert lens (Complicated cataract extraction with intraocular lens implantation, right eye, Marcelo SN-60 WF serial # [id number]: 20.5 diopter)   • CATARACT EXTRACTION W/ INTRAOCULAR LENS IMPLANT Left 3/23/2018    Procedure: CATARACT PHACO EXTRACTION WITH INTRAOCULAR LENS IMPLANT;  Surgeon: Oli Haley MD;  Location: [facility] OR;  Service: Ophthalmology   • COLONOSCOPY N/A 5/9/2019    Procedure: COLONOSCOPY;  Surgeon: Rainer Gates DO;  Location: [facility] ENDOSCOPY;  Service: Gastroenterology   • COLONOSCOPY N/A 11/12/2020    Procedure: COLONOSCOPY;  Surgeon: Rainer Gates DO;  Location: [facility] ENDOSCOPY;  Service: Gastroenterology;  Laterality: N/A;   • ENDOSCOPY  02/19/1990    EGD 66982 (Olympus video endoscope.Fibrin-like patch in stomach)   • ENDOSCOPY N/A 11/12/2020    Procedure: ESOPHAGOGASTRODUODENOSCOPY;  Surgeon: Rainer Gates DO;  Location: [facility] ENDOSCOPY;  Service: Gastroenterology;  Laterality: N/A;   • EYE SURGERY  02/02/2005    Eye surgery procedure (Repair of operative wound leak, left eye. S/P trabeculectomy with operative wound leak)   • EYE SURGERY   01/19/2005    Eye surgery procedure (Trabeculectomy, left eye. 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 degeneration)    • OTHER SURGICAL HISTORY  02/26/2015    EXTENDED VISUAL FIELDS STUDY 89064 (Primary open angle glaucoma)    • OTHER SURGICAL HISTORY  02/26/2015    EXTENDED VISUAL FIELDS STUDY 62531 (Primary open angle glaucoma)    • OTHER SURGICAL HISTORY  06/16/2016    OCT DISC NFL 04470 (Open-angle glaucoma)    • OTHER SURGICAL HISTORY      OCT DISC NFL 26280 (Primary open angle glaucoma) (3): 06/17/2015, 03/19/2014, 03/04/2013   • OTHER SURGICAL HISTORY  06/16/2016    OPTIC NERVE HEAD EVAL PERFORMED 2027F (Open-angle glaucoma)    • TRANSURETHRAL RESECTION OF BLADDER TUMOR N/A 5/9/2018    Procedure: CYSTOSCOPY TRANSURETHRAL RESECTION OF BLADDER TUMOR;  Surgeon: Donovan Conner MD;  Location: [facility] OR;  Service: Urology   • TRANSURETHRAL RESECTION OF BLADDER TUMOR N/A 7/26/2019    Procedure: CYSTOSCOPY TRANSURETHRAL RESECTION OF BLADDER TUMOR;  Surgeon: Donovan Conner MD;  Location: [facility] OR;  Service: Urology   • TRANSURETHRAL RESECTION OF BLADDER TUMOR N/A 7/29/2020    Procedure: CYSTOSCOPY TRANSURETHRAL RESECTION OF BLADDER TUMOR;  Surgeon: Donovan Conner MD;  Location: [facility] OR;  Service: Urology;  Laterality: N/A;   • UVULECTOMY  1969    EXCISION OF UVULA 31188 (partial uvulectomy with excision of papilloma. Redunant uvula with a small papilloma)      General Information     Row Name 03/28/23 6851          OT Time and Intention    Document Type therapy note (daily note)  -     Mode of Treatment occupational therapy  -     Row Name 03/28/23 1496          General Information    Patient Profile Reviewed yes  -     Existing Precautions/Restrictions fall  -MC     Row Name 03/28/23 6584           Cognition    Orientation Status (Cognition) oriented x 4  -     Row Name 03/28/23 1319          Safety Issues, Functional Mobility    Impairments Affecting Function (Mobility) strength;endurance/activity tolerance;balance  -           User Key  (r) = Recorded By, (t) = Taken By, (c) = Cosigned By    Initials Name Provider Type    Marzena Avina OT Occupational Therapist                 Mobility/ADL's     Row Name 03/28/23 1403          Bed Mobility    Bed Mobility supine-sit;sit-supine  -     Supine-Sit [place] (Bed Mobility) modified independence  -     Sit-Supine [place] (Bed Mobility) modified independence  -     Assistive Device (Bed Mobility) head of bed elevated  -     Row Name 03/28/23 1403          Transfers    Transfers sit-stand transfer;stand-sit transfer  -     Row Name 03/28/23 1403          Sit-Stand Transfer    Sit-Stand [place] (Transfers) contact guard;standby assist  -     Assistive Device (Sit-Stand Transfers) walker, front-wheeled  -     Comment, (Sit-Stand Transfer) 10x with fxnl reaching  -     Row Name 03/28/23 1403          Stand-Sit Transfer    Stand-Sit [place] (Transfers) contact guard;standby assist  -     Assistive Device (Stand-Sit Transfers) walker, front-wheeled  -     Row Name 03/28/23 1403          Lower Body Dressing Assessment/Training    [place] Level (Lower Body Dressing) don;doff;socks;independent  -     Position (Lower Body Dressing) edge of bed sitting  -     Row Name 03/28/23 1403          Grooming Assessment/Training    [place] Level (Grooming) oral care regimen;set up  -     Position (Grooming) sink side;supported standing  -           User Key  (r) = Recorded By, (t) = Taken By, (c) = Cosigned By    Initials Name Provider Type    Marzena Avina OT Occupational Therapist               Obj/Interventions    No documentation.                Goals/Plan     Row Name 03/28/23 1319          Bathing  Goal 1 (OT)    Activity/Device (Bathing Goal 1, OT) lower body bathing;shower chair  -     [place] Level/Cues Needed (Bathing Goal 1, OT) modified independence  -     Time Frame (Bathing Goal 1, OT) long term goal (LTG);by discharge  -     Progress/Outcomes (Bathing Goal 1, OT) goal not met  -     Row Name 03/28/23 1319          Strength Goal 1 (OT)    Strength Goal 1 (OT) Pt will tolerate at least 20 minutes BUE therrapeutic exercises with SpO2 above 90% for increased endurance requried for ADLs and mobility.  -     Time Frame (Strength Goal 1, OT) long term goal (LTG);by discharge  -     Progress/Outcome (Strength Goal 1, OT) goal not met  -     Row Name 03/28/23 1319          Problem Specific Goal 1 (OT)    Problem Specific Goal 1 (OT) Pt will tolerate at least 5 consecutive minutes standing ADLs with SPV and zero LOB for increased safety and balance required for ADLs and mobility.  -     Time Frame (Problem Specific Goal 1, OT) long term goal (LTG);by discharge  -     Progress/Outcome (Problem Specific Goal 1, OT) goal not met  -           User Key  (r) = Recorded By, (t) = Taken By, (c) = Cosigned By    Initials Name Provider Type    Marzena Avina, OT Occupational Therapist               Clinical Impression     Row Name 03/28/23 1314          Pain Assessment    Pretreatment Pain Rating 0/10 - no pain  -     Posttreatment Pain Rating 0/10 - no pain  -     Row Name 03/28/23 1316          Plan of Care Review    Plan of Care Reviewed With patient  -     Progress improving  -     Outcome Evaluation OT tx completed. Pt fowlers in bed upon entering and agreeable to the session. Pt is mod I for bed mobility with HOB elevated. Pt is independent for donning/doffing socks while sitting EOB. Pt completed 10x sit to stands with SBA-CGA and RW while performing fxnl reaching with B hands while standing. Pt CGA for fxnl mobility in the room with RW. Pt completed oral care routine while  standing supported at the sink with RW and s/u. Pt is making good progress towards his goals. This OT educated pt on importance of using RW at home. Recommend d/c home with assist, HHOT.  -     Row Name 03/28/23 1319          Therapy Assessment/Plan (OT)    Rehab Potential (OT) good, to achieve stated therapy goals  -     Criteria for Skilled Therapeutic Interventions Met (OT) yes;meets criteria  -     Therapy Frequency (OT) other (see comments)  5-7 d/wk  -     Row Name 03/28/23 1319          Therapy Plan Review/Discharge Plan (OT)    Anticipated Discharge Disposition (OT) home with assist;home with home health  -     Row Name 03/28/23 1319          Positioning and Restraints    Pre-Treatment Position in bed  -     Post Treatment Position bed  -     In Bed fowlers;call light within reach;encouraged to call for assist;exit alarm on  -           User Key  (r) = Recorded By, (t) = Taken By, (c) = Cosigned By    Initials Name Provider Type    Marzena Avina OT Occupational Therapist               Outcome Measures     Row Name 03/28/23 1319          How much help from another is currently needed...    Putting on and taking off regular lower body clothing? 4  -MC     Bathing (including washing, rinsing, and drying) 3  -MC     Toileting (which includes using toilet bed pan or urinal) 3  -MC     Putting on and taking off regular upper body clothing 4  -MC     Taking care of personal grooming (such as brushing teeth) 3  -MC     Eating meals 4  -MC     AM-PAC 6 Clicks Score (OT) 21  -     Row Name 03/28/23 1319          Functional Assessment    Outcome Measure Options AM-PAC 6 Clicks Daily Activity (OT)  -           User Key  (r) = Recorded By, (t) = Taken By, (c) = Cosigned By    Initials Name Provider Type    Marzena Avina OT Occupational Therapist                Occupational Therapy Education     Title: PT OT SLP Therapies (In Progress)     Topic: Occupational Therapy (In Progress)      Point: ADL training (Done)     Description:   Instruct learner(s) on proper safety adaptation and remediation techniques during self care or transfers.   Instruct in proper use of assistive devices.              Learning Progress Summary           Patient Acceptance, E,TB, VU by CM at 3/26/2023 1032    Comment: OT POC, Role of OT, d/c recommendations, DME recommendations   Family Acceptance, E,TB, VU by CM at 3/26/2023 1032    Comment: OT POC, Role of OT, d/c recommendations, DME recommendations                   Point: Home exercise program (Not Started)     Description:   Instruct learner(s) on appropriate technique for monitoring, assisting and/or progressing therapeutic exercises/activities.              Learner Progress:  Not documented in this visit.          Point: Precautions (Done)     Description:   Instruct learner(s) on prescribed precautions during self-care and functional transfers.              Learning Progress Summary           Patient Acceptance, E,TB, VU by CM at 3/26/2023 1032    Comment: OT POC, Role of OT, d/c recommendations, DME recommendations   Family Acceptance, E,TB, VU by CM at 3/26/2023 1032    Comment: OT POC, Role of OT, d/c recommendations, DME recommendations                   Point: Body mechanics (Done)     Description:   Instruct learner(s) on proper positioning and spine alignment during self-care, functional mobility activities and/or exercises.              Learning Progress Summary           Patient Acceptance, E,TB, VU by CM at 3/26/2023 1032    Comment: OT POC, Role of OT, d/c recommendations, DME recommendations   Family Acceptance, E,TB, VU by CM at 3/26/2023 1032    Comment: OT POC, Role of OT, d/c recommendations, DME recommendations                               User Key     Initials Effective Dates Name Provider Type Discipline    SAUL 11/18/22 -  Yolanda Sims OT Occupational Therapist OT              OT Recommendation and Plan  Therapy Frequency (OT): other (see  comments) (5-7 d/wk)  Plan of Care Review  Plan of Care Reviewed With: patient  Progress: improving  Outcome Evaluation: OT tx completed. Pt fowlers in bed upon entering and agreeable to the session. Pt is mod I for bed mobility with HOB elevated. Pt is independent for donning/doffing socks while sitting EOB. Pt completed 10x sit to stands with SBA-CGA and RW while performing fxnl reaching with B hands while standing. Pt CGA for fxnl mobility in the room with RW. Pt completed oral care routine while standing supported at the sink with RW and s/u. Pt is making good progress towards his goals. This OT educated pt on importance of using RW at home. Recommend d/c home with assist, HHOT.     Time Calculation:    Time Calculation- OT     Row Name 03/28/23 1319             Time Calculation- OT    OT Start Time 1319  -MC      OT Stop Time 1401  -MC      OT Time Calculation (min) 42 min  -MC      Total Timed Code Minutes- OT 42 minute(s)  -MC      OT Received On 03/28/23  -         Timed Charges    23882 - OT Therapeutic Activity Minutes 32  -MC      38464 - OT Self Care/Mgmt Minutes 10  -MC         Total Minutes    Timed Charges Total Minutes 42  -MC       Total Minutes 42  -MC            User Key  (r) = Recorded By, (t) = Taken By, (c) = Cosigned By    Initials Name Provider Type    Marzena Avina OT Occupational Therapist              Therapy Charges for Today     Code Description Service Date Service Provider Modifiers Qty    48110115154 HC OT THERAPEUTIC ACT EA 15 MIN 3/28/2023 Marzena Colbert OT GO 2    01020193271 HC OT SELF CARE/MGMT/TRAIN EA 15 MIN 3/28/2023 Marzena Colbert OT GO 1               Marzena Colbert OT  3/28/2023

## 2023-03-29 ENCOUNTER — APPOINTMENT (OUTPATIENT)
Dept: GENERAL RADIOLOGY | Facility: HOSPITAL | Age: 88
DRG: 193 | End: 2023-03-29
Payer: MEDICARE

## 2023-03-29 LAB
ANION GAP SERPL CALCULATED.3IONS-SCNC: 12 MMOL/L (ref 5–15)
APTT PPP: 52.8 SECONDS (ref 20–40.3)
APTT PPP: 60.2 SECONDS (ref 20–40.3)
APTT PPP: 90 SECONDS (ref 20–40.3)
BACTERIA SPEC RESP CULT: NORMAL
BASOPHILS # BLD AUTO: 0.01 10*3/MM3 (ref 0–0.2)
BASOPHILS NFR BLD AUTO: 0.2 % (ref 0–1.5)
BUN SERPL-MCNC: 36 MG/DL (ref 8–23)
BUN/CREAT SERPL: 29.5 (ref 7–25)
CALCIUM SPEC-SCNC: 9 MG/DL (ref 8.6–10.5)
CHLORIDE SERPL-SCNC: 104 MMOL/L (ref 98–107)
CO2 SERPL-SCNC: 23 MMOL/L (ref 22–29)
CREAT SERPL-MCNC: 1.22 MG/DL (ref 0.76–1.27)
DEPRECATED RDW RBC AUTO: 48 FL (ref 37–54)
EGFRCR SERPLBLD CKD-EPI 2021: 56.7 ML/MIN/1.73
EOSINOPHIL # BLD AUTO: 0.07 10*3/MM3 (ref 0–0.4)
EOSINOPHIL NFR BLD AUTO: 1.1 % (ref 0.3–6.2)
ERYTHROCYTE [DISTWIDTH] IN BLOOD BY AUTOMATED COUNT: 13.1 % (ref 12.3–15.4)
GLUCOSE BLDC GLUCOMTR-MCNC: 162 MG/DL (ref 70–130)
GLUCOSE BLDC GLUCOMTR-MCNC: 189 MG/DL (ref 70–130)
GLUCOSE BLDC GLUCOMTR-MCNC: 194 MG/DL (ref 70–130)
GLUCOSE SERPL-MCNC: 207 MG/DL (ref 65–99)
GRAM STN SPEC: NORMAL
HCT VFR BLD AUTO: 27.6 % (ref 37.5–51)
HGB BLD-MCNC: 9.3 G/DL (ref 13–17.7)
IMM GRANULOCYTES # BLD AUTO: 0.04 10*3/MM3 (ref 0–0.05)
IMM GRANULOCYTES NFR BLD AUTO: 0.6 % (ref 0–0.5)
LYMPHOCYTES # BLD AUTO: 1.19 10*3/MM3 (ref 0.7–3.1)
LYMPHOCYTES NFR BLD AUTO: 19.3 % (ref 19.6–45.3)
MCH RBC QN AUTO: 33.8 PG (ref 26.6–33)
MCHC RBC AUTO-ENTMCNC: 33.7 G/DL (ref 31.5–35.7)
MCV RBC AUTO: 100.4 FL (ref 79–97)
MONOCYTES # BLD AUTO: 0.73 10*3/MM3 (ref 0.1–0.9)
MONOCYTES NFR BLD AUTO: 11.9 % (ref 5–12)
NEUTROPHILS NFR BLD AUTO: 4.12 10*3/MM3 (ref 1.7–7)
NEUTROPHILS NFR BLD AUTO: 66.9 % (ref 42.7–76)
NRBC BLD AUTO-RTO: 0 /100 WBC (ref 0–0.2)
PLATELET # BLD AUTO: 160 10*3/MM3 (ref 140–450)
PMV BLD AUTO: 10 FL (ref 6–12)
POTASSIUM SERPL-SCNC: 4.4 MMOL/L (ref 3.5–5.2)
RBC # BLD AUTO: 2.75 10*6/MM3 (ref 4.14–5.8)
SODIUM SERPL-SCNC: 139 MMOL/L (ref 136–145)
WBC NRBC COR # BLD: 6.16 10*3/MM3 (ref 3.4–10.8)
WHOLE BLOOD HOLD COAG: NORMAL

## 2023-03-29 PROCEDURE — 94799 UNLISTED PULMONARY SVC/PX: CPT

## 2023-03-29 PROCEDURE — 85730 THROMBOPLASTIN TIME PARTIAL: CPT | Performed by: INTERNAL MEDICINE

## 2023-03-29 PROCEDURE — 82962 GLUCOSE BLOOD TEST: CPT

## 2023-03-29 PROCEDURE — 80048 BASIC METABOLIC PNL TOTAL CA: CPT | Performed by: INTERNAL MEDICINE

## 2023-03-29 PROCEDURE — 85025 COMPLETE CBC W/AUTO DIFF WBC: CPT | Performed by: INTERNAL MEDICINE

## 2023-03-29 PROCEDURE — 25010000002 CEFTRIAXONE PER 250 MG: Performed by: INTERNAL MEDICINE

## 2023-03-29 PROCEDURE — 73502 X-RAY EXAM HIP UNI 2-3 VIEWS: CPT

## 2023-03-29 PROCEDURE — 25010000002 HEPARIN (PORCINE) PER 1000 UNITS: Performed by: INTERNAL MEDICINE

## 2023-03-29 PROCEDURE — 63710000001 INSULIN ASPART PER 5 UNITS: Performed by: INTERNAL MEDICINE

## 2023-03-29 PROCEDURE — 94760 N-INVAS EAR/PLS OXIMETRY 1: CPT

## 2023-03-29 RX ADMIN — TIMOLOL MALEATE 1 DROP: 5 SOLUTION/ DROPS OPHTHALMIC at 20:09

## 2023-03-29 RX ADMIN — Medication 81 MG: at 08:33

## 2023-03-29 RX ADMIN — GUAIFENESIN 600 MG: 600 TABLET, EXTENDED RELEASE ORAL at 20:02

## 2023-03-29 RX ADMIN — ACETAMINOPHEN 650 MG: 325 TABLET ORAL at 21:01

## 2023-03-29 RX ADMIN — AMIODARONE HYDROCHLORIDE 200 MG: 200 TABLET ORAL at 08:33

## 2023-03-29 RX ADMIN — ALBUTEROL SULFATE 2.5 MG: 2.5 SOLUTION RESPIRATORY (INHALATION) at 10:40

## 2023-03-29 RX ADMIN — PANTOPRAZOLE SODIUM 40 MG: 40 TABLET, DELAYED RELEASE ORAL at 05:07

## 2023-03-29 RX ADMIN — HEPARIN SODIUM 2700 UNITS: 5000 INJECTION INTRAVENOUS; SUBCUTANEOUS at 21:59

## 2023-03-29 RX ADMIN — ALBUTEROL SULFATE 2.5 MG: 2.5 SOLUTION RESPIRATORY (INHALATION) at 19:38

## 2023-03-29 RX ADMIN — ALBUTEROL SULFATE 2.5 MG: 2.5 SOLUTION RESPIRATORY (INHALATION) at 15:32

## 2023-03-29 RX ADMIN — DOXYCYCLINE 100 MG: 100 CAPSULE ORAL at 08:32

## 2023-03-29 RX ADMIN — DOXYCYCLINE 100 MG: 100 CAPSULE ORAL at 20:02

## 2023-03-29 RX ADMIN — INSULIN ASPART 2 UNITS: 100 INJECTION, SOLUTION INTRAVENOUS; SUBCUTANEOUS at 17:06

## 2023-03-29 RX ADMIN — ALBUTEROL SULFATE 2.5 MG: 2.5 SOLUTION RESPIRATORY (INHALATION) at 07:09

## 2023-03-29 RX ADMIN — CLOPIDOGREL BISULFATE 75 MG: 75 TABLET ORAL at 08:33

## 2023-03-29 RX ADMIN — Medication 10 ML: at 08:31

## 2023-03-29 RX ADMIN — TIMOLOL MALEATE 1 DROP: 5 SOLUTION/ DROPS OPHTHALMIC at 08:32

## 2023-03-29 RX ADMIN — LISINOPRIL 20 MG: 20 TABLET ORAL at 08:33

## 2023-03-29 RX ADMIN — HEPARIN SODIUM 2700 UNITS: 5000 INJECTION INTRAVENOUS; SUBCUTANEOUS at 05:17

## 2023-03-29 RX ADMIN — INSULIN ASPART 2 UNITS: 100 INJECTION, SOLUTION INTRAVENOUS; SUBCUTANEOUS at 11:25

## 2023-03-29 RX ADMIN — CEFTRIAXONE 2 G: 2 INJECTION, POWDER, FOR SOLUTION INTRAMUSCULAR; INTRAVENOUS at 19:25

## 2023-03-29 RX ADMIN — MELATONIN 6 MG: 3 TAB ORAL at 20:11

## 2023-03-29 RX ADMIN — GUAIFENESIN 600 MG: 600 TABLET, EXTENDED RELEASE ORAL at 08:36

## 2023-03-29 RX ADMIN — DILTIAZEM HYDROCHLORIDE 180 MG: 180 CAPSULE, COATED, EXTENDED RELEASE ORAL at 08:32

## 2023-03-29 RX ADMIN — ALBUTEROL SULFATE 2.5 MG: 2.5 SOLUTION RESPIRATORY (INHALATION) at 03:28

## 2023-03-29 RX ADMIN — TERAZOSIN HYDROCHLORIDE 2 MG: 2 CAPSULE ORAL at 21:01

## 2023-03-29 NOTE — PROGRESS NOTES
[facility] Medicine Services  INPATIENT PROGRESS NOTE    Length of Stay: 2  Date of Admission: 3/25/2023  Primary Care Physician: Michael Douglas MD    Subjective   (S) Admitted for shortness of breath, low grade fever, and elevated troponin      Worsening hip pain overnight     Review of Systems   Psychiatric/Behavioral: Nervous/anxious:     All other systems reviewed and are negative.       All pertinent negatives and positives are as above. All other systems have been reviewed and are negative unless otherwise stated.     Prior to Admission medications    Medication Sig Start Date End Date Taking? Authorizing Provider   albuterol sulfate  (90 Base) MCG/ACT inhaler INHALE 2 PUFFS INTO THE LUNGS EVERY 6 (SIX) HOURS AS NEEDED FOR WHEEZING (COUGH). 10/31/22  Yes Ene Farmer MD   amiodarone (PACERONE) 200 MG tablet TAKE ONE TABLET BY MOUTH DAILY  Patient taking differently: 100 mg. Per pt prescribed 200mg but only takes half 11/1/22  Yes Johnny Forbes MD   apixaban (ELIQUIS) 5 MG tablet tablet Take 2.5 mg by mouth 2 (Two) Times a Day. Last dose to be taken 5/5/18 prior to surgery   Yes Ene Farmer MD   aspirin 81 MG tablet Take 1 tablet by mouth Daily. Last dose 5/1/18 10/23/16  Yes Ene Farmer MD   dilTIAZem CD (CARDIZEM CD) 180 MG 24 hr capsule TAKE ONE CAPSULE BY MOUTH DAILY 6/1/21  Yes Johnny Forbes MD   FeroSul 325 (65 Fe) MG tablet Take 1 tablet by mouth 2 (Two) Times a Day. 12/28/20  Yes Ene Farmer MD   lisinopril-hydrochlorothiazide (PRINZIDE,ZESTORETIC) 20-12.5 MG per tablet Take 1 tablet by mouth Daily. 10/23/16  Yes Ene Farmer MD   loratadine (CLARITIN) 10 MG tablet Take 1 tablet by mouth Daily.   Yes nEe Farmer MD   metFORMIN (GLUCOPHAGE) 500 MG tablet Take 1 tablet by mouth 2 (Two) Times a Day. 10/23/16  Yes Ene Farmer MD   Multiple Vitamins-Minerals (MENS MULTIVITAMIN PLUS  PO) Take 1 tablet by mouth Daily. 10/23/16  Yes Ene Farmer MD   terazosin (HYTRIN) 2 MG capsule Take 1 capsule by mouth every night at bedtime. 11/29/22  Yes Ene Farmer MD   timolol (TIMOPTIC) 0.5 % ophthalmic solution Administer 1 drop to both eyes 2 (Two) Times a Day.   Yes Ene Farmer MD   doxycycline (DORYX) 100 MG enteric coated tablet Take 1 tablet by mouth 2 (Two) Times a Day. 3/25/23   Scot Pickett DO   erythromycin (ROMYCIN) 5 MG/GM ophthalmic ointment APPLY A THIN LAYER INTO LEFT EYE AT BEDTIME AS NEEDED 4/12/22   Ene Farmer MD   fluticasone (FLONASE) 50 MCG/ACT nasal spray ONE SPRAY IN EACH NOSTRIL DAILY 3/22/21   Ene Farmer MD   furosemide (LASIX) 20 MG tablet Take 1 tablet by mouth Daily. 3/25/23   Scot Pickett DO       albuterol, 2.5 mg, Nebulization, Q4H - RT  amiodarone, 200 mg, Oral, Daily  aspirin, 81 mg, Oral, Daily  cefTRIAXone, 2 g, Intravenous, Q24H  clopidogrel, 75 mg, Oral, Daily  dilTIAZem CD, 180 mg, Oral, Daily  doxycycline, 100 mg, Oral, Q12H  guaiFENesin, 600 mg, Oral, Q12H  Insulin Aspart, 0-7 Units, Subcutaneous, TID AC  lisinopril, 20 mg, Oral, Q24H  pantoprazole, 40 mg, Oral, Q AM  sodium chloride, 10 mL, Intravenous, Q12H  terazosin, 2 mg, Oral, Nightly  timolol, 1 drop, Both Eyes, Q12H      heparin, 7.1 Units/kg/hr, Last Rate: 7.1 Units/kg/hr (03/29/23 1512)        Objective    Temp:  [97.6 °F (36.4 °C)-97.9 °F (36.6 °C)] 97.6 °F (36.4 °C)  Heart Rate:  [62-85] 63  Resp:  [18-20] 18  BP: (130-177)/(58-75) 130/58    Physical Exam  Vitals reviewed.   Constitutional:       Comments: Better looking compared to 2 days ago; less cough   HENT:      Head: Normocephalic.      Nose: Nose normal.   Eyes:      Extraocular Movements: Extraocular movements intact.   Cardiovascular:      Rate and Rhythm: Regular rhythm.      Heart sounds: Normal heart sounds.   Pulmonary:      Breath sounds: Normal breath sounds.   Abdominal:       Palpations: Abdomen is soft.   Musculoskeletal:         General: Normal range of motion.      Cervical back: Normal range of motion and neck supple.   Skin:     General: Skin is warm.   Neurological:      General: No focal deficit present.      Mental Status: He is alert. Mental status is at baseline.   Psychiatric:         Mood and Affect: Mood normal.         Results Review:  I have reviewed the labs, radiology results, and diagnostic studies.    Laboratory Data:   Results from last 7 days   Lab Units 03/29/23 0337 03/28/23 0203 03/27/23 0301 03/25/23  1251 03/25/23  1039   SODIUM mmol/L 139 138 136   < > 135*   POTASSIUM mmol/L 4.4 3.5 3.7   < > 4.2   CHLORIDE mmol/L 104 102 100   < > 98   CO2 mmol/L 23.0 23.0 24.0   < > 25.0   BUN mg/dL 36* 44* 46*   < > 16   CREATININE mg/dL 1.22 1.44* 1.54*   < > 1.09   GLUCOSE mg/dL 207* 165* 186*   < > 168*   CALCIUM mg/dL 9.0 8.6 8.6   < > 8.7   BILIRUBIN mg/dL  --   --   --   --  0.4   ALK PHOS U/L  --   --   --   --  84   ALT (SGPT) U/L  --   --   --   --  11   AST (SGOT) U/L  --   --   --   --  21   ANION GAP mmol/L 12.0 13.0 12.0   < > 12.0    < > = values in this interval not displayed.     Estimated Creatinine Clearance: 52.1 mL/min (by C-G formula based on SCr of 1.22 mg/dL).  Results from last 7 days   Lab Units 03/27/23 0301 03/26/23 0317 03/25/23  1251   MAGNESIUM mg/dL 2.2 2.0 2.1         Results from last 7 days   Lab Units 03/29/23 0337 03/28/23 0203 03/27/23  0301 03/26/23 0317 03/25/23  1251   WBC 10*3/mm3 6.16 5.70 8.11 4.02 6.51   HEMOGLOBIN g/dL 9.3* 9.3* 9.6* 9.7* 11.3*   HEMATOCRIT % 27.6* 27.1* 27.4* 28.9* 34.1*   PLATELETS 10*3/mm3 160 156 166 147 182     Results from last 7 days   Lab Units 03/25/23  1048   INR  1.31*       Culture Data:   Blood Culture   Date Value Ref Range Status   03/26/2023 No growth at 2 days  Preliminary   03/26/2023 No growth at 2 days  Preliminary     No results found for: URINECX  Respiratory Culture   Date Value Ref  Range Status   03/27/2023   Final    Moderate growth (3+) Normal respiratory lorenza. No S. aureus or Pseudomonas aeruginosa detected. Final report.     No results found for: WOUNDCX  No results found for: STOOLCX  No components found for: BODYFLD    Radiology Data:   Imaging Results (Last 24 Hours)     Procedure Component Value Units Date/Time    XR Hip With or Without Pelvis 2 - 3 View Right [[id number]] Collected: 03/29/23 0643     Updated: 03/29/23 1207    Narrative:        Two view portable right hip with portable single view pelvis    HISTORY: Pain. Difficulty bearing weight.    Portable AP and and frog-leg lateral views of the right hip and  AP film of the pelvis with the hips in the position obtained.    COMPARISON: None. Correlation CT pelvis October 10, 2018.    FINDINGS:   No fracture or dislocation.   Degenerative disc disease L4-5 and L5-S1.  No other osseous or articular abnormality.      Impression:      CONCLUSION:  Normal right hip.  Degenerative disc disease L4-5 and L5-S1.    70490    Electronically signed by:  Spencer Bolden MD  3/29/2023 12:05 PM  CDT Workstation: [id number]          I have reviewed the patient's current medications.     Assessment/Plan     Multifocal pneumonia     POA;  Continue with rocephin and doxycycline 4/5     CT chest corroborates this; will treat it as bacterial since lactic acid was elevated, which has normalized now    Elevated troponin     Likely type II MI; no chest pain     Defer to cardiology anticoagulation orders    DOMINIQUE (not POA)      Likely due to diuretics use; hold these for now; stable and improving    Insomnia     Benadryl helped him to get a good night of sleep    Medical Decision Making  Number and Complexity of problems: one major  Differential Diagnosis: PNA    Conditions and Status:        Condition is improving.     [facility] Data  External documents reviewed: previous medical records  My EKG interpretation: no acute events  My plain film interpretation: no acute  events     Decision rules/scores evaluated (example [id number], [place], etc): n/a     Discussed with: patient and his wife      Care Planning  Shared decision making: his wife  Code status and discussions: full code    Disposition  Social Determinants of Health that impact treatment or disposition: none      He may need placement       I confirmed that the patient's Advance Care Plan is present, code status is documented, or surrogate decision maker is listed in the patient's medical record.     Lela Cruz MD

## 2023-03-29 NOTE — SIGNIFICANT NOTE
03/29/23 1100   OTHER   Discipline physical therapy assistant   Rehab Time/Intention   Session Not Performed patient unavailable for treatment  (pt in BR @ present time)

## 2023-03-29 NOTE — PLAN OF CARE
Goal Outcome Evaluation:              Outcome Evaluation: patient remains on heparin drip. PTT redraw at 11am today. new complaint of right hip pain. MD aware. orders placed.

## 2023-03-29 NOTE — SIGNIFICANT NOTE
03/29/23 1323   OTHER   Discipline physical therapy assistant   Rehab Time/Intention   Session Not Performed patient/family declined treatment

## 2023-03-30 LAB
APTT PPP: 77.6 SECONDS (ref 20–40.3)
GLUCOSE BLDC GLUCOMTR-MCNC: 148 MG/DL (ref 70–130)
GLUCOSE BLDC GLUCOMTR-MCNC: 204 MG/DL (ref 70–130)
GLUCOSE BLDC GLUCOMTR-MCNC: 242 MG/DL (ref 70–130)
GLUCOSE BLDC GLUCOMTR-MCNC: 364 MG/DL (ref 70–130)
HOLD SPECIMEN: NORMAL
WHOLE BLOOD HOLD SPECIMEN: NORMAL

## 2023-03-30 PROCEDURE — 25010000002 CEFTRIAXONE PER 250 MG: Performed by: INTERNAL MEDICINE

## 2023-03-30 PROCEDURE — 63710000001 INSULIN ASPART PER 5 UNITS: Performed by: INTERNAL MEDICINE

## 2023-03-30 PROCEDURE — 94799 UNLISTED PULMONARY SVC/PX: CPT

## 2023-03-30 PROCEDURE — 82962 GLUCOSE BLOOD TEST: CPT

## 2023-03-30 PROCEDURE — 63710000001 METHYLPREDNISOLONE 4 MG TABLET THERAPY PACK: Performed by: STUDENT IN AN ORGANIZED HEALTH CARE EDUCATION/TRAINING PROGRAM

## 2023-03-30 PROCEDURE — 94664 DEMO&/EVAL PT USE INHALER: CPT

## 2023-03-30 PROCEDURE — 94760 N-INVAS EAR/PLS OXIMETRY 1: CPT

## 2023-03-30 PROCEDURE — 25010000002 HEPARIN (PORCINE) PER 1000 UNITS: Performed by: INTERNAL MEDICINE

## 2023-03-30 PROCEDURE — 85730 THROMBOPLASTIN TIME PARTIAL: CPT | Performed by: INTERNAL MEDICINE

## 2023-03-30 RX ORDER — METHYLPREDNISOLONE 4 MG/1
4 TABLET ORAL
Status: DISCONTINUED | OUTPATIENT
Start: 2023-04-01 | End: 2023-04-01

## 2023-03-30 RX ORDER — METHYLPREDNISOLONE 4 MG/1
4 TABLET ORAL
Status: DISCONTINUED | OUTPATIENT
Start: 2023-04-02 | End: 2023-04-01

## 2023-03-30 RX ORDER — METHYLPREDNISOLONE 4 MG/1
24 TABLET ORAL ONCE
Status: COMPLETED | OUTPATIENT
Start: 2023-03-30 | End: 2023-03-30

## 2023-03-30 RX ORDER — METHYLPREDNISOLONE 4 MG/1
4 TABLET ORAL
Status: DISCONTINUED | OUTPATIENT
Start: 2023-03-31 | End: 2023-03-31

## 2023-03-30 RX ORDER — METHYLPREDNISOLONE 4 MG/1
4 TABLET ORAL
Status: DISCONTINUED | OUTPATIENT
Start: 2023-04-04 | End: 2023-04-01 | Stop reason: HOSPADM

## 2023-03-30 RX ORDER — METHYLPREDNISOLONE 4 MG/1
8 TABLET ORAL
Status: COMPLETED | OUTPATIENT
Start: 2023-03-31 | End: 2023-03-31

## 2023-03-30 RX ORDER — METHYLPREDNISOLONE 4 MG/1
4 TABLET ORAL
Status: DISCONTINUED | OUTPATIENT
Start: 2023-04-03 | End: 2023-04-01 | Stop reason: HOSPADM

## 2023-03-30 RX ORDER — METHYLPREDNISOLONE 4 MG/1
4 TABLET ORAL
Status: DISCONTINUED | OUTPATIENT
Start: 2023-04-03 | End: 2023-04-01

## 2023-03-30 RX ADMIN — CEFTRIAXONE 2 G: 2 INJECTION, POWDER, FOR SOLUTION INTRAMUSCULAR; INTRAVENOUS at 19:04

## 2023-03-30 RX ADMIN — ALBUTEROL SULFATE 2.5 MG: 2.5 SOLUTION RESPIRATORY (INHALATION) at 12:13

## 2023-03-30 RX ADMIN — GUAIFENESIN 600 MG: 600 TABLET, EXTENDED RELEASE ORAL at 09:28

## 2023-03-30 RX ADMIN — METHYLPREDNISOLONE 24 MG: 4 TABLET ORAL at 14:00

## 2023-03-30 RX ADMIN — DOXYCYCLINE 100 MG: 100 CAPSULE ORAL at 09:28

## 2023-03-30 RX ADMIN — ALBUTEROL SULFATE 2.5 MG: 2.5 SOLUTION RESPIRATORY (INHALATION) at 08:23

## 2023-03-30 RX ADMIN — TIMOLOL MALEATE 1 DROP: 5 SOLUTION/ DROPS OPHTHALMIC at 09:28

## 2023-03-30 RX ADMIN — LISINOPRIL 20 MG: 20 TABLET ORAL at 09:28

## 2023-03-30 RX ADMIN — ACETAMINOPHEN 650 MG: 325 TABLET ORAL at 09:31

## 2023-03-30 RX ADMIN — INSULIN ASPART 3 UNITS: 100 INJECTION, SOLUTION INTRAVENOUS; SUBCUTANEOUS at 08:22

## 2023-03-30 RX ADMIN — ALBUTEROL SULFATE 2.5 MG: 2.5 SOLUTION RESPIRATORY (INHALATION) at 15:17

## 2023-03-30 RX ADMIN — Medication 81 MG: at 09:28

## 2023-03-30 RX ADMIN — PANTOPRAZOLE SODIUM 40 MG: 40 TABLET, DELAYED RELEASE ORAL at 05:26

## 2023-03-30 RX ADMIN — INSULIN ASPART 3 UNITS: 100 INJECTION, SOLUTION INTRAVENOUS; SUBCUTANEOUS at 10:48

## 2023-03-30 RX ADMIN — CLOPIDOGREL BISULFATE 75 MG: 75 TABLET ORAL at 09:28

## 2023-03-30 RX ADMIN — ZINC OXIDE 1 APPLICATION: 200 OINTMENT TOPICAL at 09:28

## 2023-03-30 RX ADMIN — APIXABAN 2.5 MG: 2.5 TABLET, FILM COATED ORAL at 20:21

## 2023-03-30 RX ADMIN — GUAIFENESIN 600 MG: 600 TABLET, EXTENDED RELEASE ORAL at 20:21

## 2023-03-30 RX ADMIN — TERAZOSIN HYDROCHLORIDE 2 MG: 2 CAPSULE ORAL at 20:20

## 2023-03-30 RX ADMIN — DOXYCYCLINE 100 MG: 100 CAPSULE ORAL at 20:21

## 2023-03-30 RX ADMIN — ALBUTEROL SULFATE 2.5 MG: 2.5 SOLUTION RESPIRATORY (INHALATION) at 20:43

## 2023-03-30 RX ADMIN — HEPARIN SODIUM 9.1 UNITS/KG/HR: 10000 INJECTION, SOLUTION INTRAVENOUS at 05:54

## 2023-03-30 RX ADMIN — APIXABAN 2.5 MG: 2.5 TABLET, FILM COATED ORAL at 10:57

## 2023-03-30 RX ADMIN — Medication 10 ML: at 20:24

## 2023-03-30 RX ADMIN — AMIODARONE HYDROCHLORIDE 200 MG: 200 TABLET ORAL at 09:28

## 2023-03-30 RX ADMIN — DILTIAZEM HYDROCHLORIDE 180 MG: 180 CAPSULE, COATED, EXTENDED RELEASE ORAL at 09:28

## 2023-03-30 RX ADMIN — TIMOLOL MALEATE 1 DROP: 5 SOLUTION/ DROPS OPHTHALMIC at 20:23

## 2023-03-30 RX ADMIN — ALBUTEROL SULFATE 2.5 MG: 2.5 SOLUTION RESPIRATORY (INHALATION) at 02:10

## 2023-03-30 NOTE — PLAN OF CARE
Goal Outcome Evaluation:  Plan of Care Reviewed With: patient           Outcome Evaluation: PO intakes %. Wt stable at 196#. RD enc cont good po and advised pt of alternative menu options availabe prn. Will monitor.

## 2023-03-30 NOTE — PROGRESS NOTES
[facility] Medicine Services  INPATIENT PROGRESS NOTE    Length of Stay: 3  Date of Admission: 3/25/2023  Primary Care Physician: Michael Douglas MD    Subjective   (S) Admitted for shortness of breath, low grade fever, and elevated troponin      Hip pain seems to be some better.     Review of Systems   Psychiatric/Behavioral: Nervous/anxious:     All other systems reviewed and are negative.       All pertinent negatives and positives are as above. All other systems have been reviewed and are negative unless otherwise stated.     Prior to Admission medications    Medication Sig Start Date End Date Taking? Authorizing Provider   albuterol sulfate  (90 Base) MCG/ACT inhaler INHALE 2 PUFFS INTO THE LUNGS EVERY 6 (SIX) HOURS AS NEEDED FOR WHEEZING (COUGH). 10/31/22  Yes Ene Farmer MD   amiodarone (PACERONE) 200 MG tablet TAKE ONE TABLET BY MOUTH DAILY  Patient taking differently: 100 mg. Per pt prescribed 200mg but only takes half 11/1/22  Yes Johnny Forbes MD   apixaban (ELIQUIS) 5 MG tablet tablet Take 2.5 mg by mouth 2 (Two) Times a Day. Last dose to be taken 5/5/18 prior to surgery   Yes Ene Farmer MD   aspirin 81 MG tablet Take 1 tablet by mouth Daily. Last dose 5/1/18 10/23/16  Yes Ene Farmer MD   dilTIAZem CD (CARDIZEM CD) 180 MG 24 hr capsule TAKE ONE CAPSULE BY MOUTH DAILY 6/1/21  Yes Johnny Forbes MD   FeroSul 325 (65 Fe) MG tablet Take 1 tablet by mouth 2 (Two) Times a Day. 12/28/20  Yes Ene Farmer MD   lisinopril-hydrochlorothiazide (PRINZIDE,ZESTORETIC) 20-12.5 MG per tablet Take 1 tablet by mouth Daily. 10/23/16  Yes Ene Farmer MD   loratadine (CLARITIN) 10 MG tablet Take 1 tablet by mouth Daily.   Yes Ene Farmer MD   metFORMIN (GLUCOPHAGE) 500 MG tablet Take 1 tablet by mouth 2 (Two) Times a Day. 10/23/16  Yes Ene Farmer MD   Multiple Vitamins-Minerals (MENS MULTIVITAMIN  PLUS PO) Take 1 tablet by mouth Daily. 10/23/16  Yes Ene Farmer MD   terazosin (HYTRIN) 2 MG capsule Take 1 capsule by mouth every night at bedtime. 11/29/22  Yes Ene Farmer MD   timolol (TIMOPTIC) 0.5 % ophthalmic solution Administer 1 drop to both eyes 2 (Two) Times a Day.   Yes Ene Farmer MD   doxycycline (DORYX) 100 MG enteric coated tablet Take 1 tablet by mouth 2 (Two) Times a Day. 3/25/23   Scot Pickett DO   erythromycin (ROMYCIN) 5 MG/GM ophthalmic ointment APPLY A THIN LAYER INTO LEFT EYE AT BEDTIME AS NEEDED 4/12/22   Ene Farmer MD   fluticasone (FLONASE) 50 MCG/ACT nasal spray ONE SPRAY IN EACH NOSTRIL DAILY 3/22/21   Ene Farmer MD   furosemide (LASIX) 20 MG tablet Take 1 tablet by mouth Daily. 3/25/23   Scot Pickett DO       albuterol, 2.5 mg, Nebulization, Q4H - RT  amiodarone, 200 mg, Oral, Daily  apixaban, 2.5 mg, Oral, Q12H  aspirin, 81 mg, Oral, Daily  cefTRIAXone, 2 g, Intravenous, Q24H  clopidogrel, 75 mg, Oral, Daily  dilTIAZem CD, 180 mg, Oral, Daily  doxycycline, 100 mg, Oral, Q12H  guaiFENesin, 600 mg, Oral, Q12H  Insulin Aspart, 0-7 Units, Subcutaneous, TID AC  lisinopril, 20 mg, Oral, Q24H  methylPREDNISolone, 24 mg, Oral, Once  [START ON 3/31/2023] methylPREDNISolone, 4 mg, Oral, TID Around Food  [START ON 4/1/2023] methylPREDNISolone, 4 mg, Oral, 4x Daily Taper  [START ON 4/2/2023] methylPREDNISolone, 4 mg, Oral, TID Around Food  [START ON 4/3/2023] methylPREDNISolone, 4 mg, Oral, Before Breakfast  [START ON 4/3/2023] methylPREDNISolone, 4 mg, Oral, Tonight  [START ON 4/4/2023] methylPREDNISolone, 4 mg, Oral, Before Breakfast  [START ON 3/31/2023] methylPREDNISolone, 8 mg, Oral, Tonight  pantoprazole, 40 mg, Oral, Q AM  sodium chloride, 10 mL, Intravenous, Q12H  terazosin, 2 mg, Oral, Nightly  timolol, 1 drop, Both Eyes, Q12H           Objective    Temp:  [96.6 °F (35.9 °C)-98.2 °F (36.8 °C)] 96.6 °F (35.9 °C)  Heart Rate:   [63-75] 70  Resp:  [16-20] 18  BP: (122-166)/(58-88) 122/72    Physical Exam  Vitals reviewed.   Constitutional:       Comments: Better looking compared to 2 days ago; less cough   HENT:      Head: Normocephalic.      Nose: Nose normal.   Eyes:      Extraocular Movements: Extraocular movements intact.   Cardiovascular:      Rate and Rhythm: Regular rhythm.      Heart sounds: Normal heart sounds.   Pulmonary:      Breath sounds: Normal breath sounds.   Abdominal:      Palpations: Abdomen is soft.   Musculoskeletal:         General: Normal range of motion.      Cervical back: Normal range of motion and neck supple.   Skin:     General: Skin is warm.   Neurological:      General: No focal deficit present.      Mental Status: He is alert. Mental status is at baseline.   Psychiatric:         Mood and Affect: Mood normal.         Results Review:  I have reviewed the labs, radiology results, and diagnostic studies.    Laboratory Data:   Results from last 7 days   Lab Units 03/29/23  0337 03/28/23  0203 03/27/23  0301 03/25/23  1251 03/25/23  1039   SODIUM mmol/L 139 138 136   < > 135*   POTASSIUM mmol/L 4.4 3.5 3.7   < > 4.2   CHLORIDE mmol/L 104 102 100   < > 98   CO2 mmol/L 23.0 23.0 24.0   < > 25.0   BUN mg/dL 36* 44* 46*   < > 16   CREATININE mg/dL 1.22 1.44* 1.54*   < > 1.09   GLUCOSE mg/dL 207* 165* 186*   < > 168*   CALCIUM mg/dL 9.0 8.6 8.6   < > 8.7   BILIRUBIN mg/dL  --   --   --   --  0.4   ALK PHOS U/L  --   --   --   --  84   ALT (SGPT) U/L  --   --   --   --  11   AST (SGOT) U/L  --   --   --   --  21   ANION GAP mmol/L 12.0 13.0 12.0   < > 12.0    < > = values in this interval not displayed.     Estimated Creatinine Clearance: 51.7 mL/min (by C-G formula based on SCr of 1.22 mg/dL).  Results from last 7 days   Lab Units 03/27/23  0301 03/26/23  0317 03/25/23  1251   MAGNESIUM mg/dL 2.2 2.0 2.1         Results from last 7 days   Lab Units 03/29/23  0337 03/28/23  0203 03/27/23  0301 03/26/23  0317  03/25/23  1251   WBC 10*3/mm3 6.16 5.70 8.11 4.02 6.51   HEMOGLOBIN g/dL 9.3* 9.3* 9.6* 9.7* 11.3*   HEMATOCRIT % 27.6* 27.1* 27.4* 28.9* 34.1*   PLATELETS 10*3/mm3 160 156 166 147 182     Results from last 7 days   Lab Units 03/25/23  1048   INR  1.31*       Culture Data:   No results found for: BLOODCX  No results found for: URINECX  No results found for: RESPCX  No results found for: WOUNDCX  No results found for: STOOLCX  No components found for: BODYFLD    Radiology Data:   Imaging Results (Last 24 Hours)     ** No results found for the last 24 hours. **          I have reviewed the patient's current medications.     Assessment/Plan     Multifocal pneumonia     POA;  Continue with rocephin and doxycycline - today will complete 5 days      CT chest corroborates this; will treat it as bacterial since lactic acid was elevated, which has normalized now    Elevated troponin     Likely type II MI; no chest pain     Change to Eliquis     DOMINIQUE (not POA)      Likely due to diuretics use; hold these for now; stable and improving    Insomnia     Benadryl helped him to get a good night of sleep    Hip Pain  -XR reviewed  -PT/OT ordered  -started medrol  -planning for rehab     Medical Decision Making  Number and Complexity of problems: one major  Differential Diagnosis: PNA    Conditions and Status:        Condition is improving.     MDM Data  External documents reviewed: previous medical records  My EKG interpretation: no acute events  My plain film interpretation: no acute events     Decision rules/scores evaluated (example [id number], Wells, etc): n/a     Discussed with: patient and his wife      Care Planning  Shared decision making: his wife  Code status and discussions: full code    Disposition  Social Determinants of Health that impact treatment or disposition: none      He may need placement       I confirmed that the patient's Advance Care Plan is present, code status is documented, or surrogate decision maker is  listed in the patient's medical record.     Lela Cruz MD

## 2023-03-30 NOTE — CONSULTS
"Adult Nutrition  Assessment/PES    Patient Name:  Evgeny Carter Jr.  YOB: 1933  MRN: [id number]  Admit Date:  3/25/2023    Assessment Date:  3/30/2023    Comments:  RD f/u. Pt continues treatment r/t pneumonia. He reports overall good appetite (intakes %) but says he's frustrated he can't go home d/t now having a \"cracked\" pelvis. #, remains stable. RD reviewed alternative menu items available and encouraged continued good po for strength/healing. Will monitor course.      Reason for Assessment     Row Name 03/30/23 1056          Reason for Assessment    Reason For Assessment follow-up protocol                Nutrition/Diet History     Row Name 03/30/23 1056          Nutrition/Diet History    Typical Intake (Food/Fluid/EN/PN) Pt says he's eating \"ok\". No prefs/requests voiced. He's frustrated he can't go home.     Factors Affecting Nutritional Intake appetite                Labs/Tests/Procedures/Meds     Row Name 03/30/23 1057          Labs/Procedures/Meds    Lab Results Reviewed reviewed, pertinent     Lab Results Comments Bg 242, BUN 36        Medications    Pertinent Medications Reviewed reviewed                  Estimated/Assessed Needs - Anthropometrics     Row Name 03/30/23 0529          Anthropometrics    Weight 89 kg (196 lb 3.4 oz)                Nutrition Prescription Ordered     Row Name 03/30/23 1057          Nutrition Prescription PO    Current PO Diet Regular     Fluid Consistency Thin     Common Modifiers Consistent Carbohydrate                Evaluation of Received Nutrient/Fluid Intake     Row Name 03/30/23 1058          PO Evaluation    % PO Intake                    Problem/Interventions:   Problem 1     Row Name 03/30/23 1059          Nutrition Diagnoses Problem 1    Problem 1 No Nutrition Diagnosis at this Time                      Intervention Goal     Row Name 03/30/23 1059          Intervention Goal    General Meet nutritional needs for " age/condition;Maintain nutrition     PO Continue positive trend;Maintain intake;Meet estimated needs     Weight Maintain weight                Nutrition Intervention     Row Name 03/30/23 1059          Nutrition Intervention    RD/Tech Action Follow Tx progress;Advise alternate selection;Advise available snack;Interview for preference;Encourage intake;Care plan reviewd                  Education/Evaluation     Row Name 03/30/23 1100          Education    Education Education topics;Advised regarding habits/behavior     Education Topics Basic nutrition     Advised Regarding Habits/Behavior Food choices        Monitor/Evaluation    Monitor Per protocol;PO intake;Pertinent labs;Weight;Symptoms;Skin status     Education Follow-up Reinforce PRN                 Electronically signed by:  Gracie Venegas RD  03/30/23 11:00 CDT

## 2023-03-30 NOTE — DISCHARGE PLACEMENT REQUEST
"Patient would like short term rehab to home.   Please call Preston MILAN RN, CM with any questions at [phone].    Evgeny Carter Jr. (89 y.o. Male)     Date of Birth   06/11/1933    Social Security Number       Address   [place] [address]    Home Phone   [phone]    MRN   [id number]       Christian   [employer]ar[employer]    Marital Status                               Admission Date   3/25/23    Admission Type   Emergency    Admitting Provider   Eliezer Thomas MD    Attending Provider   Eliezer Thomas MD    Department, Room/Bed   [facility] 3 EAST, 371/1       Discharge Date       Discharge Disposition       Discharge Destination                               Attending Provider: Eliezer Thomas MD    Allergies: No Known Allergies    Isolation: None   Infection: None   Code Status: CPR    Ht: 180.3 cm (71\")   Wt: 89 kg (196 lb 3.4 oz)    Admission Cmt: None   Principal Problem: Acute pulmonary edema (HCC) [J81.0]                 Active Insurance as of 3/25/2023     Primary Coverage     Payor Plan Insurance Group Employer/Plan Group    [facility] MEDICARE REPLACEMENT [facility] MEDICARE REPLACEMENT [id number]     Payor Plan Address Payor Plan Phone Number Payor Plan Fax Number Effective Dates    [address] [address]   1/1/2016 - None Entered    [facility] [id number]       Subscriber Name Subscriber Birth Date Member ID       EVGENY CARTER JR. 6/11/1933 [id number]                 Emergency Contacts      (Rel.) Home Phone Work Phone Mobile Phone    Shweta Carter (Spouse) [phone] -- [phone]               History & Physical      Lc Rucker MD at 03/25/23 1202              [facility] Medicine  HISTORY AND PHYSICAL      Date of Admission: 3/25/2023  Primary Care Physician: Michael Douglas MD    Subjective     Chief Complaint: SOB    History of " Present Illness  88 y/o male was admitted to the ED with complaints of SOB, cough and malaise.  The patient was diagnosed with bronchitis at urgent care several days ago, but has not been improving despite getting antibiotics.  He notes that he woke up last night very short of breath and was worried he was having a heart attack, so he came to the ED for further work-up.  He currently notes he has been having mild fevers, a productive cough and SOB.  He denies any current chest pain, palpitations, jaw pain, arm pain, nausea or abdominal pain.  He has no other complaints at this time.    In the ED the patient had stable vitals.  His labs showed a Na of 135 and his imaging showed mild interstitial edema.  He was admitted for further treatment and observation.      Review of Systems   12 point ROS reviewed and negative except as mentioned in the HPI.    Past Medical History:   Past Medical History:   Diagnosis Date   • Allergic rhinitis    • Arthritis     hips, knees, feet   • Artificial lens present     in position   • Benign neoplasm of skin of eyelid    • Bladder tumor    • Bladder tumor    • BPH (benign prostatic hyperplasia)    • Cancer (HCC)     SKIN    • Cataract    • Coronary arteriosclerosis    • Cortical senile cataract    • Cough    • Diabetes mellitus (HCC)    • Esophagitis 5/23/2021   • Essential hypertension    • Essential hypertension    • Hypercholesterolemia    • Hypercholesterolemia    • Kidney stones    • MI, old 1988   • Nonexudative age-related macular degeneration     mild   • Nuclear cataract    • Open-angle glaucoma     s/p trabec OS, doing well      • Primary open angle glaucoma     OS, s/p trabeculectomy; IOP down with timolol OD     • Pseudophakia of right eye    • Rhinitis    • Upper respiratory infection    • Vitreous detachment     peripheral     Past Surgical History:  Past Surgical History:   Procedure Laterality Date   • ANKLE SURGERY  07/29/2000    Ankle surgery (ORIF right ankle  fracture. Bimalleolar right fracture)   • APPENDECTOMY     • CARDIAC ABLATION     • CARDIAC CATHETERIZATION  07/02/1987    Cardiac cath 96389 (Coronary arthosclerotic heart disease. maintained patency of RCA. Mild inferior hypokinesis.)   • CARDIAC CATHETERIZATION  06/30/1987    Cardiac cath 02146 (Coronary atherosclerotic heart disease. Occluded RCA. Acute inferior infarction. S/P successful angioplasty of RCA)   • CARDIAC ELECTROPHYSIOLOGY PROCEDURE N/A 6/11/2020    Procedure: Pacer single lead;  Surgeon: Johnny Forbes MD;  Location: [facility] CATH INVASIVE LOCATION;  Service: Cardiology;  Laterality: N/A;   • CATARACT EXTRACTION  04/01/2010    Remove cataract, insert lens (Complicated cataract extraction with intraocular lens implantation, right eye, Marcelo SN-60 WF serial # [id number]: 20.5 diopter)   • CATARACT EXTRACTION W/ INTRAOCULAR LENS IMPLANT Left 3/23/2018    Procedure: CATARACT PHACO EXTRACTION WITH INTRAOCULAR LENS IMPLANT;  Surgeon: Oli Haley MD;  Location: [facility] OR;  Service: Ophthalmology   • COLONOSCOPY N/A 5/9/2019    Procedure: COLONOSCOPY;  Surgeon: Rainer Gates DO;  Location: [facility] ENDOSCOPY;  Service: Gastroenterology   • COLONOSCOPY N/A 11/12/2020    Procedure: COLONOSCOPY;  Surgeon: Rainer Gates DO;  Location: [facility] ENDOSCOPY;  Service: Gastroenterology;  Laterality: N/A;   • ENDOSCOPY  02/19/1990    EGD 90808 (Olympus video endoscope.Fibrin-like patch in stomach)   • ENDOSCOPY N/A 11/12/2020    Procedure: ESOPHAGOGASTRODUODENOSCOPY;  Surgeon: Rainer Gates DO;  Location: [facility] ENDOSCOPY;  Service: Gastroenterology;  Laterality: N/A;   • EYE SURGERY  02/02/2005    Eye surgery procedure (Repair of operative wound leak, left eye. S/P trabeculectomy with operative wound leak)   • EYE SURGERY  01/19/2005    Eye surgery procedure (Trabeculectomy, left eye. Chronic open angle glaucoma, uncontrolled left eye)   • INJECTION OF MEDICATION  06/09/2014    Kenalog  (Allergic rhinitis)    • OTHER SURGICAL HISTORY  06/16/2016    DIL MAC EXAM PERF INC DOC OF +/- MAC THICK 2019F (Nonexudative age-related macular degeneration)    • OTHER SURGICAL HISTORY  02/26/2015    EXTENDED VISUAL FIELDS STUDY 61850 (Primary open angle glaucoma)    • OTHER SURGICAL HISTORY  02/26/2015    EXTENDED VISUAL FIELDS STUDY 19432 (Primary open angle glaucoma)    • OTHER SURGICAL HISTORY  06/16/2016    OCT DISC NFL 73291 (Open-angle glaucoma)    • OTHER SURGICAL HISTORY      OCT DISC NFL 85005 (Primary open angle glaucoma) (3): 06/17/2015, 03/19/2014, 03/04/2013   • OTHER SURGICAL HISTORY  06/16/2016    OPTIC NERVE HEAD EVAL PERFORMED 2027F (Open-angle glaucoma)    • TRANSURETHRAL RESECTION OF BLADDER TUMOR N/A 5/9/2018    Procedure: CYSTOSCOPY TRANSURETHRAL RESECTION OF BLADDER TUMOR;  Surgeon: Donovan Conner MD;  Location: [facility];  Service: Urology   • TRANSURETHRAL RESECTION OF BLADDER TUMOR N/A 7/26/2019    Procedure: CYSTOSCOPY TRANSURETHRAL RESECTION OF BLADDER TUMOR;  Surgeon: Donovan Conner MD;  Location: [facility] OR;  Service: Urology   • TRANSURETHRAL RESECTION OF BLADDER TUMOR N/A 7/29/2020    Procedure: CYSTOSCOPY TRANSURETHRAL RESECTION OF BLADDER TUMOR;  Surgeon: Donovan Conner MD;  Location: [facility];  Service: Urology;  Laterality: N/A;   • UVULECTOMY  1969    EXCISION OF UVULA 17162 (partial uvulectomy with excision of papilloma. Redunant uvula with a small papilloma)     Social History:  reports that he has quit smoking. His smokeless tobacco use includes snuff. He reports that he does not drink alcohol and does not use drugs.    Family History: family history includes Cancer in his brother and mother; No Known Problems in his father.       Allergies:  No Known Allergies    Medications:  Prior to Admission medications    Medication Sig Start Date End Date Taking? Authorizing Provider   albuterol sulfate  (90 Base) MCG/ACT inhaler INHALE 2 PUFFS INTO THE LUNGS EVERY 6  (SIX) HOURS AS NEEDED FOR WHEEZING (COUGH). 10/31/22  Yes Ene Farmer MD   amiodarone (PACERONE) 200 MG tablet TAKE ONE TABLET BY MOUTH DAILY  Patient taking differently: 100 mg. Per pt prescribed 200mg but only takes half 11/1/22  Yes Johnny Forbes MD   apixaban (ELIQUIS) 5 MG tablet tablet Take 2.5 mg by mouth 2 (Two) Times a Day. Last dose to be taken 5/5/18 prior to surgery   Yes Ene Farmer MD   aspirin 81 MG tablet Take 1 tablet by mouth Daily. Last dose 5/1/18 10/23/16  Yes Ene Farmer MD   dilTIAZem CD (CARDIZEM CD) 180 MG 24 hr capsule TAKE ONE CAPSULE BY MOUTH DAILY 6/1/21  Yes Johnny Forbes MD   FeroSul 325 (65 Fe) MG tablet Take 1 tablet by mouth 2 (Two) Times a Day. 12/28/20  Yes Ene Farmer MD   lisinopril-hydrochlorothiazide (PRINZIDE,ZESTORETIC) 20-12.5 MG per tablet Take 1 tablet by mouth Daily. 10/23/16  Yes Ene Farmer MD   loratadine (CLARITIN) 10 MG tablet Take 1 tablet by mouth Daily.   Yes Ene Farmer MD   metFORMIN (GLUCOPHAGE) 500 MG tablet Take 1 tablet by mouth 2 (Two) Times a Day. 10/23/16  Yes Ene Farmer MD   Multiple Vitamins-Minerals (MENS MULTIVITAMIN PLUS PO) Take 1 tablet by mouth Daily. 10/23/16  Yes Ene Farmer MD   terazosin (HYTRIN) 2 MG capsule Take 1 capsule by mouth every night at bedtime. 11/29/22  Yes Ene Farmer MD   timolol (TIMOPTIC) 0.5 % ophthalmic solution Administer 1 drop to both eyes 2 (Two) Times a Day.   Yes Ene Farmer MD   doxycycline (DORYX) 100 MG enteric coated tablet Take 1 tablet by mouth 2 (Two) Times a Day. 3/25/23   Scot Pickett DO   erythromycin (ROMYCIN) 5 MG/GM ophthalmic ointment APPLY A THIN LAYER INTO LEFT EYE AT BEDTIME AS NEEDED 4/12/22   Ene Farmer MD   fluticasone (FLONASE) 50 MCG/ACT nasal spray ONE SPRAY IN EACH NOSTRIL DAILY 3/22/21   Provider, MD Ene   furosemide (LASIX) 20 MG tablet Take 1 tablet by mouth Daily.  "3/25/23   Scot Pickett DO   cyanocobalamin 1000 MCG/ML injection INJECT 1 MILLILITER BY INTRAMUSCULAR ROUTE EVERY WEEK FOR 4 WEEKS 12/2/22 3/25/23  Provider, MD ADIA Luque have utilized all available immediate resources to obtain, update, and review the patient's current medications.    Objective     Vital Signs: /61 (BP Location: Left arm, Patient Position: Sitting)   Pulse 62   Temp 98.3 °F (36.8 °C) (Oral)   Resp 22   Ht 180.3 cm (71\")   Wt 89.8 kg (198 lb)   SpO2 94%   BMI 27.62 kg/m²   Physical Exam   General: NAD  HEENT: AT NC, EOMI  Neck: No JVD  CVS: S1/S2 present, RRR, no M/R/G  Lungs: coarse bilaterally in all fields  Abdomen: soft, NT, ND, BS+  Ext: no edema  Skin: no rashes, bruises or discolorations  Neuro: no focal deficits  Psych: Not agitated         Results Reviewed:  Lab Results (last 24 hours)     Procedure Component Value Units Date/Time    Extra Tubes [[id number]] Collected: 03/25/23 1048    Specimen: Blood, Venous Line Updated: 03/25/23 1202    Narrative:      The following orders were created for panel order Extra Tubes.  Procedure                               Abnormality         Status                     ---------                               -----------         ------                     Gold Top - SST[[id number]]                                   Final result               Light Blue Top[[id number]]                                   Final result                 Please view results for these tests on the individual orders.    Gold Top - SST [[id number]] Collected: 03/25/23 1048    Specimen: Blood Updated: 03/25/23 1202     Extra Tube Hold for add-ons.     Comment: Auto resulted.       Light Blue Top [[id number]] Collected: 03/25/23 1048    Specimen: Blood Updated: 03/25/23 1202     Extra Tube Hold for add-ons.     Comment: Auto resulted       Comprehensive Metabolic Panel [[id number]]  (Abnormal) Collected: 03/25/23 1039    Specimen: Blood Updated: 03/25/23 1110     " Glucose 168 mg/dL      BUN 16 mg/dL      Creatinine 1.09 mg/dL      Sodium 135 mmol/L      Potassium 4.2 mmol/L      Chloride 98 mmol/L      CO2 25.0 mmol/L      Calcium 8.7 mg/dL      Total Protein 6.5 g/dL      Albumin 3.9 g/dL      ALT (SGPT) 11 U/L      AST (SGOT) 21 U/L      Alkaline Phosphatase 84 U/L      Total Bilirubin 0.4 mg/dL      Globulin 2.6 gm/dL      A/G Ratio 1.5 g/dL      BUN/Creatinine Ratio 14.7     Anion Gap 12.0 mmol/L      eGFR 64.9 mL/min/1.73     Narrative:      GFR Normal >60  Chronic Kidney Disease <60  Kidney Failure <15    The GFR formula is only valid for adults with stable renal function between ages 18 and 70.    BNP [[id number]]  (Abnormal) Collected: 03/25/23 1039    Specimen: Blood Updated: 03/25/23 1108     proBNP 4,077.0 pg/mL     Narrative:      Among patients with dyspnea, NT-proBNP is highly sensitive for the detection of acute congestive heart failure. In addition NT-proBNP of <300 pg/ml effectively rules out acute congestive heart failure with 99% negative predictive value.      CBC & Differential [[id number]]  (Abnormal) Collected: 03/25/23 1039    Specimen: Blood Updated: 03/25/23 1051    Narrative:      The following orders were created for panel order CBC & Differential.  Procedure                               Abnormality         Status                     ---------                               -----------         ------                     CBC Auto Differential[[id number]]        Abnormal            Final result                 Please view results for these tests on the individual orders.    CBC Auto Differential [[id number]]  (Abnormal) Collected: 03/25/23 1039    Specimen: Blood Updated: 03/25/23 1051     WBC 5.76 10*3/mm3      RBC 3.23 10*6/mm3      Hemoglobin 11.0 g/dL      Hematocrit 32.9 %      .9 fL      MCH 34.1 pg      MCHC 33.4 g/dL      RDW 13.2 %      RDW-SD 49.4 fl      MPV 9.9 fL      Platelets 171 10*3/mm3      Neutrophil % 69.6 %      Lymphocyte %  13.2 %      Monocyte % 15.5 %      Eosinophil % 1.0 %      Basophil % 0.2 %      Immature Grans % 0.5 %      Neutrophils, Absolute 4.01 10*3/mm3      Lymphocytes, Absolute 0.76 10*3/mm3      Monocytes, Absolute 0.89 10*3/mm3      Eosinophils, Absolute 0.06 10*3/mm3      Basophils, Absolute 0.01 10*3/mm3      Immature Grans, Absolute 0.03 10*3/mm3      nRBC 0.0 /100 WBC     COVID-19 and FLU A/B PCR - Swab, Nasopharynx [[id number]]  (Normal) Collected: 03/25/23 0955    Specimen: Swab from Nasopharynx Updated: 03/25/23 1019     COVID19 Not Detected     Influenza A PCR Not Detected     Influenza B PCR Not Detected    Narrative:      Fact sheet for providers: https://www.fda.gov/media/448847/download    Fact sheet for patients: https://www.fda.gov/media/973179/download    Test performed by PCR.        Imaging Results (Last 24 Hours)     Procedure Component Value Units Date/Time    XR Chest 2 View [[id number]] Collected: 03/25/23 1002     Updated: 03/25/23 1038    Narrative:      EXAM: XR CHEST 2 VIEWS     HISTORY: cough.    COMPARISON: June 11, 2020     FINDINGS:    Heart: Borderline heart size.     Mediastinum: Left pacemaker    Lungs: Bibasilar congestion. Interval increased interstitial  markings. Calcified granuloma right upper lung    Bones: Degenerative changes in the shoulders.    Abdomen: Visualized upper abdomen is unremarkable.      Impression:        Mild interstitial edema    Electronically signed by:  Doron Slater DO  3/25/2023 10:36 AM  CDT Workstation: [id number]        I have personally reviewed and interpreted the radiology studies and ECG obtained at time of admission.     Assessment / Plan       1) NSTEMI  - troponin is elevated, repeat pending  - ASA, heparin  - TTE ordered  - cardiology consulted, recs pending  - monitor on telemetry    2) HTN  - home meds    3) DM  - ISS, accuchecks, diet    4) diastolic CHF  - lasix  - repeat TTE pending    5) A-fib  - cardizem, amiodarone, eliquis    6) CAD  -  home meds    7) glaucoma  - timolol    8) asthma  - albuterol steroids    9) GI/DVT ppx  - protonix/eliquis    Electronically signed by Lc Rucker MD, 03/25/23, 12:02 CDT.                Electronically signed by Lc Rucker MD at 03/25/23 1245          Physician Progress Notes (last 24 hours)      Lela Cruz MD at 03/29/23 1733              [facility] Medicine Services  INPATIENT PROGRESS NOTE    Length of Stay: 2  Date of Admission: 3/25/2023  Primary Care Physician: Michael Douglas MD    Subjective   (S) Admitted for shortness of breath, low grade fever, and elevated troponin      Worsening hip pain overnight     Review of Systems   Psychiatric/Behavioral: Nervous/anxious:     All other systems reviewed and are negative.       All pertinent negatives and positives are as above. All other systems have been reviewed and are negative unless otherwise stated.     Prior to Admission medications    Medication Sig Start Date End Date Taking? Authorizing Provider   albuterol sulfate  (90 Base) MCG/ACT inhaler INHALE 2 PUFFS INTO THE LUNGS EVERY 6 (SIX) HOURS AS NEEDED FOR WHEEZING (COUGH). 10/31/22  Yes Ene Farmer MD   amiodarone (PACERONE) 200 MG tablet TAKE ONE TABLET BY MOUTH DAILY  Patient taking differently: 100 mg. Per pt prescribed 200mg but only takes half 11/1/22  Yes Johnny Forbes MD   apixaban (ELIQUIS) 5 MG tablet tablet Take 2.5 mg by mouth 2 (Two) Times a Day. Last dose to be taken 5/5/18 prior to surgery   Yes Ene Farmer MD   aspirin 81 MG tablet Take 1 tablet by mouth Daily. Last dose 5/1/18 10/23/16  Yes Ene Farmer MD   dilTIAZem CD (CARDIZEM CD) 180 MG 24 hr capsule TAKE ONE CAPSULE BY MOUTH DAILY 6/1/21  Yes Johnny Forbes MD   FeroSul 325 (65 Fe) MG tablet Take 1 tablet by mouth 2 (Two) Times a Day. 12/28/20  Yes Ene Farmer MD   lisinopril-hydrochlorothiazide (PRINZIDE,ZESTORETIC)  20-12.5 MG per tablet Take 1 tablet by mouth Daily. 10/23/16  Yes Ene Farmer MD   loratadine (CLARITIN) 10 MG tablet Take 1 tablet by mouth Daily.   Yes Ene Farmer MD   metFORMIN (GLUCOPHAGE) 500 MG tablet Take 1 tablet by mouth 2 (Two) Times a Day. 10/23/16  Yes Ene Farmer MD   Multiple Vitamins-Minerals (MENS MULTIVITAMIN PLUS PO) Take 1 tablet by mouth Daily. 10/23/16  Yes Ene Farmer MD   terazosin (HYTRIN) 2 MG capsule Take 1 capsule by mouth every night at bedtime. 11/29/22  Yes Ene Farmer MD   timolol (TIMOPTIC) 0.5 % ophthalmic solution Administer 1 drop to both eyes 2 (Two) Times a Day.   Yes Ene Farmer MD   doxycycline (DORYX) 100 MG enteric coated tablet Take 1 tablet by mouth 2 (Two) Times a Day. 3/25/23   Scot Pickett DO   erythromycin (ROMYCIN) 5 MG/GM ophthalmic ointment APPLY A THIN LAYER INTO LEFT EYE AT BEDTIME AS NEEDED 4/12/22   Ene Farmer MD   fluticasone (FLONASE) 50 MCG/ACT nasal spray ONE SPRAY IN EACH NOSTRIL DAILY 3/22/21   Ene Farmer MD   furosemide (LASIX) 20 MG tablet Take 1 tablet by mouth Daily. 3/25/23   Scot Pickett DO       albuterol, 2.5 mg, Nebulization, Q4H - RT  amiodarone, 200 mg, Oral, Daily  aspirin, 81 mg, Oral, Daily  cefTRIAXone, 2 g, Intravenous, Q24H  clopidogrel, 75 mg, Oral, Daily  dilTIAZem CD, 180 mg, Oral, Daily  doxycycline, 100 mg, Oral, Q12H  guaiFENesin, 600 mg, Oral, Q12H  Insulin Aspart, 0-7 Units, Subcutaneous, TID AC  lisinopril, 20 mg, Oral, Q24H  pantoprazole, 40 mg, Oral, Q AM  sodium chloride, 10 mL, Intravenous, Q12H  terazosin, 2 mg, Oral, Nightly  timolol, 1 drop, Both Eyes, Q12H      heparin, 7.1 Units/kg/hr, Last Rate: 7.1 Units/kg/hr (03/29/23 1512)        Objective    Temp:  [97.6 °F (36.4 °C)-97.9 °F (36.6 °C)] 97.6 °F (36.4 °C)  Heart Rate:  [62-85] 63  Resp:  [18-20] 18  BP: (130-177)/(58-75) 130/58    Physical Exam  Vitals reviewed.   Constitutional:        Comments: Better looking compared to 2 days ago; less cough   HENT:      Head: Normocephalic.      Nose: Nose normal.   Eyes:      Extraocular Movements: Extraocular movements intact.   Cardiovascular:      Rate and Rhythm: Regular rhythm.      Heart sounds: Normal heart sounds.   Pulmonary:      Breath sounds: Normal breath sounds.   Abdominal:      Palpations: Abdomen is soft.   Musculoskeletal:         General: Normal range of motion.      Cervical back: Normal range of motion and neck supple.   Skin:     General: Skin is warm.   Neurological:      General: No focal deficit present.      Mental Status: He is alert. Mental status is at baseline.   Psychiatric:         Mood and Affect: Mood normal.         Results Review:  I have reviewed the labs, radiology results, and diagnostic studies.    Laboratory Data:   Results from last 7 days   Lab Units 03/29/23 0337 03/28/23 0203 03/27/23  0301 03/25/23  1251 03/25/23  1039   SODIUM mmol/L 139 138 136   < > 135*   POTASSIUM mmol/L 4.4 3.5 3.7   < > 4.2   CHLORIDE mmol/L 104 102 100   < > 98   CO2 mmol/L 23.0 23.0 24.0   < > 25.0   BUN mg/dL 36* 44* 46*   < > 16   CREATININE mg/dL 1.22 1.44* 1.54*   < > 1.09   GLUCOSE mg/dL 207* 165* 186*   < > 168*   CALCIUM mg/dL 9.0 8.6 8.6   < > 8.7   BILIRUBIN mg/dL  --   --   --   --  0.4   ALK PHOS U/L  --   --   --   --  84   ALT (SGPT) U/L  --   --   --   --  11   AST (SGOT) U/L  --   --   --   --  21   ANION GAP mmol/L 12.0 13.0 12.0   < > 12.0    < > = values in this interval not displayed.     Estimated Creatinine Clearance: 52.1 mL/min (by C-G formula based on SCr of 1.22 mg/dL).  Results from last 7 days   Lab Units 03/27/23 0301 03/26/23 0317 03/25/23  1251   MAGNESIUM mg/dL 2.2 2.0 2.1         Results from last 7 days   Lab Units 03/29/23 0337 03/28/23 0203 03/27/23  0301 03/26/23  0317 03/25/23  1251   WBC 10*3/mm3 6.16 5.70 8.11 4.02 6.51   HEMOGLOBIN g/dL 9.3* 9.3* 9.6* 9.7* 11.3*   HEMATOCRIT % 27.6* 27.1*  27.4* 28.9* 34.1*   PLATELETS 10*3/mm3 160 156 166 147 182     Results from last 7 days   Lab Units 03/25/23  1048   INR  1.31*       Culture Data:   Blood Culture   Date Value Ref Range Status   03/26/2023 No growth at 2 days  Preliminary   03/26/2023 No growth at 2 days  Preliminary     No results found for: URINECX  Respiratory Culture   Date Value Ref Range Status   03/27/2023   Final    Moderate growth (3+) Normal respiratory lorenza. No S. aureus or Pseudomonas aeruginosa detected. Final report.     No results found for: WOUNDCX  No results found for: STOOLCX  No components found for: BODYFLD    Radiology Data:   Imaging Results (Last 24 Hours)     Procedure Component Value Units Date/Time    XR Hip With or Without Pelvis 2 - 3 View Right [[id number]] Collected: 03/29/23 0643     Updated: 03/29/23 1207    Narrative:        Two view portable right hip with portable single view pelvis    HISTORY: Pain. Difficulty bearing weight.    Portable AP and and frog-leg lateral views of the right hip and  AP film of the pelvis with the hips in the position obtained.    COMPARISON: None. Correlation CT pelvis October 10, 2018.    FINDINGS:   No fracture or dislocation.   Degenerative disc disease L4-5 and L5-S1.  No other osseous or articular abnormality.      Impression:      CONCLUSION:  Normal right hip.  Degenerative disc disease L4-5 and L5-S1.    63357    Electronically signed by:  Spencer Bolden MD  3/29/2023 12:05 PM  CDT Workstation: [id number]          I have reviewed the patient's current medications.     Assessment/Plan     Multifocal pneumonia     POA;  Continue with rocephin and doxycycline 4/5     CT chest corroborates this; will treat it as bacterial since lactic acid was elevated, which has normalized now    Elevated troponin     Likely type II MI; no chest pain     Defer to cardiology anticoagulation orders    DOMINIQUE (not POA)      Likely due to diuretics use; hold these for now; stable and improving    Insomnia      Benadryl helped him to get a good night of sleep    Medical Decision Making  Number and Complexity of problems: one major  Differential Diagnosis: PNA    Conditions and Status:        Condition is improving.     MDM Data  External documents reviewed: previous medical records  My EKG interpretation: no acute events  My plain film interpretation: no acute events     Decision rules/scores evaluated (example [id number], Wells, etc): n/a     Discussed with: patient and his wife      Care Planning  Shared decision making: his wife  Code status and discussions: full code    Disposition  Social Determinants of Health that impact treatment or disposition: none      He may need placement       I confirmed that the patient's Advance Care Plan is present, code status is documented, or surrogate decision maker is listed in the patient's medical record.     Lela Cruz MD      Electronically signed by Lela Cruz MD at 03/29/23 1007

## 2023-03-30 NOTE — DISCHARGE PLACEMENT REQUEST
"Evgeny Bartlett Jr. (89 y.o. Male)     Date of Birth   06/11/1933    Social Security Number       Address   [place] [address]    Home Phone   [phone]    MRN   [id number]       Congregation   [employer]    Marital Status                               Admission Date   3/25/23    Admission Type   Emergency    Admitting Provider   Eliezer Thomas MD    Attending Provider   Eliezer Thomas MD    Department, Room/Bed   [address], 371/1       Discharge Date       Discharge Disposition       Discharge Destination                               Attending Provider: Eliezer Thomas MD    Allergies: No Known Allergies    Isolation: None   Infection: None   Code Status: CPR    Ht: 180.3 cm (71\")   Wt: 89 kg (196 lb 3.4 oz)    Admission Cmt: None   Principal Problem: Acute pulmonary edema (HCC) [J81.0]                 Active Insurance as of 3/25/2023     Primary Coverage     Payor Plan Insurance Group Employer/Plan Group    [facility] MEDICARE REPLACEMENT [facility] MEDICARE REPLACEMENT [id number]     Payor Plan Address Payor Plan Phone Number Payor Plan Fax Number Effective Dates    [address] [address]   1/1/2016 - None Entered    [facility] [id number]       Subscriber Name Subscriber Birth Date Member ID       EVGENY BARTLETT JR. 6/11/1933 [id number]                 Emergency Contacts      (Rel.) Home Phone Work Phone Mobile Phone    Shweta Bartlett (Spouse) [phone] -- [phone]              "

## 2023-03-30 NOTE — SIGNIFICANT NOTE
03/30/23 1102   OTHER   Discipline physical therapy assistant   Rehab Time/Intention   Session Not Performed patient/family declined treatment

## 2023-03-31 LAB
BACTERIA SPEC AEROBE CULT: NORMAL
BACTERIA SPEC AEROBE CULT: NORMAL
GLUCOSE BLDC GLUCOMTR-MCNC: 142 MG/DL (ref 70–130)
GLUCOSE BLDC GLUCOMTR-MCNC: 195 MG/DL (ref 70–130)
GLUCOSE BLDC GLUCOMTR-MCNC: 233 MG/DL (ref 70–130)
GLUCOSE BLDC GLUCOMTR-MCNC: 290 MG/DL (ref 70–130)

## 2023-03-31 PROCEDURE — 63710000001 INSULIN ASPART PER 5 UNITS: Performed by: INTERNAL MEDICINE

## 2023-03-31 PROCEDURE — 82962 GLUCOSE BLOOD TEST: CPT

## 2023-03-31 PROCEDURE — 63710000001 METHYLPREDNISOLONE PER 4 MG: Performed by: INTERNAL MEDICINE

## 2023-03-31 PROCEDURE — 94760 N-INVAS EAR/PLS OXIMETRY 1: CPT

## 2023-03-31 PROCEDURE — 94799 UNLISTED PULMONARY SVC/PX: CPT

## 2023-03-31 PROCEDURE — 97116 GAIT TRAINING THERAPY: CPT

## 2023-03-31 PROCEDURE — 97110 THERAPEUTIC EXERCISES: CPT

## 2023-03-31 PROCEDURE — 94664 DEMO&/EVAL PT USE INHALER: CPT

## 2023-03-31 PROCEDURE — 63710000001 METHYLPREDNISOLONE 4 MG TABLET THERAPY PACK: Performed by: STUDENT IN AN ORGANIZED HEALTH CARE EDUCATION/TRAINING PROGRAM

## 2023-03-31 RX ORDER — METHYLPREDNISOLONE 4 MG/1
4 TABLET ORAL
Status: COMPLETED | OUTPATIENT
Start: 2023-03-31 | End: 2023-03-31

## 2023-03-31 RX ORDER — METHYLPREDNISOLONE 4 MG/1
4 TABLET ORAL
Status: DISCONTINUED | OUTPATIENT
Start: 2023-03-31 | End: 2023-03-31

## 2023-03-31 RX ADMIN — GUAIFENESIN 600 MG: 600 TABLET, EXTENDED RELEASE ORAL at 20:26

## 2023-03-31 RX ADMIN — LISINOPRIL 20 MG: 20 TABLET ORAL at 08:44

## 2023-03-31 RX ADMIN — CLOPIDOGREL BISULFATE 75 MG: 75 TABLET ORAL at 08:44

## 2023-03-31 RX ADMIN — Medication 10 ML: at 08:45

## 2023-03-31 RX ADMIN — ALBUTEROL SULFATE 2.5 MG: 2.5 SOLUTION RESPIRATORY (INHALATION) at 03:47

## 2023-03-31 RX ADMIN — AMIODARONE HYDROCHLORIDE 200 MG: 200 TABLET ORAL at 08:44

## 2023-03-31 RX ADMIN — ALBUTEROL SULFATE 2.5 MG: 2.5 SOLUTION RESPIRATORY (INHALATION) at 16:30

## 2023-03-31 RX ADMIN — METHYLPREDNISOLONE 8 MG: 4 TABLET ORAL at 20:43

## 2023-03-31 RX ADMIN — PANTOPRAZOLE SODIUM 40 MG: 40 TABLET, DELAYED RELEASE ORAL at 05:08

## 2023-03-31 RX ADMIN — APIXABAN 2.5 MG: 2.5 TABLET, FILM COATED ORAL at 20:27

## 2023-03-31 RX ADMIN — DILTIAZEM HYDROCHLORIDE 180 MG: 180 CAPSULE, COATED, EXTENDED RELEASE ORAL at 08:44

## 2023-03-31 RX ADMIN — ALBUTEROL SULFATE 2.5 MG: 2.5 SOLUTION RESPIRATORY (INHALATION) at 07:00

## 2023-03-31 RX ADMIN — INSULIN ASPART 2 UNITS: 100 INJECTION, SOLUTION INTRAVENOUS; SUBCUTANEOUS at 17:56

## 2023-03-31 RX ADMIN — GUAIFENESIN 600 MG: 600 TABLET, EXTENDED RELEASE ORAL at 08:44

## 2023-03-31 RX ADMIN — METHYLPREDNISOLONE 4 MG: 4 TABLET ORAL at 17:56

## 2023-03-31 RX ADMIN — MELATONIN 6 MG: 3 TAB ORAL at 21:24

## 2023-03-31 RX ADMIN — TERAZOSIN HYDROCHLORIDE 2 MG: 2 CAPSULE ORAL at 20:26

## 2023-03-31 RX ADMIN — ALBUTEROL SULFATE 2.5 MG: 2.5 SOLUTION RESPIRATORY (INHALATION) at 22:24

## 2023-03-31 RX ADMIN — ALBUTEROL SULFATE 2.5 MG: 2.5 SOLUTION RESPIRATORY (INHALATION) at 18:53

## 2023-03-31 RX ADMIN — METHYLPREDNISOLONE 4 MG: 4 TABLET ORAL at 12:21

## 2023-03-31 RX ADMIN — TIMOLOL MALEATE 1 DROP: 5 SOLUTION/ DROPS OPHTHALMIC at 20:25

## 2023-03-31 RX ADMIN — MELATONIN 6 MG: 3 TAB ORAL at 01:08

## 2023-03-31 RX ADMIN — INSULIN ASPART 3 UNITS: 100 INJECTION, SOLUTION INTRAVENOUS; SUBCUTANEOUS at 08:44

## 2023-03-31 RX ADMIN — APIXABAN 2.5 MG: 2.5 TABLET, FILM COATED ORAL at 08:44

## 2023-03-31 RX ADMIN — Medication 10 ML: at 20:25

## 2023-03-31 RX ADMIN — TIMOLOL MALEATE 1 DROP: 5 SOLUTION/ DROPS OPHTHALMIC at 08:46

## 2023-03-31 RX ADMIN — ALBUTEROL SULFATE 2.5 MG: 2.5 SOLUTION RESPIRATORY (INHALATION) at 10:27

## 2023-03-31 RX ADMIN — Medication 81 MG: at 08:44

## 2023-03-31 RX ADMIN — DOXYCYCLINE 100 MG: 100 CAPSULE ORAL at 08:44

## 2023-03-31 NOTE — THERAPY TREATMENT NOTE
Acute Care - Physical Therapy Treatment Note  [facility]     Patient Name: Evgeny Carter Jr.  : 1933  MRN: [id number]  Today's Date: 3/31/2023      Visit Dx:     ICD-10-CM ICD-9-CM   1. Acute pulmonary edema (HCC)  J81.0 518.4   2. Cough, unspecified type  R05.9 786.2   3. Impaired functional mobility, balance, gait, and endurance  Z74.09 V49.89   4. Impaired mobility and ADLs  Z74.09 V49.89    Z78.9      Patient Active Problem List   Diagnosis   • Atrial flutter (HCC)   • Coronary artery disease involving native coronary artery of native heart without angina pectoris   • Benign essential hypertension   • Type 2 diabetes mellitus without complication (HCC)   • Anxiety state   • Depression   • Hyperlipidemia   • Dilated aortic root (HCC)   • Paroxysmal atrial fibrillation (HCC)   • Bladder tumor   • Malignant tumor of urinary bladder (HCC)   • Sick sinus syndrome (HCC)   • Pacemaker   • History of colonic polyps   • Esophagitis   • Diverticulosis of colon without diverticulitis   • Anemia   • Acute pulmonary edema (HCC)   • Multifocal pneumonia     Past Medical History:   Diagnosis Date   • Allergic rhinitis    • Arthritis     hips, knees, feet   • Artificial lens present     in position   • Benign neoplasm of skin of eyelid    • Bladder tumor    • Bladder tumor    • BPH (benign prostatic hyperplasia)    • Cancer (HCC)     SKIN    • Cataract    • Coronary arteriosclerosis    • Cortical senile cataract    • Cough    • Diabetes mellitus (HCC)    • Esophagitis 2021   • Essential hypertension    • Essential hypertension    • Hypercholesterolemia    • Hypercholesterolemia    • Kidney stones    • MI, old    • Nonexudative age-related macular degeneration     mild   • Nuclear cataract    • Open-angle glaucoma     s/p trabec OS, doing well      • Primary open angle glaucoma     OS, s/p trabeculectomy; IOP down with timolol OD     • Pseudophakia of right eye    • Rhinitis    • Upper respiratory  infection    • Vitreous detachment     peripheral     Past Surgical History:   Procedure Laterality Date   • ANKLE SURGERY  07/29/2000    Ankle surgery (ORIF right ankle fracture. Bimalleolar right fracture)   • APPENDECTOMY     • CARDIAC ABLATION     • CARDIAC CATHETERIZATION  07/02/1987    Cardiac cath 56754 (Coronary arthosclerotic heart disease. maintained patency of RCA. Mild inferior hypokinesis.)   • CARDIAC CATHETERIZATION  06/30/1987    Cardiac cath 98366 (Coronary atherosclerotic heart disease. Occluded RCA. Acute inferior infarction. S/P successful angioplasty of RCA)   • CARDIAC ELECTROPHYSIOLOGY PROCEDURE N/A 6/11/2020    Procedure: Pacer single lead;  Surgeon: Johnny Forbes MD;  Location: [facility] CATH INVASIVE LOCATION;  Service: Cardiology;  Laterality: N/A;   • CATARACT EXTRACTION  04/01/2010    Remove cataract, insert lens (Complicated cataract extraction with intraocular lens implantation, right eye, Marcelo SN-60 WF serial # [id number]: 20.5 diopter)   • CATARACT EXTRACTION W/ INTRAOCULAR LENS IMPLANT Left 3/23/2018    Procedure: CATARACT PHACO EXTRACTION WITH INTRAOCULAR LENS IMPLANT;  Surgeon: Oli Haley MD;  Location: [facility] OR;  Service: Ophthalmology   • COLONOSCOPY N/A 5/9/2019    Procedure: COLONOSCOPY;  Surgeon: Rainer Gates DO;  Location: [facility] ENDOSCOPY;  Service: Gastroenterology   • COLONOSCOPY N/A 11/12/2020    Procedure: COLONOSCOPY;  Surgeon: Rainer Gates DO;  Location: [facility] ENDOSCOPY;  Service: Gastroenterology;  Laterality: N/A;   • ENDOSCOPY  02/19/1990    EGD 22432 (Olympus video endoscope.Fibrin-like patch in stomach)   • ENDOSCOPY N/A 11/12/2020    Procedure: ESOPHAGOGASTRODUODENOSCOPY;  Surgeon: Rainer Gates DO;  Location: [facility] ENDOSCOPY;  Service: Gastroenterology;  Laterality: N/A;   • EYE SURGERY  02/02/2005    Eye surgery procedure (Repair of operative wound leak, left eye. S/P trabeculectomy with operative wound leak)   • EYE SURGERY   01/19/2005    Eye surgery procedure (Trabeculectomy, left eye. 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 degeneration)    • OTHER SURGICAL HISTORY  02/26/2015    EXTENDED VISUAL FIELDS STUDY 97740 (Primary open angle glaucoma)    • OTHER SURGICAL HISTORY  02/26/2015    EXTENDED VISUAL FIELDS STUDY 82397 (Primary open angle glaucoma)    • OTHER SURGICAL HISTORY  06/16/2016    OCT DISC NFL 63460 (Open-angle glaucoma)    • OTHER SURGICAL HISTORY      OCT DISC NFL 22309 (Primary open angle glaucoma) (3): 06/17/2015, 03/19/2014, 03/04/2013   • OTHER SURGICAL HISTORY  06/16/2016    OPTIC NERVE HEAD EVAL PERFORMED 2027F (Open-angle glaucoma)    • TRANSURETHRAL RESECTION OF BLADDER TUMOR N/A 5/9/2018    Procedure: CYSTOSCOPY TRANSURETHRAL RESECTION OF BLADDER TUMOR;  Surgeon: Donovan Conner MD;  Location: [facility] OR;  Service: Urology   • TRANSURETHRAL RESECTION OF BLADDER TUMOR N/A 7/26/2019    Procedure: CYSTOSCOPY TRANSURETHRAL RESECTION OF BLADDER TUMOR;  Surgeon: Donovan Conner MD;  Location: [facility] OR;  Service: Urology   • TRANSURETHRAL RESECTION OF BLADDER TUMOR N/A 7/29/2020    Procedure: CYSTOSCOPY TRANSURETHRAL RESECTION OF BLADDER TUMOR;  Surgeon: Donovan Conner MD;  Location: [facility] OR;  Service: Urology;  Laterality: N/A;   • UVULECTOMY  1969    EXCISION OF UVULA 12039 (partial uvulectomy with excision of papilloma. Redunant uvula with a small papilloma)     PT Assessment (last 12 hours)     PT Evaluation and Treatment     Row Name 03/31/23 1249          Physical Therapy Time and Intention    Subjective Information complains of;pain  -TA     Document Type therapy note (daily note)  -TA     Mode of Treatment physical therapy  -TA     Row Name 03/31/23 9884          General Information    Patient  Profile Reviewed yes  -TA     Existing Precautions/Restrictions fall  -TA     Row Name 03/31/23 1249          Pain    Pretreatment Pain Rating 8/10  -TA     Posttreatment Pain Rating 9/10  -TA     Pain Location - Side/Orientation Right  -TA     Pain Location lower  -TA     Pain Location - extremity  -TA     Row Name 03/31/23 1249          Cognition    Orientation Status (Cognition) oriented x 4  -TA     Personal Safety Interventions fall prevention program maintained;gait belt;muscle strengthening facilitated;nonskid shoes/slippers when out of bed;supervised activity  -TA     Row Name 03/31/23 1249          Range of Motion Comprehensive    General Range of Motion bilateral lower extremity ROM WFL  -TA     Row Name 03/31/23 1249          Bed Mobility    Bed Mobility supine-sit;sit-supine  -TA     Supine-Sit [place] (Bed Mobility) modified independence  -TA     Sit-Supine [place] (Bed Mobility) modified independence  -TA     Assistive Device (Bed Mobility) head of bed elevated  -TA     Row Name 03/31/23 1249          Sit-Stand Transfer    Sit-Stand [place] (Transfers) standby assist  -TA     Assistive Device (Sit-Stand Transfers) walker, front-wheeled  -TA     Row Name 03/31/23 1249          Stand-Sit Transfer    Stand-Sit [place] (Transfers) standby assist  -TA     Assistive Device (Stand-Sit Transfers) walker, front-wheeled  -TA     Row Name 03/31/23 1249          Gait/Stairs (Locomotion)    [place] Level (Gait) contact guard;standby assist  -TA     Assistive Device (Gait) walker, front-wheeled  -TA     Distance in Feet (Gait) 70`  -TA     Bilateral Gait Deviations forward flexed posture  -TA     Row Name 03/31/23 1249          Safety Issues, Functional Mobility    Impairments Affecting Function (Mobility) strength;endurance/activity tolerance;balance  -TA     Row Name 03/31/23 1249          Hip (Therapeutic Exercise)    Hip (Therapeutic Exercise) AROM (active range of motion)  -TA     Hip  AROM (Therapeutic Exercise) bilateral;aBduction;aDduction;sitting;15 repititions  -TA     Row Name 03/31/23 1249          Knee (Therapeutic Exercise)    Knee (Therapeutic Exercise) AROM (active range of motion)  -TA     Knee AROM (Therapeutic Exercise) bilateral;LAQ (long arc quad);sitting;15 repititions  -TA     Row Name 03/31/23 1249          Ankle (Therapeutic Exercise)    Ankle (Therapeutic Exercise) AROM (active range of motion)  -TA     Ankle AROM (Therapeutic Exercise) bilateral;dorsiflexion;plantarflexion;sitting;15 repititions  -TA     Row Name 03/31/23 1249          Plan of Care Review    Plan of Care Reviewed With patient  -TA     Outcome Evaluation pt sup<>sit with independence, sit<>stand with SBA, pt ambulated 70` with RW & CGA-SBA, pt would benefit from assistance & continued PPT services  -TA     Row Name 03/31/23 1249          Vital Signs    Pre Systolic BP Rehab 113  -TA     Pre Treatment Diastolic BP 65  -TA     Post Systolic BP Rehab 139  -TA     Post Treatment Diastolic BP 58  -TA     Pretreatment Heart Rate (beats/min) 69  -TA     Intratreatment Heart Rate (beats/min) 81  -TA     Posttreatment Heart Rate (beats/min) 87  -TA     Pre SpO2 (%) 97  -TA     O2 Delivery Pre Treatment room air  -TA     Intra SpO2 (%) 96  -TA     O2 Delivery Intra Treatment room air  -TA     Post SpO2 (%) 96  -TA     O2 Delivery Post Treatment room air  -TA     Pre Patient Position Supine  -TA     Post Patient Position Supine  -TA     Row Name 03/31/23 1249          Positioning and Restraints    Pre-Treatment Position in bed  -TA     Post Treatment Position bed  -TA     In Bed supine;call light within reach;exit alarm on  -TA     Row Name 03/31/23 1249          Therapy Assessment/Plan (PT)    Rehab Potential (PT) good, to achieve stated therapy goals  -TA     Criteria for Skilled Interventions Met (PT) yes;skilled treatment is necessary  -TA     Therapy Frequency (PT) 5 times/wk  -TA     Comment, Therapy  Assessment/Plan (PT) continue  -TA     Row Name 03/31/23 1249          Bed Mobility Goal 1 (PT)    Activity/Assistive Device (Bed Mobility Goal 1, PT) sit to supine/supine to sit  -TA     [place] Level/Cues Needed (Bed Mobility Goal 1, PT) independent  -TA     Time Frame (Bed Mobility Goal 1, PT) by discharge  -TA     Strategies/Barriers (Bed Mobility Goal 1, PT) HOB flat, no be rails.  -TA     Progress/Outcomes (Bed Mobility Goal 1, PT) goal not met  -TA     Row Name 03/31/23 1249          Transfer Goal 1 (PT)    Activity/Assistive Device (Transfer Goal 1, PT) sit-to-stand/stand-to-sit;bed-to-chair/chair-to-bed;walker, rolling  -TA     [place] Level/Cues Needed (Transfer Goal 1, PT) modified independence  -TA     Time Frame (Transfer Goal 1, PT) by discharge  -TA     Progress/Outcome (Transfer Goal 1, PT) goal not met  -TA     Row Name 03/31/23 1249          Gait Training Goal 1 (PT)    Activity/Assistive Device (Gait Training Goal 1, PT) walker, rolling  -TA     [place] Level (Gait Training Goal 1, PT) modified independence  -TA     Distance (Gait Training Goal 1, PT) 150'x2.  -TA     Time Frame (Gait Training Goal 1, PT) by discharge  -TA     Strategies/Barriers (Gait Training Goal 1, PT) May advance to (I) without an AD.  -TA     Progress/Outcome (Gait Training Goal 1, PT) goal not met  -TA     Row Name 03/31/23 1249          Problem Specific Goal 1 (PT)    Problem Specific Goal 1 (PT) Score 25/28 on Tinetti fall risk assessment.  -TA     Time Frame (Problem Specific Goal 1, PT) by discharge  -TA     Progress/Outcome (Problem Specific Goal 1, PT) goal not met  -TA           User Key  (r) = Recorded By, (t) = Taken By, (c) = Cosigned By    Initials Name Provider Type    Amy Block PTA Physical Therapist Assistant                Physical Therapy Education     Title: PT OT SLP Therapies (In Progress)     Topic: Physical Therapy (In Progress)     Point: Mobility training (Not Started)      Learner Progress:  Not documented in this visit.          Point: Home exercise program (Not Started)     Learner Progress:  Not documented in this visit.          Point: Body mechanics (Not Started)     Learner Progress:  Not documented in this visit.          Point: Precautions (Done)     Learning Progress Summary           Patient Acceptance, E, VU by  at 3/26/2023 1025    Comment: Tinetti assessed: 23/28 or moderate fall risk, recommend use of FWW.   Family Acceptance, E, VU by  at 3/26/2023 1025    Comment: Dale assessed: 23/28 or moderate fall risk, recommend use of FWW.                               User Key     Initials Effective Dates Name Provider Type Discipline     09/18/22 -  Francois Gamino, PT Physical Therapist PT              PT Recommendation and Plan  Therapy Frequency (PT): 5 times/wk  Plan of Care Reviewed With: patient  Outcome Evaluation: pt sup<>sit with independence, sit<>stand with SBA, pt ambulated 70` with RW & CGA-SBA, pt would benefit from assistance & continued PPT services   Outcome Measures     Row Name 03/31/23 1600             How much help from another person do you currently need...    Turning from your back to your side while in flat bed without using bedrails? 4  -TA      Moving from lying on back to sitting on the side of a flat bed without bedrails? 4  -TA      Moving to and from a bed to a chair (including a wheelchair)? 3  -TA      Standing up from a chair using your arms (e.g., wheelchair, bedside chair)? 3  -TA      Climbing 3-5 steps with a railing? 2  -TA      To walk in hospital room? 3  -TA      AM-PAC 6 Clicks Score (PT) 19  -TA         Functional Assessment    Outcome Measure Options AM-PAC 6 Clicks Basic Mobility (PT)  -TA            User Key  (r) = Recorded By, (t) = Taken By, (c) = Cosigned By    Initials Name Provider Type    Amy Block, PTA Physical Therapist Assistant                 Time Calculation:    PT Charges     Row Name 03/31/23 9975              Time Calculation    Start Time 1249  -TA      Stop Time 1312  -TA      Time Calculation (min) 23 min  -TA      PT Received On 03/31/23  -TA         Time Calculation- PT    Total Timed Code Minutes- PT 23 minute(s)  -TA         Timed Charges    11177 - PT Therapeutic Exercise Minutes 10  -TA      57466 - Gait Training Minutes  13  -TA         Total Minutes    Timed Charges Total Minutes 23  -TA       Total Minutes 23  -TA            User Key  (r) = Recorded By, (t) = Taken By, (c) = Cosigned By    Initials Name Provider Type    Amy Block PTA Physical Therapist Assistant              Therapy Charges for Today     Code Description Service Date Service Provider Modifiers Qty    23025169789 HC PT THER PROC EA 15 MIN 3/31/2023 Amy Infante, MACI GP 1    24747997378 HC GAIT TRAINING EA 15 MIN 3/31/2023 Amy Infante, MACI GP 1          PT G-Codes  Outcome Measure Options: AM-PAC 6 Clicks Basic Mobility (PT)  AM-PAC 6 Clicks Score (PT): 19  AM-PAC 6 Clicks Score (OT): 21  T[place]etti Total Score: 23    Amy Infante PTA  3/31/2023     No

## 2023-03-31 NOTE — THERAPY TREATMENT NOTE
Patient Name: Evgeny Carter Jr.  : 1933    MRN: [id number]                              Today's Date: 3/31/2023       Admit Date: 3/25/2023    Visit Dx:     ICD-10-CM ICD-9-CM   1. Acute pulmonary edema (HCC)  J81.0 518.4   2. Cough, unspecified type  R05.9 786.2   3. Impaired functional mobility, balance, gait, and endurance  Z74.09 V49.89   4. Impaired mobility and ADLs  Z74.09 V49.89    Z78.9      Patient Active Problem List   Diagnosis   • Atrial flutter (HCC)   • Coronary artery disease involving native coronary artery of native heart without angina pectoris   • Benign essential hypertension   • Type 2 diabetes mellitus without complication (HCC)   • Anxiety state   • Depression   • Hyperlipidemia   • Dilated aortic root (HCC)   • Paroxysmal atrial fibrillation (HCC)   • Bladder tumor   • Malignant tumor of urinary bladder (HCC)   • Sick sinus syndrome (HCC)   • Pacemaker   • History of colonic polyps   • Esophagitis   • Diverticulosis of colon without diverticulitis   • Anemia   • Acute pulmonary edema (HCC)   • Multifocal pneumonia     Past Medical History:   Diagnosis Date   • Allergic rhinitis    • Arthritis     hips, knees, feet   • Artificial lens present     in position   • Benign neoplasm of skin of eyelid    • Bladder tumor    • Bladder tumor    • BPH (benign prostatic hyperplasia)    • Cancer (HCC)     SKIN    • Cataract    • Coronary arteriosclerosis    • Cortical senile cataract    • Cough    • Diabetes mellitus (HCC)    • Esophagitis 2021   • Essential hypertension    • Essential hypertension    • Hypercholesterolemia    • Hypercholesterolemia    • Kidney stones    • MI, old    • Nonexudative age-related macular degeneration     mild   • Nuclear cataract    • Open-angle glaucoma     s/p trabec OS, doing well      • Primary open angle glaucoma     OS, s/p trabeculectomy; IOP down with timolol OD     • Pseudophakia of right eye    • Rhinitis    • Upper respiratory infection     • Vitreous detachment     peripheral     Past Surgical History:   Procedure Laterality Date   • ANKLE SURGERY  07/29/2000    Ankle surgery (ORIF right ankle fracture. Bimalleolar right fracture)   • APPENDECTOMY     • CARDIAC ABLATION     • CARDIAC CATHETERIZATION  07/02/1987    Cardiac cath 75879 (Coronary arthosclerotic heart disease. maintained patency of RCA. Mild inferior hypokinesis.)   • CARDIAC CATHETERIZATION  06/30/1987    Cardiac cath 77145 (Coronary atherosclerotic heart disease. Occluded RCA. Acute inferior infarction. S/P successful angioplasty of RCA)   • CARDIAC ELECTROPHYSIOLOGY PROCEDURE N/A 6/11/2020    Procedure: Pacer single lead;  Surgeon: Johnny Forbes MD;  Location: [facility] CATH INVASIVE LOCATION;  Service: Cardiology;  Laterality: N/A;   • CATARACT EXTRACTION  04/01/2010    Remove cataract, insert lens (Complicated cataract extraction with intraocular lens implantation, right eye, Marcelo SN-60 WF serial # [id number]: 20.5 diopter)   • CATARACT EXTRACTION W/ INTRAOCULAR LENS IMPLANT Left 3/23/2018    Procedure: CATARACT PHACO EXTRACTION WITH INTRAOCULAR LENS IMPLANT;  Surgeon: Oli Haley MD;  Location: [facility] OR;  Service: Ophthalmology   • COLONOSCOPY N/A 5/9/2019    Procedure: COLONOSCOPY;  Surgeon: Rainer Gates DO;  Location: [facility] ENDOSCOPY;  Service: Gastroenterology   • COLONOSCOPY N/A 11/12/2020    Procedure: COLONOSCOPY;  Surgeon: Rainer Gates DO;  Location: [facility] ENDOSCOPY;  Service: Gastroenterology;  Laterality: N/A;   • ENDOSCOPY  02/19/1990    EGD 12618 (Olympus video endoscope.Fibrin-like patch in stomach)   • ENDOSCOPY N/A 11/12/2020    Procedure: ESOPHAGOGASTRODUODENOSCOPY;  Surgeon: Rainer Gates DO;  Location: [facility] ENDOSCOPY;  Service: Gastroenterology;  Laterality: N/A;   • EYE SURGERY  02/02/2005    Eye surgery procedure (Repair of operative wound leak, left eye. S/P trabeculectomy with operative wound leak)   • EYE SURGERY   01/19/2005    Eye surgery procedure (Trabeculectomy, left eye. 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 degeneration)    • OTHER SURGICAL HISTORY  02/26/2015    EXTENDED VISUAL FIELDS STUDY 79002 (Primary open angle glaucoma)    • OTHER SURGICAL HISTORY  02/26/2015    EXTENDED VISUAL FIELDS STUDY 09453 (Primary open angle glaucoma)    • OTHER SURGICAL HISTORY  06/16/2016    OCT DISC NFL 89871 (Open-angle glaucoma)    • OTHER SURGICAL HISTORY      OCT DISC NFL 32364 (Primary open angle glaucoma) (3): 06/17/2015, 03/19/2014, 03/04/2013   • OTHER SURGICAL HISTORY  06/16/2016    OPTIC NERVE HEAD EVAL PERFORMED 2027F (Open-angle glaucoma)    • TRANSURETHRAL RESECTION OF BLADDER TUMOR N/A 5/9/2018    Procedure: CYSTOSCOPY TRANSURETHRAL RESECTION OF BLADDER TUMOR;  Surgeon: Donovan Conner MD;  Location: [facility] OR;  Service: Urology   • TRANSURETHRAL RESECTION OF BLADDER TUMOR N/A 7/26/2019    Procedure: CYSTOSCOPY TRANSURETHRAL RESECTION OF BLADDER TUMOR;  Surgeon: Donovan Conner MD;  Location: [facility] OR;  Service: Urology   • TRANSURETHRAL RESECTION OF BLADDER TUMOR N/A 7/29/2020    Procedure: CYSTOSCOPY TRANSURETHRAL RESECTION OF BLADDER TUMOR;  Surgeon: Donovan Conner MD;  Location: [facility] OR;  Service: Urology;  Laterality: N/A;   • UVULECTOMY  1969    EXCISION OF UVULA 44214 (partial uvulectomy with excision of papilloma. Redunant uvula with a small papilloma)      General Information     Row Name 03/31/23 1325          OT Time and Intention    Document Type therapy note (daily note)  -RC     Mode of Treatment occupational therapy;individual therapy  -RC     Row Name 03/31/23 7004          General Information    Patient Profile Reviewed yes  -RC     Existing Precautions/Restrictions fall  -RC     Row Name  03/31/23 1325          Cognition    Orientation Status (Cognition) oriented x 4  -     Row Name 03/31/23 1325          Safety Issues, Functional Mobility    Impairments Affecting Function (Mobility) strength;endurance/activity tolerance;balance  -           User Key  (r) = Recorded By, (t) = Taken By, (c) = Cosigned By    Initials Name Provider Type     Paola Last COTA Occupational Therapist Assistant                 Mobility/ADL's    No documentation.                Obj/Interventions    No documentation.                Goals/Plan     Row Name 03/31/23 1325          Bathing Goal 1 (OT)    Activity/Device (Bathing Goal 1, OT) lower body bathing;shower chair  -     Barry Level/Cues Needed (Bathing Goal 1, OT) modified independence  -     Time Frame (Bathing Goal 1, OT) long term goal (LTG);by discharge  -     Progress/Outcomes (Bathing Goal 1, OT) goal not met  -     Row Name 03/31/23 1325          Strength Goal 1 (OT)    Strength Goal 1 (OT) Pt will tolerate at least 20 minutes BUE therrapeutic exercises with SpO2 above 90% for increased endurance requried for ADLs and mobility.  -     Time Frame (Strength Goal 1, OT) long term goal (LTG);by discharge  -     Progress/Outcome (Strength Goal 1, OT) goal not met  -     Row Name 03/31/23 1325          Problem Specific Goal 1 (OT)    Problem Specific Goal 1 (OT) Pt will tolerate at least 5 consecutive minutes standing ADLs with SPV and zero LOB for increased safety and balance required for ADLs and mobility.  -     Time Frame (Problem Specific Goal 1, OT) long term goal (LTG);by discharge  -     Progress/Outcome (Problem Specific Goal 1, OT) goal not met  -           User Key  (r) = Recorded By, (t) = Taken By, (c) = Cosigned By    Initials Name Provider Type    Paola Muñoz COTA Occupational Therapist Assistant               Clinical Impression     Row Name 03/31/23 1325          Pain Assessment    Pretreatment Pain Rating  0/10 - no pain  -     Posttreatment Pain Rating 0/10 - no pain  -RC     Row Name 03/31/23 1325          Therapy Assessment/Plan (OT)    Rehab Potential (OT) good, to achieve stated therapy goals  -     Criteria for Skilled Therapeutic Interventions Met (OT) yes;meets criteria  -     Therapy Frequency (OT) other (see comments)  5-7 d/wk  -     Row Name 03/31/23 1325          Therapy Plan Review/Discharge Plan (OT)    Anticipated Discharge Disposition (OT) home with assist;home with home health  -           User Key  (r) = Recorded By, (t) = Taken By, (c) = Cosigned By    Initials Name Provider Type     Paola Last COTA Occupational Therapist Assistant               Outcome Measures     Row Name 03/31/23 1325          How much help from another is currently needed...    Putting on and taking off regular lower body clothing? 3  -RC     Bathing (including washing, rinsing, and drying) 3  -RC     Toileting (which includes using toilet bed pan or urinal) 3  -RC     Putting on and taking off regular upper body clothing 4  -RC     Taking care of personal grooming (such as brushing teeth) 4  -RC     Eating meals 4  -RC     AM-PAC 6 Clicks Score (OT) 21  -     Row Name 03/31/23 1005          How much help from another person do you currently need...    Turning from your back to your side while in flat bed without using bedrails? 4  -MD     Moving from lying on back to sitting on the side of a flat bed without bedrails? 4  -MD     Moving to and from a bed to a chair (including a wheelchair)? 3  -MD     Standing up from a chair using your arms (e.g., wheelchair, bedside chair)? 3  -MD     Climbing 3-5 steps with a railing? 2  -MD     To walk in hospital room? 3  -MD     AM-PAC 6 Clicks Score (PT) 19  -MD     Highest level of mobility 6 --> Walked 10 steps or more  -MD     Row Name 03/31/23 1325          Functional Assessment    Outcome Measure Options AM-PAC 6 Clicks Daily Activity (OT)  -           User  Key  (r) = Recorded By, (t) = Taken By, (c) = Cosigned By    Initials Name Provider Type    Paola Muñoz COTA Occupational Therapist Assistant    Ten Santiago LPN Licensed Nurse                Occupational Therapy Education     Title: PT OT SLP Therapies (In Progress)     Topic: Occupational Therapy (In Progress)     Point: ADL training (Done)     Description:   Instruct learner(s) on proper safety adaptation and remediation techniques during self care or transfers.   Instruct in proper use of assistive devices.              Learning Progress Summary           Patient Acceptance, E,TB, VU by CM at 3/26/2023 1032    Comment: OT POC, Role of OT, d/c recommendations, DME recommendations   Family Acceptance, E,TB, VU by CM at 3/26/2023 1032    Comment: OT POC, Role of OT, d/c recommendations, DME recommendations                   Point: Home exercise program (Not Started)     Description:   Instruct learner(s) on appropriate technique for monitoring, assisting and/or progressing therapeutic exercises/activities.              Learner Progress:  Not documented in this visit.          Point: Precautions (Done)     Description:   Instruct learner(s) on prescribed precautions during self-care and functional transfers.              Learning Progress Summary           Patient Acceptance, E, VU,NR by RC at 3/31/2023 1452    Acceptance, E,TB, VU by CM at 3/26/2023 1032    Comment: OT POC, Role of OT, d/c recommendations, DME recommendations   Family Acceptance, E,TB, VU by CM at 3/26/2023 1032    Comment: OT POC, Role of OT, d/c recommendations, DME recommendations                   Point: Body mechanics (Done)     Description:   Instruct learner(s) on proper positioning and spine alignment during self-care, functional mobility activities and/or exercises.              Learning Progress Summary           Patient Acceptance, E, VU,NR by RC at 3/31/2023 1452    Acceptance, E,TB, VU by CM at 3/26/2023 1032    Comment:  OT POC, Role of OT, d/c recommendations, DME recommendations   Family Acceptance, E,TB, VU by CM at 3/26/2023 1032    Comment: OT POC, Role of OT, d/c recommendations, DME recommendations                               User Key     Initials Effective Dates Name Provider Type Discipline    RC 06/16/21 -  Paola Last COTA Occupational Therapist Assistant OT    CM 11/18/22 -  Yolanda Sims OT Occupational Therapist OT              OT Recommendation and Plan  Therapy Frequency (OT): other (see comments) (5-7 d/wk)  Plan of Care Review  Plan of Care Reviewed With: patient  Progress: improving  Outcome Evaluation: nursing ok'd OT pt agreeable to ex  sitting eob upon entry defers amb reports hip hurts w/ walking   particiated w/ ue ex as patsy pt request to be sitting eob at tx end needs in reach exit alarm on cont poc    recommend cont ot at next level of care     Time Calculation:    Time Calculation- OT     Row Name 03/31/23 1451             Time Calculation- OT    OT Start Time 1325  -RC      OT Stop Time 1350  -RC      OT Time Calculation (min) 25 min  -RC      Total Timed Code Minutes- OT 25 minute(s)  -RC      OT Received On 03/31/23  -RC         Timed Charges    20459 - OT Therapeutic Exercise Minutes 25  -RC         Total Minutes    Timed Charges Total Minutes 25  -RC       Total Minutes 25  -RC            User Key  (r) = Recorded By, (t) = Taken By, (c) = Cosigned By    Initials Name Provider Type    RC Paola Last COTA Occupational Therapist Assistant              Therapy Charges for Today     Code Description Service Date Service Provider Modifiers Qty    [id number] HC OT THER PROC EA 15 MIN 3/31/2023 Paola Last COTA GO 2               LETTY Abbott  3/31/2023

## 2023-03-31 NOTE — PLAN OF CARE
Goal Outcome Evaluation:  Plan of Care Reviewed With: patient        Progress: improving  Outcome Evaluation: nursing ok'd OT pt agreeable to ex  sitting eob upon entry defers amb reports hip hurts w/ walking   particiated w/ ue ex as patsy pt request to be sitting eob at tx end needs in reach exit alarm on cont poc    recommend cont ot at next level of care

## 2023-03-31 NOTE — PLAN OF CARE
Goal Outcome Evaluation:  Plan of Care Reviewed With: patient           Outcome Evaluation: pt sup<>sit with independence, sit<>stand with SBA, pt ambulated 70` with RW & CGA-SBA, pt would benefit from assistance & continued PPT services

## 2023-03-31 NOTE — PROGRESS NOTES
[facility] Medicine Services  INPATIENT PROGRESS NOTE    Length of Stay: 4  Date of Admission: 3/25/2023  Primary Care Physician: Michael Douglas MD    Subjective   (S) Admitted for shortness of breath, low grade fever, and elevated troponin and found to have multifocal pneumonia as well as DOMINIQUE.  He completed course of IV antibiotics while inpatient.  DOMINIQUE felt related to medications, diuretics held.  Renal function improved.  Elevated troponin felt secondary to demand ischemia.      Hip pain has improved some.  Shortness of breath improved.  He still has intermittent cough.        ROS  All pertinent negatives and positives are as above. All other systems have been reviewed and are negative unless otherwise stated.     Prior to Admission medications    Medication Sig Start Date End Date Taking? Authorizing Provider   albuterol sulfate  (90 Base) MCG/ACT inhaler INHALE 2 PUFFS INTO THE LUNGS EVERY 6 (SIX) HOURS AS NEEDED FOR WHEEZING (COUGH). 10/31/22  Yes Ene Farmer MD   amiodarone (PACERONE) 200 MG tablet TAKE ONE TABLET BY MOUTH DAILY  Patient taking differently: 100 mg. Per pt prescribed 200mg but only takes half 11/1/22  Yes Johnny Forbes MD   apixaban (ELIQUIS) 5 MG tablet tablet Take 2.5 mg by mouth 2 (Two) Times a Day. Last dose to be taken 5/5/18 prior to surgery   Yes Ene Farmer MD   aspirin 81 MG tablet Take 1 tablet by mouth Daily. Last dose 5/1/18 10/23/16  Yes Ene Farmer MD   dilTIAZem CD (CARDIZEM CD) 180 MG 24 hr capsule TAKE ONE CAPSULE BY MOUTH DAILY 6/1/21  Yes Johnny oFrbes MD   FeroSul 325 (65 Fe) MG tablet Take 1 tablet by mouth 2 (Two) Times a Day. 12/28/20  Yes Ene Farmer MD   lisinopril-hydrochlorothiazide (PRINZIDE,ZESTORETIC) 20-12.5 MG per tablet Take 1 tablet by mouth Daily. 10/23/16  Yes Ene Farmer MD   loratadine (CLARITIN) 10 MG tablet Take 1 tablet by mouth Daily.   Yes  Ene Farmer MD   metFORMIN (GLUCOPHAGE) 500 MG tablet Take 1 tablet by mouth 2 (Two) Times a Day. 10/23/16  Yes Ene Farmer MD   Multiple Vitamins-Minerals (MENS MULTIVITAMIN PLUS PO) Take 1 tablet by mouth Daily. 10/23/16  Yes Ene Farmer MD   terazosin (HYTRIN) 2 MG capsule Take 1 capsule by mouth every night at bedtime. 11/29/22  Yes Ene Farmer MD   timolol (TIMOPTIC) 0.5 % ophthalmic solution Administer 1 drop to both eyes 2 (Two) Times a Day.   Yes Ene Farmer MD   doxycycline (DORYX) 100 MG enteric coated tablet Take 1 tablet by mouth 2 (Two) Times a Day. 3/25/23   Scot Pickett DO   erythromycin (ROMYCIN) 5 MG/GM ophthalmic ointment APPLY A THIN LAYER INTO LEFT EYE AT BEDTIME AS NEEDED 4/12/22   Ene Farmer MD   fluticasone (FLONASE) 50 MCG/ACT nasal spray ONE SPRAY IN EACH NOSTRIL DAILY 3/22/21   Ene Farmer MD   furosemide (LASIX) 20 MG tablet Take 1 tablet by mouth Daily. 3/25/23   Scot Pickett DO       albuterol, 2.5 mg, Nebulization, Q4H - RT  amiodarone, 200 mg, Oral, Daily  apixaban, 2.5 mg, Oral, Q12H  aspirin, 81 mg, Oral, Daily  clopidogrel, 75 mg, Oral, Daily  dilTIAZem CD, 180 mg, Oral, Daily  guaiFENesin, 600 mg, Oral, Q12H  Insulin Aspart, 0-7 Units, Subcutaneous, TID AC  lisinopril, 20 mg, Oral, Q24H  magnesium hydroxide, 10 mL, Oral, Once  [START ON 4/1/2023] methylPREDNISolone, 4 mg, Oral, 4x Daily Taper  [START ON 4/2/2023] methylPREDNISolone, 4 mg, Oral, TID Around Food  [START ON 4/3/2023] methylPREDNISolone, 4 mg, Oral, Before Breakfast  [START ON 4/3/2023] methylPREDNISolone, 4 mg, Oral, Tonight  [START ON 4/4/2023] methylPREDNISolone, 4 mg, Oral, Before Breakfast  methylPREDNISolone, 8 mg, Oral, Tonight  methylPREDNISolone, 4 mg, Oral, TID Around Food  pantoprazole, 40 mg, Oral, Q AM  sodium chloride, 10 mL, Intravenous, Q12H  terazosin, 2 mg, Oral, Nightly  timolol, 1 drop, Both Eyes, Q12H           Objective     Temp:  [97.3 °F (36.3 °C)-98.2 °F (36.8 °C)] 98.2 °F (36.8 °C)  Heart Rate:  [62-96] 63  Resp:  [16-20] 16  BP: (107-162)/(51-70) 107/51    Physical Exam  Vitals reviewed.   HENT:      Head: Normocephalic.   Cardiovascular:      Rate and Rhythm: Regular rhythm.      Heart sounds: Normal heart sounds.   Pulmonary:      Breath sounds: Normal breath sounds.   Abdominal:      General: There is no distension.   Musculoskeletal:      Right lower leg: No edema.      Left lower leg: No edema.   Skin:     General: Skin is warm and dry.   Neurological:      Mental Status: He is alert.   Psychiatric:         Mood and Affect: Mood normal.         Results Review:  I have reviewed the labs, radiology results, and diagnostic studies.    Laboratory Data:   Results from last 7 days   Lab Units 03/29/23  0337 03/28/23  0203 03/27/23  0301 03/25/23  1251 03/25/23  1039   SODIUM mmol/L 139 138 136   < > 135*   POTASSIUM mmol/L 4.4 3.5 3.7   < > 4.2   CHLORIDE mmol/L 104 102 100   < > 98   CO2 mmol/L 23.0 23.0 24.0   < > 25.0   BUN mg/dL 36* 44* 46*   < > 16   CREATININE mg/dL 1.22 1.44* 1.54*   < > 1.09   GLUCOSE mg/dL 207* 165* 186*   < > 168*   CALCIUM mg/dL 9.0 8.6 8.6   < > 8.7   BILIRUBIN mg/dL  --   --   --   --  0.4   ALK PHOS U/L  --   --   --   --  84   ALT (SGPT) U/L  --   --   --   --  11   AST (SGOT) U/L  --   --   --   --  21   ANION GAP mmol/L 12.0 13.0 12.0   < > 12.0    < > = values in this interval not displayed.     Estimated Creatinine Clearance: 47.4 mL/min (by C-G formula based on SCr of 1.22 mg/dL).  Results from last 7 days   Lab Units 03/27/23  0301 03/26/23  0317 03/25/23  1251   MAGNESIUM mg/dL 2.2 2.0 2.1         Results from last 7 days   Lab Units 03/29/23  0337 03/28/23  0203 03/27/23  0301 03/26/23  0317 03/25/23  1251   WBC 10*3/mm3 6.16 5.70 8.11 4.02 6.51   HEMOGLOBIN g/dL 9.3* 9.3* 9.6* 9.7* 11.3*   HEMATOCRIT % 27.6* 27.1* 27.4* 28.9* 34.1*   PLATELETS 10*3/mm3 160 156 166 147 182     Results from  last 7 days   Lab Units 03/25/23  1048   INR  1.31*       Culture Data:   No results found for: BLOODCX  No results found for: URINECX  No results found for: RESPCX  No results found for: WOUNDCX  No results found for: STOOLCX  No components found for: BODYFLD    Radiology Data:   Imaging Results (Last 24 Hours)     ** No results found for the last 24 hours. **          I have reviewed the patient's current medications.     Assessment/Plan     Multifocal pneumonia  -Finish course of Rocephin and doxycycline, clinically improving from respiratory standpoint  Continue symptomatic and supportive treatment    Elevated troponin, demand ischemia secondary to above, without any angina       DOMINIQUE (not POA)      Likely due to diuretics use; hold these for now; stable and improving     Paroxysmal A-fib  Rate controlled  Continue amiodarone for rhythm control  Anticoagulated on Eliquis         Hip Pain  -XR reviewed--no evidence of fracture, degenerative changes noted  -PT/OT   -Continue methylprednisolone with taper, pain control measures  -planning for rehab       Consideration for inpatient rehab placement        Elder Burden MD

## 2023-03-31 NOTE — PLAN OF CARE
Goal Outcome Evaluation:           Progress: improving  Outcome Evaluation: Patient has had no acute changes this shift. VSS.

## 2023-04-01 ENCOUNTER — HOSPITAL ENCOUNTER (EMERGENCY)
Facility: HOSPITAL | Age: 88
Discharge: ANOTHER HOSPITAL | End: 2023-04-02
Attending: FAMILY MEDICINE | Admitting: FAMILY MEDICINE
Payer: MEDICARE

## 2023-04-01 ENCOUNTER — READMISSION MANAGEMENT (OUTPATIENT)
Dept: CALL CENTER | Facility: HOSPITAL | Age: 88
End: 2023-04-01
Payer: MEDICARE

## 2023-04-01 ENCOUNTER — HOME HEALTH ADMISSION (OUTPATIENT)
Dept: HOME HEALTH SERVICES | Facility: HOME HEALTHCARE | Age: 88
End: 2023-04-01
Payer: MEDICARE

## 2023-04-01 ENCOUNTER — APPOINTMENT (OUTPATIENT)
Dept: ULTRASOUND IMAGING | Facility: HOSPITAL | Age: 88
End: 2023-04-01
Payer: MEDICARE

## 2023-04-01 VITALS
DIASTOLIC BLOOD PRESSURE: 58 MMHG | BODY MASS INDEX: 28.29 KG/M2 | HEART RATE: 62 BPM | OXYGEN SATURATION: 91 % | WEIGHT: 202.1 LBS | HEIGHT: 71 IN | RESPIRATION RATE: 18 BRPM | TEMPERATURE: 98.4 F | SYSTOLIC BLOOD PRESSURE: 122 MMHG

## 2023-04-01 DIAGNOSIS — K92.2 GASTROINTESTINAL HEMORRHAGE, UNSPECIFIED GASTROINTESTINAL HEMORRHAGE TYPE: Primary | ICD-10-CM

## 2023-04-01 DIAGNOSIS — D64.9 ANEMIA, UNSPECIFIED TYPE: ICD-10-CM

## 2023-04-01 PROBLEM — N17.9 AKI (ACUTE KIDNEY INJURY): Status: ACTIVE | Noted: 2023-04-01

## 2023-04-01 LAB
ALBUMIN SERPL-MCNC: 3.4 G/DL (ref 3.5–5.2)
ALBUMIN/GLOB SERPL: 1.4 G/DL
ALP SERPL-CCNC: 68 U/L (ref 39–117)
ALT SERPL W P-5'-P-CCNC: 33 U/L (ref 1–41)
ANION GAP SERPL CALCULATED.3IONS-SCNC: 11 MMOL/L (ref 5–15)
ANION GAP SERPL CALCULATED.3IONS-SCNC: 14 MMOL/L (ref 5–15)
AST SERPL-CCNC: 35 U/L (ref 1–40)
BASOPHILS # BLD AUTO: 0.02 10*3/MM3 (ref 0–0.2)
BASOPHILS NFR BLD AUTO: 0.2 % (ref 0–1.5)
BILIRUB SERPL-MCNC: 0.3 MG/DL (ref 0–1.2)
BUN SERPL-MCNC: 43 MG/DL (ref 8–23)
BUN SERPL-MCNC: 53 MG/DL (ref 8–23)
BUN/CREAT SERPL: 28.1 (ref 7–25)
BUN/CREAT SERPL: 28.5 (ref 7–25)
CALCIUM SPEC-SCNC: 8.9 MG/DL (ref 8.6–10.5)
CALCIUM SPEC-SCNC: 9.2 MG/DL (ref 8.6–10.5)
CHLORIDE SERPL-SCNC: 102 MMOL/L (ref 98–107)
CHLORIDE SERPL-SCNC: 102 MMOL/L (ref 98–107)
CO2 SERPL-SCNC: 21 MMOL/L (ref 22–29)
CO2 SERPL-SCNC: 21 MMOL/L (ref 22–29)
CREAT SERPL-MCNC: 1.53 MG/DL (ref 0.76–1.27)
CREAT SERPL-MCNC: 1.86 MG/DL (ref 0.76–1.27)
DEPRECATED RDW RBC AUTO: 48.5 FL (ref 37–54)
DEPRECATED RDW RBC AUTO: 49.5 FL (ref 37–54)
EGFRCR SERPLBLD CKD-EPI 2021: 34.2 ML/MIN/1.73
EGFRCR SERPLBLD CKD-EPI 2021: 43.2 ML/MIN/1.73
EOSINOPHIL # BLD AUTO: 0.01 10*3/MM3 (ref 0–0.4)
EOSINOPHIL NFR BLD AUTO: 0.1 % (ref 0.3–6.2)
ERYTHROCYTE [DISTWIDTH] IN BLOOD BY AUTOMATED COUNT: 13.1 % (ref 12.3–15.4)
ERYTHROCYTE [DISTWIDTH] IN BLOOD BY AUTOMATED COUNT: 13.2 % (ref 12.3–15.4)
GLOBULIN UR ELPH-MCNC: 2.5 GM/DL
GLUCOSE BLDC GLUCOMTR-MCNC: 214 MG/DL (ref 70–130)
GLUCOSE BLDC GLUCOMTR-MCNC: 254 MG/DL (ref 70–130)
GLUCOSE SERPL-MCNC: 179 MG/DL (ref 65–99)
GLUCOSE SERPL-MCNC: 230 MG/DL (ref 65–99)
HCT VFR BLD AUTO: 21.9 % (ref 37.5–51)
HCT VFR BLD AUTO: 23.9 % (ref 37.5–51)
HGB BLD-MCNC: 7.2 G/DL (ref 13–17.7)
HGB BLD-MCNC: 7.8 G/DL (ref 13–17.7)
HOLD SPECIMEN: NORMAL
HYPOCHROMIA BLD QL: ABNORMAL
IMM GRANULOCYTES # BLD AUTO: 0.21 10*3/MM3 (ref 0–0.05)
IMM GRANULOCYTES NFR BLD AUTO: 1.7 % (ref 0–0.5)
LYMPHOCYTES # BLD AUTO: 1.07 10*3/MM3 (ref 0.7–3.1)
LYMPHOCYTES # BLD MANUAL: 1.19 10*3/MM3 (ref 0.7–3.1)
LYMPHOCYTES NFR BLD AUTO: 8.7 % (ref 19.6–45.3)
LYMPHOCYTES NFR BLD MANUAL: 11 % (ref 5–12)
MCH RBC QN AUTO: 33.8 PG (ref 26.6–33)
MCH RBC QN AUTO: 33.8 PG (ref 26.6–33)
MCHC RBC AUTO-ENTMCNC: 32.6 G/DL (ref 31.5–35.7)
MCHC RBC AUTO-ENTMCNC: 32.9 G/DL (ref 31.5–35.7)
MCV RBC AUTO: 102.8 FL (ref 79–97)
MCV RBC AUTO: 103.5 FL (ref 79–97)
MONOCYTES # BLD AUTO: 1.3 10*3/MM3 (ref 0.1–0.9)
MONOCYTES # BLD: 1.31 10*3/MM3 (ref 0.1–0.9)
MONOCYTES NFR BLD AUTO: 10.5 % (ref 5–12)
NEUTROPHILS # BLD AUTO: 9.38 10*3/MM3 (ref 1.7–7)
NEUTROPHILS NFR BLD AUTO: 78.8 % (ref 42.7–76)
NEUTROPHILS NFR BLD AUTO: 9.75 10*3/MM3 (ref 1.7–7)
NEUTROPHILS NFR BLD MANUAL: 78 % (ref 42.7–76)
NEUTS BAND NFR BLD MANUAL: 1 % (ref 0–5)
NRBC BLD AUTO-RTO: 0.2 /100 WBC (ref 0–0.2)
PLATELET # BLD AUTO: 232 10*3/MM3 (ref 140–450)
PLATELET # BLD AUTO: 232 10*3/MM3 (ref 140–450)
PMV BLD AUTO: 10.7 FL (ref 6–12)
PMV BLD AUTO: 10.8 FL (ref 6–12)
POLYCHROMASIA BLD QL SMEAR: ABNORMAL
POTASSIUM SERPL-SCNC: 4.7 MMOL/L (ref 3.5–5.2)
POTASSIUM SERPL-SCNC: 4.8 MMOL/L (ref 3.5–5.2)
PROT SERPL-MCNC: 5.9 G/DL (ref 6–8.5)
RBC # BLD AUTO: 2.13 10*6/MM3 (ref 4.14–5.8)
RBC # BLD AUTO: 2.31 10*6/MM3 (ref 4.14–5.8)
SMALL PLATELETS BLD QL SMEAR: ADEQUATE
SODIUM SERPL-SCNC: 134 MMOL/L (ref 136–145)
SODIUM SERPL-SCNC: 137 MMOL/L (ref 136–145)
VARIANT LYMPHS NFR BLD MANUAL: 10 % (ref 19.6–45.3)
WBC MORPH BLD: NORMAL
WBC NRBC COR # BLD: 11.87 10*3/MM3 (ref 3.4–10.8)
WBC NRBC COR # BLD: 12.36 10*3/MM3 (ref 3.4–10.8)
WHOLE BLOOD HOLD COAG: NORMAL

## 2023-04-01 PROCEDURE — 63710000001 INSULIN ASPART PER 5 UNITS: Performed by: INTERNAL MEDICINE

## 2023-04-01 PROCEDURE — 94760 N-INVAS EAR/PLS OXIMETRY 1: CPT

## 2023-04-01 PROCEDURE — 96374 THER/PROPH/DIAG INJ IV PUSH: CPT

## 2023-04-01 PROCEDURE — 86901 BLOOD TYPING SEROLOGIC RH(D): CPT

## 2023-04-01 PROCEDURE — 85025 COMPLETE CBC W/AUTO DIFF WBC: CPT | Performed by: FAMILY MEDICINE

## 2023-04-01 PROCEDURE — 36415 COLL VENOUS BLD VENIPUNCTURE: CPT

## 2023-04-01 PROCEDURE — 86900 BLOOD TYPING SEROLOGIC ABO: CPT

## 2023-04-01 PROCEDURE — 99285 EMERGENCY DEPT VISIT HI MDM: CPT

## 2023-04-01 PROCEDURE — 85025 COMPLETE CBC W/AUTO DIFF WBC: CPT | Performed by: STUDENT IN AN ORGANIZED HEALTH CARE EDUCATION/TRAINING PROGRAM

## 2023-04-01 PROCEDURE — 63710000001 METHYLPREDNISOLONE 4 MG TABLET THERAPY PACK: Performed by: STUDENT IN AN ORGANIZED HEALTH CARE EDUCATION/TRAINING PROGRAM

## 2023-04-01 PROCEDURE — 85007 BL SMEAR W/DIFF WBC COUNT: CPT | Performed by: FAMILY MEDICINE

## 2023-04-01 PROCEDURE — 94664 DEMO&/EVAL PT USE INHALER: CPT

## 2023-04-01 PROCEDURE — 63710000001 METHYLPREDNISOLONE PER 4 MG: Performed by: INTERNAL MEDICINE

## 2023-04-01 PROCEDURE — 82962 GLUCOSE BLOOD TEST: CPT

## 2023-04-01 PROCEDURE — 94799 UNLISTED PULMONARY SVC/PX: CPT

## 2023-04-01 PROCEDURE — 76882 US LMTD JT/FCL EVL NVASC XTR: CPT

## 2023-04-01 PROCEDURE — 80053 COMPREHEN METABOLIC PANEL: CPT | Performed by: STUDENT IN AN ORGANIZED HEALTH CARE EDUCATION/TRAINING PROGRAM

## 2023-04-01 RX ORDER — METHYLPREDNISOLONE 4 MG/1
TABLET ORAL
Qty: 21 TABLET | Refills: 0 | Status: SHIPPED | OUTPATIENT
Start: 2023-04-01

## 2023-04-01 RX ORDER — FUROSEMIDE 20 MG/1
20 TABLET ORAL DAILY
Qty: 90 TABLET | Refills: 0 | Status: SHIPPED | OUTPATIENT
Start: 2023-04-01

## 2023-04-01 RX ORDER — METHYLPREDNISOLONE 4 MG/1
4 TABLET ORAL
Status: DISCONTINUED | OUTPATIENT
Start: 2023-04-03 | End: 2023-04-01 | Stop reason: HOSPADM

## 2023-04-01 RX ORDER — TRAMADOL HYDROCHLORIDE 50 MG/1
50 TABLET ORAL EVERY 6 HOURS PRN
Qty: 12 TABLET | Refills: 0 | Status: SHIPPED | OUTPATIENT
Start: 2023-04-01 | End: 2023-04-01 | Stop reason: SDUPTHER

## 2023-04-01 RX ORDER — FUROSEMIDE 20 MG/1
20 TABLET ORAL DAILY
Qty: 90 TABLET | Refills: 0 | Status: SHIPPED | OUTPATIENT
Start: 2023-04-01 | End: 2023-04-01 | Stop reason: SDUPTHER

## 2023-04-01 RX ORDER — TRAMADOL HYDROCHLORIDE 50 MG/1
50 TABLET ORAL EVERY 6 HOURS PRN
Qty: 12 TABLET | Refills: 0 | Status: SHIPPED | OUTPATIENT
Start: 2023-04-01

## 2023-04-01 RX ORDER — METHYLPREDNISOLONE 4 MG/1
4 TABLET ORAL
Status: DISCONTINUED | OUTPATIENT
Start: 2023-04-01 | End: 2023-04-01 | Stop reason: HOSPADM

## 2023-04-01 RX ORDER — METHYLPREDNISOLONE 4 MG/1
TABLET ORAL
Qty: 21 TABLET | Refills: 0 | Status: SHIPPED | OUTPATIENT
Start: 2023-04-01 | End: 2023-04-01 | Stop reason: SDUPTHER

## 2023-04-01 RX ORDER — METHYLPREDNISOLONE 4 MG/1
4 TABLET ORAL
Status: DISCONTINUED | OUTPATIENT
Start: 2023-04-02 | End: 2023-04-01 | Stop reason: HOSPADM

## 2023-04-01 RX ADMIN — APIXABAN 2.5 MG: 2.5 TABLET, FILM COATED ORAL at 08:48

## 2023-04-01 RX ADMIN — AMIODARONE HYDROCHLORIDE 200 MG: 200 TABLET ORAL at 08:44

## 2023-04-01 RX ADMIN — ALBUTEROL SULFATE 2.5 MG: 2.5 SOLUTION RESPIRATORY (INHALATION) at 03:49

## 2023-04-01 RX ADMIN — LISINOPRIL 20 MG: 20 TABLET ORAL at 08:44

## 2023-04-01 RX ADMIN — ALBUTEROL SULFATE 2.5 MG: 2.5 SOLUTION RESPIRATORY (INHALATION) at 11:28

## 2023-04-01 RX ADMIN — METHYLPREDNISOLONE 4 MG: 4 TABLET ORAL at 12:23

## 2023-04-01 RX ADMIN — Medication 81 MG: at 08:44

## 2023-04-01 RX ADMIN — CLOPIDOGREL BISULFATE 75 MG: 75 TABLET ORAL at 08:44

## 2023-04-01 RX ADMIN — TIMOLOL MALEATE 1 DROP: 5 SOLUTION/ DROPS OPHTHALMIC at 08:45

## 2023-04-01 RX ADMIN — METHYLPREDNISOLONE 4 MG: 4 TABLET ORAL at 08:52

## 2023-04-01 RX ADMIN — INSULIN ASPART 4 UNITS: 100 INJECTION, SOLUTION INTRAVENOUS; SUBCUTANEOUS at 12:23

## 2023-04-01 RX ADMIN — ALBUTEROL SULFATE 2.5 MG: 2.5 SOLUTION RESPIRATORY (INHALATION) at 07:28

## 2023-04-01 RX ADMIN — GUAIFENESIN 600 MG: 600 TABLET, EXTENDED RELEASE ORAL at 08:44

## 2023-04-01 RX ADMIN — PANTOPRAZOLE SODIUM 40 MG: 40 TABLET, DELAYED RELEASE ORAL at 05:09

## 2023-04-01 RX ADMIN — DILTIAZEM HYDROCHLORIDE 180 MG: 180 CAPSULE, COATED, EXTENDED RELEASE ORAL at 08:44

## 2023-04-01 RX ADMIN — ZINC OXIDE 1 APPLICATION: 200 OINTMENT TOPICAL at 08:45

## 2023-04-01 RX ADMIN — INSULIN ASPART 3 UNITS: 100 INJECTION, SOLUTION INTRAVENOUS; SUBCUTANEOUS at 07:48

## 2023-04-01 RX ADMIN — METHYLPREDNISOLONE 4 MG: 4 TABLET ORAL at 08:48

## 2023-04-01 RX ADMIN — Medication 10 ML: at 08:45

## 2023-04-01 NOTE — DISCHARGE PLACEMENT REQUEST
"Evgeny Carter Jr. (89 y.o. Male)     Date of Birth   06/11/1933    Social Security Number       Address   [address] [place] [address]    Home Phone   [phone]    MRN   [id number]       Mormonism   Druze    Marital Status                               Admission Date   3/25/23    Admission Type   Emergency    Admitting Provider   Eliezer Thomas MD    Attending Provider   Elder Burden MD    Department, Room/Bed   [address], [address]/1       Discharge Date       Discharge Disposition   Home-Health Care [facility]    Discharge Destination                               Attending Provider: Elder Burden MD    Allergies: No Known Allergies    Isolation: None   Infection: None   Code Status: CPR    Ht: 180.3 cm (71\")   Wt: 91.7 kg (202 lb 1.6 oz)    Admission Cmt: None   Principal Problem: Multifocal pneumonia [J18.9]                 Active Insurance as of 3/25/2023     Primary Coverage     Payor Plan Insurance Group Employer/Plan Group    [facility] MEDICARE REPLACEMENT [facility] MEDICARE REPLACEMENT [id number]     Payor Plan Address Payor Plan Phone Number Payor Plan Fax Number Effective Dates    [address] [address]   1/1/2016 - None Entered    [facility] [id number]       Subscriber Name Subscriber Birth Date Member ID       EVGENY CARTER JR. 6/11/1933 [id number]                 Emergency Contacts      (Rel.) Home Phone Work Phone Mobile Phone    Shweta Carter (Spouse) [phone] -- [phone]               History & Physical      Lc Rucker MD at 03/25/23 1202              [facility]  HISTORY AND PHYSICAL      Date of Admission: 3/25/2023  Primary Care Physician: Michael Douglas MD    Subjective     Chief Complaint: SOB    History of Present Illness  88 y/o male was admitted to the ED with complaints of SOB, cough and malaise.  The patient " was diagnosed with bronchitis at urgent care several days ago, but has not been improving despite getting antibiotics.  He notes that he woke up last night very short of breath and was worried he was having a heart attack, so he came to the ED for further work-up.  He currently notes he has been having mild fevers, a productive cough and SOB.  He denies any current chest pain, palpitations, jaw pain, arm pain, nausea or abdominal pain.  He has no other complaints at this time.    In the ED the patient had stable vitals.  His labs showed a Na of 135 and his imaging showed mild interstitial edema.  He was admitted for further treatment and observation.      Review of Systems   12 point ROS reviewed and negative except as mentioned in the HPI.    Past Medical History:   Past Medical History:   Diagnosis Date   • Allergic rhinitis    • Arthritis     hips, knees, feet   • Artificial lens present     in position   • Benign neoplasm of skin of eyelid    • Bladder tumor    • Bladder tumor    • BPH (benign prostatic hyperplasia)    • Cancer (HCC)     SKIN    • Cataract    • Coronary arteriosclerosis    • Cortical senile cataract    • Cough    • Diabetes mellitus (HCC)    • Esophagitis 5/23/2021   • Essential hypertension    • Essential hypertension    • Hypercholesterolemia    • Hypercholesterolemia    • Kidney stones    • MI, old 1988   • Nonexudative age-related macular degeneration     mild   • Nuclear cataract    • Open-angle glaucoma     s/p trabec OS, doing well      • Primary open angle glaucoma     OS, s/p trabeculectomy; IOP down with timolol OD     • Pseudophakia of right eye    • Rhinitis    • Upper respiratory infection    • Vitreous detachment     peripheral     Past Surgical History:  Past Surgical History:   Procedure Laterality Date   • ANKLE SURGERY  07/29/2000    Ankle surgery (ORIF right ankle fracture. Bimalleolar right fracture)   • APPENDECTOMY     • CARDIAC ABLATION     • CARDIAC CATHETERIZATION   07/02/1987    Cardiac cath 79787 (Coronary arthosclerotic heart disease. maintained patency of RCA. Mild inferior hypokinesis.)   • CARDIAC CATHETERIZATION  06/30/1987    Cardiac cath 34585 (Coronary atherosclerotic heart disease. Occluded RCA. Acute inferior infarction. S/P successful angioplasty of RCA)   • CARDIAC ELECTROPHYSIOLOGY PROCEDURE N/A 6/11/2020    Procedure: Pacer single lead;  Surgeon: Johnny Forbes MD;  Location: [facility] CATH INVASIVE LOCATION;  Service: Cardiology;  Laterality: N/A;   • CATARACT EXTRACTION  04/01/2010    Remove cataract, insert lens (Complicated cataract extraction with intraocular lens implantation, right eye, Marcelo SN-60 WF serial # [id number]: 20.5 diopter)   • CATARACT EXTRACTION W/ INTRAOCULAR LENS IMPLANT Left 3/23/2018    Procedure: CATARACT PHACO EXTRACTION WITH INTRAOCULAR LENS IMPLANT;  Surgeon: Oli Haley MD;  Location: [facility] OR;  Service: Ophthalmology   • COLONOSCOPY N/A 5/9/2019    Procedure: COLONOSCOPY;  Surgeon: Rainer Gates DO;  Location: [facility] ENDOSCOPY;  Service: Gastroenterology   • COLONOSCOPY N/A 11/12/2020    Procedure: COLONOSCOPY;  Surgeon: Rainer Gates DO;  Location: [facility] ENDOSCOPY;  Service: Gastroenterology;  Laterality: N/A;   • ENDOSCOPY  02/19/1990    EGD 99031 (Olympus video endoscope.Fibrin-like patch in stomach)   • ENDOSCOPY N/A 11/12/2020    Procedure: ESOPHAGOGASTRODUODENOSCOPY;  Surgeon: Rainer Gates DO;  Location: [facility] ENDOSCOPY;  Service: Gastroenterology;  Laterality: N/A;   • EYE SURGERY  02/02/2005    Eye surgery procedure (Repair of operative wound leak, left eye. S/P trabeculectomy with operative wound leak)   • EYE SURGERY  01/19/2005    Eye surgery procedure (Trabeculectomy, left eye. Chronic open angle glaucoma, uncontrolled left eye)   • INJECTION OF MEDICATION  06/09/2014    Kenalog (Allergic rhinitis)    • OTHER SURGICAL HISTORY  06/16/2016    DIL MAC EXAM PERF INC DOC OF +/- MAC THICK 2019F  (Nonexudative age-related macular degeneration)    • OTHER SURGICAL HISTORY  02/26/2015    EXTENDED VISUAL FIELDS STUDY 59039 (Primary open angle glaucoma)    • OTHER SURGICAL HISTORY  02/26/2015    EXTENDED VISUAL FIELDS STUDY 51800 (Primary open angle glaucoma)    • OTHER SURGICAL HISTORY  06/16/2016    OCT DISC NFL 33968 (Open-angle glaucoma)    • OTHER SURGICAL HISTORY      OCT DISC NFL 63624 (Primary open angle glaucoma) (3): 06/17/2015, 03/19/2014, 03/04/2013   • OTHER SURGICAL HISTORY  06/16/2016    OPTIC NERVE HEAD EVAL PERFORMED 2027F (Open-angle glaucoma)    • TRANSURETHRAL RESECTION OF BLADDER TUMOR N/A 5/9/2018    Procedure: CYSTOSCOPY TRANSURETHRAL RESECTION OF BLADDER TUMOR;  Surgeon: Donovan Conner MD;  Location: [facility] OR;  Service: Urology   • TRANSURETHRAL RESECTION OF BLADDER TUMOR N/A 7/26/2019    Procedure: CYSTOSCOPY TRANSURETHRAL RESECTION OF BLADDER TUMOR;  Surgeon: Donovan Conner MD;  Location: [facility] OR;  Service: Urology   • TRANSURETHRAL RESECTION OF BLADDER TUMOR N/A 7/29/2020    Procedure: CYSTOSCOPY TRANSURETHRAL RESECTION OF BLADDER TUMOR;  Surgeon: Donovan Conner MD;  Location: [facility] OR;  Service: Urology;  Laterality: N/A;   • UVULECTOMY  1969    EXCISION OF UVULA 64730 (partial uvulectomy with excision of papilloma. Redunant uvula with a small papilloma)     Social History:  reports that he has quit smoking. His smokeless tobacco use includes snuff. He reports that he does not drink alcohol and does not use drugs.    Family History: family history includes Cancer in his brother and mother; No Known Problems in his father.       Allergies:  No Known Allergies    Medications:  Prior to Admission medications    Medication Sig Start Date End Date Taking? Authorizing Provider   albuterol sulfate  (90 Base) MCG/ACT inhaler INHALE 2 PUFFS INTO THE LUNGS EVERY 6 (SIX) HOURS AS NEEDED FOR WHEEZING (COUGH). 10/31/22  Yes Provider, MD Ene   amiodarone (PACERONE) 200 MG  tablet TAKE ONE TABLET BY MOUTH DAILY  Patient taking differently: 100 mg. Per pt prescribed 200mg but only takes half 11/1/22  Yes Johnny Forbes MD   apixaban (ELIQUIS) 5 MG tablet tablet Take 2.5 mg by mouth 2 (Two) Times a Day. Last dose to be taken 5/5/18 prior to surgery   Yes Ene Farmer MD   aspirin 81 MG tablet Take 1 tablet by mouth Daily. Last dose 5/1/18 10/23/16  Yes Ene Farmer MD   dilTIAZem CD (CARDIZEM CD) 180 MG 24 hr capsule TAKE ONE CAPSULE BY MOUTH DAILY 6/1/21  Yes Johnny Forbes MD   FeroSul 325 (65 Fe) MG tablet Take 1 tablet by mouth 2 (Two) Times a Day. 12/28/20  Yes Ene Farmer MD   lisinopril-hydrochlorothiazide (PRINZIDE,ZESTORETIC) 20-12.5 MG per tablet Take 1 tablet by mouth Daily. 10/23/16  Yes Ene Farmer MD   loratadine (CLARITIN) 10 MG tablet Take 1 tablet by mouth Daily.   Yes Ene Farmer MD   metFORMIN (GLUCOPHAGE) 500 MG tablet Take 1 tablet by mouth 2 (Two) Times a Day. 10/23/16  Yes Ene Farmer MD   Multiple Vitamins-Minerals (MENS MULTIVITAMIN PLUS PO) Take 1 tablet by mouth Daily. 10/23/16  Yes Ene Farmer MD   terazosin (HYTRIN) 2 MG capsule Take 1 capsule by mouth every night at bedtime. 11/29/22  Yes Ene Farmer MD   timolol (TIMOPTIC) 0.5 % ophthalmic solution Administer 1 drop to both eyes 2 (Two) Times a Day.   Yes Ene Farmer MD   doxycycline (DORYX) 100 MG enteric coated tablet Take 1 tablet by mouth 2 (Two) Times a Day. 3/25/23   Scot Pickett DO   erythromycin (ROMYCIN) 5 MG/GM ophthalmic ointment APPLY A THIN LAYER INTO LEFT EYE AT BEDTIME AS NEEDED 4/12/22   Ene Farmer MD   fluticasone (FLONASE) 50 MCG/ACT nasal spray ONE SPRAY IN EACH NOSTRIL DAILY 3/22/21   Ene Farmer MD   furosemide (LASIX) 20 MG tablet Take 1 tablet by mouth Daily. 3/25/23   Scot Pickett DO   cyanocobalamin 1000 MCG/ML injection INJECT 1 MILLILITER BY INTRAMUSCULAR ROUTE  "EVERY WEEK FOR 4 WEEKS 12/2/22 3/25/23  Provider, MD Ene     I have utilized all available immediate resources to obtain, update, and review the patient's current medications.    Objective     Vital Signs: /61 (BP Location: Left arm, Patient Position: Sitting)   Pulse 62   Temp 98.3 °F (36.8 °C) (Oral)   Resp 22   Ht 180.3 cm (71\")   Wt 89.8 kg (198 lb)   SpO2 94%   BMI 27.62 kg/m²   Physical Exam   General: NAD  HEENT: AT NC, EOMI  Neck: No JVD  CVS: S1/S2 present, RRR, no M/R/G  Lungs: coarse bilaterally in all fields  Abdomen: soft, NT, ND, BS+  Ext: no edema  Skin: no rashes, bruises or discolorations  Neuro: no focal deficits  Psych: Not agitated         Results Reviewed:  Lab Results (last 24 hours)     Procedure Component Value Units Date/Time    Extra Tubes [[id number]] Collected: 03/25/23 1048    Specimen: Blood, Venous Line Updated: 03/25/23 1202    Narrative:      The following orders were created for panel order Extra Tubes.  Procedure                               Abnormality         Status                     ---------                               -----------         ------                     Gold Top - SST[[id number]]                                   Final result               Light Blue Top[[id number]]                                   Final result                 Please view results for these tests on the individual orders.    Gold Top - SST [[id number]] Collected: 03/25/23 1048    Specimen: Blood Updated: 03/25/23 1202     Extra Tube Hold for add-ons.     Comment: Auto resulted.       Light Blue Top [[id number]] Collected: 03/25/23 1048    Specimen: Blood Updated: 03/25/23 1202     Extra Tube Hold for add-ons.     Comment: Auto resulted       Comprehensive Metabolic Panel [[id number]]  (Abnormal) Collected: 03/25/23 1039    Specimen: Blood Updated: 03/25/23 1110     Glucose 168 mg/dL      BUN 16 mg/dL      Creatinine 1.09 mg/dL      Sodium 135 mmol/L      Potassium 4.2 mmol/L      " Chloride 98 mmol/L      CO2 25.0 mmol/L      Calcium 8.7 mg/dL      Total Protein 6.5 g/dL      Albumin 3.9 g/dL      ALT (SGPT) 11 U/L      AST (SGOT) 21 U/L      Alkaline Phosphatase 84 U/L      Total Bilirubin 0.4 mg/dL      Globulin 2.6 gm/dL      A/G Ratio 1.5 g/dL      BUN/Creatinine Ratio 14.7     Anion Gap 12.0 mmol/L      eGFR 64.9 mL/min/1.73     Narrative:      GFR Normal >60  Chronic Kidney Disease <60  Kidney Failure <15    The GFR formula is only valid for adults with stable renal function between ages 18 and 70.    BNP [[id number]]  (Abnormal) Collected: 03/25/23 1039    Specimen: Blood Updated: 03/25/23 1108     proBNP 4,077.0 pg/mL     Narrative:      Among patients with dyspnea, NT-proBNP is highly sensitive for the detection of acute congestive heart failure. In addition NT-proBNP of <300 pg/ml effectively rules out acute congestive heart failure with 99% negative predictive value.      CBC & Differential [[id number]]  (Abnormal) Collected: 03/25/23 1039    Specimen: Blood Updated: 03/25/23 1051    Narrative:      The following orders were created for panel order CBC & Differential.  Procedure                               Abnormality         Status                     ---------                               -----------         ------                     CBC Auto Differential[[id number]]        Abnormal            Final result                 Please view results for these tests on the individual orders.    CBC Auto Differential [[id number]]  (Abnormal) Collected: 03/25/23 1039    Specimen: Blood Updated: 03/25/23 1051     WBC 5.76 10*3/mm3      RBC 3.23 10*6/mm3      Hemoglobin 11.0 g/dL      Hematocrit 32.9 %      .9 fL      MCH 34.1 pg      MCHC 33.4 g/dL      RDW 13.2 %      RDW-SD 49.4 fl      MPV 9.9 fL      Platelets 171 10*3/mm3      Neutrophil % 69.6 %      Lymphocyte % 13.2 %      Monocyte % 15.5 %      Eosinophil % 1.0 %      Basophil % 0.2 %      Immature Grans % 0.5 %       Neutrophils, Absolute 4.01 10*3/mm3      Lymphocytes, Absolute 0.76 10*3/mm3      Monocytes, Absolute 0.89 10*3/mm3      Eosinophils, Absolute 0.06 10*3/mm3      Basophils, Absolute 0.01 10*3/mm3      Immature Grans, Absolute 0.03 10*3/mm3      nRBC 0.0 /100 WBC     COVID-19 and FLU A/B PCR - Swab, Nasopharynx [[id number]]  (Normal) Collected: 03/25/23 0955    Specimen: Swab from Nasopharynx Updated: 03/25/23 1019     COVID19 Not Detected     Influenza A PCR Not Detected     Influenza B PCR Not Detected    Narrative:      Fact sheet for providers: https://www.fda.gov/media/001542/download    Fact sheet for patients: https://www.fda.gov/media/789046/download    Test performed by PCR.        Imaging Results (Last 24 Hours)     Procedure Component Value Units Date/Time    XR Chest 2 View [[id number]] Collected: 03/25/23 1002     Updated: 03/25/23 1038    Narrative:      EXAM: XR CHEST 2 VIEWS     HISTORY: cough.    COMPARISON: June 11, 2020     FINDINGS:    Heart: Borderline heart size.     Mediastinum: Left pacemaker    Lungs: Bibasilar congestion. Interval increased interstitial  markings. Calcified granuloma right upper lung    Bones: Degenerative changes in the shoulders.    Abdomen: Visualized upper abdomen is unremarkable.      Impression:        Mild interstitial edema    Electronically signed by:  Doron Slater DO  3/25/2023 10:36 AM  CDT Workstation: [id number]        I have personally reviewed and interpreted the radiology studies and ECG obtained at time of admission.     Assessment / Plan       1) NSTEMI  - troponin is elevated, repeat pending  - ASA, heparin  - TTE ordered  - cardiology consulted, recs pending  - monitor on telemetry    2) HTN  - home meds    3) DM  - ISS, accuchecks, diet    4) diastolic CHF  - lasix  - repeat TTE pending    5) A-fib  - cardizem, amiodarone, eliquis    6) CAD  - home meds    7) glaucoma  - timolol    8) asthma  - albuterol steroids    9) GI/DVT ppx  -  "protonix/eliquis    Electronically signed by Lc Rucker MD, 23, 12:02 CDT.                Electronically signed by Lc Rucker MD at 23 1245       [address] [address]  Dept. Phone:  [phone]  Dept. Fax:  [phone] Date Ordered: 2023         Patient:  Evgeny Carter Jr. MRN:  [id number]   [address] [place] [address] :  1933  SSN:    Phone: [phone] Sex:  M     Weight: 91.7 kg (202 lb 1.6 oz)         Ht Readings from Last 1 Encounters:   23 180.3 cm (71\")         Walker               (Order ID: [id number])    Diagnosis:  Impaired functional mobility, balance, gait, and endurance (Z74.09 [ICD-10-CM] V49.89 [ICD-9-CM])  Impaired mobility and ADLs (Z74.09,Z78.9 [ICD-10-CM] V49.89 [ICD-9-CM])  Pain of right hip (M25.551 [ICD-10-CM] 719.45 [ICD-9-CM])   Quantity:  1     Equipment:  Walker Folding with Wheels  Accessories:  Seat Attachment, Walker  Length of Need (99 Months = Lifetime): 99 Months = Lifetime        Authorizing Provider's Phone: [phone]  Authorizing Provider:Elder Burden MD  Authorizing Provider's NPI: [id number]  Order Entered By: Elder Burden MD 2023 10:54 AM     Electronically signed by: Elder Burden MD 2023 10:54 AM       "

## 2023-04-01 NOTE — DISCHARGE PLACEMENT REQUEST
"Evgeny Carter Jr. (89 y.o. Male)     Date of Birth   06/11/1933    Social Security Number       Address   [address] [place] [address]    Home Phone   [phone]    MRN   [id number]       Alevism   Sikhism    Marital Status                               Admission Date   3/25/23    Admission Type   Emergency    Admitting Provider   Eliezer Thomas MD    Attending Provider   Elder Burden MD    Department, Room/Bed   [address], [address]       Discharge Date       Discharge Disposition   Home-Health Care [facility]    Discharge Destination                               Attending Provider: Elder Burden MD    Allergies: No Known Allergies    Isolation: None   Infection: None   Code Status: CPR    Ht: 180.3 cm (71\")   Wt: 91.7 kg (202 lb 1.6 oz)    Admission Cmt: None   Principal Problem: Multifocal pneumonia [J18.9]                 Active Insurance as of 3/25/2023     Primary Coverage     Payor Plan Insurance Group Employer/Plan Group    [facility] MEDICARE REPLACEMENT [facility] MEDICARE REPLACEMENT [id number]     Payor Plan Address Payor Plan Phone Number Payor Plan Fax Number Effective Dates    [address] [address]   1/1/2016 - None Entered    [facility] [id number]       Subscriber Name Subscriber Birth Date Member ID       EVGENY CARTER JR. 6/11/1933 [id number]                 Emergency Contacts      (Rel.) Home Phone Work Phone Mobile Phone    Shweta Carter (Spouse) [phone] -- [phone]              "

## 2023-04-01 NOTE — DISCHARGE PLACEMENT REQUEST
"Evgeny Carter Jr. (89 y.o. Male)     Date of Birth   06/11/1933    Social Security Number       Address   [address] [place] [address]    Home Phone   [phone]    MRN   [id number]       Religious   Shinto    Marital Status                               Admission Date   3/25/23    Admission Type   Emergency    Admitting Provider   Eliezer Thomas MD    Attending Provider   Elder Burden MD    Department, Room/Bed   [address], [address]       Discharge Date       Discharge Disposition   Home-Health Care [facility]    Discharge Destination                               Attending Provider: Elder Burden MD    Allergies: No Known Allergies    Isolation: None   Infection: None   Code Status: CPR    Ht: 180.3 cm (71\")   Wt: 91.7 kg (202 lb 1.6 oz)    Admission Cmt: None   Principal Problem: Multifocal pneumonia [J18.9]                 Active Insurance as of 3/25/2023     Primary Coverage     Payor Plan Insurance Group Employer/Plan Group    [facility] MEDICARE REPLACEMENT [facility] MEDICARE REPLACEMENT [id number]     Payor Plan Address Payor Plan Phone Number Payor Plan Fax Number Effective Dates    [address] [address]   1/1/2016 - None Entered    [facility] [address]       Subscriber Name Subscriber Birth Date Member ID       EVGENY CARTER JR. 6/11/1933 [id number]                 Emergency Contacts      (Rel.) Home Phone Work Phone Mobile Phone    Shweta Carter (Spouse) [phone] -- [phone]              "

## 2023-04-01 NOTE — DISCHARGE SUMMARY
[facility] Medicine Services  DISCHARGE SUMMARY       Date of Admission: 3/25/2023  Date of Discharge:  4/1/2023  Primary Care Physician: Michael Douglas MD    Presenting Problem/History of Present Illness:  Shortness of breath  Fever      Final Discharge Diagnoses:  Active Hospital Problems    Diagnosis    • **Multifocal pneumonia    • DOMINIQUE (acute kidney injury)    • Acute pulmonary edema    • Paroxysmal atrial fibrillation (HCC)    • Coronary artery disease involving native coronary artery of native heart without angina pectoris    • Benign essential hypertension    • Type 2 diabetes mellitus without complication        Consults:   Consults     Date and Time Order Name Status Description    3/25/2023 12:42 PM Inpatient Cardiology Consult Completed           Pertinent Test Results:   Lab Results (most recent)     Procedure Component Value Units Date/Time    Basic Metabolic Panel [[id number]]  (Abnormal) Collected: 04/01/23 0913    Specimen: Blood Updated: 04/01/23 0950     Glucose 179 mg/dL      BUN 43 mg/dL      Creatinine 1.53 mg/dL      Sodium 134 mmol/L      Potassium 4.7 mmol/L      Chloride 102 mmol/L      CO2 21.0 mmol/L      Calcium 9.2 mg/dL      BUN/Creatinine Ratio 28.1     Anion Gap 11.0 mmol/L      eGFR 43.2 mL/min/1.73     Narrative:      GFR Normal >60  Chronic Kidney Disease <60  Kidney Failure <15    The GFR formula is only valid for adults with stable renal function between ages 18 and 70.    CBC & Differential [[id number]]  (Abnormal) Collected: 04/01/23 0912    Specimen: Blood Updated: 04/01/23 0932    Narrative:      The following orders were created for panel order CBC & Differential.  Procedure                               Abnormality         Status                     ---------                               -----------         ------                     CBC Auto Differential[[id number]]        Abnormal            Final result                  Please view results for these tests on the individual orders.    CBC Auto Differential [[id number]]  (Abnormal) Collected: 04/01/23 0912    Specimen: Blood Updated: 04/01/23 0932     WBC 12.36 10*3/mm3      RBC 2.31 10*6/mm3      Hemoglobin 7.8 g/dL      Hematocrit 23.9 %      .5 fL      MCH 33.8 pg      MCHC 32.6 g/dL      RDW 13.1 %      RDW-SD 48.5 fl      MPV 10.7 fL      Platelets 232 10*3/mm3      Neutrophil % 78.8 %      Lymphocyte % 8.7 %      Monocyte % 10.5 %      Eosinophil % 0.1 %      Basophil % 0.2 %      Immature Grans % 1.7 %      Neutrophils, Absolute 9.75 10*3/mm3      Lymphocytes, Absolute 1.07 10*3/mm3      Monocytes, Absolute 1.30 10*3/mm3      Eosinophils, Absolute 0.01 10*3/mm3      Basophils, Absolute 0.02 10*3/mm3      Immature Grans, Absolute 0.21 10*3/mm3      nRBC 0.2 /100 WBC     POC Glucose Once [[id number]]  (Abnormal) Collected: 04/01/23 0633    Specimen: Blood Updated: 04/01/23 0647     Glucose 214 mg/dL      Comment: RN NotifiedOperator: [id number] YAHIR [employer]-dorMeter ID: [id number]       POC Glucose Once [[id number]]  (Abnormal) Collected: 03/31/23 1927    Specimen: Blood Updated: 03/31/23 1941     Glucose 290 mg/dL      Comment: RN NotifiedOperator: [id number] YAHIR [employer]dor[employer] ID: [id number]       Blood Culture - Blood, Arm, Right [[id number]]  (Normal) Collected: 03/26/23 1829    Specimen: Blood from Arm, Right Updated: 03/31/23 1846     Blood Culture No growth at 5 days    Blood Culture - Blood, Arm, Left [[id number]]  (Normal) Collected: 03/26/23 1829    Specimen: Blood from Arm, Left Updated: 03/31/23 1846     Blood Culture No growth at 5 days    Extra Tubes [[id number]] Collected: 03/30/23 0404    Specimen: Blood, Venous Line Updated: 03/30/23 0515    Narrative:      The following orders were created for panel order Extra Tubes.  Procedure                               Abnormality         Status                     ---------                               -----------          ------                     Lavender Top[[id number]]                                     Final result               Green Top (Gel)[[id number]]                                  Final result                 Please view results for these tests on the individual orders.    Lavender Top [[id number]] Collected: 03/30/23 0404    Specimen: Blood Updated: 03/30/23 0515     Extra Tube hold for add-on     Comment: Auto resulted       Green Top (Gel) [[id number]] Collected: 03/30/23 0404    Specimen: Blood Updated: 03/30/23 0515     Extra Tube Hold for add-ons.     Comment: Auto resulted.       aPTT [[id number]]  (Abnormal) Collected: 03/30/23 0404    Specimen: Blood Updated: 03/30/23 0505     PTT 77.6 seconds     Narrative:      The recommended Heparin therapeutic range is 68-97 seconds.    aPTT [[id number]]  (Abnormal) Collected: 03/29/23 2112    Specimen: Blood Updated: 03/29/23 2141     PTT 52.8 seconds     Narrative:      The recommended Heparin therapeutic range is 68-97 seconds.    Respiratory Culture - Sputum, Cough [[id number]] Collected: 03/27/23 1132    Specimen: Sputum from Cough Updated: 03/29/23 0949     Respiratory Culture Moderate growth (3+) Normal respiratory lorenza. No S. aureus or Pseudomonas aeruginosa detected. Final report.     Gram Stain Few (2+) Epithelial cells per low power field      Moderate (3+) WBCs per low power field      Mixed bacterial lorenza    Extra Tubes [[id number]] Collected: 03/29/23 0337    Specimen: Blood Updated: 03/29/23 0445    Narrative:      The following orders were created for panel order Extra Tubes.  Procedure                               Abnormality         Status                     ---------                               -----------         ------                     Light Blue Top[[id number]]                                   Final result                 Please view results for these tests on the individual orders.    Light Blue Top [[id number]] Collected: 03/29/23 0337     Specimen: Blood Updated: 03/29/23 0445     Extra Tube Hold for add-ons.     Comment: Auto resulted       Basic Metabolic Panel [[id number]]  (Abnormal) Collected: 03/29/23 0337    Specimen: Blood Updated: 03/29/23 0407     Glucose 207 mg/dL      BUN 36 mg/dL      Creatinine 1.22 mg/dL      Sodium 139 mmol/L      Potassium 4.4 mmol/L      Comment: Slight hemolysis detected by analyzer. Results may be affected.        Chloride 104 mmol/L      CO2 23.0 mmol/L      Calcium 9.0 mg/dL      BUN/Creatinine Ratio 29.5     Anion Gap 12.0 mmol/L      eGFR 56.7 mL/min/1.73     Narrative:      GFR Normal >60  Chronic Kidney Disease <60  Kidney Failure <15    The GFR formula is only valid for adults with stable renal function between ages 18 and 70.    CBC & Differential [[id number]]  (Abnormal) Collected: 03/29/23 0337    Specimen: Blood Updated: 03/29/23 0345    Narrative:      The following orders were created for panel order CBC & Differential.  Procedure                               Abnormality         Status                     ---------                               -----------         ------                     CBC Auto Differential[[id number]]        Abnormal            Final result                 Please view results for these tests on the individual orders.    CBC Auto Differential [[id number]]  (Abnormal) Collected: 03/29/23 0337    Specimen: Blood Updated: 03/29/23 0345     WBC 6.16 10*3/mm3      RBC 2.75 10*6/mm3      Hemoglobin 9.3 g/dL      Hematocrit 27.6 %      .4 fL      MCH 33.8 pg      MCHC 33.7 g/dL      RDW 13.1 %      RDW-SD 48.0 fl      MPV 10.0 fL      Platelets 160 10*3/mm3      Neutrophil % 66.9 %      Lymphocyte % 19.3 %      Monocyte % 11.9 %      Eosinophil % 1.1 %      Basophil % 0.2 %      Immature Grans % 0.6 %      Neutrophils, Absolute 4.12 10*3/mm3      Lymphocytes, Absolute 1.19 10*3/mm3      Monocytes, Absolute 0.73 10*3/mm3      Eosinophils, Absolute 0.07 10*3/mm3      Basophils,  "Absolute 0.01 10*3/mm3      Immature Grans, Absolute 0.04 10*3/mm3      nRBC 0.0 /100 WBC     Procalcitonin [[id number]]  (Normal) Collected: 03/27/23 0301    Specimen: Blood Updated: 03/27/23 0336     Procalcitonin 0.07 ng/mL     Narrative:      As a Marker for Sepsis (Non-Neonates):    1. <0.5 ng/mL represents a low risk of severe sepsis and/or septic shock.  2. >2 ng/mL represents a high risk of severe sepsis and/or septic shock.    As a Marker for Lower Respiratory Tract Infections that require antibiotic therapy:    PCT on Admission    Antibiotic Therapy       6-12 Hrs later    >0.5                Strongly Recommended  >0.25 - <0.5        Recommended   0.1 - 0.25          Discouraged              Remeasure/reassess PCT  <0.1                Strongly Discouraged     Remeasure/reassess PCT    As 28 day mortality risk marker: \"Change in Procalcitonin Result\" (>80% or <=80%) if Day 0 (or Day 1) and Day 4 values are available. Refer to http://www.[facility]-pct-calculator.com    Change in PCT <=80%  A decrease of PCT levels below or equal to 80% defines a positive change in PCT test result representing a higher risk for 28-day all-cause mortality of patients diagnosed with severe sepsis for septic shock.    Change in PCT >80%  A decrease of PCT levels of more than 80% defines a negative change in PCT result representing a lower risk for 28-day all-cause mortality of patients diagnosed with severe sepsis or septic shock.       Magnesium [[id number]]  (Normal) Collected: 03/27/23 0301    Specimen: Blood Updated: 03/27/23 0329     Magnesium 2.2 mg/dL     STAT Lactic Acid, Reflex [[id number]]  (Normal) Collected: 03/27/23 0301    Specimen: Blood Updated: 03/27/23 0324     Lactate 2.0 mmol/L     STAT Lactic Acid, Reflex [[id number]]  (Abnormal) Collected: 03/27/23 0042    Specimen: Blood Updated: 03/27/23 0104     Lactate 2.4 mmol/L     Lactic Acid, Plasma [[id number]]  (Abnormal) Collected: 03/26/23 1829    Specimen: Blood " "Updated: 03/26/23 1912     Lactate 2.9 mmol/L     Procalcitonin [[id number]]  (Normal) Collected: 03/26/23 1829    Specimen: Blood Updated: 03/26/23 1902     Procalcitonin 0.08 ng/mL     Narrative:      As a Marker for Sepsis (Non-Neonates):    1. <0.5 ng/mL represents a low risk of severe sepsis and/or septic shock.  2. >2 ng/mL represents a high risk of severe sepsis and/or septic shock.    As a Marker for Lower Respiratory Tract Infections that require antibiotic therapy:    PCT on Admission    Antibiotic Therapy       6-12 Hrs later    >0.5                Strongly Recommended  >0.25 - <0.5        Recommended   0.1 - 0.25          Discouraged              Remeasure/reassess PCT  <0.1                Strongly Discouraged     Remeasure/reassess PCT    As 28 day mortality risk marker: \"Change in Procalcitonin Result\" (>80% or <=80%) if Day 0 (or Day 1) and Day 4 values are available. Refer to http://www."[employer]"s-pct-calculator.com    Change in PCT <=80%  A decrease of PCT levels below or equal to 80% defines a positive change in PCT test result representing a higher risk for 28-day all-cause mortality of patients diagnosed with severe sepsis for septic shock.    Change in PCT >80%  A decrease of PCT levels of more than 80% defines a negative change in PCT result representing a lower risk for 28-day all-cause mortality of patients diagnosed with severe sepsis or septic shock.       Green Top (Gel) [[id number]] Collected: 03/26/23 1541    Specimen: Blood from Arm, Right Updated: 03/26/23 1645     Extra Tube Hold for add-ons.     Comment: Auto resulted.       High Sensitivity Troponin T [[id number]]  (Abnormal) Collected: 03/26/23 1152    Specimen: Blood Updated: 03/26/23 1241     HS Troponin T 117 ng/L     Narrative:      High Sensitive Troponin T Reference Range:  <10.0 ng/L- Negative Female for AMI  <15.0 ng/L- Negative Male for AMI  >=10 - Abnormal Female indicating possible myocardial injury.  >=15 - Abnormal Male " indicating possible myocardial injury.   Clinicians would have to utilize clinical acumen, EKG, Troponin, and serial changes to determine if it is an Acute Myocardial Infarction or myocardial injury due to an underlying chronic condition.         Magnesium [[id number]]  (Normal) Collected: 03/26/23 0317    Specimen: Blood Updated: 03/26/23 0337     Magnesium 2.0 mg/dL     High Sensitivity Troponin T 2Hr [[id number]]  (Abnormal) Collected: 03/25/23 1251    Specimen: Blood Updated: 03/25/23 1321     HS Troponin T 164 ng/L      Troponin T Delta 26 ng/L     Narrative:      High Sensitive Troponin T Reference Range:  <10.0 ng/L- Negative Female for AMI  <15.0 ng/L- Negative Male for AMI  >=10 - Abnormal Female indicating possible myocardial injury.  >=15 - Abnormal Male indicating possible myocardial injury.   Clinicians would have to utilize clinical acumen, EKG, Troponin, and serial changes to determine if it is an Acute Myocardial Infarction or myocardial injury due to an underlying chronic condition.         Protime-INR [[id number]]  (Abnormal) Collected: 03/25/23 1048    Specimen: Blood Updated: 03/25/23 1318     Protime 16.3 Seconds      INR 1.31    Narrative:      Therapeutic range for most indications is 2.0-3.0 INR,  or 2.5-3.5 for mechanical heart valves.    High Sensitivity Troponin T [[id number]]  (Abnormal) Collected: 03/25/23 1039    Specimen: Blood Updated: 03/25/23 1242     HS Troponin T 138 ng/L     Narrative:      High Sensitive Troponin T Reference Range:  <10.0 ng/L- Negative Female for AMI  <15.0 ng/L- Negative Male for AMI  >=10 - Abnormal Female indicating possible myocardial injury.  >=15 - Abnormal Male indicating possible myocardial injury.   Clinicians would have to utilize clinical acumen, EKG, Troponin, and serial changes to determine if it is an Acute Myocardial Infarction or myocardial injury due to an underlying chronic condition.         [facility] [[id number]] Collected: 03/25/23 1048     Specimen: Blood Updated: 03/25/23 1202     Extra Tube Hold for add-ons.     Comment: Auto resulted.       Light Blue Top [[id number]] Collected: 03/25/23 1048    Specimen: Blood Updated: 03/25/23 1202     Extra Tube Hold for add-ons.     Comment: Auto resulted       Comprehensive Metabolic Panel [[id number]]  (Abnormal) Collected: 03/25/23 1039    Specimen: Blood Updated: 03/25/23 1110     Glucose 168 mg/dL      BUN 16 mg/dL      Creatinine 1.09 mg/dL      Sodium 135 mmol/L      Potassium 4.2 mmol/L      Chloride 98 mmol/L      CO2 25.0 mmol/L      Calcium 8.7 mg/dL      Total Protein 6.5 g/dL      Albumin 3.9 g/dL      ALT (SGPT) 11 U/L      AST (SGOT) 21 U/L      Alkaline Phosphatase 84 U/L      Total Bilirubin 0.4 mg/dL      Globulin 2.6 gm/dL      A/G Ratio 1.5 g/dL      BUN/Creatinine Ratio 14.7     Anion Gap 12.0 mmol/L      eGFR 64.9 mL/min/1.73     Narrative:      GFR Normal >60  Chronic Kidney Disease <60  Kidney Failure <15    The GFR formula is only valid for adults with stable renal function between ages 18 and 70.    BNP [[id number]]  (Abnormal) Collected: 03/25/23 1039    Specimen: Blood Updated: 03/25/23 1108     proBNP 4,077.0 pg/mL     Narrative:      Among patients with dyspnea, NT-proBNP is highly sensitive for the detection of acute congestive heart failure. In addition NT-proBNP of <300 pg/ml effectively rules out acute congestive heart failure with 99% negative predictive value.      COVID-19 and FLU A/B PCR - Swab, Nasopharynx [[id number]]  (Normal) Collected: 03/25/23 0955    Specimen: Swab from Nasopharynx Updated: 03/25/23 1019     COVID19 Not Detected     Influenza A PCR Not Detected     Influenza B PCR Not Detected    Narrative:      Fact sheet for providers: https://www.fda.gov/media/385398/download    Fact sheet for patients: https://www.fda.gov/media/390836/download    Test performed by PCR.        Imaging Results (Most Recent)     Procedure Component Value Units Date/Time    XR Hip With  or Without Pelvis 2 - 3 View Right [[id number]] Collected: 03/29/23 0643     Updated: 03/29/23 1207    Narrative:        Two view portable right hip with portable single view pelvis    HISTORY: Pain. Difficulty bearing weight.    Portable AP and and frog-leg lateral views of the right hip and  AP film of the pelvis with the hips in the position obtained.    COMPARISON: None. Correlation CT pelvis October 10, 2018.    FINDINGS:   No fracture or dislocation.   Degenerative disc disease L4-5 and L5-S1.  No other osseous or articular abnormality.      Impression:      CONCLUSION:  Normal right hip.  Degenerative disc disease L4-5 and L5-S1.    [id number]    Electronically signed by:  Spencer Bolden MD  3/29/2023 12:05 PM  CDT Workstation: [id number]    CT Chest Without Contrast Diagnostic [[id number]] Collected: 03/26/23 1348     Updated: 03/26/23 1433    Narrative:      EXAM:  CT CHEST WITHOUT IV CONTRAST    ORDERING PROVIDER:  JERONIMO FAJARDO    CLINICAL HISTORY:  Pneumonia    COMPARISON:  CT scan of 10/10/2018    TECHNIQUE:   Unenhanced chest CT performed and reformatted in the sagittal and  coronal planes.     This examination was performed according to our departmental dose  optimization program which includes automated exposure control,  adjustment of the MA and kV according to patient size, and/or use  of iterative reconstruction technique.    FINDINGS:    LUNGS AND PLEURA: Scattered patchy air space process in the left  lingula, left lower lobe, and to a lesser extent in the right  lower lobe, concerning for multifocal pneumonia. Calcified  granulomas in the lung parenchyma due to prior granulomatous  disease. No pleural effusion. No significant pleural plaques. No  pneumothorax. No endotracheal or endobronchial lesion.    HEART: Normal size. Small pericardial effusion. A pacemaker is in  place on the left side.    MEDIASTINUM AND VANE: No significant adenopathy.     AORTA AND GREAT VESSELS: No aneurysm. No dissection.  Scattered  atherosclerotic calcifications.    EXTRATHORACIC SOFT TISSUES: No axillary adenopathy. No mass.  Unremarkable supraclavicular soft tissues.     UPPER ABDOMEN: Scattered low attenuations in the liver which may  be due to cysts, among other etiologies..     MUSCULOSKELETAL:  No acute fracture. No dislocation. No  suspicious lytic or sclerotic lesion.         Impression:      Scattered patchy air space process in the left lingula, left  lower lobe, and to a lesser extent in the right lower lobe,  concerning for multifocal pneumonia.   Calcified granulomas in the lung parenchyma due to prior  granulomatous disease.   Scattered low attenuations in the liver which may be due to  cysts, among other etiologies..   Correlation with patient's symptoms and history is recommended.    Electronically signed by:  Nav Suero MD  3/26/2023 2:31 PM CDT  Workstation: [id number]    XR Chest 2 View [[id number]] Collected: 03/25/23 1002     Updated: 03/25/23 1038    Narrative:      EXAM: XR CHEST 2 VIEWS     HISTORY: cough.    COMPARISON: June 11, 2020     FINDINGS:    Heart: Borderline heart size.     Mediastinum: Left pacemaker    Lungs: Bibasilar congestion. Interval increased interstitial  markings. Calcified granuloma right upper lung    Bones: Degenerative changes in the shoulders.    Abdomen: Visualized upper abdomen is unremarkable.      Impression:        Mild interstitial edema    Electronically signed by:  Doron Slater DO  3/25/2023 10:36 AM  CDT Workstation: [id number]            Hospital Course:  The patient is a 89 y.o. male with atrial fibrillation on Eliquis who presented with shortness of breath, fever found to have mildly elevated troponin, elevated proBNP with acute pulmonary edema on chest x-ray.  Further evaluation with CT chest showed evidence of multifocal pneumonia.  He was treated with IV antibiotics and diuretics.  Later developed DOMINIQUE so diuretics temporarily held.  Renal function subsequently  "improved.  Elevated troponin felt secondary to demand ischemia.  He was evaluated by cardiology.  Echocardiogram was obtained which showed preserved LVEF, some diastolic dysfunction noted.  Cardiology recommended outpatient follow-up in their office in about 1 month.  Patient improved from respiratory standpoint and completed course of antibiotics while inpatient.  He did have some debility with right hip pain felt related to osteoarthritis, treated with Medrol with improvement.  X-ray without any significant finding.  Requiring further PT evaluation although he did improve in this regard and was able to ambulate adequately with walker.    Medically stable for discharge home with home health set up, DME equipment with walker.      Condition on Discharge: Stable    Physical Exam on Discharge:  /65 (BP Location: Right arm, Patient Position: Sitting)   Pulse 66   Temp 97.5 °F (36.4 °C) (Tympanic)   Resp 18   Ht 180.3 cm (71\")   Wt 91.7 kg (202 lb 1.6 oz)   SpO2 92%   BMI 28.19 kg/m²   Physical Exam  General: No acute distress  Cardiac: Normal rate  Respiratory: No wheezes or rhonchi, no use of accessory muscles  Abdomen: Nondistended  Extremities: No clubbing or cyanosis  Musculoskeletal: No tenderness around right hip or thigh    Discharge Disposition:  Home-Health Care Svc    Discharge Medications:     Discharge Medications      New Medications      Instructions Start Date   dextromethorphan 15 MG/5ML syrup   30 mg, Oral, 4 Times Daily PRN      furosemide 20 MG tablet  Commonly known as: LASIX   20 mg, Oral, Daily      methylPREDNISolone 4 MG dose pack  Commonly known as: MEDROL   Take as directed on package instructions.      traMADol 50 MG tablet  Commonly known as: ULTRAM   50 mg, Oral, Every 6 Hours PRN         Changes to Medications      Instructions Start Date   amiodarone 200 MG tablet  Commonly known as: PACERONE  What changed:   · how much to take  · how to take this  · when to take " this  · additional instructions   TAKE ONE TABLET BY MOUTH DAILY         Continue These Medications      Instructions Start Date   albuterol sulfate  (90 Base) MCG/ACT inhaler  Commonly known as: PROVENTIL HFA;VENTOLIN HFA;PROAIR HFA   INHALE 2 PUFFS INTO THE LUNGS EVERY 6 (SIX) HOURS AS NEEDED FOR WHEEZING (COUGH).      apixaban 5 MG tablet tablet  Commonly known as: ELIQUIS   2.5 mg, Oral, 2 Times Daily, Last dose to be taken 5/5/18 prior to surgery       aspirin 81 MG tablet   81 mg, Oral, Daily, Last dose 5/1/18      dilTIAZem  MG 24 hr capsule  Commonly known as: CARDIZEM CD   TAKE ONE CAPSULE BY MOUTH DAILY      erythromycin 5 MG/GM ophthalmic ointment  Commonly known as: ROMYCIN   APPLY A THIN LAYER INTO LEFT EYE AT BEDTIME AS NEEDED      FeroSul 325 (65 FE) MG tablet  Generic drug: ferrous sulfate   325 mg, Oral, 2 Times Daily      fluticasone 50 MCG/ACT nasal spray  Commonly known as: FLONASE   ONE SPRAY IN EACH NOSTRIL DAILY      lisinopril-hydrochlorothiazide 20-12.5 MG per tablet  Commonly known as: PRINZIDE,ZESTORETIC   1 tablet, Oral, Daily      loratadine 10 MG tablet  Commonly known as: CLARITIN   10 mg, Oral, Daily      metFORMIN 500 MG tablet  Commonly known as: GLUCOPHAGE   500 mg, Oral, 2 Times Daily      multivitamin with minerals tablet tablet   1 tablet, Oral, Daily      terazosin 2 MG capsule  Commonly known as: HYTRIN   1 capsule, Oral, Every Night at Bedtime      timolol 0.5 % ophthalmic solution  Commonly known as: TIMOPTIC   1 drop, Both Eyes, 2 Times Daily         Stop These Medications    cyanocobalamin 1000 MCG/ML injection            Discharge Diet: Heart healthy, carbohydrate control    Activity at Discharge: As tolerated    Discharge Care Plan/Instructions: Follow-up with PCP within 1 week.  Follow-up with cardiology in 1 month.  Take medications as directed.    Follow-up Appointments:   Future Appointments   Date Time Provider Department Center   4/19/2023 10:00 AM  Johnny Forbes MD [facility] None   10/11/2023  2:00 PM [facility] None       Test Results Pending at Discharge: None        Time: 35 minutes

## 2023-04-02 ENCOUNTER — HOME CARE VISIT (OUTPATIENT)
Dept: HOME HEALTH SERVICES | Facility: HOME HEALTHCARE | Age: 88
End: 2023-04-02

## 2023-04-02 VITALS
RESPIRATION RATE: 16 BRPM | TEMPERATURE: 98 F | DIASTOLIC BLOOD PRESSURE: 61 MMHG | SYSTOLIC BLOOD PRESSURE: 134 MMHG | OXYGEN SATURATION: 94 % | HEART RATE: 70 BPM | WEIGHT: 200 LBS | HEIGHT: 72 IN | BODY MASS INDEX: 27.09 KG/M2

## 2023-04-02 LAB
ABO GROUP BLD: NORMAL
BLD GP AB SCN SERPL QL: NEGATIVE
DEPRECATED RDW RBC AUTO: 48.3 FL (ref 37–54)
ERYTHROCYTE [DISTWIDTH] IN BLOOD BY AUTOMATED COUNT: 13 % (ref 12.3–15.4)
HCT VFR BLD AUTO: 23.3 % (ref 37.5–51)
HGB BLD-MCNC: 7.5 G/DL (ref 13–17.7)
HOLD SPECIMEN: NORMAL
Lab: NORMAL
MCH RBC QN AUTO: 33.2 PG (ref 26.6–33)
MCHC RBC AUTO-ENTMCNC: 32.2 G/DL (ref 31.5–35.7)
MCV RBC AUTO: 103.1 FL (ref 79–97)
PLATELET # BLD AUTO: 201 10*3/MM3 (ref 140–450)
PMV BLD AUTO: 10.5 FL (ref 6–12)
QT INTERVAL: 444 MS
QTC INTERVAL: 465 MS
RBC # BLD AUTO: 2.26 10*6/MM3 (ref 4.14–5.8)
RH BLD: POSITIVE
T&S EXPIRATION DATE: NORMAL
WBC NRBC COR # BLD: 9.47 10*3/MM3 (ref 3.4–10.8)

## 2023-04-02 PROCEDURE — 36415 COLL VENOUS BLD VENIPUNCTURE: CPT | Performed by: EMERGENCY MEDICINE

## 2023-04-02 PROCEDURE — 86850 RBC ANTIBODY SCREEN: CPT | Performed by: FAMILY MEDICINE

## 2023-04-02 PROCEDURE — 96374 THER/PROPH/DIAG INJ IV PUSH: CPT

## 2023-04-02 PROCEDURE — 86901 BLOOD TYPING SEROLOGIC RH(D): CPT | Performed by: FAMILY MEDICINE

## 2023-04-02 PROCEDURE — 85027 COMPLETE CBC AUTOMATED: CPT | Performed by: EMERGENCY MEDICINE

## 2023-04-02 PROCEDURE — 93005 ELECTROCARDIOGRAM TRACING: CPT | Performed by: FAMILY MEDICINE

## 2023-04-02 PROCEDURE — 96372 THER/PROPH/DIAG INJ SC/IM: CPT

## 2023-04-02 PROCEDURE — 93010 ELECTROCARDIOGRAM REPORT: CPT | Performed by: INTERNAL MEDICINE

## 2023-04-02 PROCEDURE — 86900 BLOOD TYPING SEROLOGIC ABO: CPT | Performed by: FAMILY MEDICINE

## 2023-04-02 RX ORDER — PANTOPRAZOLE SODIUM 40 MG/10ML
80 INJECTION, POWDER, LYOPHILIZED, FOR SOLUTION INTRAVENOUS ONCE
Status: COMPLETED | OUTPATIENT
Start: 2023-04-02 | End: 2023-04-02

## 2023-04-02 RX ADMIN — PANTOPRAZOLE SODIUM 80 MG: 40 INJECTION, POWDER, FOR SOLUTION INTRAVENOUS at 00:30

## 2023-04-02 NOTE — OUTREACH NOTE
Prep Survey    Flowsheet Row Responses   [facility] facility patient discharged from? [place]   Is LACE score < 7 ? No   Eligibility Readm Mgmt   Discharge diagnosis Multifocal pneumonia   Does the patient have one of the following disease processes/diagnoses(primary or secondary)? Pneumonia   Does the patient have Home health ordered? Yes   What is the Home health agency?  [facility]   Is there a DME ordered? Yes   What DME was ordered? Walker   Medication alerts for this patient see AVS   Prep survey completed? Yes          Marina HAWTHORNE - Registered Nurse

## 2023-04-02 NOTE — ED PROVIDER NOTES
Subjective   History of Present Illness  Patient presents emergency department for evaluation of a large bruise on his right medial proximal thigh.  He was discharged from the hospital earlier today for admission that involve treatment of a multifocal pneumonia, acute kidney injury, pulmonary edema.  Patient has an underlying history of atrial fibrillation and is taking Eliquis.  In addition to the area of bruising in his right thigh, patient also complains of hip pain and had an x-ray performed several days ago while he was admitted in the hospital that did not reveal any acute findings.      Hip Pain  Location:  Right hip  Quality:  Aching  Severity:  Moderate  Timing:  Intermittent  Progression:  Waxing and waning  Chronicity:  New  Relieved by:  Rest  Worsened by:  Movement  Associated symptoms: no abdominal pain, no chest pain, no congestion, no cough, no diarrhea, no ear pain, no fatigue, no fever, no headaches, no myalgias, no nausea, no rash, no rhinorrhea, no shortness of breath, no sore throat, no vomiting and no wheezing    Leg Injury  Associated symptoms: no abdominal pain, no chest pain, no congestion, no cough, no diarrhea, no ear pain, no fatigue, no fever, no headaches, no myalgias, no nausea, no rash, no rhinorrhea, no shortness of breath, no sore throat, no vomiting and no wheezing        Review of Systems   Constitutional: Negative for appetite change, chills, diaphoresis, fatigue and fever.   HENT: Negative for congestion, ear discharge, ear pain, nosebleeds, rhinorrhea, sinus pressure, sore throat and trouble swallowing.    Eyes: Negative for discharge and redness.   Respiratory: Negative for apnea, cough, chest tightness, shortness of breath and wheezing.    Cardiovascular: Negative for chest pain.   Gastrointestinal: Negative for abdominal pain, diarrhea, nausea and vomiting.   Endocrine: Negative for polyuria.   Genitourinary: Negative for dysuria, frequency and urgency.   Musculoskeletal:  Positive for arthralgias. Negative for myalgias and neck pain.   Skin: Positive for color change. Negative for rash.   Allergic/Immunologic: Negative for immunocompromised state.   Neurological: Negative for dizziness, seizures, syncope, weakness, light-headedness and headaches.   Hematological: Negative for adenopathy. Does not bruise/bleed easily.   Psychiatric/Behavioral: Negative for behavioral problems and confusion.   All other systems reviewed and are negative.      Past Medical History:   Diagnosis Date   • Allergic rhinitis    • Arthritis     hips, knees, feet   • Artificial lens present     in position   • Benign neoplasm of skin of eyelid    • Bladder tumor    • Bladder tumor    • BPH (benign prostatic hyperplasia)    • Cancer     SKIN    • Cataract    • Coronary arteriosclerosis    • Cortical senile cataract    • Cough    • Diabetes mellitus    • Esophagitis 5/23/2021   • Essential hypertension    • Essential hypertension    • Hypercholesterolemia    • Hypercholesterolemia    • Kidney stones    • MI, old 1988   • Nonexudative age-related macular degeneration     mild   • Nuclear cataract    • Open-angle glaucoma     s/p trabec OS, doing well      • Primary open angle glaucoma     OS, s/p trabeculectomy; IOP down with timolol OD     • Pseudophakia of right eye    • Rhinitis    • Upper respiratory infection    • Vitreous detachment     peripheral       No Known Allergies    Past Surgical History:   Procedure Laterality Date   • ANKLE SURGERY  07/29/2000    Ankle surgery (ORIF right ankle fracture. Bimalleolar right fracture)   • APPENDECTOMY     • CARDIAC ABLATION     • CARDIAC CATHETERIZATION  07/02/1987    Cardiac cath 76837 (Coronary arthosclerotic heart disease. maintained patency of RCA. Mild inferior hypokinesis.)   • CARDIAC CATHETERIZATION  06/30/1987    Cardiac cath 73041 (Coronary atherosclerotic heart disease. Occluded RCA. Acute inferior infarction. S/P successful angioplasty of RCA)   •  CARDIAC ELECTROPHYSIOLOGY PROCEDURE N/A 6/11/2020    Procedure: Pacer single lead;  Surgeon: Johnny Forbes MD;  Location: [facility] CATH INVASIVE LOCATION;  Service: Cardiology;  Laterality: N/A;   • CATARACT EXTRACTION  04/01/2010    Remove cataract, insert lens (Complicated cataract extraction with intraocular lens implantation, right eye, Marcelo SN-60 WF serial # [id number]: 20.5 diopter)   • CATARACT EXTRACTION W/ INTRAOCULAR LENS IMPLANT Left 3/23/2018    Procedure: CATARACT PHACO EXTRACTION WITH INTRAOCULAR LENS IMPLANT;  Surgeon: Oli Haley MD;  Location: [facility] OR;  Service: Ophthalmology   • COLONOSCOPY N/A 5/9/2019    Procedure: COLONOSCOPY;  Surgeon: Rainer Gates DO;  Location: [facility] ENDOSCOPY;  Service: Gastroenterology   • COLONOSCOPY N/A 11/12/2020    Procedure: COLONOSCOPY;  Surgeon: Rainer Gates DO;  Location: [facility] ENDOSCOPY;  Service: Gastroenterology;  Laterality: N/A;   • ENDOSCOPY  02/19/1990    EGD 28020 (Olympus video endoscope.Fibrin-like patch in stomach)   • ENDOSCOPY N/A 11/12/2020    Procedure: ESOPHAGOGASTRODUODENOSCOPY;  Surgeon: Rainer Gates DO;  Location: [facility] ENDOSCOPY;  Service: Gastroenterology;  Laterality: N/A;   • EYE SURGERY  02/02/2005    Eye surgery procedure (Repair of operative wound leak, left eye. S/P trabeculectomy with operative wound leak)   • EYE SURGERY  01/19/2005    Eye surgery procedure (Trabeculectomy, left eye. 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 degeneration)    • OTHER SURGICAL HISTORY  02/26/2015    EXTENDED VISUAL FIELDS STUDY 89108 (Primary open angle glaucoma)    • OTHER SURGICAL HISTORY  02/26/2015    EXTENDED VISUAL FIELDS STUDY 26751 (Primary open angle glaucoma)    • OTHER SURGICAL HISTORY  06/16/2016    OCT  DISC NFL 05001 (Open-angle glaucoma)    • OTHER SURGICAL HISTORY      OCT DISC NFL 23425 (Primary open angle glaucoma) (3): 06/17/2015, 03/19/2014, 03/04/2013   • OTHER SURGICAL HISTORY  06/16/2016    OPTIC NERVE HEAD EVAL PERFORMED 2027F (Open-angle glaucoma)    • TRANSURETHRAL RESECTION OF BLADDER TUMOR N/A 5/9/2018    Procedure: CYSTOSCOPY TRANSURETHRAL RESECTION OF BLADDER TUMOR;  Surgeon: Donovan Conner MD;  Location: [facility] OR;  Service: Urology   • TRANSURETHRAL RESECTION OF BLADDER TUMOR N/A 7/26/2019    Procedure: CYSTOSCOPY TRANSURETHRAL RESECTION OF BLADDER TUMOR;  Surgeon: Donovan Conner MD;  Location: [facility] OR;  Service: Urology   • TRANSURETHRAL RESECTION OF BLADDER TUMOR N/A 7/29/2020    Procedure: CYSTOSCOPY TRANSURETHRAL RESECTION OF BLADDER TUMOR;  Surgeon: Donovan Conner MD;  Location: [facility] OR;  Service: Urology;  Laterality: N/A;   • UVULECTOMY  1969    EXCISION OF UVULA 19490 (partial uvulectomy with excision of papilloma. Redunant uvula with a small papilloma)       Family History   Problem Relation Age of Onset   • Cancer Mother    • No Known Problems Father    • Cancer Brother        Social History     Socioeconomic History   • Marital status:    Tobacco Use   • Smoking status: Former   • Smokeless tobacco: Current     Types: Snuff   • Tobacco comments:     pouches   Vaping Use   • Vaping Use: Never used   Substance and Sexual Activity   • Alcohol use: No   • Drug use: No   • Sexual activity: Defer           Objective   Physical Exam  Vitals and nursing note reviewed.   Constitutional:       Appearance: He is well-developed.   HENT:      Head: Normocephalic and atraumatic.      Nose: Nose normal.   Eyes:      General: No scleral icterus.        Right eye: No discharge.         Left eye: No discharge.      Conjunctiva/sclera: Conjunctivae normal.      Pupils: Pupils are equal, round, and reactive to light.   Neck:      Trachea: No tracheal deviation.   Cardiovascular:       Rate and Rhythm: Normal rate and regular rhythm.      Heart sounds: Normal heart sounds. No murmur heard.  Pulmonary:      Effort: Pulmonary effort is normal. No respiratory distress.      Breath sounds: Normal breath sounds. No stridor. No wheezing or rales.   Abdominal:      General: Bowel sounds are normal. There is no distension.      Palpations: Abdomen is soft. There is no mass.      Tenderness: There is no abdominal tenderness. There is no guarding or rebound.   Genitourinary:     Rectum: Guaiac result positive (stool normal color).   Musculoskeletal:      Cervical back: Normal range of motion and neck supple.        Legs:       Comments: Large area of ecchymosis noted over the right medial thigh with no areas of induration or fluctuance.   Skin:     General: Skin is warm and dry.      Findings: No erythema or rash.   Neurological:      Mental Status: He is alert and oriented to person, place, and time.      Coordination: Coordination normal.   Psychiatric:         Behavior: Behavior normal.         Thought Content: Thought content normal.         ECG 12 Lead      Date/Time: 4/2/2023 12:21 AM  Performed by: Richy Barrera MD  Authorized by: Richy Barrera MD   Interpreted by physician  Rhythm: atrial fibrillation  BPM: 66  ST Segments: ST segments normal  T depression: I, V2, V3, V4, V5 and V6                   ED Course  ED Course as of 04/02/23 0032   Sun Apr 02, 2023   0030 Patient's hemoglobin on 3/25/2023 was 11.3.  In the emergency department today's hemoglobin is 7.2.  A rectal exam was performed which did reveal occult blood.  The patient's care was discussed with Dr. Huitron at [facility], as there is no GI coverage at [facility].  IV Protonix and type and screen performed in the emergency department while patient awaits transfer. [CB]      ED Course User Index  [CB] Richy Barrera MD        Labs Reviewed   COMPREHENSIVE METABOLIC PANEL - Abnormal; Notable for  the following components:       Result Value    Glucose 230 (*)     BUN 53 (*)     Creatinine 1.86 (*)     CO2 21.0 (*)     Total Protein 5.9 (*)     Albumin 3.4 (*)     BUN/Creatinine Ratio 28.5 (*)     eGFR 34.2 (*)     All other components within normal limits    Narrative:     GFR Normal >60  Chronic Kidney Disease <60  Kidney Failure <15    The GFR formula is only valid for adults with stable renal function between ages 18 and 70.   CBC WITH AUTO DIFFERENTIAL - Abnormal; Notable for the following components:    WBC 11.87 (*)     RBC 2.13 (*)     Hemoglobin 7.2 (*)     Hematocrit 21.9 (*)     .8 (*)     MCH 33.8 (*)     All other components within normal limits   MANUAL DIFFERENTIAL - Abnormal; Notable for the following components:    Neutrophil % 78.0 (*)     Lymphocyte % 10.0 (*)     Neutrophils Absolute 9.38 (*)     Monocytes Absolute 1.31 (*)     All other components within normal limits   TYPE AND SCREEN   PREVIOUS HISTORY   CBC AND DIFFERENTIAL    Narrative:     The following orders were created for panel order CBC & Differential.  Procedure                               Abnormality         Status                     ---------                               -----------         ------                     CBC Auto Differential[[id number]]        Abnormal            Final result                 Please view results for these tests on the individual orders.   EXTRA TUBES    Narrative:     The following orders were created for panel order Extra Tubes.  Procedure                               Abnormality         Status                     ---------                               -----------         ------                     [facility] - [facility][[id number]]                                   Final result               Light Blue Top[[id number]]                                   Final result                 Please view results for these tests on the individual orders.   [facility]   LIGHT BLUE [facility]  Nonvascular Extremity Limited   Final Result      Minimal edema. No hematoma, mass, or drainable fluid collection      Electronically signed by:  Seth Coburn MD  4/1/2023 10:58 PM CDT   Workstation: [id number]                                                 [facility]    Final diagnoses:   Gastrointestinal hemorrhage, unspecified gastrointestinal hemorrhage type   Anemia, unspecified type       ED Disposition  ED Disposition     ED Disposition   Transfer to Another Facility     Condition   --    Comment   --             No follow-up provider specified.       Medication List      No changes were made to your prescriptions during this visit.          Richy Barrera MD  04/02/23 0032

## 2023-04-05 ENCOUNTER — READMISSION MANAGEMENT (OUTPATIENT)
Dept: CALL CENTER | Facility: HOSPITAL | Age: 88
End: 2023-04-05
Payer: MEDICARE

## 2023-04-05 NOTE — OUTREACH NOTE
COPD/PN Week 1 Survey    Flowsheet Row Responses   [facility] patient discharged from? [place]   Does the patient have one of the following disease processes/diagnoses(primary or secondary)? Pneumonia   Week 1 attempt successful? Yes   Call start time 1706   Call end time 1717   Discharge diagnosis Multifocal pneumonia   Meds reviewed with patient/caregiver? Yes   Is the patient having any side effects they believe may be caused by any medication additions or changes? No   Does the patient have all medications ordered at discharge? Yes   Is the patient taking all medications as directed (includes completed medication regime)? Yes   Does the patient have a primary care provider?  Yes   Has the patient kept scheduled appointments due by today? Yes   What is the Home health agency?  [facility]   Has home health visited the patient within 72 hours of discharge? No   Home health interventions Called Home Health agency   Home health comments They went to see PCP and had to get orders   Pulse Ox monitoring Intermittent   Pulse Ox device source Patient   O2 Sat comments 95% on room air   O2 Sat: education provided Sat levels, Monitoring frequency, When to seek care   Psychosocial issues? No   Did the patient receive a copy of their discharge instructions? Yes   Nursing interventions Reviewed instructions with patient   What is the patient's perception of their health status since discharge? Improving   Is the patient/caregiver able to teach back the hierarchy of who to call/visit for symptoms/problems? PCP, Specialist, Home health nurse, Urgent Care, ED, 911 Yes   Additional teach back comments Wife states he is doing a little better today.  They have not heard from home health and went to see his PCP today and was told he had sent orders. [facility] was called and they are checking into it and will contact pt.    Is the patient/caregiver able to teach back signs and symptoms of worsening condition: Fever/chills,  Shortness of breath, Chest pain   Is the patient/caregiver able to teach back importance of completing antibiotic course of treatment? Yes   Week 1 call completed? Yes   Wrap up additional comments Home health to call pt back regarding starting.          Natalia TABOR - Licensed Nurse

## 2023-04-06 ENCOUNTER — TRANSCRIBE ORDERS (OUTPATIENT)
Dept: HOME HEALTH SERVICES | Facility: HOME HEALTHCARE | Age: 88
End: 2023-04-06
Payer: MEDICARE

## 2023-04-06 ENCOUNTER — HOME HEALTH ADMISSION (OUTPATIENT)
Dept: HOME HEALTH SERVICES | Facility: HOME HEALTHCARE | Age: 88
End: 2023-04-06
Payer: MEDICARE

## 2023-04-06 DIAGNOSIS — J18.9 PNEUMONIA OF BOTH LUNGS DUE TO INFECTIOUS ORGANISM, UNSPECIFIED PART OF LUNG: Primary | ICD-10-CM

## 2023-04-10 ENCOUNTER — HOME CARE VISIT (OUTPATIENT)
Dept: HOME HEALTH SERVICES | Facility: CLINIC | Age: 88
End: 2023-04-10
Payer: MEDICARE

## 2023-04-10 PROCEDURE — G0299 HHS/HOSPICE OF RN EA 15 MIN: HCPCS

## 2023-04-11 ENCOUNTER — HOME CARE VISIT (OUTPATIENT)
Dept: HOME HEALTH SERVICES | Facility: CLINIC | Age: 88
End: 2023-04-11
Payer: MEDICARE

## 2023-04-11 VITALS
SYSTOLIC BLOOD PRESSURE: 130 MMHG | RESPIRATION RATE: 18 BRPM | HEART RATE: 82 BPM | OXYGEN SATURATION: 97 % | DIASTOLIC BLOOD PRESSURE: 68 MMHG | TEMPERATURE: 97.1 F

## 2023-04-11 VITALS
HEART RATE: 63 BPM | SYSTOLIC BLOOD PRESSURE: 128 MMHG | OXYGEN SATURATION: 94 % | RESPIRATION RATE: 18 BRPM | TEMPERATURE: 98.1 F | DIASTOLIC BLOOD PRESSURE: 56 MMHG

## 2023-04-11 PROCEDURE — G0151 HHCP-SERV OF PT,EA 15 MIN: HCPCS

## 2023-04-11 NOTE — Clinical Note
"[facility]  / Case Conference Note  Medical Necessity and focus of care: ANDREIA WAS HOSPITALIZED SECONDARY TO BILATERAL PNEUMONIA AND A SILENT HEART ATTACK. PATIENT THEN DEVELOPED VERY LOW HEMOGLOBIN SEOCNDARY TO PROLONGED ANTICOAGULATION DUE TO HEART ATTACK. PATIENT HAS WEAKNESS IN LE, LIMITED GAIT DISTANCE, INCREASED FALL RISK FOLLOWING HOSPITALIZATION  Caregiver Status: SPOUSE FULL TIME, KIDS INTERMITTENTLY  Patient's goal(s): \" GET HIS STRENGTH BACK\"  GG Discharge Goal: 150 FEET 6  Medication [place]:   Environmental/ Physical issues:  Any additional needs:    Based upon record review and collaboration conference, the recommended frequency for this patient is   SN-----  PT----- 2W2 1W2  OT----  ST-----  HHA---  MSW---  Provider__________ Notified @ _________ and agrees with POC     Please verify your eval  scores: (Answer the scores  that pertain to your area of focus remove the others)    5   2   3        M 1700        "

## 2023-04-12 NOTE — HOME HEALTH
"PATIENT WAS HOSPITALIZED 3/25/2023 - 4/1/2023 SECONDARY TO MULTIFOCAL PNEUMONIA, DOMINIQUE, PULMONARY EDEMA, CAD, AND HIP PAIN. PATIENT WAS TREATED AT THE HOSPITAL FOR THE PNEUMONIA AND DEVELOPED A DOMINIQUE SECONDARY TO DIURETICS SO THEY HELD HIS DIURETICS AND HE IMPROVED. PATIENT WAS FOUND TO HAVE A SILENT HEART ATTACK WHEN HE PRESENTED TO THE HOSPITAL AND HE WAS TREATED WITH ANTICOAGULATION AGENTS. PATIENT WAS DISCHARGED HOME ON 4/1/23 AND CONTINUED TO HAVE LARGER AMOUNTS OF BRUISING AND HIS FAMILY WAS CONCERNED THAT HE HAD TOO MUCH BLEEDING. PATIENT WAS FOUND TO HAVE NO ACTIVE BLEEDING BUT SIGNIFICANT LOW HEMOGLOBIN AND HE WAS TREATED WHILE HOSPITALIZED AND THEN DISCHARGED HOME 4/4/2023    PMHX: ATRIAL FLUTTER, HTN, DM, DEPRESSION/ANXIETY, BLADDER CANCER, SSS, PACEMAKER, PULMONARY EDEMA, DOMINIQUE    PATIENT GOAL\" GET BACK TO NORMAL\"    PRIOR LEVEL OF FUNCTION: PATIENT IS NORMALLY INDEPENDENT WITH ALL ACTIVITY, HE IS ABLE TO WALK AROUND WITHOUT AD AND WAS MOWING THE LAWN"

## 2023-04-13 ENCOUNTER — HOME CARE VISIT (OUTPATIENT)
Dept: HOME HEALTH SERVICES | Facility: CLINIC | Age: 88
End: 2023-04-13
Payer: MEDICARE

## 2023-04-13 ENCOUNTER — READMISSION MANAGEMENT (OUTPATIENT)
Dept: CALL CENTER | Facility: HOSPITAL | Age: 88
End: 2023-04-13
Payer: MEDICARE

## 2023-04-13 PROCEDURE — G0157 HHC PT ASSISTANT EA 15: HCPCS

## 2023-04-13 NOTE — OUTREACH NOTE
COPD/PN Week 2 Survey    Flowsheet Row Responses   [facility] patient discharged from? [place]   Does the patient have one of the following disease processes/diagnoses(primary or secondary)? Pneumonia   Week 2 attempt successful? Yes   Call start time 1449   Call end time 1451   Discharge diagnosis Multifocal pneumonia   Meds reviewed with patient/caregiver? Yes   Is the patient having any side effects they believe may be caused by any medication additions or changes? No   Does the patient have all medications ordered at discharge? Yes   Is the patient taking all medications as directed (includes completed medication regime)? Yes   Does the patient have a primary care provider?  Yes   Comments regarding PCP Patient has seen his pcp   Has the patient kept scheduled appointments due by today? Yes   What is the Home health agency?  [facility]   Has home health visited the patient within 72 hours of discharge? Yes   Pulse Ox monitoring Intermittent   Pulse Ox device source Patient   O2 Sat: education provided Sat levels, Monitoring frequency   Psychosocial issues? No   Did the patient receive a copy of their discharge instructions? Yes   Nursing interventions Reviewed instructions with patient   What is the patient's perception of their health status since discharge? Improving   Nursing Interventions Nurse provided patient education   Is the patient/caregiver able to teach back the hierarchy of who to call/visit for symptoms/problems? PCP, Specialist, Home health nurse, Urgent Care, ED, 911 Yes   Is the patient/caregiver able to teach back signs and symptoms of worsening condition: Fever/chills, Shortness of breath, Chest pain   Week 2 call completed? Yes          Francine MORRISON - Licensed Nurse

## 2023-04-14 ENCOUNTER — HOME CARE VISIT (OUTPATIENT)
Dept: HOME HEALTH SERVICES | Facility: CLINIC | Age: 88
End: 2023-04-14
Payer: MEDICARE

## 2023-04-14 VITALS
HEART RATE: 64 BPM | DIASTOLIC BLOOD PRESSURE: 60 MMHG | SYSTOLIC BLOOD PRESSURE: 134 MMHG | OXYGEN SATURATION: 95 % | TEMPERATURE: 98.4 F | RESPIRATION RATE: 20 BRPM

## 2023-04-14 VITALS
DIASTOLIC BLOOD PRESSURE: 70 MMHG | HEART RATE: 76 BPM | RESPIRATION RATE: 18 BRPM | TEMPERATURE: 97.4 F | SYSTOLIC BLOOD PRESSURE: 126 MMHG | OXYGEN SATURATION: 96 %

## 2023-04-14 PROCEDURE — G0300 HHS/HOSPICE OF LPN EA 15 MIN: HCPCS

## 2023-04-17 ENCOUNTER — HOME CARE VISIT (OUTPATIENT)
Dept: HOME HEALTH SERVICES | Facility: CLINIC | Age: 88
End: 2023-04-17
Payer: MEDICARE

## 2023-04-17 VITALS
TEMPERATURE: 98.6 F | HEART RATE: 73 BPM | SYSTOLIC BLOOD PRESSURE: 132 MMHG | DIASTOLIC BLOOD PRESSURE: 68 MMHG | OXYGEN SATURATION: 96 % | RESPIRATION RATE: 20 BRPM

## 2023-04-17 PROCEDURE — G0300 HHS/HOSPICE OF LPN EA 15 MIN: HCPCS

## 2023-04-17 PROCEDURE — 93296 REM INTERROG EVL PM/IDS: CPT | Performed by: INTERNAL MEDICINE

## 2023-04-17 PROCEDURE — 93294 REM INTERROG EVL PM/LDLS PM: CPT | Performed by: INTERNAL MEDICINE

## 2023-04-18 ENCOUNTER — HOME CARE VISIT (OUTPATIENT)
Dept: HOME HEALTH SERVICES | Facility: CLINIC | Age: 88
End: 2023-04-18
Payer: MEDICARE

## 2023-04-18 VITALS
RESPIRATION RATE: 20 BRPM | OXYGEN SATURATION: 98 % | DIASTOLIC BLOOD PRESSURE: 68 MMHG | SYSTOLIC BLOOD PRESSURE: 130 MMHG | HEART RATE: 74 BPM

## 2023-04-18 PROCEDURE — G0157 HHC PT ASSISTANT EA 15: HCPCS

## 2023-04-19 ENCOUNTER — OFFICE VISIT (OUTPATIENT)
Dept: CARDIOLOGY | Facility: CLINIC | Age: 88
End: 2023-04-19
Payer: MEDICARE

## 2023-04-19 VITALS
WEIGHT: 196 LBS | OXYGEN SATURATION: 98 % | HEIGHT: 72 IN | SYSTOLIC BLOOD PRESSURE: 132 MMHG | DIASTOLIC BLOOD PRESSURE: 54 MMHG | HEART RATE: 61 BPM | BODY MASS INDEX: 26.55 KG/M2

## 2023-04-19 DIAGNOSIS — I49.5 SICK SINUS SYNDROME: ICD-10-CM

## 2023-04-19 DIAGNOSIS — I10 BENIGN ESSENTIAL HYPERTENSION: Primary | ICD-10-CM

## 2023-04-19 DIAGNOSIS — E78.2 MIXED HYPERLIPIDEMIA: ICD-10-CM

## 2023-04-19 DIAGNOSIS — I48.92 ATRIAL FLUTTER, UNSPECIFIED TYPE: ICD-10-CM

## 2023-04-19 DIAGNOSIS — I25.10 CORONARY ARTERY DISEASE INVOLVING NATIVE CORONARY ARTERY OF NATIVE HEART WITHOUT ANGINA PECTORIS: ICD-10-CM

## 2023-04-19 DIAGNOSIS — I48.0 PAROXYSMAL ATRIAL FIBRILLATION: ICD-10-CM

## 2023-04-19 NOTE — PROGRESS NOTES
Evgeny Carter Jr.  89 y.o. male    4/19/2023  1. Benign essential hypertension    2. Atrial flutter, unspecified type    3. Coronary artery disease involving native coronary artery of native heart without angina pectoris    4. Sick sinus syndrome    5. Paroxysmal atrial fibrillation (HCC)    6. Mixed hyperlipidemia        History of Present Illness:    Body mass index is 26.58 kg/m². BMI is above normal parameters. Recommendations include: exercise counseling, nutrition counseling and referral to primary care.    89 years old patient was hospitalized for management bronchitis bilateral pneumonia in March 2023 have mildly abnormal troponin evaluated with Dr. Boswell thought to be type II NSTEMI second to bilateral pneumonia patient also had GI bleed with drop in hemoglobin to less than 7 subsequent transferred to [facility].  Endoscope was per performed.  Patient was started back on oral anticoagulation.  He was not interested in invasive cardiac evaluation do not want to pursue the cardiac catheterizations.  His hemoglobin came back up to 9.  He is back on low-dose Eliquis 2.5 mg twice a day.  Echocardiogram preserved left ventricle systolic function with a moderate concentric left and hypertrophy left atrial volume is moderately increased left atrial volume.  Pacemaker interrogated earlier today and right ventricular sensitivity was adjusted.  EKG T wave changes in lateral leads similar to the EKG when he was in hospital.  Gain of discussed further risk stratification not interested in.  No orthopnea or PND reported.  Eliquis 2.5 mg twice a    Blood test March 2023 creatinine 1.6 normal LFT, hemoglobin A1c seven-point      Echo March 2023     Left ventricular ejection fraction appears to be 51 - 55%.  •  Left ventricular wall thickness is consistent with moderate concentric hypertrophy.  •  The following left ventricular wall segments are akinetic: apex.  •  Left ventricular diastolic function is consistent  with (grade I) impaired relaxation and age.  •  Left atrial volume is moderately increased.  •  Estimated right ventricular systolic pressure from tricuspid regurgitation is normal (<35 mmHg).      Date of Procedure: 06/11/20          PROCEDURE(S) PERFORMED:    1. Implantation of a dual-chamber permanent pacemaker.        INDICATIONS FOR PROCDEDURE:            Sick sinus     Echo 5/11/2020  · Left ventricular wall thickness is consistent with borderline concentric hypertrophy.  · Estimated EF = 61%.  · Left ventricular systolic function is normal.  · Left ventricular diastolic dysfunction (grade I a) consistent with impaired relaxation.  · Left atrial cavity size is mildly dilated.  · Mild mitral valve regurgitation is present    ECHO 8/15/18  ·  Mild tricuspid valve regurgitation is present.  · Mild pulmonic valve regurgitation is present.  · Left atrial cavity size is mild-to-moderately dilated.  · Left ventricular systolic function is normal. Estimated EF = 55%.  · Mild mitral valve regurgitation is present  Left ventricular diastolic dysfunction (grade I a) consistent with impaired relaxation     X-ray chest November 2020    PA and Lateral Chest:  The bones are intact.  The heart is borderline in size.  There is a dual-lead left subclavian transvenous pacemaker with intact, well-positioned leads.  The lungs are clear.  No heart failure or pleural fluid.  There is an old   calcified right hilar node.  Exam End: 11/06/20 12:55         LFTs July 2021    Protein   6.4 - 8.9 g/dL 6.3Low        Albumin   3.5 - 5.7 g/dL 4.0       Total Bilirubin   0.3 - 1.0 mg/dL 0.52       SGOT- AST   13 - 39 U/L 23       SGPT- ALT   7 - 52 U/L 22       Alkaline Phosphatase   34 - 104 U/L 67      TSH   0.42 - 5.47 uIU/mL 1.93        LFTs November 6, 2020      Total Protein   6.4 - 8.2 g/dL 7.4    Albumin   3.4 - 5.0 g/dL 3.7    Total Bilirubin   0.20 - 1.00 mg/dL 0.30    AST (SGOT)   15 - 37 U/L 35    ALT (SGPT)   16 - 63 U/L 44     Alkaline Phosphatase   46 - 116 U/L 87    GFR-mch    mL/min 48          Lipid October 28, 2020    Total Cholesterol   <200 mg/dL 218High     Triglycerides   30 - 150 mg/dL 142    HDL Cholesterol   32 - 72 mg/dL 46    LDL Cholesterol    5 - 99 mg/dL 140High     Chol/HDL Ratio   4.2 - 5.6 4.7        10/2016      Ref Range & Units 1yr ago      Total Cholesterol 0 - 199 mg/dl 169   Comments: CHOL DESIRED: < 200 MG/DL   Triglycerides 20 - 199 mg/dl 188   Comments: TRIG DESIRED: < 200 MG/DL   HDL Cholesterol 60 - 200 mg/dl 37 (L)   Comments: HDL AVERAGE RISK: 35 - 60 MG/DL   LDL Cholesterol  0 - 129 mg/dl 94       7/2019  Total Cholesterol  100 - 200 mg/dL 196    Triglycerides  30 - 150 mg/dL 199 Abnormally high     HDL Cholesterol  32 - 72 mg/dL 41    LDL Cholesterol   5 - 99 mg/dL 112 Abnormally high     Chol/HDL Ratio  4.2 - 5.6 4.8           Stress TEST ; 10/2016   Normal LV systolic function overall with an EF of 70% with inferior       wall hypokinesis.  2.   Moderate size predominantly fixed defect in the inferior wall and       apex with partial reversibility.     ECHO 10/2016  1.   Mild concentric left ventricular hypertrophy with estimated       ejection fraction 50% to 60%.  2.   Mildly dilated aortic root with diameter of 4.0.  3.   Mildly dilated left atrium with diameter of 4.4.  4.   Aortic valve appears mildly sclerotic without any hemodynamic       significant aortic stenosis. No aortic regurgitation seen.  5.   Pulmonic valve not well visualized. There is trace pulmonic       regurgitation. There is a trace mitral regurgitation. No       intracardiac mass, pericardial effusion, cardiac thrombus seen.  6.   Normal right ventricular size and function      X-ray chest June 11, 2020    IMPRESSION:  Pacemaker leads with tips in right atrium and right  ventricle in satisfactory position. There is no evidence of a  pneumothorax.    SUBJECTIVE:    No Known Allergies      Past Medical History:   Diagnosis Date    • Allergic rhinitis    • Arthritis     hips, knees, feet   • Artificial lens present     in position   • Benign neoplasm of skin of eyelid    • Bladder tumor    • Bladder tumor    • BPH (benign prostatic hyperplasia)    • Cancer     SKIN    • Cataract    • Coronary arteriosclerosis    • Cortical senile cataract    • Cough    • Diabetes mellitus    • Esophagitis 5/23/2021   • Essential hypertension    • Essential hypertension    • Hypercholesterolemia    • Hypercholesterolemia    • Kidney stones    • MI, old 1988   • Nonexudative age-related macular degeneration     mild   • Nuclear cataract    • Open-angle glaucoma     s/p trabec OS, doing well      • Primary open angle glaucoma     OS, s/p trabeculectomy; IOP down with timolol OD     • Pseudophakia of right eye    • Rhinitis    • Upper respiratory infection    • Vitreous detachment     peripheral         Past Surgical History:   Procedure Laterality Date   • ANKLE SURGERY  07/29/2000    Ankle surgery (ORIF right ankle fracture. Bimalleolar right fracture)   • APPENDECTOMY     • CARDIAC ABLATION     • CARDIAC CATHETERIZATION  07/02/1987    Cardiac cath 57157 (Coronary arthosclerotic heart disease. maintained patency of RCA. Mild inferior hypokinesis.)   • CARDIAC CATHETERIZATION  06/30/1987    Cardiac cath 09107 (Coronary atherosclerotic heart disease. Occluded RCA. Acute inferior infarction. S/P successful angioplasty of RCA)   • CARDIAC ELECTROPHYSIOLOGY PROCEDURE N/A 6/11/2020    Procedure: Pacer single lead;  Surgeon: Johnny Forbes MD;  Location: [facility] INVASIVE LOCATION;  Service: Cardiology;  Laterality: N/A;   • CATARACT EXTRACTION  04/01/2010    Remove cataract, insert lens (Complicated cataract extraction with intraocular lens implantation, right eye, Marcelo SN-60  serial # [id number]: 20.5 diopter)   • CATARACT EXTRACTION W/ INTRAOCULAR LENS IMPLANT Left 3/23/2018    Procedure: CATARACT PHACO EXTRACTION WITH INTRAOCULAR LENS IMPLANT;  Surgeon:  Oli Haley MD;  Location: [facility] OR;  Service: Ophthalmology   • COLONOSCOPY N/A 5/9/2019    Procedure: COLONOSCOPY;  Surgeon: Rainer Gates DO;  Location: [facility] ENDOSCOPY;  Service: Gastroenterology   • COLONOSCOPY N/A 11/12/2020    Procedure: COLONOSCOPY;  Surgeon: Rainer Gates DO;  Location: [facility] ENDOSCOPY;  Service: Gastroenterology;  Laterality: N/A;   • ENDOSCOPY  02/19/1990    EGD 66132 (Olympus video endoscope.Fibrin-like patch in stomach)   • ENDOSCOPY N/A 11/12/2020    Procedure: ESOPHAGOGASTRODUODENOSCOPY;  Surgeon: Rainer Gates DO;  Location: [facility] ENDOSCOPY;  Service: Gastroenterology;  Laterality: N/A;   • EYE SURGERY  02/02/2005    Eye surgery procedure (Repair of operative wound leak, left eye. S/P trabeculectomy with operative wound leak)   • EYE SURGERY  01/19/2005    Eye surgery procedure (Trabeculectomy, left eye. 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 degeneration)    • OTHER SURGICAL HISTORY  02/26/2015    EXTENDED VISUAL FIELDS STUDY 76587 (Primary open angle glaucoma)    • OTHER SURGICAL HISTORY  02/26/2015    EXTENDED VISUAL FIELDS STUDY 17096 (Primary open angle glaucoma)    • OTHER SURGICAL HISTORY  06/16/2016    OCT DISC NFL 23990 (Open-angle glaucoma)    • OTHER SURGICAL HISTORY      OCT DISC NFL 82015 (Primary open angle glaucoma) (3): 06/17/2015, 03/19/2014, 03/04/2013   • OTHER SURGICAL HISTORY  06/16/2016    OPTIC NERVE HEAD EVAL PERFORMED 2027F (Open-angle glaucoma)    • TRANSURETHRAL RESECTION OF BLADDER TUMOR N/A 5/9/2018    Procedure: CYSTOSCOPY TRANSURETHRAL RESECTION OF BLADDER TUMOR;  Surgeon: Donovan Conner MD;  Location: [facility] OR;  Service: Urology   • TRANSURETHRAL RESECTION OF BLADDER TUMOR N/A 7/26/2019    Procedure: CYSTOSCOPY  TRANSURETHRAL RESECTION OF BLADDER TUMOR;  Surgeon: Donovan Conner MD;  Location: [facility];  Service: Urology   • TRANSURETHRAL RESECTION OF BLADDER TUMOR N/A 7/29/2020    Procedure: CYSTOSCOPY TRANSURETHRAL RESECTION OF BLADDER TUMOR;  Surgeon: Donovan Conner MD;  Location: [facility];  Service: Urology;  Laterality: N/A;   • UVULECTOMY  1969    EXCISION OF UVULA 27891 (partial uvulectomy with excision of papilloma. Redunant uvula with a small papilloma)         Family History   Problem Relation Age of Onset   • Cancer Mother    • No Known Problems Father    • Cancer Brother          Social History     Socioeconomic History   • Marital status:    Tobacco Use   • Smoking status: Former   • Smokeless tobacco: Current     Types: Snuff   • Tobacco comments:     pouches   Vaping Use   • Vaping Use: Never used   Substance and Sexual Activity   • Alcohol use: No   • Drug use: No   • Sexual activity: Defer         Current Outpatient Medications   Medication Sig Dispense Refill   • albuterol sulfate  (90 Base) MCG/ACT inhaler INHALE 2 PUFFS INTO THE LUNGS EVERY 6 (SIX) HOURS AS NEEDED FOR WHEEZING (COUGH).     • amiodarone (PACERONE) 200 MG tablet TAKE ONE TABLET BY MOUTH DAILY (Patient taking differently: 100 mg. Per pt prescribed 200mg but only takes half) 90 tablet 3   • apixaban (ELIQUIS) 5 MG tablet tablet Take 2.5 mg by mouth 2 (Two) Times a Day. Last dose to be taken 5/5/18 prior to surgery     • aspirin 81 MG tablet Take 1 tablet by mouth Daily. Last dose 5/1/18     • dextromethorphan 15 MG/5ML syrup Take 10 mL by mouth 4 (Four) Times a Day As Needed for Cough. 118 mL 0   • dilTIAZem CD (CARDIZEM CD) 180 MG 24 hr capsule TAKE ONE CAPSULE BY MOUTH DAILY 90 capsule 5   • erythromycin (ROMYCIN) 5 MG/GM ophthalmic ointment APPLY A THIN LAYER INTO LEFT EYE AT BEDTIME AS NEEDED     • FeroSul 325 (65 Fe) MG tablet Take 1 tablet by mouth 2 (Two) Times a Day.     • fluticasone (FLONASE) 50 MCG/ACT nasal  spray ONE SPRAY IN EACH NOSTRIL DAILY     • furosemide (LASIX) 20 MG tablet Take 1 tablet by mouth Daily. 90 tablet 0   • lisinopril-hydrochlorothiazide (PRINZIDE,ZESTORETIC) 20-12.5 MG per tablet Take 1 tablet by mouth Daily.     • loratadine (CLARITIN) 10 MG tablet Take 1 tablet by mouth Daily.     • metFORMIN (GLUCOPHAGE) 500 MG tablet Take 1 tablet by mouth 2 (Two) Times a Day.     • methylPREDNISolone (MEDROL) 4 MG dose pack Take as directed on package instructions. 21 tablet 0   • Multiple Vitamins-Minerals (MENS MULTIVITAMIN PLUS PO) Take 1 tablet by mouth Daily.     • terazosin (HYTRIN) 2 MG capsule Take 1 capsule by mouth every night at bedtime.     • timolol (TIMOPTIC) 0.5 % ophthalmic solution Administer 1 drop to both eyes 2 (Two) Times a Day.     • traMADol (ULTRAM) 50 MG tablet Take 1 tablet by mouth Every 6 (Six) Hours As Needed for Severe Pain. 12 tablet 0     No current facility-administered medications for this visit.             Constitutional:  Denies recent weight loss, weight gain,      HENT:  Denies any hearing loss, epistaxis, hoarseness    Eyes: No blurring    Respiratory: Bilateral pneumonia or bronchitis when he was hospitalized in March 2023    Cardiovascular: See H&P    Gastrointestinal: History of GI bleed per patient evaluated with Dr. Gates polyp was removed and currently he is on iron supplement    Endocrine: No polydipsia polyuria    Genitourinary: Negative.      Musculoskeletal: History of    Skin:  Denies any change in hair or nails, rashes, or skin lesions.     Allergic/Immunologic: Negative.  Negative for environmental allergies    Neurological:  Denies  headaches, strokes, TIA, or seizure disorder.     Hematological: Denies any food allergies, seasonal allergies, bleeding disorders    Psychiatric/Behavioral: Denies any history of depression, substance abuse, or change in cognitive function.       OBJECTIVE:    /54 (BP Location: Left arm, Patient Position: Sitting, Cuff  "Size: Adult)   Pulse 61   Ht 182.9 cm (72\")   Wt 88.9 kg (196 lb)   SpO2 98%   BMI 26.58 kg/m²       Physical Exam:     Constitutional: Cooperative, alert and oriented, well-developed, well-nourished, in no acute distress.     HENT:   Head: Normocephalic and atraumatic, oral mucosa moist, conjunctive pink no jugular distention    Cardiovascular: Regular rhythm, S1 and S2 normal, no S3 or S4. Apical impulse not displaced. No murmurs, gallops, or rubs detected.     Pulmonary/Chest: Chest: Good bilateral air entry     Abdominal: Abdominal soft nontender    Musculoskeletal: Positive peripheral pulses no edema    Neurological: No gross motor or sensory deficits noted, affect appropriate    Skin: Warm and dry to the touch, no apparent skin lesions or masses noted.     Psychiatric: Good personality and good mood        Procedures      Lab Results   Component Value Date    WBC 9.6 04/03/2023    HGB 8.5 (L) 04/04/2023    HCT 25.3 (L) 04/03/2023    .7 (H) 04/03/2023     04/03/2023     Lab Results   Component Value Date    GLUCOSE 230 (H) 04/01/2023    BUN 21 04/11/2023    CREATININE 1.5 (H) 04/11/2023    EGFRIFNONA 46 (L) 07/23/2020    EGFRIFAFRI 63 07/27/2021    BCR 28.5 (H) 04/01/2023    CO2 32 (H) 04/11/2023    CALCIUM 8.5 (L) 04/11/2023    ALBUMIN 3.4 (L) 04/01/2023    AST 35 04/01/2023    ALT 33 04/01/2023     Lab Results   Component Value Date    CHOL 218 (H) 10/28/2020    CHOL 196 07/09/2019    CHOL 190 03/30/2018     Lab Results   Component Value Date    TRIG 83 09/20/2022    TRIG 203 (H) 02/23/2022    TRIG 142 10/28/2020     Lab Results   Component Value Date    HDL 49 09/20/2022    HDL 49 02/23/2022    HDL 46 10/28/2020     No components found for: LDLCALC  Lab Results   Component Value Date     09/20/2022     02/23/2022     (H) 10/28/2020     No results found for: HDLLDLRATIO  No components found for: CHOLHDL  Lab Results   Component Value Date    HGBA1C 5.2 09/20/2022 "     Lab Results   Component Value Date    TSH 1.77 09/20/2022           ASSESSMENT AND PLAN:    #1 paroxysmal atrial fibrillation currently in sinus rhythm on oral anticoagulation to decrease the cardiac embolization    Continue amiodarone 100 mg a day maintaining sinus rhythm recent LFTs within normal range.  Continue oral anticoagulation to decrease risk of cardiac embolization       [id number] score  Is 4 continue oral anticoagulation     #2  Sick sinus syndrome with a significant bradyarrhythmia  status post pacemaker implantation continue follow-up with the pacer clinic         #3 history of atrial flutter status post EP study and flutter ablation no further recurrence     #4 hypertension good blood pressure control on lisinopril hydrochlorothiazide    #4 CAD     Patient was hospitalized in March 2023 for bilateral pneumonia and type II type MI.  The patient and the family not is tested in invasive cardiac evaluations.  Medically managed.  He is asymptomatic.  We will continue antiplatelet  I spent more than 25 minutes in discussing the management during hospitalizations they have multiple question concern that was attempted to answer to their satisfaction.  They are not interested in invasive cardiac evaluations.     I spent 25 minutes caring for Evgeny on this date of service. This time includes time spent by me of counseling/coordination of care as relates to the presenting problem and any ordered procedures/tests as outlined above.           This document has been electronically signed by Johnny Forbes MD on April 19, 2023 10:30 CDT      Diagnoses and all orders for this visit:    1. Benign essential hypertension (Primary)  -     ECG 12 Lead    2. Atrial flutter, unspecified type    3. Coronary artery disease involving native coronary artery of native heart without angina pectoris    4. Sick sinus syndrome    5. Paroxysmal atrial fibrillation (HCC)    6. Mixed hyperlipidemia        Johnny Forbes  MD  4/19/2023  10:30 CDT

## 2023-04-20 ENCOUNTER — HOME CARE VISIT (OUTPATIENT)
Dept: HOME HEALTH SERVICES | Facility: CLINIC | Age: 88
End: 2023-04-20
Payer: MEDICARE

## 2023-04-20 VITALS
HEART RATE: 70 BPM | SYSTOLIC BLOOD PRESSURE: 112 MMHG | TEMPERATURE: 98.6 F | DIASTOLIC BLOOD PRESSURE: 68 MMHG | OXYGEN SATURATION: 96 % | RESPIRATION RATE: 18 BRPM

## 2023-04-20 VITALS
RESPIRATION RATE: 18 BRPM | DIASTOLIC BLOOD PRESSURE: 56 MMHG | HEART RATE: 76 BPM | SYSTOLIC BLOOD PRESSURE: 138 MMHG | TEMPERATURE: 97.3 F | OXYGEN SATURATION: 98 %

## 2023-04-20 LAB
QT INTERVAL: 460 MS
QTC INTERVAL: 464 MS

## 2023-04-20 PROCEDURE — G0157 HHC PT ASSISTANT EA 15: HCPCS

## 2023-04-20 PROCEDURE — G0299 HHS/HOSPICE OF RN EA 15 MIN: HCPCS

## 2023-04-21 ENCOUNTER — HOME CARE VISIT (OUTPATIENT)
Dept: HOME HEALTH SERVICES | Facility: CLINIC | Age: 88
End: 2023-04-21
Payer: MEDICARE

## 2023-04-21 ENCOUNTER — LAB REQUISITION (OUTPATIENT)
Dept: LAB | Facility: HOSPITAL | Age: 88
End: 2023-04-21
Payer: MEDICARE

## 2023-04-21 VITALS
OXYGEN SATURATION: 96 % | DIASTOLIC BLOOD PRESSURE: 56 MMHG | RESPIRATION RATE: 20 BRPM | TEMPERATURE: 98.2 F | SYSTOLIC BLOOD PRESSURE: 128 MMHG | HEART RATE: 62 BPM

## 2023-04-21 DIAGNOSIS — D50.8 OTHER IRON DEFICIENCY ANEMIAS: ICD-10-CM

## 2023-04-21 DIAGNOSIS — J18.9 PNEUMONIA, UNSPECIFIED ORGANISM: ICD-10-CM

## 2023-04-21 LAB
ALBUMIN SERPL-MCNC: 3.7 G/DL (ref 3.5–5.2)
ALBUMIN/GLOB SERPL: 1.5 G/DL
ALP SERPL-CCNC: 82 U/L (ref 39–117)
ALT SERPL W P-5'-P-CCNC: 10 U/L (ref 1–41)
ANION GAP SERPL CALCULATED.3IONS-SCNC: 11 MMOL/L (ref 5–15)
AST SERPL-CCNC: 10 U/L (ref 1–40)
BASOPHILS # BLD AUTO: 0.02 10*3/MM3 (ref 0–0.2)
BASOPHILS NFR BLD AUTO: 0.5 % (ref 0–1.5)
BILIRUB SERPL-MCNC: 0.3 MG/DL (ref 0–1.2)
BUN SERPL-MCNC: 16 MG/DL (ref 8–23)
BUN/CREAT SERPL: 13.1 (ref 7–25)
CALCIUM SPEC-SCNC: 9.2 MG/DL (ref 8.6–10.5)
CHLORIDE SERPL-SCNC: 100 MMOL/L (ref 98–107)
CO2 SERPL-SCNC: 29 MMOL/L (ref 22–29)
CREAT SERPL-MCNC: 1.22 MG/DL (ref 0.76–1.27)
DEPRECATED RDW RBC AUTO: 55.9 FL (ref 37–54)
EGFRCR SERPLBLD CKD-EPI 2021: 56.7 ML/MIN/1.73
EOSINOPHIL # BLD AUTO: 0.13 10*3/MM3 (ref 0–0.4)
EOSINOPHIL NFR BLD AUTO: 3 % (ref 0.3–6.2)
ERYTHROCYTE [DISTWIDTH] IN BLOOD BY AUTOMATED COUNT: 14.4 % (ref 12.3–15.4)
GLOBULIN UR ELPH-MCNC: 2.5 GM/DL
GLUCOSE SERPL-MCNC: 169 MG/DL (ref 65–99)
HCT VFR BLD AUTO: 31 % (ref 37.5–51)
HGB BLD-MCNC: 9.7 G/DL (ref 13–17.7)
IMM GRANULOCYTES # BLD AUTO: 0.02 10*3/MM3 (ref 0–0.05)
IMM GRANULOCYTES NFR BLD AUTO: 0.5 % (ref 0–0.5)
LYMPHOCYTES # BLD AUTO: 0.93 10*3/MM3 (ref 0.7–3.1)
LYMPHOCYTES NFR BLD AUTO: 21.1 % (ref 19.6–45.3)
MCH RBC QN AUTO: 33.2 PG (ref 26.6–33)
MCHC RBC AUTO-ENTMCNC: 31.3 G/DL (ref 31.5–35.7)
MCV RBC AUTO: 106.2 FL (ref 79–97)
MONOCYTES # BLD AUTO: 0.52 10*3/MM3 (ref 0.1–0.9)
MONOCYTES NFR BLD AUTO: 11.8 % (ref 5–12)
NEUTROPHILS NFR BLD AUTO: 2.78 10*3/MM3 (ref 1.7–7)
NEUTROPHILS NFR BLD AUTO: 63.1 % (ref 42.7–76)
NRBC BLD AUTO-RTO: 0 /100 WBC (ref 0–0.2)
PLATELET # BLD AUTO: 202 10*3/MM3 (ref 140–450)
PMV BLD AUTO: 10.4 FL (ref 6–12)
POTASSIUM SERPL-SCNC: 3.9 MMOL/L (ref 3.5–5.2)
PROT SERPL-MCNC: 6.2 G/DL (ref 6–8.5)
RBC # BLD AUTO: 2.92 10*6/MM3 (ref 4.14–5.8)
SODIUM SERPL-SCNC: 140 MMOL/L (ref 136–145)
WBC NRBC COR # BLD: 4.4 10*3/MM3 (ref 3.4–10.8)

## 2023-04-21 PROCEDURE — 85025 COMPLETE CBC W/AUTO DIFF WBC: CPT | Performed by: INTERNAL MEDICINE

## 2023-04-21 PROCEDURE — G0299 HHS/HOSPICE OF RN EA 15 MIN: HCPCS

## 2023-04-21 PROCEDURE — 80053 COMPREHEN METABOLIC PANEL: CPT | Performed by: INTERNAL MEDICINE

## 2023-04-24 ENCOUNTER — HOSPITAL ENCOUNTER (OUTPATIENT)
Dept: ULTRASOUND IMAGING | Facility: HOSPITAL | Age: 88
Discharge: HOME | End: 2023-04-24
Admitting: INTERNAL MEDICINE
Payer: MEDICARE

## 2023-04-24 ENCOUNTER — HOME CARE VISIT (OUTPATIENT)
Dept: HOME HEALTH SERVICES | Facility: CLINIC | Age: 88
End: 2023-04-24
Payer: MEDICARE

## 2023-04-24 DIAGNOSIS — I82.4Z3 DEEP VEIN THROMBOSIS (DVT) OF DISTAL VEIN OF BOTH LOWER EXTREMITIES, UNSPECIFIED CHRONICITY: ICD-10-CM

## 2023-04-24 DIAGNOSIS — M96.841: ICD-10-CM

## 2023-04-24 PROCEDURE — 93970 EXTREMITY STUDY: CPT

## 2023-04-24 PROCEDURE — G0157 HHC PT ASSISTANT EA 15: HCPCS

## 2023-04-26 ENCOUNTER — HOME CARE VISIT (OUTPATIENT)
Dept: HOME HEALTH SERVICES | Facility: CLINIC | Age: 88
End: 2023-04-26
Payer: MEDICARE

## 2023-04-26 PROCEDURE — G0299 HHS/HOSPICE OF RN EA 15 MIN: HCPCS

## 2023-05-01 ENCOUNTER — HOME CARE VISIT (OUTPATIENT)
Dept: HOME HEALTH SERVICES | Facility: CLINIC | Age: 88
End: 2023-05-01
Payer: MEDICARE

## 2023-05-01 VITALS
OXYGEN SATURATION: 96 % | HEART RATE: 88 BPM | RESPIRATION RATE: 22 BRPM | SYSTOLIC BLOOD PRESSURE: 130 MMHG | DIASTOLIC BLOOD PRESSURE: 82 MMHG | TEMPERATURE: 96.4 F

## 2023-05-02 ENCOUNTER — HOME CARE VISIT (OUTPATIENT)
Dept: HOME HEALTH SERVICES | Facility: CLINIC | Age: 88
End: 2023-05-02
Payer: MEDICARE

## 2023-05-02 VITALS
SYSTOLIC BLOOD PRESSURE: 110 MMHG | OXYGEN SATURATION: 96 % | RESPIRATION RATE: 20 BRPM | TEMPERATURE: 98.2 F | DIASTOLIC BLOOD PRESSURE: 56 MMHG | HEART RATE: 60 BPM

## 2023-05-02 PROCEDURE — G0299 HHS/HOSPICE OF RN EA 15 MIN: HCPCS

## 2023-05-07 ENCOUNTER — HOSPITAL ENCOUNTER (EMERGENCY)
Facility: HOSPITAL | Age: 88
Discharge: HOME | End: 2023-05-07
Attending: STUDENT IN AN ORGANIZED HEALTH CARE EDUCATION/TRAINING PROGRAM
Payer: MEDICARE

## 2023-05-07 VITALS
HEART RATE: 60 BPM | DIASTOLIC BLOOD PRESSURE: 56 MMHG | SYSTOLIC BLOOD PRESSURE: 121 MMHG | BODY MASS INDEX: 27.61 KG/M2 | TEMPERATURE: 97.5 F | RESPIRATION RATE: 18 BRPM | WEIGHT: 197.2 LBS | OXYGEN SATURATION: 97 % | HEIGHT: 71 IN

## 2023-05-07 DIAGNOSIS — N50.1 SCROTAL BLEEDING: Primary | ICD-10-CM

## 2023-05-07 PROCEDURE — 99283 EMERGENCY DEPT VISIT LOW MDM: CPT

## 2023-05-07 NOTE — ED PROVIDER NOTES
Subjective   History of Present Illness  Pt in the ED with scrotal bleeding. Noticed it this morning. He is anticoagulated on eliquis and was having trouble getting the bleeding stopped. Denies any specific trauma or urinary complaints     History provided by:  Patient   used: No        Review of Systems   Constitutional: Negative.    HENT: Negative.    Eyes: Negative.    Respiratory: Negative.    Cardiovascular: Negative.    Gastrointestinal: Negative.    Endocrine: Negative.    Genitourinary: Negative.    Musculoskeletal: Negative.    Skin: Positive for wound.   Allergic/Immunologic: Negative.    Neurological: Negative.    Hematological: Negative.    Psychiatric/Behavioral: Negative.    All other systems reviewed and are negative.      Past Medical History:   Diagnosis Date   • Allergic rhinitis    • Arthritis     hips, knees, feet   • Artificial lens present     in position   • Benign neoplasm of skin of eyelid    • Bladder tumor    • Bladder tumor    • BPH (benign prostatic hyperplasia)    • Cancer     SKIN    • Cataract    • Coronary arteriosclerosis    • Cortical senile cataract    • Cough    • Diabetes mellitus    • Esophagitis 5/23/2021   • Essential hypertension    • Essential hypertension    • Hypercholesterolemia    • Hypercholesterolemia    • Kidney stones    • MI, old 1988   • Nonexudative age-related macular degeneration     mild   • Nuclear cataract    • Open-angle glaucoma     s/p trabec OS, doing well      • Primary open angle glaucoma     OS, s/p trabeculectomy; IOP down with timolol OD     • Pseudophakia of right eye    • Rhinitis    • Upper respiratory infection    • Vitreous detachment     peripheral       No Known Allergies    Past Surgical History:   Procedure Laterality Date   • ANKLE SURGERY  07/29/2000    Ankle surgery (ORIF right ankle fracture. Bimalleolar right fracture)   • APPENDECTOMY     • CARDIAC ABLATION     • CARDIAC CATHETERIZATION  07/02/1987    Cardiac cath  43188 (Coronary arthosclerotic heart disease. maintained patency of RCA. Mild inferior hypokinesis.)   • CARDIAC CATHETERIZATION  06/30/1987    Cardiac cath 22830 (Coronary atherosclerotic heart disease. Occluded RCA. Acute inferior infarction. S/P successful angioplasty of RCA)   • CARDIAC ELECTROPHYSIOLOGY PROCEDURE N/A 6/11/2020    Procedure: Pacer single lead;  Surgeon: Johnny Forbes MD;  Location: [facility] CATH INVASIVE LOCATION;  Service: Cardiology;  Laterality: N/A;   • CATARACT EXTRACTION  04/01/2010    Remove cataract, insert lens (Complicated cataract extraction with intraocular lens implantation, right eye, Marcelo SN-60  serial # [id number]: 20.5 diopter)   • CATARACT EXTRACTION W/ INTRAOCULAR LENS IMPLANT Left 3/23/2018    Procedure: CATARACT PHACO EXTRACTION WITH INTRAOCULAR LENS IMPLANT;  Surgeon: Oli Haley MD;  Location: [facility] OR;  Service: Ophthalmology   • COLONOSCOPY N/A 5/9/2019    Procedure: COLONOSCOPY;  Surgeon: Rainer Gates DO;  Location: [facility] ENDOSCOPY;  Service: Gastroenterology   • COLONOSCOPY N/A 11/12/2020    Procedure: COLONOSCOPY;  Surgeon: Rainer Gates DO;  Location: [facility] ENDOSCOPY;  Service: Gastroenterology;  Laterality: N/A;   • ENDOSCOPY  02/19/1990    EGD 02340 (Olympus video endoscope.Fibrin-like patch in stomach)   • ENDOSCOPY N/A 11/12/2020    Procedure: ESOPHAGOGASTRODUODENOSCOPY;  Surgeon: Rainer Gates DO;  Location: [facility] ENDOSCOPY;  Service: Gastroenterology;  Laterality: N/A;   • EYE SURGERY  02/02/2005    Eye surgery procedure (Repair of operative wound leak, left eye. S/P trabeculectomy with operative wound leak)   • EYE SURGERY  01/19/2005    Eye surgery procedure (Trabeculectomy, left eye. Chronic open angle glaucoma, uncontrolled left eye)   • INJECTION OF MEDICATION  06/09/2014    Kenalog (Allergic rhinitis)    • OTHER SURGICAL HISTORY  06/16/2016    DIL MAC EXAM PERF INC DOC OF +/- MAC THICK 2019F (Nonexudative age-related  macular degeneration)    • OTHER SURGICAL HISTORY  02/26/2015    EXTENDED VISUAL FIELDS STUDY 55608 (Primary open angle glaucoma)    • OTHER SURGICAL HISTORY  02/26/2015    EXTENDED VISUAL FIELDS STUDY 47915 (Primary open angle glaucoma)    • OTHER SURGICAL HISTORY  06/16/2016    OCT DISC NFL 04469 (Open-angle glaucoma)    • OTHER SURGICAL HISTORY      OCT DISC NFL 08102 (Primary open angle glaucoma) (3): 06/17/2015, 03/19/2014, 03/04/2013   • OTHER SURGICAL HISTORY  06/16/2016    OPTIC NERVE HEAD EVAL PERFORMED 2027F (Open-angle glaucoma)    • TRANSURETHRAL RESECTION OF BLADDER TUMOR N/A 5/9/2018    Procedure: CYSTOSCOPY TRANSURETHRAL RESECTION OF BLADDER TUMOR;  Surgeon: Donovan Conner MD;  Location: [facility] OR;  Service: Urology   • TRANSURETHRAL RESECTION OF BLADDER TUMOR N/A 7/26/2019    Procedure: CYSTOSCOPY TRANSURETHRAL RESECTION OF BLADDER TUMOR;  Surgeon: Donovan Conner MD;  Location: [facility] OR;  Service: Urology   • TRANSURETHRAL RESECTION OF BLADDER TUMOR N/A 7/29/2020    Procedure: CYSTOSCOPY TRANSURETHRAL RESECTION OF BLADDER TUMOR;  Surgeon: Donovan Conner MD;  Location: [facility] OR;  Service: Urology;  Laterality: N/A;   • UVULECTOMY  1969    EXCISION OF UVULA 21323 (partial uvulectomy with excision of papilloma. Redunant uvula with a small papilloma)       Family History   Problem Relation Age of Onset   • Cancer Mother    • No Known Problems Father    • Cancer Brother        Social History     Socioeconomic History   • Marital status:    Tobacco Use   • Smoking status: Former   • Smokeless tobacco: Current     Types: Snuff   • Tobacco comments:     pouches   Vaping Use   • Vaping Use: Never used   Substance and Sexual Activity   • Alcohol use: No   • Drug use: No   • Sexual activity: Defer           Objective   Physical Exam  Vitals and nursing note reviewed.   Constitutional:       Appearance: He is well-developed.   HENT:      Head: Normocephalic.      Nose: Nose normal.   Eyes:       Conjunctiva/sclera: Conjunctivae normal.      Pupils: Pupils are equal, round, and reactive to light.   Cardiovascular:      Rate and Rhythm: Normal rate and regular rhythm.      Heart sounds: Normal heart sounds.   Pulmonary:      Effort: Pulmonary effort is normal.      Breath sounds: Normal breath sounds.   Abdominal:      Palpations: Abdomen is soft.   Genitourinary:      Musculoskeletal:         General: Normal range of motion.      Cervical back: Normal range of motion.   Skin:     General: Skin is warm and dry.   Neurological:      Mental Status: He is alert and oriented to person, place, and time.      GCS: GCS eye subscore is 4. GCS verbal subscore is 5. GCS motor subscore is 6.         Procedures           ED Course    Bleeding controlled pta, scabbed area noted over the abrasion with minimal bleeding residual. Placed dermabond over the pooled area and explained to patient not to wash area very arrestively                                        Medical Decision Making  89-year-old male in the emergency department complaining of left-sided scrotal bleeding, patient is anticoagulated on Eliquis and at first he was having trouble getting bleeding controlled so he came to the emergency department.  The area of concern is in the left scrotal area that has tamponade itself off, there is a scabbed area noted over the superficial bleeding/abrasion.  Lengthy discussion with patient about cauterizing the area, being that the area has tamponade itself off, formed a layer of protection he is agreeable to Dermabond the area and lengthy discussion with patient about genital hygiene.  Patient agreeable stable for discharge.  He will return to the emergency department if bleeding occurs    Scrotal bleeding: acute illness or injury      Final diagnoses:   Scrotal bleeding       ED Disposition  ED Disposition     ED Disposition   Discharge    Condition   Stable    Comment   --             [place] Douglas MD  [address]  [facility] [address]  [phone]    Call in 1 week           Medication List      Changed    amiodarone 200 MG tablet  Commonly known as: PACERONE  TAKE ONE TABLET BY MOUTH DAILY  What changed:   · how much to take  · how to take this  · when to take this  · additional instructions             Ja Coleman, APRN  05/07/23 1051

## 2023-05-11 ENCOUNTER — HOME CARE VISIT (OUTPATIENT)
Dept: HOME HEALTH SERVICES | Facility: CLINIC | Age: 88
End: 2023-05-11
Payer: MEDICARE

## 2023-05-11 VITALS
HEART RATE: 60 BPM | SYSTOLIC BLOOD PRESSURE: 102 MMHG | DIASTOLIC BLOOD PRESSURE: 50 MMHG | RESPIRATION RATE: 18 BRPM | TEMPERATURE: 98.1 F | OXYGEN SATURATION: 96 %

## 2023-05-11 PROCEDURE — G0299 HHS/HOSPICE OF RN EA 15 MIN: HCPCS

## 2023-05-16 ENCOUNTER — HOME CARE VISIT (OUTPATIENT)
Dept: HOME HEALTH SERVICES | Facility: CLINIC | Age: 88
End: 2023-05-16
Payer: MEDICARE

## 2023-05-16 VITALS
HEART RATE: 68 BPM | RESPIRATION RATE: 18 BRPM | SYSTOLIC BLOOD PRESSURE: 120 MMHG | OXYGEN SATURATION: 99 % | DIASTOLIC BLOOD PRESSURE: 58 MMHG | TEMPERATURE: 98.4 F

## 2023-05-16 PROCEDURE — G0299 HHS/HOSPICE OF RN EA 15 MIN: HCPCS

## 2023-06-06 ENCOUNTER — APPOINTMENT (OUTPATIENT)
Dept: GENERAL RADIOLOGY | Facility: HOSPITAL | Age: 88
End: 2023-06-06
Payer: MEDICARE

## 2023-06-06 ENCOUNTER — TELEPHONE (OUTPATIENT)
Dept: CARDIOLOGY | Facility: CLINIC | Age: 88
End: 2023-06-06
Payer: MEDICARE

## 2023-06-06 ENCOUNTER — HOSPITAL ENCOUNTER (EMERGENCY)
Facility: HOSPITAL | Age: 88
Discharge: HOME | End: 2023-06-06
Attending: EMERGENCY MEDICINE | Admitting: EMERGENCY MEDICINE
Payer: MEDICARE

## 2023-06-06 VITALS
HEART RATE: 75 BPM | SYSTOLIC BLOOD PRESSURE: 117 MMHG | BODY MASS INDEX: 27.72 KG/M2 | DIASTOLIC BLOOD PRESSURE: 56 MMHG | HEIGHT: 71 IN | WEIGHT: 198 LBS | TEMPERATURE: 98 F | OXYGEN SATURATION: 96 % | RESPIRATION RATE: 18 BRPM

## 2023-06-06 DIAGNOSIS — A49.9 UTI (URINARY TRACT INFECTION), BACTERIAL: Primary | ICD-10-CM

## 2023-06-06 DIAGNOSIS — N39.0 UTI (URINARY TRACT INFECTION), BACTERIAL: Primary | ICD-10-CM

## 2023-06-06 DIAGNOSIS — D64.9 ANEMIA, UNSPECIFIED TYPE: ICD-10-CM

## 2023-06-06 DIAGNOSIS — I50.9 CHRONIC CONGESTIVE HEART FAILURE, UNSPECIFIED HEART FAILURE TYPE: ICD-10-CM

## 2023-06-06 DIAGNOSIS — Z95.0 PACEMAKER: ICD-10-CM

## 2023-06-06 LAB
ALBUMIN SERPL-MCNC: 3.8 G/DL (ref 3.5–5.2)
ALBUMIN/GLOB SERPL: 1.3 G/DL
ALP SERPL-CCNC: 79 U/L (ref 39–117)
ALT SERPL W P-5'-P-CCNC: 7 U/L (ref 1–41)
ANION GAP SERPL CALCULATED.3IONS-SCNC: 12 MMOL/L (ref 5–15)
APTT PPP: 33.9 SECONDS (ref 20–40.3)
AST SERPL-CCNC: 10 U/L (ref 1–40)
BACTERIA UR QL AUTO: ABNORMAL /HPF
BASOPHILS # BLD AUTO: 0.02 10*3/MM3 (ref 0–0.2)
BASOPHILS NFR BLD AUTO: 0.3 % (ref 0–1.5)
BILIRUB SERPL-MCNC: 0.5 MG/DL (ref 0–1.2)
BILIRUB UR QL STRIP: NEGATIVE
BUN SERPL-MCNC: 19 MG/DL (ref 8–23)
BUN/CREAT SERPL: 14.8 (ref 7–25)
CALCIUM SPEC-SCNC: 9.2 MG/DL (ref 8.6–10.5)
CHLORIDE SERPL-SCNC: 96 MMOL/L (ref 98–107)
CLARITY UR: CLEAR
CO2 SERPL-SCNC: 26 MMOL/L (ref 22–29)
COLOR UR: YELLOW
CREAT SERPL-MCNC: 1.28 MG/DL (ref 0.76–1.27)
DEPRECATED RDW RBC AUTO: 47.8 FL (ref 37–54)
EGFRCR SERPLBLD CKD-EPI 2021: 53.5 ML/MIN/1.73
EOSINOPHIL # BLD AUTO: 0.01 10*3/MM3 (ref 0–0.4)
EOSINOPHIL NFR BLD AUTO: 0.1 % (ref 0.3–6.2)
ERYTHROCYTE [DISTWIDTH] IN BLOOD BY AUTOMATED COUNT: 13.2 % (ref 12.3–15.4)
GEN 5 2HR TROPONIN T REFLEX: 38 NG/L
GLOBULIN UR ELPH-MCNC: 2.9 GM/DL
GLUCOSE SERPL-MCNC: 133 MG/DL (ref 65–99)
GLUCOSE UR STRIP-MCNC: NEGATIVE MG/DL
HCT VFR BLD AUTO: 29.3 % (ref 37.5–51)
HGB BLD-MCNC: 9.6 G/DL (ref 13–17.7)
HGB UR QL STRIP.AUTO: ABNORMAL
HOLD SPECIMEN: NORMAL
HYALINE CASTS UR QL AUTO: ABNORMAL /LPF
IMM GRANULOCYTES # BLD AUTO: 0.04 10*3/MM3 (ref 0–0.05)
IMM GRANULOCYTES NFR BLD AUTO: 0.5 % (ref 0–0.5)
INR PPP: 1.36 (ref 0.8–1.2)
KETONES UR QL STRIP: NEGATIVE
LEUKOCYTE ESTERASE UR QL STRIP.AUTO: ABNORMAL
LYMPHOCYTES # BLD AUTO: 0.76 10*3/MM3 (ref 0.7–3.1)
LYMPHOCYTES NFR BLD AUTO: 9.5 % (ref 19.6–45.3)
MAGNESIUM SERPL-MCNC: 1.8 MG/DL (ref 1.6–2.4)
MCH RBC QN AUTO: 32.7 PG (ref 26.6–33)
MCHC RBC AUTO-ENTMCNC: 32.8 G/DL (ref 31.5–35.7)
MCV RBC AUTO: 99.7 FL (ref 79–97)
MONOCYTES # BLD AUTO: 0.64 10*3/MM3 (ref 0.1–0.9)
MONOCYTES NFR BLD AUTO: 8 % (ref 5–12)
NEUTROPHILS NFR BLD AUTO: 6.49 10*3/MM3 (ref 1.7–7)
NEUTROPHILS NFR BLD AUTO: 81.6 % (ref 42.7–76)
NITRITE UR QL STRIP: POSITIVE
NRBC BLD AUTO-RTO: 0 /100 WBC (ref 0–0.2)
NT-PROBNP SERPL-MCNC: 4659 PG/ML (ref 0–1800)
PH UR STRIP.AUTO: <=5 [PH] (ref 5–9)
PLATELET # BLD AUTO: 221 10*3/MM3 (ref 140–450)
PMV BLD AUTO: 10.2 FL (ref 6–12)
POTASSIUM SERPL-SCNC: 4 MMOL/L (ref 3.5–5.2)
PROT SERPL-MCNC: 6.7 G/DL (ref 6–8.5)
PROT UR QL STRIP: NEGATIVE
PROTHROMBIN TIME: 16.6 SECONDS (ref 11.1–15.3)
QT INTERVAL: 428 MS
QT INTERVAL: 434 MS
QTC INTERVAL: 448 MS
QTC INTERVAL: 488 MS
RBC # BLD AUTO: 2.94 10*6/MM3 (ref 4.14–5.8)
RBC # UR STRIP: ABNORMAL /HPF
REF LAB TEST METHOD: ABNORMAL
SODIUM SERPL-SCNC: 134 MMOL/L (ref 136–145)
SP GR UR STRIP: 1.01 (ref 1–1.03)
SQUAMOUS #/AREA URNS HPF: ABNORMAL /HPF
TROPONIN T DELTA: -3 NG/L
TROPONIN T SERPL HS-MCNC: 41 NG/L
UROBILINOGEN UR QL STRIP: ABNORMAL
WBC # UR STRIP: ABNORMAL /HPF
WBC NRBC COR # BLD: 7.96 10*3/MM3 (ref 3.4–10.8)

## 2023-06-06 PROCEDURE — 85025 COMPLETE CBC W/AUTO DIFF WBC: CPT | Performed by: EMERGENCY MEDICINE

## 2023-06-06 PROCEDURE — 81001 URINALYSIS AUTO W/SCOPE: CPT | Performed by: EMERGENCY MEDICINE

## 2023-06-06 PROCEDURE — 99284 EMERGENCY DEPT VISIT MOD MDM: CPT

## 2023-06-06 PROCEDURE — 84484 ASSAY OF TROPONIN QUANT: CPT | Performed by: EMERGENCY MEDICINE

## 2023-06-06 PROCEDURE — 36415 COLL VENOUS BLD VENIPUNCTURE: CPT

## 2023-06-06 PROCEDURE — 80053 COMPREHEN METABOLIC PANEL: CPT | Performed by: EMERGENCY MEDICINE

## 2023-06-06 PROCEDURE — 96361 HYDRATE IV INFUSION ADD-ON: CPT

## 2023-06-06 PROCEDURE — 83735 ASSAY OF MAGNESIUM: CPT | Performed by: EMERGENCY MEDICINE

## 2023-06-06 PROCEDURE — 83880 ASSAY OF NATRIURETIC PEPTIDE: CPT | Performed by: EMERGENCY MEDICINE

## 2023-06-06 PROCEDURE — 96365 THER/PROPH/DIAG IV INF INIT: CPT

## 2023-06-06 PROCEDURE — 71045 X-RAY EXAM CHEST 1 VIEW: CPT

## 2023-06-06 PROCEDURE — 25010000002 CEFTRIAXONE PER 250 MG: Performed by: EMERGENCY MEDICINE

## 2023-06-06 PROCEDURE — 85610 PROTHROMBIN TIME: CPT | Performed by: EMERGENCY MEDICINE

## 2023-06-06 PROCEDURE — 93010 ELECTROCARDIOGRAM REPORT: CPT | Performed by: INTERNAL MEDICINE

## 2023-06-06 PROCEDURE — 85730 THROMBOPLASTIN TIME PARTIAL: CPT | Performed by: EMERGENCY MEDICINE

## 2023-06-06 PROCEDURE — 93005 ELECTROCARDIOGRAM TRACING: CPT | Performed by: EMERGENCY MEDICINE

## 2023-06-06 RX ORDER — SODIUM CHLORIDE 0.9 % (FLUSH) 0.9 %
10 SYRINGE (ML) INJECTION AS NEEDED
Status: DISCONTINUED | OUTPATIENT
Start: 2023-06-06 | End: 2023-06-06 | Stop reason: HOSPADM

## 2023-06-06 RX ORDER — SODIUM CHLORIDE 9 MG/ML
75 INJECTION, SOLUTION INTRAVENOUS CONTINUOUS
Status: DISCONTINUED | OUTPATIENT
Start: 2023-06-06 | End: 2023-06-06 | Stop reason: HOSPADM

## 2023-06-06 RX ORDER — CEFDINIR 300 MG/1
300 CAPSULE ORAL 2 TIMES DAILY
Qty: 14 CAPSULE | Refills: 0 | Status: SHIPPED | OUTPATIENT
Start: 2023-06-06 | End: 2023-06-13

## 2023-06-06 RX ADMIN — SODIUM CHLORIDE 75 ML/HR: 9 INJECTION, SOLUTION INTRAVENOUS at 11:38

## 2023-06-06 RX ADMIN — CEFTRIAXONE SODIUM 1 G: 1 INJECTION, POWDER, FOR SOLUTION INTRAMUSCULAR; INTRAVENOUS at 13:10

## 2023-06-06 NOTE — TELEPHONE ENCOUNTER
"Contacted patient spouse per Dr. Forbes and advised that he needs to go to er for evaluation.   She was understanding.     ----- Message from Janeen Maldonado RN sent at 6/6/2023  9:08 AM CDT -----  His wife called c/o patient with soa, dizzy, fatigue, and stating his HR in the 80's \"and it's always in the 60's\", b/p 151/85. I had her send in a remote, and there are no alerts on remote. I called her back and told her no alerts on device but she wants him to be seen, stating his PCP is not in today. I told her his vitals were no bad but if he didn't feel good to go to urgent care of ED. She wants someone to give her a call from this office.    "

## 2023-06-06 NOTE — ED PROVIDER NOTES
"Subjective   History of Present Illness  88yo male pmh significant htn/dm2/sick sinus syndrome/paroxysmal atrial fibrillation/cad presents ED c/o awakening this morning \"not hungry\" and feeling lightheaded.  Pt noted pulse 80s, which patient states is high for him.  Pt contacted pmd/cardiology office et was referred ED for evaluation.  ROS neg fever/cough/soa/chest pain/abd pain/syncope/dizziness/vertigo/n/v/d.    History provided by:  Patient and spouse  Illness  Associated symptoms: fatigue      Review of Systems   Constitutional:  Positive for fatigue.   HENT: Negative.     Eyes: Negative.    Respiratory: Negative.     Cardiovascular: Negative.    Gastrointestinal: Negative.    Musculoskeletal: Negative.    Skin: Negative.    Neurological:  Positive for light-headedness. Negative for syncope.   All other systems reviewed and are negative.    Past Medical History:   Diagnosis Date    Allergic rhinitis     Arthritis     hips, knees, feet    Artificial lens present     in position    Benign neoplasm of skin of eyelid     Bladder tumor     Bladder tumor     BPH (benign prostatic hyperplasia)     Cancer     SKIN     Cataract     Coronary arteriosclerosis     Cortical senile cataract     Cough     Diabetes mellitus     Esophagitis 5/23/2021    Essential hypertension     Essential hypertension     Hypercholesterolemia     Hypercholesterolemia     Kidney stones     MI, old 1988    Nonexudative age-related macular degeneration     mild    Nuclear cataract     Open-angle glaucoma     s/p trabec OS, doing well       Primary open angle glaucoma     OS, s/p trabeculectomy; IOP down with timolol OD      Pseudophakia of right eye     Rhinitis     Upper respiratory infection     Vitreous detachment     peripheral       No Known Allergies    Past Surgical History:   Procedure Laterality Date    ANKLE SURGERY  07/29/2000    Ankle surgery (ORIF right ankle fracture. Bimalleolar right fracture)    APPENDECTOMY      CARDIAC ABLATION  "     CARDIAC CATHETERIZATION  07/02/1987    Cardiac cath 26708 (Coronary arthosclerotic heart disease. maintained patency of RCA. Mild inferior hypokinesis.)    CARDIAC CATHETERIZATION  06/30/1987    Cardiac cath 59604 (Coronary atherosclerotic heart disease. Occluded RCA. Acute inferior infarction. S/P successful angioplasty of RCA)    CARDIAC ELECTROPHYSIOLOGY PROCEDURE N/A 6/11/2020    Procedure: Pacer single lead;  Surgeon: Johnny Forbes MD;  Location: [facility] CATH INVASIVE LOCATION;  Service: Cardiology;  Laterality: N/A;    CATARACT EXTRACTION  04/01/2010    Remove cataract, insert lens (Complicated cataract extraction with intraocular lens implantation, right eye, Marcelo SN-60 WF serial # [id number]: 20.5 diopter)    CATARACT EXTRACTION W/ INTRAOCULAR LENS IMPLANT Left 3/23/2018    Procedure: CATARACT PHACO EXTRACTION WITH INTRAOCULAR LENS IMPLANT;  Surgeon: Oli Haley MD;  Location: [facility] OR;  Service: Ophthalmology    COLONOSCOPY N/A 5/9/2019    Procedure: COLONOSCOPY;  Surgeon: Rainer Gates DO;  Location: [facility] ENDOSCOPY;  Service: Gastroenterology    COLONOSCOPY N/A 11/12/2020    Procedure: COLONOSCOPY;  Surgeon: Rainer Gates DO;  Location: [facility] ENDOSCOPY;  Service: Gastroenterology;  Laterality: N/A;    ENDOSCOPY  02/19/1990    EGD 84175 (Olympus video endoscope.Fibrin-like patch in stomach)    ENDOSCOPY N/A 11/12/2020    Procedure: ESOPHAGOGASTRODUODENOSCOPY;  Surgeon: Rainer Gates DO;  Location: [facility] ENDOSCOPY;  Service: Gastroenterology;  Laterality: N/A;    EYE SURGERY  02/02/2005    Eye surgery procedure (Repair of operative wound leak, left eye. S/P trabeculectomy with operative wound leak)    EYE SURGERY  01/19/2005    Eye surgery procedure (Trabeculectomy, left eye. Chronic open angle glaucoma, uncontrolled left eye)    INJECTION OF MEDICATION  06/09/2014    Kenalog (Allergic rhinitis)     OTHER SURGICAL HISTORY  06/16/2016    DIL MAC EXAM PERF INC DOC OF +/-  MAC THICK 2019F (Nonexudative age-related macular degeneration)     OTHER SURGICAL HISTORY  02/26/2015    EXTENDED VISUAL FIELDS STUDY 76156 (Primary open angle glaucoma)     OTHER SURGICAL HISTORY  02/26/2015    EXTENDED VISUAL FIELDS STUDY 54111 (Primary open angle glaucoma)     OTHER SURGICAL HISTORY  06/16/2016    OCT DISC NFL 76407 (Open-angle glaucoma)     OTHER SURGICAL HISTORY      OCT DISC NFL 53675 (Primary open angle glaucoma) (3): 06/17/2015, 03/19/2014, 03/04/2013    OTHER SURGICAL HISTORY  06/16/2016    OPTIC NERVE HEAD EVAL PERFORMED 2027F (Open-angle glaucoma)     TRANSURETHRAL RESECTION OF BLADDER TUMOR N/A 5/9/2018    Procedure: CYSTOSCOPY TRANSURETHRAL RESECTION OF BLADDER TUMOR;  Surgeon: Donovan Conner MD;  Location: [facility] OR;  Service: Urology    TRANSURETHRAL RESECTION OF BLADDER TUMOR N/A 7/26/2019    Procedure: CYSTOSCOPY TRANSURETHRAL RESECTION OF BLADDER TUMOR;  Surgeon: Donovan Conner MD;  Location: [facility] OR;  Service: Urology    TRANSURETHRAL RESECTION OF BLADDER TUMOR N/A 7/29/2020    Procedure: CYSTOSCOPY TRANSURETHRAL RESECTION OF BLADDER TUMOR;  Surgeon: Donovan Conner MD;  Location: [facility] OR;  Service: Urology;  Laterality: N/A;    UVULECTOMY  1969    EXCISION OF UVULA 35411 (partial uvulectomy with excision of papilloma. Redunant uvula with a small papilloma)       Family History   Problem Relation Age of Onset    Cancer Mother     No Known Problems Father     Cancer Brother        Social History     Socioeconomic History    Marital status:    Tobacco Use    Smoking status: Former    Smokeless tobacco: Current     Types: Snuff    Tobacco comments:     pouches   Vaping Use    Vaping Use: Never used   Substance and Sexual Activity    Alcohol use: No    Drug use: No    Sexual activity: Defer           Objective   Physical Exam  Vitals and nursing note reviewed.   Constitutional:       Appearance: Normal appearance.   HENT:      Head: Normocephalic and atraumatic.       Right Ear: External ear normal.      Left Ear: External ear normal.      Nose: Nose normal.      Mouth/Throat:      Mouth: Mucous membranes are moist.      Pharynx: Oropharynx is clear. No oropharyngeal exudate or posterior oropharyngeal erythema.   Eyes:      Pupils: Pupils are equal, round, and reactive to light.   Cardiovascular:      Rate and Rhythm: Normal rate and regular rhythm.      Pulses: Normal pulses.      Heart sounds: Normal heart sounds. No murmur heard.    No friction rub. No gallop.   Pulmonary:      Effort: Pulmonary effort is normal. No respiratory distress.      Breath sounds: Normal breath sounds. No wheezing, rhonchi or rales.   Abdominal:      General: Abdomen is flat. Bowel sounds are normal. There is no distension.      Palpations: Abdomen is soft.      Tenderness: There is no abdominal tenderness. There is no guarding or rebound.   Musculoskeletal:      Cervical back: Normal range of motion and neck supple. No rigidity.      Right lower leg: No edema.      Left lower leg: No edema.   Lymphadenopathy:      Cervical: No cervical adenopathy.   Skin:     General: Skin is warm and dry.   Neurological:      General: No focal deficit present.      Mental Status: He is alert and oriented to person, place, and time.      GCS: GCS eye subscore is 4. GCS verbal subscore is 5. GCS motor subscore is 6.       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 Forbes.  States ok for ED disposition with treatment of concurrent UTI/dehydration. [SD]      ED Course User Index  [SD] Wai Chiu MD      Labs  Reviewed   COMPREHENSIVE METABOLIC PANEL - Abnormal; Notable for the following components:       Result Value    Glucose 133 (*)     Creatinine 1.28 (*)     Sodium 134 (*)     Chloride 96 (*)     eGFR 53.5 (*)     All other components within normal limits    Narrative:     GFR Normal >60  Chronic Kidney Disease <60  Kidney Failure <15    The GFR formula is only valid for adults with stable renal function between ages 18 and 70.   PROTIME-INR - Abnormal; Notable for the following components:    Protime 16.6 (*)     INR 1.36 (*)     All other components within normal limits    Narrative:     Therapeutic range for most indications is 2.0-3.0 INR,  or 2.5-3.5 for mechanical heart valves.   TROPONIN - Abnormal; Notable for the following components:    HS Troponin T 41 (*)     All other components within normal limits    Narrative:     High Sensitive Troponin T Reference Range:  <10.0 ng/L- Negative Female for AMI  <15.0 ng/L- Negative Male for AMI  >=10 - Abnormal Female indicating possible myocardial injury.  >=15 - Abnormal Male indicating possible myocardial injury.   Clinicians would have to utilize clinical acumen, EKG, Troponin, and serial changes to determine if it is an Acute Myocardial Infarction or myocardial injury due to an underlying chronic condition.        BNP (IN-HOUSE) - Abnormal; Notable for the following components:    proBNP 4,659.0 (*)     All other components within normal limits    Narrative:     Among patients with dyspnea, NT-proBNP is highly sensitive for the detection of acute congestive heart failure. In addition NT-proBNP of <300 pg/ml effectively rules out acute congestive heart failure with 99% negative predictive value.     CBC WITH AUTO DIFFERENTIAL - Abnormal; Notable for the following components:    RBC 2.94 (*)     Hemoglobin 9.6 (*)     Hematocrit 29.3 (*)     MCV 99.7 (*)     Neutrophil % 81.6 (*)     Lymphocyte % 9.5 (*)     Eosinophil % 0.1 (*)     All other components within  normal limits   URINALYSIS W/ MICROSCOPIC IF INDICATED (NO CULTURE) - Abnormal; Notable for the following components:    Blood, UA Trace (*)     Leuk Esterase, UA Moderate (2+) (*)     Nitrite, UA Positive (*)     All other components within normal limits   URINALYSIS, MICROSCOPIC ONLY - Abnormal; Notable for the following components:    RBC, UA 0-2 (*)     WBC, UA 31-50 (*)     Bacteria, UA 4+ (*)     All other components within normal limits   HIGH SENSITIVITIY TROPONIN T 2HR - Abnormal; Notable for the following components:    HS Troponin T 38 (*)     All other components within normal limits    Narrative:     High Sensitive Troponin T Reference Range:  <10.0 ng/L- Negative Female for AMI  <15.0 ng/L- Negative Male for AMI  >=10 - Abnormal Female indicating possible myocardial injury.  >=15 - Abnormal Male indicating possible myocardial injury.   Clinicians would have to utilize clinical acumen, EKG, Troponin, and serial changes to determine if it is an Acute Myocardial Infarction or myocardial injury due to an underlying chronic condition.        MAGNESIUM - Normal   APTT - Normal    Narrative:     The recommended Heparin therapeutic range is 68-97 seconds.   CBC AND DIFFERENTIAL    Narrative:     The following orders were created for panel order CBC & Differential.  Procedure                               Abnormality         Status                     ---------                               -----------         ------                     CBC Auto Differential[[id number]]        Abnormal            Final result                 Please view results for these tests on the individual orders.   EXTRA TUBES    Narrative:     The following orders were created for panel order Extra Tubes.  Procedure                               Abnormality         Status                     ---------                               -----------         ------                     [facility][[id number]]                                    Final result                 Please view results for these tests on the individual orders.   [facility]     XR Chest 1 View    Result Date: 6/6/2023  Narrative: INDICATION: Lightheaded.     Impression: FINDINGS/IMPRESSION: Bipolar pacemaker seen on the left. The lungs are clear of infiltration. There is chronic mild vascular prominence. Heart size is enlarged.                                        Medical Decision Making  Labs/radiographic studies reviewed. CXR: cardiomegaly w/o acute infiltrate or pulmonary edema.  CBC: significant chronic stable anemia.  CMP: unremarkable.  hsTnT: mild elevation x2 with delta negative 3.  Pt denies chest pain.  Orthostatic VS (+) orthostasis. UA: significant pyuria/nitrite (+).  EKG: SR/rate 76/LAD.  Pacemaker interrogation performed. See report. Bnp: elevated without clinical or radiographic evidence fluid overload.  Pt given NS 250cc bolus as well as rocephin 1G IV.  Dr. Forbes consulted.  Pt stable discharge with omnicef 300mg bid x7d.  Pmd/cardiology followup.    Problems Addressed:  Anemia, unspecified type: complicated acute illness or injury  Chronic congestive heart failure, unspecified heart failure type: complicated acute illness or injury  Pacemaker: complicated acute illness or injury  UTI (urinary tract infection), bacterial: complicated acute illness or injury    Amount and/or Complexity of Data Reviewed  Labs: ordered.  Radiology: ordered.  ECG/medicine tests: ordered and independent interpretation performed.    Risk  Prescription drug management.        Final diagnoses:   UTI (urinary tract infection), bacterial   Anemia, unspecified type   Chronic congestive heart failure, unspecified heart failure type   Pacemaker       ED Disposition  ED Disposition       ED Disposition   Discharge    Condition   Good    Comment   --               [place] Douglas MD  [address] [place], [place] [id number]  [phone]    In 1 day      Johnny Forbes MD  [address] [place] [address]  [address]  [address]  [place] [id number] [phone]    Schedule an appointment as soon as possible for a visit in 1 week           Medication List        New Prescriptions      cefdinir 300 MG capsule  Commonly known as: OMNICEF  Take 1 capsule by mouth 2 (Two) Times a Day for 7 days.            Changed      amiodarone 200 MG tablet  Commonly known as: PACERONE  TAKE ONE TABLET BY MOUTH DAILY  What changed:   how much to take  how to take this  when to take this  additional instructions               Where to Get Your Medications        These medications were sent to [facility] - [place], [place] - [address] - [phone] [facility] [phone]   [address], [place] [address]      Phone: [phone]   cefdinir 300 MG capsule            Wai Chiu MD  06/06/23 1441       Wai Chiu MD  06/06/23 1442

## 2023-06-06 NOTE — ED NOTES
"[employer] representative/ called to inform of \"everything is okay.  He has had no episodes of asystole or v-tach or anything else.He actually only uses it about 2%-3% of the time\".  Informed md.    "

## 2023-06-06 NOTE — DISCHARGE INSTRUCTIONS
Return ED fever, vomiting, chest pain, shortness of air, syncope, palpitations, worse condition, any other concerns

## 2023-07-17 PROCEDURE — 93294 REM INTERROG EVL PM/LDLS PM: CPT | Performed by: INTERNAL MEDICINE

## 2023-07-17 PROCEDURE — 93296 REM INTERROG EVL PM/IDS: CPT | Performed by: INTERNAL MEDICINE

## (undated) DEVICE — NDL FLTR 19G 1 1/2 LF

## (undated) DEVICE — ELECTRD LP CUT 24/26F YEL

## (undated) DEVICE — EVAC BLDR UROVAC W ADAPT

## (undated) DEVICE — SOL IRR UROLOGIC GYLCINE 1.5% BT 2000ML

## (undated) DEVICE — PK PM 60

## (undated) DEVICE — GLV SURG NEOLON 2G PF LF 7.5 STRL

## (undated) DEVICE — SKIN AFFIX SURG ADHESIVE 72/CS 0.55ML: Brand: MEDLINE

## (undated) DEVICE — Device: Brand: MALYUGIN RING SYSTEM 6.25MM

## (undated) DEVICE — CONTAINER,SPECIMEN,OR STERILE,4OZ: Brand: MEDLINE

## (undated) DEVICE — GOWN,AURORA,NOREINF,RAGLAN,XL,STERILE: Brand: MEDLINE

## (undated) DEVICE — PAD GRND REM POLYHESIVE A/ DISP

## (undated) DEVICE — KT INTRO MINISTICK MAX W/GW NITNL/TUNG ECHO 4F 21G 7CM

## (undated) DEVICE — DRAINBAG,ANTI-REFLUX TOWER,L/F,2000ML,LL: Brand: MEDLINE

## (undated) DEVICE — GLV SURG SENSICARE GREEN W/ALOE PF LF 6.5 STRL

## (undated) DEVICE — SOL IRR GLYCNE 1.5PCT 3000ML

## (undated) DEVICE — GLV SURG TRIUMPH LT PF LTX 7.5 STRL

## (undated) DEVICE — BITEBLOCK ENDO W/STRAP 60F A/ LF DISP

## (undated) DEVICE — STERILE POLYISOPRENE POWDER-FREE SURGICAL GLOVES WITH EMOLLIENT COATING: Brand: PROTEXIS

## (undated) DEVICE — SOL PVPI SPRY BETADINE 3OZ

## (undated) DEVICE — CATHETER,FOLEY,3-WAY,24FR,30ML,100%SILI: Brand: MEDLINE

## (undated) DEVICE — GLV SURG NEOLON 2G PF LF 6.5 STRL

## (undated) DEVICE — SYR LL 3CC

## (undated) DEVICE — DOVER HYDROGEL COATED LATEX FOLEY CATHETER, 30 ML, 3-WAY 24 FR/CH (8.0 MM): Brand: DOVER

## (undated) DEVICE — SILICONE ELASTOMER COATED LATEX FOLEY CATHETER, 30 CC, 3-WAY, 22 FR (7.3 MM): Brand: DOVER

## (undated) DEVICE — PK CYSTO LF 60

## (undated) DEVICE — SINGLE-USE BIOPSY FORCEPS: Brand: RADIAL JAW 4

## (undated) DEVICE — Device

## (undated) DEVICE — SAFESECURE,SECUREMENT,FOLEY CATH,STERILE: Brand: MEDLINE

## (undated) DEVICE — SOL IRR H2O BTL 1000ML STRL

## (undated) DEVICE — HYDROGEL COATED LATEX FOLEY CATHETER, 30 CC, 2-WAY, 24 FR (8.0 MM): Brand: DOVER

## (undated) DEVICE — CANN SMPL SOFTECH BIFLO ETCO2 A/M 7FT

## (undated) DEVICE — GLV SURG SENSICARE GREEN W/ALOE PF LF 6 STRL

## (undated) DEVICE — IRRIGATOR TOOMEY 70CC

## (undated) DEVICE — SYR LUERLOK 30CC

## (undated) DEVICE — ELECTRD ROLL BALL 24F 5MM

## (undated) DEVICE — ELECTRODE,RT,STRESS,FOAM,50PK: Brand: MEDLINE

## (undated) DEVICE — SYRINGE,TOOMEY,IRRIGATION,70CC,STERILE: Brand: MEDLINE